# Patient Record
Sex: MALE | Race: WHITE | NOT HISPANIC OR LATINO | Employment: OTHER | URBAN - METROPOLITAN AREA
[De-identification: names, ages, dates, MRNs, and addresses within clinical notes are randomized per-mention and may not be internally consistent; named-entity substitution may affect disease eponyms.]

---

## 2017-01-28 DIAGNOSIS — N18.30 BENIGN HYPERTENSION WITH CHRONIC KIDNEY DISEASE, STAGE III (H): Primary | ICD-10-CM

## 2017-01-28 DIAGNOSIS — I12.9 BENIGN HYPERTENSION WITH CHRONIC KIDNEY DISEASE, STAGE III (H): Primary | ICD-10-CM

## 2017-01-28 NOTE — TELEPHONE ENCOUNTER
lisinopril-hydrochlorothiazide (PRINZIDE,ZESTORETIC) 10-12.5 MG per tablet      Last Written Prescription Date: 08/24/16  Last Fill Quantity: 90 tab, # refills: 0  Last Office Visit with [facility], [facility] or [facility] prescribing provider: 08/07/15       POTASSIUM   Date Value Ref Range Status   01/16/2015 4.7 3.4 - 5.3 mmol/L Final     CREATININE   Date Value Ref Range Status   01/16/2015 1.64* 0.66 - 1.25 mg/dL Final     BP Readings from Last 3 Encounters:   08/07/15 102/60   08/01/15 140/70   04/20/15 128/62

## 2017-01-30 NOTE — TELEPHONE ENCOUNTER
Routing refill request to provider for review/approval because:  Labs out of range:  Potassium and creatine    Gabriel Carroll MD at 8/24/2016  3:43 PM      Status: Signed         Expand All Collapse All    call- f/u needed before more refills given

## 2017-01-31 NOTE — TELEPHONE ENCOUNTER
Call the pt - must have appt scheduled to get med then 1 mo will be given until appt  Send back to me once appt is scheduled and pt is informed

## 2017-02-02 NOTE — TELEPHONE ENCOUNTER
Left message for patient informing him to call back to schedule appt . Will postpone message.Lexi Knox RN

## 2017-02-08 ENCOUNTER — TRANSFERRED RECORDS (OUTPATIENT)
Dept: HEALTH INFORMATION MANAGEMENT | Facility: CLINIC | Age: 82
End: 2017-02-08

## 2017-02-08 NOTE — TELEPHONE ENCOUNTER
[facility] called to check status of refill request.  Advised pharmacist that pt needs appt scheduled first, then will refill x1 month.  Pharmacist will pass message on to pt.  Hold message and watch for pt to make appt.  TISH Platt R.N.

## 2017-02-09 RX ORDER — LISINOPRIL/HYDROCHLOROTHIAZIDE 10-12.5 MG
1 TABLET ORAL DAILY
Qty: 30 TABLET | Refills: 0 | Status: SHIPPED | OUTPATIENT
Start: 2017-02-09 | End: 2018-04-26

## 2017-02-22 DIAGNOSIS — E11.9 TYPE 2 DIABETES, HBA1C GOAL < 8% (H): ICD-10-CM

## 2017-02-22 LAB
ALT SERPL W P-5'-P-CCNC: 24 U/L (ref 0–70)
ANION GAP SERPL CALCULATED.3IONS-SCNC: 7 MMOL/L (ref 3–14)
BUN SERPL-MCNC: 23 MG/DL (ref 7–30)
CALCIUM SERPL-MCNC: 8.2 MG/DL (ref 8.5–10.1)
CHLORIDE SERPL-SCNC: 109 MMOL/L (ref 94–109)
CHOLEST SERPL-MCNC: 116 MG/DL
CO2 SERPL-SCNC: 25 MMOL/L (ref 20–32)
CREAT SERPL-MCNC: 1.53 MG/DL (ref 0.66–1.25)
CREAT UR-MCNC: 197 MG/DL
GFR SERPL CREATININE-BSD FRML MDRD: 43 ML/MIN/1.7M2
GLUCOSE SERPL-MCNC: 107 MG/DL (ref 70–99)
HBA1C MFR BLD: 5.9 % (ref 4.3–6)
HDLC SERPL-MCNC: 41 MG/DL
LDLC SERPL CALC-MCNC: 64 MG/DL
MICROALBUMIN UR-MCNC: 170 MG/L
MICROALBUMIN/CREAT UR: 86.29 MG/G CR (ref 0–17)
NONHDLC SERPL-MCNC: 75 MG/DL
POTASSIUM SERPL-SCNC: 4 MMOL/L (ref 3.4–5.3)
SODIUM SERPL-SCNC: 141 MMOL/L (ref 133–144)
TRIGL SERPL-MCNC: 56 MG/DL

## 2017-02-22 PROCEDURE — 83036 HEMOGLOBIN GLYCOSYLATED A1C: CPT | Performed by: INTERNAL MEDICINE

## 2017-02-22 PROCEDURE — 82043 UR ALBUMIN QUANTITATIVE: CPT | Performed by: INTERNAL MEDICINE

## 2017-02-22 PROCEDURE — 80048 BASIC METABOLIC PNL TOTAL CA: CPT | Performed by: INTERNAL MEDICINE

## 2017-02-22 PROCEDURE — 84460 ALANINE AMINO (ALT) (SGPT): CPT | Performed by: INTERNAL MEDICINE

## 2017-02-22 PROCEDURE — 80061 LIPID PANEL: CPT | Performed by: INTERNAL MEDICINE

## 2017-02-22 PROCEDURE — 36415 COLL VENOUS BLD VENIPUNCTURE: CPT | Performed by: INTERNAL MEDICINE

## 2018-03-10 ENCOUNTER — RADIANT APPOINTMENT (OUTPATIENT)
Dept: GENERAL RADIOLOGY | Facility: CLINIC | Age: 83
End: 2018-03-10
Attending: FAMILY MEDICINE
Payer: MEDICARE

## 2018-03-10 ENCOUNTER — OFFICE VISIT (OUTPATIENT)
Dept: URGENT CARE | Facility: URGENT CARE | Age: 83
End: 2018-03-10
Payer: MEDICARE

## 2018-03-10 VITALS
DIASTOLIC BLOOD PRESSURE: 90 MMHG | WEIGHT: 164.06 LBS | BODY MASS INDEX: 25.7 KG/M2 | RESPIRATION RATE: 16 BRPM | SYSTOLIC BLOOD PRESSURE: 160 MMHG | HEART RATE: 80 BPM | TEMPERATURE: 97.6 F

## 2018-03-10 DIAGNOSIS — R10.31 ABDOMINAL PAIN, RIGHT LOWER QUADRANT: Primary | ICD-10-CM

## 2018-03-10 DIAGNOSIS — R10.31 ABDOMINAL PAIN, RIGHT LOWER QUADRANT: ICD-10-CM

## 2018-03-10 LAB
ALBUMIN SERPL-MCNC: 4 G/DL (ref 3.4–5)
ALBUMIN UR-MCNC: 30 MG/DL
ALP SERPL-CCNC: 82 U/L (ref 40–150)
ALT SERPL W P-5'-P-CCNC: 14 U/L (ref 0–70)
ANION GAP SERPL CALCULATED.3IONS-SCNC: 6 MMOL/L (ref 3–14)
APPEARANCE UR: CLEAR
AST SERPL W P-5'-P-CCNC: 17 U/L (ref 0–45)
BASOPHILS # BLD AUTO: 0 10E9/L (ref 0–0.2)
BASOPHILS NFR BLD AUTO: 0.3 %
BILIRUB SERPL-MCNC: 0.8 MG/DL (ref 0.2–1.3)
BILIRUB UR QL STRIP: NEGATIVE
BUN SERPL-MCNC: 28 MG/DL (ref 7–30)
CALCIUM SERPL-MCNC: 8.5 MG/DL (ref 8.5–10.1)
CHLORIDE SERPL-SCNC: 105 MMOL/L (ref 94–109)
CO2 SERPL-SCNC: 26 MMOL/L (ref 20–32)
COLOR UR AUTO: YELLOW
CREAT SERPL-MCNC: 1.72 MG/DL (ref 0.66–1.25)
DIFFERENTIAL METHOD BLD: NORMAL
EOSINOPHIL # BLD AUTO: 0.4 10E9/L (ref 0–0.7)
EOSINOPHIL NFR BLD AUTO: 6.2 %
ERYTHROCYTE [DISTWIDTH] IN BLOOD BY AUTOMATED COUNT: 12.5 % (ref 10–15)
GFR SERPL CREATININE-BSD FRML MDRD: 38 ML/MIN/1.7M2
GLUCOSE SERPL-MCNC: 115 MG/DL (ref 70–99)
GLUCOSE UR STRIP-MCNC: NEGATIVE MG/DL
HCT VFR BLD AUTO: 46.1 % (ref 40–53)
HGB BLD-MCNC: 15.3 G/DL (ref 13.3–17.7)
HGB UR QL STRIP: ABNORMAL
KETONES UR STRIP-MCNC: ABNORMAL MG/DL
LEUKOCYTE ESTERASE UR QL STRIP: ABNORMAL
LYMPHOCYTES # BLD AUTO: 1.3 10E9/L (ref 0.8–5.3)
LYMPHOCYTES NFR BLD AUTO: 18.7 %
MCH RBC QN AUTO: 32.7 PG (ref 26.5–33)
MCHC RBC AUTO-ENTMCNC: 33.2 G/DL (ref 31.5–36.5)
MCV RBC AUTO: 99 FL (ref 78–100)
MONOCYTES # BLD AUTO: 0.6 10E9/L (ref 0–1.3)
MONOCYTES NFR BLD AUTO: 8.6 %
NEUTROPHILS # BLD AUTO: 4.6 10E9/L (ref 1.6–8.3)
NEUTROPHILS NFR BLD AUTO: 66.2 %
NITRATE UR QL: NEGATIVE
PH UR STRIP: 5.5 PH (ref 5–7)
PLATELET # BLD AUTO: 189 10E9/L (ref 150–450)
POTASSIUM SERPL-SCNC: 4 MMOL/L (ref 3.4–5.3)
PROT SERPL-MCNC: 7.6 G/DL (ref 6.8–8.8)
RBC # BLD AUTO: 4.68 10E12/L (ref 4.4–5.9)
SODIUM SERPL-SCNC: 137 MMOL/L (ref 133–144)
SOURCE: ABNORMAL
SP GR UR STRIP: 1.02 (ref 1–1.03)
UROBILINOGEN UR STRIP-ACNC: 0.2 EU/DL (ref 0.2–1)
WBC # BLD AUTO: 6.9 10E9/L (ref 4–11)

## 2018-03-10 PROCEDURE — 87186 SC STD MICRODIL/AGAR DIL: CPT | Performed by: FAMILY MEDICINE

## 2018-03-10 PROCEDURE — 81003 URINALYSIS AUTO W/O SCOPE: CPT | Performed by: FAMILY MEDICINE

## 2018-03-10 PROCEDURE — 85025 COMPLETE CBC W/AUTO DIFF WBC: CPT | Performed by: FAMILY MEDICINE

## 2018-03-10 PROCEDURE — 72170 X-RAY EXAM OF PELVIS: CPT | Mod: FY

## 2018-03-10 PROCEDURE — 80053 COMPREHEN METABOLIC PANEL: CPT | Performed by: FAMILY MEDICINE

## 2018-03-10 PROCEDURE — 99214 OFFICE O/P EST MOD 30 MIN: CPT | Performed by: FAMILY MEDICINE

## 2018-03-10 PROCEDURE — 87088 URINE BACTERIA CULTURE: CPT | Performed by: FAMILY MEDICINE

## 2018-03-10 PROCEDURE — 87086 URINE CULTURE/COLONY COUNT: CPT | Performed by: FAMILY MEDICINE

## 2018-03-10 PROCEDURE — 36415 COLL VENOUS BLD VENIPUNCTURE: CPT | Performed by: FAMILY MEDICINE

## 2018-03-10 NOTE — NURSING NOTE
"Chief Complaint   Patient presents with     Abdominal Pain     rt sided abd pain for past 2 days,denies nausea       Initial /90 (Cuff Size: Adult Regular)  Pulse 80  Temp 97.6  F (36.4  C) (Oral)  Resp 16  Wt 164 lb 1 oz (74.4 kg)  BMI 25.7 kg/m2 Estimated body mass index is 25.7 kg/(m^2) as calculated from the following:    Height as of 8/7/15: 5' 7\" (1.702 m).    Weight as of this encounter: 164 lb 1 oz (74.4 kg).  Medication Reconciliation: complete Jovanny DAWKINS    "

## 2018-03-10 NOTE — MR AVS SNAPSHOT
"              After Visit Summary   3/10/2018    Jose Gomez    MRN: [id number]           Patient Information     Date Of Birth          1928        Visit Information        Provider Department      3/10/2018 9:30 AM Christiano Knox MD [facility]        Today's Diagnoses     Abdominal pain, right lower quadrant    -  1       Follow-ups after your visit        Who to contact     If you have questions or need follow up information about today's clinic visit or your schedule please contact [facility] directly at [phone].  Normal or non-critical lab and imaging results will be communicated to you by appAttachhart, letter or phone within 4 business days after the clinic has received the results. If you do not hear from us within 7 days, please contact the clinic through appAttachhart or phone. If you have a critical or abnormal lab result, we will notify you by phone as soon as possible.  Submit refill requests through TrueInsider or call your pharmacy and they will forward the refill request to us. Please allow 3 business days for your refill to be completed.          Additional Information About Your Visit        MyChart Information     TrueInsider lets you send messages to your doctor, view your test results, renew your prescriptions, schedule appointments and more. To sign up, go to [email or web address] . Click on \"Log in\" on the left side of the screen, which will take you to the Welcome page. Then click on \"Sign up Now\" on the right side of the page.     You will be asked to enter the access code listed below, as well as some personal information. Please follow the directions to create your username and password.     Your access code is: [email or web address]  Expires: 2018 11:21 AM     Your access code will  in 90 days. If you need help or a new code, please call your [place] clinic or [phone].        Care EveryWhere ID     This is your Care " EveryWhere ID. This could be used by other organizations to access your [place] medical records  [id number]        Your Vitals Were     Pulse Temperature Respirations BMI (Body Mass Index)          80 97.6  F (36.4  C) (Oral) 16 25.7 kg/m2         Blood Pressure from Last 3 Encounters:   03/10/18 160/90   08/07/15 102/60   08/01/15 140/70    Weight from Last 3 Encounters:   03/10/18 164 lb 1 oz (74.4 kg)   08/07/15 151 lb 9.6 oz (68.8 kg)   08/01/15 160 lb (72.6 kg)              We Performed the Following     CBC with platelets and differential     Comprehensive metabolic panel (BMP + Alb, Alk Phos, ALT, AST, Total. Bili, TP)     UA without Microscopic     Urine Culture Aerobic Bacterial        Primary Care Provider Office Phone # Fax #    Gabriel Carroll [phone]       [address] [place] [address]        Equal Access to Services     ESDRAS LOPEZ : Hadii aad ku hadasho Soomaali, waaxda luqadaha, qaybta kaalmada adeegyada, waxay idiin haypapin becky merrill . So [facility] [phone].    ATENCIÓN: Si habla español, tiene a davis disposición servicios gratuitos de asistencia lingüística. Llame al [phone].    We comply with applicable federal civil rights laws and [place] laws. We do not discriminate on the basis of race, color, national origin, age, disability, sex, sexual orientation, or gender identity.            Thank you!     Thank you for choosing [facility]  for your care. Our goal is always to provide you with excellent care. Hearing back from our patients is one way we can continue to improve our services. Please take a few minutes to complete the written survey that you may receive in the mail after your visit with us. Thank you!             Your Updated Medication List - Protect others around you: Learn how to safely use, store and throw away your medicines at www.disposemymeds.org.          This list is accurate as of 3/10/18 11:21  AM.  Always use your most recent med list.                   Brand Name Dispense Instructions for use Diagnosis    acetaminophen 500 MG tablet    TYLENOL    50 tablet    Take 2 tablets (1,000 mg) by mouth every 6 hours as needed for mild pain    Low back pain       aspirin 81 MG tablet     100    1 TABLET DAILY        B-12 2000 MCG Tabs      Take 1 tablet by mouth daily    Need for prophylactic vaccination against Streptococcus pneumoniae (pneumococcus)       blood glucose monitoring test strip    no brand specified    100 each    by In Vitro route. Test as directed    Type II or unspecified type diabetes mellitus without mention of complication, not stated as uncontrolled       gabapentin 100 MG capsule    NEURONTIN     Take 100 mg by mouth 3 times daily        lisinopril-hydrochlorothiazide 10-12.5 MG per tablet    PRINZIDE/ZESTORETIC    30 tablet    Take 1 tablet by mouth daily    Benign hypertension with chronic kidney disease, stage III       METAMUCIL SMOOTH TEXTURE 63 % Powd   Generic drug:  psyllium     1 Bottle    Take 1 teaspoonful by mouth daily as needed Mix in 8 ounces of water    Left hip pain       simvastatin 20 MG tablet    ZOCOR    30 tablet    Take 1 tablet (20 mg) by mouth At Bedtime    Hyperlipidemia LDL goal <100

## 2018-03-10 NOTE — PROGRESS NOTES
SUBJECTIVE  HPI:  Jose Gomez is a 89 year old male who presents with the CC of abdominal/pelvic pain.    Pain is located in the RLQ/side area, without radiation to the back opr groin.  The pain is characterized as aching, and at worst is a level 3 on a scale of 1-10.  Pain has been present for 1 week(s) and is stable.  EXACERBATING FACTORS: movement/walking  RELIEVING FACTORS: nothing.  ASSOCIATED SX: DENIES nausea, vomiting, diarrhea, dysuria, frequency, hematuria, fever and chills.    Past Medical History:   Diagnosis Date     CKD (chronic kidney disease) stage 3, GFR 30-59 ml/min      Esophageal reflux     very mild     Essential hypertension, benign      Mixed hyperlipidemia      Type 2 diabetes, HbA1c goal < 7% (H)      Unspecified internal derangement of knee     LEFT     Current Outpatient Prescriptions   Medication Sig Dispense Refill     simvastatin (ZOCOR) 20 MG tablet Take 1 tablet (20 mg) by mouth At Bedtime 30 tablet 0     Cyanocobalamin (B-12) 2000 MCG TABS Take 1 tablet by mouth daily       ASPIRIN 81 MG PO TABS 1 TABLET DAILY 100 3     lisinopril-hydrochlorothiazide (PRINZIDE/ZESTORETIC) 10-12.5 MG per tablet Take 1 tablet by mouth daily (Patient not taking: Reported on 3/10/2018) 30 tablet 0     acetaminophen (TYLENOL) 500 MG tablet Take 2 tablets (1,000 mg) by mouth every 6 hours as needed for mild pain (Patient not taking: Reported on 3/10/2018) 50 tablet 0     gabapentin (NEURONTIN) 100 MG capsule Take 100 mg by mouth 3 times daily       psyllium (METAMUCIL SMOOTH TEXTURE) 63 % POWD Take 1 teaspoonful by mouth daily as needed Mix in 8 ounces of water 1 Bottle 11     glucose blood VI test strips strip by In Vitro route. Test as directed 100 each 0     Social History   Substance Use Topics     Smoking status: Former Smoker     Types: Cigars     Quit date: 5/3/1984     Smokeless tobacco: Never Used     Alcohol use Yes      Comment: 1-2x per month       ROS:  CONSTITUTIONAL:NEGATIVE for fever,  chills, change in weight  INTEGUMENTARY/SKIN: NEGATIVE for worrisome rashes, moles or lesions    OBJECTIVE:  /90 (Cuff Size: Adult Regular)  Pulse 80  Temp 97.6  F (36.4  C) (Oral)  Resp 16  Wt 164 lb 1 oz (74.4 kg)  BMI 25.7 kg/m2  GENERAL APPEARANCE: healthy, alert and no distress  EYES: EOMI,  PERRL, conjunctiva clear  HENT: ear canals and TM's normal.  Nose and mouth without ulcers, erythema or lesions  NECK: supple, nontender, no lymphadenopathy  RESP: lungs clear to auscultation - no rales, rhonchi or wheezes  CV: regular rates and rhythm, normal S1 S2, no murmur noted  ABDOMEN:  soft, nontender, no HSM or masses and bowel sounds normal  MS: extremities normal- no gross deformities noted, no erythema, FROM noted in all extremities  NEURO: Normal strength and tone, sensory exam grossly normal,  normal speech and mentation  SKIN: no suspicious lesions or rashes    ASSESSMENT:  1. Abdominal pain, right lower quadrant  ddx includes appy, msk issue, constipation, stone, bursitis, shingles.    - UA without Microscopic  - CBC with platelets and differential  - Comprehensive metabolic panel (BMP + Alb, Alk Phos, ALT, AST, Total. Bili, TP)  - Urine Culture Aerobic Bacterial  - XR Pelvis 1/2 Views; Future     See Orders in Epic  Urine has mild findings but of doubtful significance   Will add cx   Wbc neg   cmp pending.   Xray without specific findings when I review the images personally   Radiology to review xrays and I will communicate new findings   Lets treat as msk issue and see back in one week if persists    hes due for dm labs.

## 2018-03-13 LAB
BACTERIA SPEC CULT: ABNORMAL
BACTERIA SPEC CULT: ABNORMAL
SPECIMEN SOURCE: ABNORMAL

## 2018-03-14 DIAGNOSIS — R82.79 POSITIVE URINE CULTURE: Primary | ICD-10-CM

## 2018-03-14 NOTE — LETTER
[address] [address]  Phone: [phone]      Jose Gomez  [address] [facility] [address]    3/17/2018        Dear Jose    I am writing you concerning your recent lab results obtained at the clinic. Your tests have returned. We have tried contacting you by phone and left messages for you to return call; Please contact [facility]. Thank you!    Sincerely,        [address] [address] [phone] or  [phone]

## 2018-03-14 NOTE — TELEPHONE ENCOUNTER
Please call pt and inform him that he will need a antibiotic for a positive UC.  And the call in RX for him to his preferred Pharmacy.

## 2018-03-20 RX ORDER — AMOXICILLIN 500 MG/1
500 CAPSULE ORAL 3 TIMES DAILY
Qty: 21 CAPSULE | Refills: 0
Start: 2018-03-20 | End: 2018-03-27

## 2018-03-20 NOTE — TELEPHONE ENCOUNTER
Pt called and advised. Pt reports that he has already been taking the amoxicillin for about 3-4 days. Pt instructed to follow up with his PCP after the antibiotic is finished. Pt understood.

## 2018-04-26 ENCOUNTER — APPOINTMENT (OUTPATIENT)
Dept: CT IMAGING | Facility: CLINIC | Age: 83
DRG: 305 | End: 2018-04-26
Attending: EMERGENCY MEDICINE
Payer: MEDICARE

## 2018-04-26 ENCOUNTER — OFFICE VISIT (OUTPATIENT)
Dept: URGENT CARE | Facility: URGENT CARE | Age: 83
End: 2018-04-26
Payer: MEDICARE

## 2018-04-26 ENCOUNTER — APPOINTMENT (OUTPATIENT)
Dept: MRI IMAGING | Facility: CLINIC | Age: 83
DRG: 305 | End: 2018-04-26
Attending: INTERNAL MEDICINE
Payer: MEDICARE

## 2018-04-26 ENCOUNTER — HOSPITAL ENCOUNTER (INPATIENT)
Facility: CLINIC | Age: 83
LOS: 1 days | Discharge: HOME | DRG: 305 | End: 2018-04-27
Attending: EMERGENCY MEDICINE | Admitting: INTERNAL MEDICINE
Payer: MEDICARE

## 2018-04-26 VITALS
TEMPERATURE: 96.5 F | SYSTOLIC BLOOD PRESSURE: 168 MMHG | DIASTOLIC BLOOD PRESSURE: 98 MMHG | RESPIRATION RATE: 16 BRPM | HEART RATE: 68 BPM

## 2018-04-26 DIAGNOSIS — R26.81 GAIT INSTABILITY: ICD-10-CM

## 2018-04-26 DIAGNOSIS — R51.9 ACUTE INTRACTABLE HEADACHE, UNSPECIFIED HEADACHE TYPE: Primary | ICD-10-CM

## 2018-04-26 DIAGNOSIS — I10 HYPERTENSION, UNSPECIFIED TYPE: ICD-10-CM

## 2018-04-26 DIAGNOSIS — E78.5 HYPERLIPIDEMIA LDL GOAL <100: Primary | ICD-10-CM

## 2018-04-26 DIAGNOSIS — R51.9 NONINTRACTABLE HEADACHE, UNSPECIFIED CHRONICITY PATTERN, UNSPECIFIED HEADACHE TYPE: ICD-10-CM

## 2018-04-26 PROBLEM — I16.1 HYPERTENSIVE EMERGENCY: Status: ACTIVE | Noted: 2018-04-26

## 2018-04-26 LAB
ALBUMIN SERPL-MCNC: 3.6 G/DL (ref 3.4–5)
ALBUMIN SERPL-MCNC: 4 G/DL (ref 3.4–5)
ALBUMIN UR-MCNC: NEGATIVE MG/DL
ALP SERPL-CCNC: 74 U/L (ref 40–150)
ALP SERPL-CCNC: 84 U/L (ref 40–150)
ALT SERPL W P-5'-P-CCNC: 15 U/L (ref 0–70)
ALT SERPL W P-5'-P-CCNC: 16 U/L (ref 0–70)
ANION GAP SERPL CALCULATED.3IONS-SCNC: 9 MMOL/L (ref 3–14)
APPEARANCE UR: CLEAR
AST SERPL W P-5'-P-CCNC: 18 U/L (ref 0–45)
AST SERPL W P-5'-P-CCNC: 18 U/L (ref 0–45)
BASOPHILS # BLD AUTO: 0 10E9/L (ref 0–0.2)
BASOPHILS NFR BLD AUTO: 0.1 %
BILIRUB DIRECT SERPL-MCNC: 0.2 MG/DL (ref 0–0.2)
BILIRUB SERPL-MCNC: 0.8 MG/DL (ref 0.2–1.3)
BILIRUB SERPL-MCNC: 1 MG/DL (ref 0.2–1.3)
BILIRUB UR QL STRIP: NEGATIVE
BUN SERPL-MCNC: 24 MG/DL (ref 7–30)
CALCIUM SERPL-MCNC: 8.7 MG/DL (ref 8.5–10.1)
CHLORIDE SERPL-SCNC: 105 MMOL/L (ref 94–109)
CO2 SERPL-SCNC: 21 MMOL/L (ref 20–32)
COLOR UR AUTO: ABNORMAL
CREAT SERPL-MCNC: 1.52 MG/DL (ref 0.66–1.25)
DIFFERENTIAL METHOD BLD: NORMAL
EOSINOPHIL # BLD AUTO: 0.2 10E9/L (ref 0–0.7)
EOSINOPHIL NFR BLD AUTO: 2.9 %
ERYTHROCYTE [DISTWIDTH] IN BLOOD BY AUTOMATED COUNT: 12.4 % (ref 10–15)
ERYTHROCYTE [SEDIMENTATION RATE] IN BLOOD BY WESTERGREN METHOD: 9 MM/H (ref 0–20)
GFR SERPL CREATININE-BSD FRML MDRD: 43 ML/MIN/1.7M2
GLUCOSE BLDC GLUCOMTR-MCNC: 177 MG/DL (ref 70–99)
GLUCOSE SERPL-MCNC: 123 MG/DL (ref 70–99)
GLUCOSE UR STRIP-MCNC: NEGATIVE MG/DL
HBA1C MFR BLD: 6.3 % (ref 0–6.4)
HCT VFR BLD AUTO: 42.5 % (ref 40–53)
HGB BLD-MCNC: 14.9 G/DL (ref 13.3–17.7)
HGB UR QL STRIP: NEGATIVE
IMM GRANULOCYTES # BLD: 0 10E9/L (ref 0–0.4)
IMM GRANULOCYTES NFR BLD: 0.4 %
INR PPP: 1.01 (ref 0.86–1.14)
INTERPRETATION ECG - MUSE: NORMAL
KETONES UR STRIP-MCNC: 10 MG/DL
LEUKOCYTE ESTERASE UR QL STRIP: NEGATIVE
LYMPHOCYTES # BLD AUTO: 1 10E9/L (ref 0.8–5.3)
LYMPHOCYTES NFR BLD AUTO: 12.9 %
MCH RBC QN AUTO: 33 PG (ref 26.5–33)
MCHC RBC AUTO-ENTMCNC: 35.1 G/DL (ref 31.5–36.5)
MCV RBC AUTO: 94 FL (ref 78–100)
MONOCYTES # BLD AUTO: 0.4 10E9/L (ref 0–1.3)
MONOCYTES NFR BLD AUTO: 4.9 %
NEUTROPHILS # BLD AUTO: 5.9 10E9/L (ref 1.6–8.3)
NEUTROPHILS NFR BLD AUTO: 78.8 %
NITRATE UR QL: NEGATIVE
NRBC # BLD AUTO: 0 10*3/UL
NRBC BLD AUTO-RTO: 0 /100
PH UR STRIP: 6.5 PH (ref 5–7)
PLATELET # BLD AUTO: 168 10E9/L (ref 150–450)
POTASSIUM SERPL-SCNC: 4 MMOL/L (ref 3.4–5.3)
PROT SERPL-MCNC: 7 G/DL (ref 6.8–8.8)
PROT SERPL-MCNC: 7.7 G/DL (ref 6.8–8.8)
RBC # BLD AUTO: 4.51 10E12/L (ref 4.4–5.9)
RBC #/AREA URNS AUTO: 3 /HPF (ref 0–2)
SODIUM SERPL-SCNC: 135 MMOL/L (ref 133–144)
SOURCE: ABNORMAL
SP GR UR STRIP: 1.03 (ref 1–1.03)
TROPONIN I SERPL-MCNC: <0.015 UG/L (ref 0–0.04)
UROBILINOGEN UR STRIP-MCNC: NORMAL MG/DL (ref 0–2)
WBC # BLD AUTO: 7.5 10E9/L (ref 4–11)
WBC #/AREA URNS AUTO: 2 /HPF (ref 0–5)

## 2018-04-26 PROCEDURE — 81001 URINALYSIS AUTO W/SCOPE: CPT | Performed by: EMERGENCY MEDICINE

## 2018-04-26 PROCEDURE — 25000125 ZZHC RX 250: Performed by: EMERGENCY MEDICINE

## 2018-04-26 PROCEDURE — 25000132 ZZH RX MED GY IP 250 OP 250 PS 637: Mod: GY | Performed by: EMERGENCY MEDICINE

## 2018-04-26 PROCEDURE — 99285 EMERGENCY DEPT VISIT HI MDM: CPT | Mod: 25

## 2018-04-26 PROCEDURE — 25000128 H RX IP 250 OP 636: Performed by: INTERNAL MEDICINE

## 2018-04-26 PROCEDURE — 99213 OFFICE O/P EST LOW 20 MIN: CPT | Performed by: PHYSICIAN ASSISTANT

## 2018-04-26 PROCEDURE — 99223 1ST HOSP IP/OBS HIGH 75: CPT | Mod: AI | Performed by: INTERNAL MEDICINE

## 2018-04-26 PROCEDURE — 85610 PROTHROMBIN TIME: CPT | Performed by: EMERGENCY MEDICINE

## 2018-04-26 PROCEDURE — 96365 THER/PROPH/DIAG IV INF INIT: CPT

## 2018-04-26 PROCEDURE — 25000131 ZZH RX MED GY IP 250 OP 636 PS 637: Mod: GY | Performed by: INTERNAL MEDICINE

## 2018-04-26 PROCEDURE — 21000000 ZZH R&B IMCU HEART CARE

## 2018-04-26 PROCEDURE — 70553 MRI BRAIN STEM W/O & W/DYE: CPT

## 2018-04-26 PROCEDURE — 36415 COLL VENOUS BLD VENIPUNCTURE: CPT | Performed by: INTERNAL MEDICINE

## 2018-04-26 PROCEDURE — 80076 HEPATIC FUNCTION PANEL: CPT | Performed by: INTERNAL MEDICINE

## 2018-04-26 PROCEDURE — 93005 ELECTROCARDIOGRAM TRACING: CPT

## 2018-04-26 PROCEDURE — 70450 CT HEAD/BRAIN W/O DYE: CPT

## 2018-04-26 PROCEDURE — 84484 ASSAY OF TROPONIN QUANT: CPT | Performed by: INTERNAL MEDICINE

## 2018-04-26 PROCEDURE — 85652 RBC SED RATE AUTOMATED: CPT | Performed by: EMERGENCY MEDICINE

## 2018-04-26 PROCEDURE — A9270 NON-COVERED ITEM OR SERVICE: HCPCS | Mod: GY | Performed by: INTERNAL MEDICINE

## 2018-04-26 PROCEDURE — 70496 CT ANGIOGRAPHY HEAD: CPT

## 2018-04-26 PROCEDURE — 83036 HEMOGLOBIN GLYCOSYLATED A1C: CPT | Performed by: INTERNAL MEDICINE

## 2018-04-26 PROCEDURE — 25000132 ZZH RX MED GY IP 250 OP 250 PS 637: Mod: GY | Performed by: INTERNAL MEDICINE

## 2018-04-26 PROCEDURE — 00000146 ZZHCL STATISTIC GLUCOSE BY METER IP

## 2018-04-26 PROCEDURE — 25000128 H RX IP 250 OP 636: Performed by: EMERGENCY MEDICINE

## 2018-04-26 PROCEDURE — 96375 TX/PRO/DX INJ NEW DRUG ADDON: CPT

## 2018-04-26 PROCEDURE — A9270 NON-COVERED ITEM OR SERVICE: HCPCS | Mod: GY | Performed by: EMERGENCY MEDICINE

## 2018-04-26 PROCEDURE — 85025 COMPLETE CBC W/AUTO DIFF WBC: CPT | Performed by: EMERGENCY MEDICINE

## 2018-04-26 PROCEDURE — 80053 COMPREHEN METABOLIC PANEL: CPT | Performed by: EMERGENCY MEDICINE

## 2018-04-26 RX ORDER — AMLODIPINE BESYLATE 5 MG/1
5 TABLET ORAL DAILY
Status: DISCONTINUED | OUTPATIENT
Start: 2018-04-26 | End: 2018-04-27 | Stop reason: HOSPADM

## 2018-04-26 RX ORDER — LISINOPRIL/HYDROCHLOROTHIAZIDE 10-12.5 MG
1 TABLET ORAL ONCE
Status: COMPLETED | OUTPATIENT
Start: 2018-04-26 | End: 2018-04-26

## 2018-04-26 RX ORDER — PROCHLORPERAZINE 25 MG
12.5 SUPPOSITORY, RECTAL RECTAL EVERY 12 HOURS PRN
Status: DISCONTINUED | OUTPATIENT
Start: 2018-04-26 | End: 2018-04-27 | Stop reason: HOSPADM

## 2018-04-26 RX ORDER — ACETAMINOPHEN 325 MG/1
650 TABLET ORAL EVERY 4 HOURS PRN
Status: DISCONTINUED | OUTPATIENT
Start: 2018-04-26 | End: 2018-04-27 | Stop reason: HOSPADM

## 2018-04-26 RX ORDER — ATORVASTATIN CALCIUM 40 MG/1
40 TABLET, FILM COATED ORAL
Status: DISCONTINUED | OUTPATIENT
Start: 2018-04-26 | End: 2018-04-27 | Stop reason: HOSPADM

## 2018-04-26 RX ORDER — HYDROCODONE BITARTRATE AND ACETAMINOPHEN 5; 325 MG/1; MG/1
1-2 TABLET ORAL EVERY 4 HOURS PRN
Status: DISCONTINUED | OUTPATIENT
Start: 2018-04-26 | End: 2018-04-27 | Stop reason: HOSPADM

## 2018-04-26 RX ORDER — LISINOPRIL 10 MG/1
10 TABLET ORAL DAILY
Status: DISCONTINUED | OUTPATIENT
Start: 2018-04-27 | End: 2018-04-27 | Stop reason: HOSPADM

## 2018-04-26 RX ORDER — HYDROMORPHONE HYDROCHLORIDE 1 MG/ML
.3-.5 INJECTION, SOLUTION INTRAMUSCULAR; INTRAVENOUS; SUBCUTANEOUS
Status: DISCONTINUED | OUTPATIENT
Start: 2018-04-26 | End: 2018-04-27 | Stop reason: HOSPADM

## 2018-04-26 RX ORDER — ONDANSETRON 2 MG/ML
4 INJECTION INTRAMUSCULAR; INTRAVENOUS EVERY 6 HOURS PRN
Status: DISCONTINUED | OUTPATIENT
Start: 2018-04-26 | End: 2018-04-27 | Stop reason: HOSPADM

## 2018-04-26 RX ORDER — SODIUM CHLORIDE 9 MG/ML
INJECTION, SOLUTION INTRAVENOUS CONTINUOUS
Status: DISCONTINUED | OUTPATIENT
Start: 2018-04-26 | End: 2018-04-27

## 2018-04-26 RX ORDER — NITROGLYCERIN 0.4 MG/1
0.4 TABLET SUBLINGUAL EVERY 5 MIN PRN
Status: DISCONTINUED | OUTPATIENT
Start: 2018-04-26 | End: 2018-04-27 | Stop reason: HOSPADM

## 2018-04-26 RX ORDER — POLYETHYLENE GLYCOL 3350 17 G/17G
17 POWDER, FOR SOLUTION ORAL DAILY PRN
Status: DISCONTINUED | OUTPATIENT
Start: 2018-04-26 | End: 2018-04-27 | Stop reason: HOSPADM

## 2018-04-26 RX ORDER — DEXAMETHASONE SODIUM PHOSPHATE 10 MG/ML
10 INJECTION, SOLUTION INTRAMUSCULAR; INTRAVENOUS ONCE
Status: COMPLETED | OUTPATIENT
Start: 2018-04-26 | End: 2018-04-26

## 2018-04-26 RX ORDER — NALOXONE HYDROCHLORIDE 0.4 MG/ML
.1-.4 INJECTION, SOLUTION INTRAMUSCULAR; INTRAVENOUS; SUBCUTANEOUS
Status: DISCONTINUED | OUTPATIENT
Start: 2018-04-26 | End: 2018-04-27 | Stop reason: HOSPADM

## 2018-04-26 RX ORDER — ASPIRIN 81 MG/1
81 TABLET ORAL DAILY
Status: DISCONTINUED | OUTPATIENT
Start: 2018-04-27 | End: 2018-04-27 | Stop reason: HOSPADM

## 2018-04-26 RX ORDER — IOPAMIDOL 755 MG/ML
70 INJECTION, SOLUTION INTRAVASCULAR ONCE
Status: COMPLETED | OUTPATIENT
Start: 2018-04-26 | End: 2018-04-26

## 2018-04-26 RX ORDER — LIDOCAINE 40 MG/G
CREAM TOPICAL
Status: DISCONTINUED | OUTPATIENT
Start: 2018-04-26 | End: 2018-04-27 | Stop reason: HOSPADM

## 2018-04-26 RX ORDER — PROCHLORPERAZINE MALEATE 5 MG
5 TABLET ORAL EVERY 6 HOURS PRN
Status: DISCONTINUED | OUTPATIENT
Start: 2018-04-26 | End: 2018-04-27 | Stop reason: HOSPADM

## 2018-04-26 RX ORDER — ASPIRIN 325 MG
325 TABLET ORAL ONCE
Status: COMPLETED | OUTPATIENT
Start: 2018-04-26 | End: 2018-04-26

## 2018-04-26 RX ORDER — DEXTROSE MONOHYDRATE 25 G/50ML
25-50 INJECTION, SOLUTION INTRAVENOUS
Status: DISCONTINUED | OUTPATIENT
Start: 2018-04-26 | End: 2018-04-27 | Stop reason: HOSPADM

## 2018-04-26 RX ORDER — ONDANSETRON 4 MG/1
4 TABLET, ORALLY DISINTEGRATING ORAL EVERY 6 HOURS PRN
Status: DISCONTINUED | OUTPATIENT
Start: 2018-04-26 | End: 2018-04-27 | Stop reason: HOSPADM

## 2018-04-26 RX ORDER — NICOTINE POLACRILEX 4 MG
15-30 LOZENGE BUCCAL
Status: DISCONTINUED | OUTPATIENT
Start: 2018-04-26 | End: 2018-04-27 | Stop reason: HOSPADM

## 2018-04-26 RX ORDER — DIPHENHYDRAMINE HYDROCHLORIDE 50 MG/ML
25 INJECTION INTRAMUSCULAR; INTRAVENOUS ONCE
Status: COMPLETED | OUTPATIENT
Start: 2018-04-26 | End: 2018-04-26

## 2018-04-26 RX ORDER — BISACODYL 10 MG
10 SUPPOSITORY, RECTAL RECTAL DAILY PRN
Status: DISCONTINUED | OUTPATIENT
Start: 2018-04-26 | End: 2018-04-27 | Stop reason: HOSPADM

## 2018-04-26 RX ADMIN — PROCHLORPERAZINE EDISYLATE 5 MG: 5 INJECTION INTRAMUSCULAR; INTRAVENOUS at 17:01

## 2018-04-26 RX ADMIN — ATORVASTATIN CALCIUM 40 MG: 40 TABLET, FILM COATED ORAL at 22:39

## 2018-04-26 RX ADMIN — NICARDIPINE HYDROCHLORIDE 2.5 MG/HR: 0.2 INJECTION, SOLUTION INTRAVENOUS at 20:09

## 2018-04-26 RX ADMIN — DEXAMETHASONE SODIUM PHOSPHATE 10 MG: 10 INJECTION, SOLUTION INTRAMUSCULAR; INTRAVENOUS at 17:01

## 2018-04-26 RX ADMIN — LISINOPRIL AND HYDROCHLOROTHIAZIDE 1 TABLET: 12.5; 1 TABLET ORAL at 18:42

## 2018-04-26 RX ADMIN — ASPIRIN 325 MG ORAL TABLET 325 MG: 325 PILL ORAL at 19:34

## 2018-04-26 RX ADMIN — IOPAMIDOL 70 ML: 755 INJECTION, SOLUTION INTRAVENOUS at 16:54

## 2018-04-26 RX ADMIN — DIPHENHYDRAMINE HYDROCHLORIDE 25 MG: 50 INJECTION, SOLUTION INTRAMUSCULAR; INTRAVENOUS at 17:01

## 2018-04-26 RX ADMIN — SODIUM CHLORIDE: 9 INJECTION, SOLUTION INTRAVENOUS at 22:37

## 2018-04-26 RX ADMIN — INSULIN ASPART 1 UNITS: 100 INJECTION, SOLUTION INTRAVENOUS; SUBCUTANEOUS at 23:08

## 2018-04-26 RX ADMIN — AMLODIPINE BESYLATE 5 MG: 5 TABLET ORAL at 22:39

## 2018-04-26 RX ADMIN — SODIUM CHLORIDE 90 ML: 9 INJECTION, SOLUTION INTRAVENOUS at 16:55

## 2018-04-26 ASSESSMENT — ENCOUNTER SYMPTOMS
ABDOMINAL PAIN: 0
VOMITING: 0
NUMBNESS: 0
HEADACHES: 1
NAUSEA: 1
SPEECH DIFFICULTY: 0
SHORTNESS OF BREATH: 0
DIZZINESS: 0
NECK PAIN: 1

## 2018-04-26 ASSESSMENT — PAIN DESCRIPTION - DESCRIPTORS: DESCRIPTORS: ACHING

## 2018-04-26 NOTE — PROGRESS NOTES
SUBJECTIVE:   Jose Gomez is a 89 year old male presenting with a chief complaint of a severe headache, onset 2 hours ago.  The headache is sharp, and an 8/10 on a 0-10 pain scale.    He also complains of nausea, no vomiting.  He denies any numbness, tingling or weakness in his extremities.  Denies any difficulty talking or swallowing.  He is otherwise in his usual state of health.  He states that he does not usually have headaches.  This headache is one of the worse that he has ever had.  He also complains of some posterior neck pain.  Denies any fevers or URI symptoms.      His friend drove him.       Past Medical History:   Diagnosis Date     CKD (chronic kidney disease) stage 3, GFR 30-59 ml/min      Esophageal reflux     very mild     Essential hypertension, benign      Mixed hyperlipidemia      Type 2 diabetes, HbA1c goal < 7% (H)      Unspecified internal derangement of knee     LEFT     Patient Active Problem List   Diagnosis     Internal derangement of knee     Esophageal reflux     Essential hypertension, benign     Pernicious anemia     HYPERLIPIDEMIA LDL GOAL <100     CKD (chronic kidney disease) stage 3, GFR 30-59 ml/min     Other specified idiopathic peripheral neuropathy     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)     Social History   Substance Use Topics     Smoking status: Former Smoker     Types: Cigars     Quit date: 5/3/1984     Smokeless tobacco: Never Used     Alcohol use Yes      Comment: 1-2x per month       ROS:  CONSTITUTIONAL:NEGATIVE for fever, chills, change in weight  INTEGUMENTARY/SKIN: NEGATIVE for worrisome rashes, moles or lesions  EYES: NEGATIVE for vision changes or irritation  ENT/MOUTH: NEGATIVE for ear, mouth and throat problems  RESP:NEGATIVE for significant cough or SOB  CV: NEGATIVE for chest pain, palpitations or peripheral edema  GI: as per HPI  MUSCULOSKELETAL: NEGATIVE for significant arthralgias or myalgia  NEURO: as per HPI  Review of systems  negative except as stated above.    OBJECTIVE  :BP (!) 168/98  Pulse 68  Temp 96.5  F (35.8  C) (Tympanic)  Resp 16  GENERAL APPEARANCE: alert and mild distress secondary to pain, sitting in wheelchair  SKIN: no suspicious lesions or rashes    (R51) Acute intractable headache, unspecified headache type  (primary encounter diagnosis)  Comment:   Plan: TO [facility] now for further evaluation.  Patient prefers his friend to drive him.  [facility] informed.

## 2018-04-26 NOTE — ED PROVIDER NOTES
History     Chief Complaint:  Headache     The history is provided by the patient and a friend.      Jose Gomez is a 89 year old male with history of DM2, HTN, HLD, and CKD who presents with a headache. The patient felt an ache in his neck at around 1100 this morning while playing cards with a friend. The neck pain slowly worsened and he developed a headache. The headache became progressively worse and was unaffected when he took an extra strength Tylenol at 1330. At 1500 the pain became even worse and his friend noticed that the patient had an abnormally unsteady gait, prompting him to bring the patient to an Urgent Care. Providers there referred him to the ED after obtaining a history. Here the patient endorses some mild nausea with his pain but otherwise has no other medical concerns. He denies any speech difficulty, vision problems, numbness, dizziness, chest pain, shortness of breath, abdominal pain, or vomiting. Of note, the patient does report that he does not often get headaches. He has no history of stroke.    Allergies:  No known drug allergies     Medications:    Aspirin  Neurontin  Prinzide  Zocor    Past Medical History:    CKD (chronic kidney disease) stage 3, GFR 30-59 ml/min   Esophageal reflux   Essential hypertension, benign   Mixed hyperlipidemia   Type 2 diabetes, HbA1c goal < 7% (H)   Unspecified internal derangement of knee     Past Surgical History:    Colonoscopy  Phacoemulsification clear cornea with intraocular lens implant, bilateral    Family History:    Heart disease  Cancer  CVD    Social History:  Presents with friend   Tobacco use: Former smoker. QD 5/3/1984  Alcohol use: 1-2 standard drinks per month  PCP: Gabriel Carroll    Marital Status:  Single      Review of Systems   Eyes: Negative for visual disturbance.   Respiratory: Negative for shortness of breath.    Cardiovascular: Negative for chest pain.   Gastrointestinal: Positive for nausea. Negative for abdominal pain and  "vomiting.   Musculoskeletal: Positive for gait problem and neck pain.   Neurological: Positive for headaches. Negative for dizziness, speech difficulty and numbness.   All other systems reviewed and are negative.    Physical Exam     Patient Vitals for the past 24 hrs:   BP Temp Temp src Pulse Heart Rate Resp SpO2 Height Weight   04/26/18 2020 150/89 - - - 79 11 - - -   04/26/18 2010 150/90 - - - 80 20 - - -   04/26/18 2009 - - - - 80 13 - - -   04/26/18 2008 165/87 - - - 79 23 - - -   04/26/18 2001 - - - - 93 22 92 % - -   04/26/18 1954 (!) 174/104 - - 83 83 20 98 % - -   04/26/18 1915 (!) 185/108 - - - 80 10 - - -   04/26/18 1900 (!) 162/91 - - - 85 11 - - -   04/26/18 1845 (!) 193/131 - - - 75 18 97 % - -   04/26/18 1830 (!) 200/115 - - - 84 18 - - -   04/26/18 1815 171/84 - - - 65 (!) 6 97 % - -   04/26/18 1800 166/77 - - - 68 - 95 % - -   04/26/18 1745 (!) 179/95 - - - 67 - 96 % - -   04/26/18 1730 (!) 171/97 - - - 68 14 94 % - -   04/26/18 1715 (!) 191/105 - - - 73 16 94 % - -   04/26/18 1700 (!) 199/102 - - - 77 9 97 % - -   04/26/18 1634 (!) 218/109 - - - - - - - -   04/26/18 1609 (!) 229/110 - - - - - - - -   04/26/18 1608 - 97  F (36.1  C) Temporal - 79 16 97 % 1.753 m (5' 9\") 74.8 kg (165 lb)      Physical Exam  Constitutional: Elderly white male sitting, appears uncomfortable.  HENT: No signs of trauma.   Eyes: EOM are normal. Pupils are equal, round, and reactive to light.   Neck: Normal range of motion. No JVD present. No cervical adenopathy. No carotid bruit.  Cardiovascular: Regular rhythm.  Exam reveals no gallop and no friction rub.    No murmur heard.  Pulmonary/Chest: Bilateral breath sounds normal. No wheezes, rhonchi or rales.  Abdominal: Soft. No tenderness. No rebound or guarding.   Musculoskeletal: No edema. No tenderness.   Lymphadenopathy: No lymphadenopathy.   Neurological: Alert and oriented to person, place, and time. Normal strength. Coordination normal. Fluent speech, no facial " asymmetry. Finger-nose-finger with slight past pointing. Sensation intact to light touch. Gait not initially tested.  Skin: Skin is warm and dry. No rash noted. No erythema.      Emergency Department Course   ECG (17:06:01):  Rate 82 bpm. KY interval 140. QRS duration 86. QT/QTc 416/486. P-R-T axes 48 34 -8. Normal sinus rhythm. Possible inferior infarct, age undetermined. Interpreted by Matt Rey MD.     Imaging:  Radiographic findings were communicated with the patient who voiced understanding of the findings.  Head CT w/o Contrast  IMPRESSION: Chronic changes. No evidence for intracranial hemorrhage or any acute process.    LILIANA COYLE MD    Head/neck angiogram CT w & w/o Contrast  IMPRESSION:  1. Mild to moderate atherosclerotic disease involving the carotid bifurcations, brachiocephalic vessels, carotid siphon regions without significant stenosis.  2. No evidence for significant stenosis, thromboembolism, dissection, or aneurysm.    LILIANA COYLE MD    Imaging independently reviewed and agree with radiologist interpretation.      Laboratory:  CBC: WBC 7.5 HGB 14.9    CMP: Creatinine 1.52 Glucose 123  GFR Estimate 43   INR: 1.01  Erythrocyte sedimentation rate: 9    UA: 2 wbc, 3 rbc     Interventions:  1701: Compazine 5 mg IV  1701: Benadryl 25 mg IV  1701: Decadron 10 mg IV  1842: Prinzide 10/12.5 1 tablet PO  1934: Aspirin 325 mg PO  2041: Cardene 40 mg IV    Emergency Department Course:  Past medical records, nursing notes, and vitals reviewed.  1630: I performed an exam of the patient and obtained history, as documented above.    1809: Findings and plan explained to the Patient who consents to admission.   1919: I discussed the patient with Dr. Marie, on-call for Neurology.    1924: Discussed the patient with Dr. Sanchez, who will admit the patient to a [facility] bed for further monitoring, evaluation, and treatment.      I personally reviewed the laboratory results with the patient and  answered all related questions prior to admit.  Impression & Plan    Medical Decision Making:  Jose Gomez is a 89 year old male who was playing cards with his friends when he started to notice pain in the back of his neck and the pain progressed and got worse over the course of several hours where he was nauseated and was having some gait problems. He was taken to an urgent care by a friend and brought here.  On arrival here when I entered the room he is hypertensive but he is neurologically non focal, awake, and appropriate. I did not ambulate him initially. Patient was started on medication for headache as well as underwent CT scan of the head and neck to rule out both bleed, dissection, or stroke. No acute findings were noted and when the patient came back he was feeling better and his blood pressure was down. However, when we tried to ambulate him he could not ambulate safely. This is new for him. Patient was given his normal blood pressure medicine he had been taking at home but had been off for a while and his blood pressure was under control. However, when the hospitalist came down to see him it started to rise and so we started him on Nicardipine drip. Patient also received an aspirin and I discussed patient with Neurology, Dr. Matthew Marie. At this point, the patient's symptoms show no sign of acute head bleed or stroke; however, small lacunar stroke  is possible. This could be a hypertensive crisis as well.     Critical Care Time: was 35 minutes excluding procedures.    Diagnosis:    ICD-10-CM    1. Nonintractable headache, unspecified chronicity pattern, unspecified headache type R51 UA with Microscopic   2. Hypertension, unspecified type I10    3. Gait instability R26.81        Disposition:  Admitted to Dr. Sanchez for further evaluation and treatment.      Chris Hdz  4/26/2018    EMERGENCY DEPARTMENT  I, Chris Hdz, am serving as a scribe at 4:30 PM on 4/26/2018 to document services personally  performed by Matt Rey MD based on my observations and the provider's statements to me.       Matt Rey MD  04/26/18 [id number]

## 2018-04-26 NOTE — IP AVS SNAPSHOT
[facility]    [address]    [place] [address]    Phone:  [phone]                                       After Visit Summary   4/26/2018    Jose Gomez    MRN: [id number]           After Visit Summary Signature Page     I have received my discharge instructions, and my questions have been answered. I have discussed any challenges I see with this plan with the nurse or doctor.    ..........................................................................................................................................  Patient/Patient Representative Signature      ..........................................................................................................................................  Patient Representative Print Name and Relationship to Patient    ..................................................               ................................................  Date                                            Time    ..........................................................................................................................................  Reviewed by Signature/Title    ...................................................              ..............................................  Date                                                            Time

## 2018-04-26 NOTE — NURSING NOTE
"Chief Complaint   Patient presents with     Urgent Care     Pt c/o neck pain and headache that started two hours ago.        Initial BP (!) 168/98  Pulse 68  Temp 96.5  F (35.8  C) (Tympanic)  Resp 16 Estimated body mass index is 25.7 kg/(m^2) as calculated from the following:    Height as of 8/7/15: 5' 7\" (1.702 m).    Weight as of 3/10/18: 164 lb 1 oz (74.4 kg).  Medication Reconciliation: unable or not appropriate to perform    Dominick Yanes CMA  "

## 2018-04-26 NOTE — MR AVS SNAPSHOT
"              After Visit Summary   2018    Jose Gomez    MRN: [id number]           Patient Information     Date Of Birth          1928        Visit Information        Provider Department      2018 3:15 PM Patricia Prieto PA-C [facility]        Today's Diagnoses     Acute intractable headache, unspecified headache type    -  1       Follow-ups after your visit        Who to contact     If you have questions or need follow up information about today's clinic visit or your schedule please contact [facility] directly at [phone].  Normal or non-critical lab and imaging results will be communicated to you by [employer], letter or phone within 4 business days after the clinic has received the results. If you do not hear from us within 7 days, please contact the clinic through [employer] or phone. If you have a critical or abnormal lab result, we will notify you by phone as soon as possible.  Submit refill requests through [employer] or call your pharmacy and they will forward the refill request to us. Please allow 3 business days for your refill to be completed.          Additional Information About Your Visit        MyChart Information     [employer] lets you send messages to your doctor, view your test results, renew your prescriptions, schedule appointments and more. To sign up, go to [email or web address] . Click on \"Log in\" on the left side of the screen, which will take you to the Welcome page. Then click on \"Sign up Now\" on the right side of the page.     You will be asked to enter the access code listed below, as well as some personal information. Please follow the directions to create your username and password.     Your access code is: [email or web address]  Expires: 2018 12:21 PM     Your access code will  in 90 days. If you need help or a new code, please call your [place] clinic or [phone].        Care EveryWhere ID     " This is your Care EveryWhere ID. This could be used by other organizations to access your [facility] medical records  [id number]        Your Vitals Were     Pulse Temperature Respirations             68 96.5  F (35.8  C) (Tympanic) 16          Blood Pressure from Last 3 Encounters:   04/26/18 (!) 168/98   03/10/18 160/90   08/07/15 102/60    Weight from Last 3 Encounters:   03/10/18 164 lb 1 oz (74.4 kg)   08/07/15 151 lb 9.6 oz (68.8 kg)   08/01/15 160 lb (72.6 kg)              Today, you had the following     No orders found for display       Primary Care Provider Office Phone # Fax #    Gabriel Carroll [phone]       [address] [place] [id number]        Equal Access to Services     [facility] : Hadii elza olpes hadasho Soomaali, waaxda luqadaha, qaybta kaalmada adeegyada, julianna sousa haysal merrill . So [facility] [phone].    ATENCIÓN: Si habla español, tiene a davis disposición servicios gratuitos de asistencia lingüística. Llame al [phone].    We comply with applicable federal civil rights laws and [place] laws. We do not discriminate on the basis of race, color, national origin, age, disability, sex, sexual orientation, or gender identity.            Thank you!     Thank you for choosing [facility]  for your care. Our goal is always to provide you with excellent care. Hearing back from our patients is one way we can continue to improve our services. Please take a few minutes to complete the written survey that you may receive in the mail after your visit with us. Thank you!             Your Updated Medication List - Protect others around you: Learn how to safely use, store and throw away your medicines at www.disposemymeds.org.          This list is accurate as of 4/26/18  3:49 PM.  Always use your most recent med list.                   Brand Name Dispense Instructions for use Diagnosis    acetaminophen 500 MG tablet    TYLENOL     50 tablet    Take 2 tablets (1,000 mg) by mouth every 6 hours as needed for mild pain    Low back pain       aspirin 81 MG tablet     100    1 TABLET DAILY        B-12 2000 MCG Tabs      Take 1 tablet by mouth daily    Need for prophylactic vaccination against Streptococcus pneumoniae (pneumococcus)       blood glucose monitoring test strip    no brand specified    100 each    by In Vitro route. Test as directed    Type II or unspecified type diabetes mellitus without mention of complication, not stated as uncontrolled       gabapentin 100 MG capsule    NEURONTIN     Take 100 mg by mouth 3 times daily        lisinopril-hydrochlorothiazide 10-12.5 MG per tablet    PRINZIDE/ZESTORETIC    30 tablet    Take 1 tablet by mouth daily    Benign hypertension with chronic kidney disease, stage III       METAMUCIL SMOOTH TEXTURE 63 % Powd   Generic drug:  psyllium     1 Bottle    Take 1 teaspoonful by mouth daily as needed Mix in 8 ounces of water    Left hip pain       simvastatin 20 MG tablet    ZOCOR    30 tablet    Take 1 tablet (20 mg) by mouth At Bedtime    Hyperlipidemia LDL goal <100

## 2018-04-26 NOTE — IP AVS SNAPSHOT
MRN:[id number]                      After Visit Summary   4/26/2018    Jose Gomez    MRN: [id number]           Thank you!     Thank you for choosing [facility] for your care. Our goal is always to provide you with excellent care. Hearing back from our patients is one way we can continue to improve our services. Please take a few minutes to complete the written survey that you may receive in the mail after you visit with us. Thank you!        Patient Information     Date Of Birth          1/21/1928        Designated Caregiver       Most Recent Value    Caregiver    Will someone help with your care after discharge? yes    Name of designated caregiver son/daughter    Phone number of caregiver [phone]    Caregiver address [address] [place]      About your hospital stay     You were admitted on:  April 26, 2018 [facility]    You were discharged on:  April 27, 2018        Reason for your hospital stay       Hypertensive emergency                  Who to Call     For medical emergencies, please call 911.  For non-urgent questions about your medical care, please call your primary care provider or clinic, [phone]          Attending Provider     Provider Specialty    Matt Rey MD Emergency Medicine    [facility]       Primary Care Provider Office Phone # Fax #    Gabriel Carroll [phone]       When to contact your care team       Call your primary doctor or return to ER if you have any of the following: temperature greater than 100.5 or less than 96, chest pain, shortness of breath, severe headache, dizziness, loss of consciousness.                  After Care Instructions     Activity       Your activity upon discharge: activity as tolerated            Diet       Follow this diet upon discharge: Orders Placed This Encounter   2gm sodium, low cholesterol, low fat  diet;   Moderate Consistent CHO Diet                  Follow-up Appointments     Follow-up and recommended labs and tests        Follow up with primary care provider, Gabriel Carroll, within 7 days for hospital follow- up.  Monitor blood pressures- adjust the antihypertensive meds as needed. The following labs/tests are recommended: BMP.                  Your next 10 appointments already scheduled     Apr 28, 2018  6:00 AM CDT   Inpatient Meal Follow Up Therapy with JACQUELIN Enciso   [facility] ([facility])    [address], [address]  [facility] [id number] [phone]            May 02, 2018 11:20 AM CDT   [facility] Visit with Gabriel Carroll MD   [facility]    [address] [address] [phone]           Bring a current list of meds and any records pertaining to this visit. For Physicals, please bring immunization records and any forms needing to be filled out. Please arrive 10 minutes early to complete paperwork.              Additional Services     Med Therapy Manage Referral       Your provider has referred you to: **[facility] Management Scheduling (numerous locations) [phone]   [email or web address]  [facility]: [facility] [phone]   [email or web address]    Reason for Referral: Non-compliance with medications.    The [facility] Management department will contact you to schedule an appointment.  You may also schedule the appointment by calling [phone].  For [facility] [place] patients, please call [phone] to confirm/schedule your appointment on the next business day.    This service is designed to help you get the most from your medications.  A specially trained Pharmacist will work closely with you and your  "providers to solve any questions, concerns, issues or problems related to your medications.    Please bring all of your prescription and non-prescription medications (such as vitamins, over-the-counter medications, and herbals) or a detailed medication list to your appointment.    If you have a glucose meter or other home monitoring information, please also bring this to your appointment (i.e. blood glucose log, blood pressure log, pain log, etc.).                  Pending Results     Date and Time Order Name Status Description    2018 2121 EKG 12-lead, tracing only Preliminary             Statement of Approval     Ordered          18 1516  I have reviewed and agree with all the recommendations and orders detailed in this document.  EFFECTIVE NOW     Approved and electronically signed by:  Marya Hassan MD             Admission Information     Date & Time Provider Department Dept. Phone    2018 Zulema Sanchez MD [facility] Coronary Care Unit [phone]      Your Vitals Were     Blood Pressure Pulse Temperature Respirations Height Weight    146/70 83 97.7  F (36.5  C) (Oral) 16 1.753 m (5' 9\") 69.9 kg (154 lb)    Pulse Oximetry BMI (Body Mass Index)                97% 22.74 kg/m2          MyChart Information     [facility] lets you send messages to your doctor, view your test results, renew your prescriptions, schedule appointments and more. To sign up, go to [email or web address] . Click on \"Log in\" on the left side of the screen, which will take you to the Welcome page. Then click on \"Sign up Now\" on the right side of the page.     You will be asked to enter the access code listed below, as well as some personal information. Please follow the directions to create your username and password.     Your access code is: [email or web address]  Expires: 2018 12:21 PM     Your access code will  in 90 days. If you need help or a new code, please call your [facility] clinic or [phone].   "      Care EveryWhere ID     This is your Care EveryWhere ID. This could be used by other organizations to access your [facility] medical records  [id number]        Equal Access to Services     ESDRAS LOPEZ : Angela Atkinson, warulatimi maidavidha, kimberlyjeff kakat jorgensen, julianna angelain hayaamiranda arteagaalfonzo webb garfield cannon. So [facility] [phone].    ATENCIÓN: Si habla español, tiene a davis disposición servicios gratuitos de asistencia lingüística. Llame al [phone].    We comply with applicable federal civil rights laws and [place] laws. We do not discriminate on the basis of race, color, national origin, age, disability, sex, sexual orientation, or gender identity.               Review of your medicines      START taking        Dose / Directions    amLODIPine 5 MG tablet   Commonly known as:  NORVASC   Used for:  Hypertension, unspecified type        Dose:  5 mg   Take 1 tablet (5 mg) by mouth daily   Quantity:  30 tablet   Refills:  0       lisinopril 10 MG tablet   Commonly known as:  PRINIVIL/ZESTRIL   Used for:  Hypertension, unspecified type        Dose:  10 mg   Take 1 tablet (10 mg) by mouth daily   Quantity:  30 tablet   Refills:  0       simvastatin 20 MG tablet   Commonly known as:  ZOCOR   Used for:  Hyperlipidemia LDL goal <100        Dose:  20 mg   Take 1 tablet (20 mg) by mouth At Bedtime   Quantity:  30 tablet   Refills:  0         CONTINUE these medicines which have NOT CHANGED        Dose / Directions    aspirin 81 MG tablet        1 TABLET DAILY   Quantity:  100   Refills:  3       B-12 2000 MCG Tabs   Used for:  Need for prophylactic vaccination against Streptococcus pneumoniae (pneumococcus)        Dose:  1 tablet   Take 1 tablet by mouth daily   Refills:  0         STOP taking     blood glucose monitoring test strip   Commonly known as:  no brand specified                Where to get your medicines      These medications were sent to [facility] [place], [place] - [address]   [address] [place] [address]     Phone:  [phone]     amLODIPine 5 MG tablet    lisinopril 10 MG tablet    simvastatin 20 MG tablet                Protect others around you: Learn how to safely use, store and throw away your medicines at www.disposemymeds.org.             Medication List: This is a list of all your medications and when to take them. Check marks below indicate your daily home schedule. Keep this list as a reference.      Medications           Morning Afternoon Evening Bedtime As Needed    amLODIPine 5 MG tablet   Commonly known as:  NORVASC   Take 1 tablet (5 mg) by mouth daily   Last time this was given:  5 mg on 4/27/2018 11:37 AM   Next Dose Due:  Tomorrow morning 04/28                                   aspirin 81 MG tablet   1 TABLET DAILY   Last time this was given:  325 mg on 4/26/2018  7:34 PM   Next Dose Due:  This evening 04/27                                   B-12 2000 MCG Tabs   Take 1 tablet by mouth daily   Next Dose Due:  Today 04/27                                   lisinopril 10 MG tablet   Commonly known as:  PRINIVIL/ZESTRIL   Take 1 tablet (10 mg) by mouth daily   Last time this was given:  10 mg on 4/27/2018  9:30 AM   Next Dose Due:  Tomorrow morning 04/28                                   simvastatin 20 MG tablet   Commonly known as:  ZOCOR   Take 1 tablet (20 mg) by mouth At Bedtime   Next Dose Due:  Tonight 04/27                                             More Information        Amlodipine Besylate Oral tablet  What is this medicine?  AMLODIPINE (am YU di peen) is a calcium-channel blocker. It affects the amount of calcium found in your heart and muscle cells. This relaxes your blood vessels, which can reduce the amount of work the heart has to do. This medicine is used to lower high blood pressure. It is also used to prevent chest pain.  This medicine may be used for other purposes; ask your health care provider or pharmacist if you have  questions.  What should I tell my health care provider before I take this medicine?  They need to know if you have any of these conditions:    heart problems like heart failure or aortic stenosis    liver disease    an unusual or allergic reaction to amlodipine, other medicines, foods, dyes, or preservatives    pregnant or trying to get pregnant    breast-feeding  How should I use this medicine?  Take this medicine by mouth with a glass of water. Follow the directions on the prescription label. Take your medicine at regular intervals. Do not take more medicine than directed.  Talk to your pediatrician regarding the use of this medicine in children. Special care may be needed. This medicine has been used in children as young as 6.  Persons over 65 years old may have a stronger reaction to this medicine and need smaller doses.  Overdosage: If you think you have taken too much of this medicine contact a poison control center or emergency room at once.  NOTE: This medicine is only for you. Do not share this medicine with others.  What if I miss a dose?  If you miss a dose, take it as soon as you can. If it is almost time for your next dose, take only that dose. Do not take double or extra doses.  What may interact with this medicine?    herbal or dietary supplements    local or general anesthetics    medicines for high blood pressure    medicines for prostate problems    rifampin  This list may not describe all possible interactions. Give your health care provider a list of all the medicines, herbs, non-prescription drugs, or dietary supplements you use. Also tell them if you smoke, drink alcohol, or use illegal drugs. Some items may interact with your medicine.  What should I watch for while using this medicine?  Visit your doctor or health care professional for regular check ups. Check your blood pressure and pulse rate regularly. Ask your health care professional what your blood pressure and pulse rate should be, and  when you should contact him or her.  This medicine may make you feel confused, dizzy or lightheaded. Do not drive, use machinery, or do anything that needs mental alertness until you know how this medicine affects you. To reduce the risk of dizzy or fainting spells, do not sit or stand up quickly, especially if you are an older patient. Avoid alcoholic drinks; they can make you more dizzy.  Do not suddenly stop taking amlodipine. Ask your doctor or health care professional how you can gradually reduce the dose.  What side effects may I notice from receiving this medicine?  Side effects that you should report to your doctor or health care professional as soon as possible:    allergic reactions like skin rash, itching or hives, swelling of the face, lips, or tongue    breathing problems    changes in vision or hearing    chest pain    fast, irregular heartbeat    swelling of legs or ankles  Side effects that usually do not require medical attention (report to your doctor or health care professional if they continue or are bothersome):    dry mouth    facial flushing    nausea, vomiting    stomach gas, pain    tired, weak    trouble sleeping  This list may not describe all possible side effects. Call your doctor for medical advice about side effects. You may report side effects to FDA at 0-545-FDA-2568.  Where should I keep my medicine?  Keep out of the reach of children.  Store at room temperature between 59 and 86 degrees F (15 and 30 degrees C). Protect from light. Keep container tightly closed. Throw away any unused medicine after the expiration date.  NOTE:This sheet is a summary. It may not cover all possible information. If you have questions about this medicine, talk to your doctor, pharmacist, or health care provider. Copyright  2016 Gold Standard                Lisinopril Oral tablet  What is this medicine?  LISINOPRIL (lyse IN oh pril) is an ACE inhibitor. This medicine is used to treat high blood pressure  and heart failure. It is also used to protect the heart immediately after a heart attack.  This medicine may be used for other purposes; ask your health care provider or pharmacist if you have questions.  What should I tell my health care provider before I take this medicine?  They need to know if you have any of these conditions:    diabetes    heart or blood vessel disease    immune system disease like lupus or scleroderma    kidney disease    low blood pressure    previous swelling of the tongue, face, or lips with difficulty breathing, difficulty swallowing, hoarseness, or tightening of the throat    an unusual or allergic reaction to lisinopril, other ACE inhibitors, insect venom, foods, dyes, or preservatives    pregnant or trying to get pregnant    breast-feeding  How should I use this medicine?  Take this medicine by mouth with a glass of water. Follow the directions on your prescription label. You may take this medicine with or without food. Take your medicine at regular intervals. Do not stop taking this medicine except on the advice of your doctor or health care professional.  Talk to your pediatrician regarding the use of this medicine in children. Special care may be needed. While this drug may be prescribed for children as young as 6 years of age for selected conditions, precautions do apply.  Overdosage: If you think you have taken too much of this medicine contact a poison control center or emergency room at once.  NOTE: This medicine is only for you. Do not share this medicine with others.  What if I miss a dose?  If you miss a dose, take it as soon as you can. If it is almost time for your next dose, take only that dose. Do not take double or extra doses.  What may interact with this medicine?    diuretics    lithium    NSAIDs, medicines for pain and inflammation, like ibuprofen or naproxen    over-the-counter herbal supplements like hawthorn    potassium salts or potassium supplements    salt  substitutes  This list may not describe all possible interactions. Give your health care provider a list of all the medicines, herbs, non-prescription drugs, or dietary supplements you use. Also tell them if you smoke, drink alcohol, or use illegal drugs. Some items may interact with your medicine.  What should I watch for while using this medicine?  Visit your doctor or health care professional for regular check ups. Check your blood pressure as directed. Ask your doctor what your blood pressure should be, and when you should contact him or her. Call your doctor or health care professional if you notice an irregular or fast heart beat.  Women should inform their doctor if they wish to become pregnant or think they might be pregnant. There is a potential for serious side effects to an unborn child. Talk to your health care professional or pharmacist for more information.  Check with your doctor or health care professional if you get an attack of severe diarrhea, nausea and vomiting, or if you sweat a lot. The loss of too much body fluid can make it dangerous for you to take this medicine.  You may get drowsy or dizzy. Do not drive, use machinery, or do anything that needs mental alertness until you know how this drug affects you. Do not stand or sit up quickly, especially if you are an older patient. This reduces the risk of dizzy or fainting spells. Alcohol can make you more drowsy and dizzy. Avoid alcoholic drinks.  Avoid salt substitutes unless you are told otherwise by your doctor or health care professional.  Do not treat yourself for coughs, colds, or pain while you are taking this medicine without asking your doctor or health care professional for advice. Some ingredients may increase your blood pressure.  What side effects may I notice from receiving this medicine?  Side effects that you should report to your doctor or health care professional as soon as possible:    abdominal pain with or without nausea or  vomiting    allergic reactions like skin rash or hives, swelling of the hands, feet, face, lips, throat, or tongue    dark urine    difficulty breathing    dizzy, lightheaded or fainting spell    fever or sore throat    irregular heart beat, chest pain    pain or difficulty passing urine    redness, blistering, peeling or loosening of the skin, including inside the mouth    unusually weak    yellowing of the eyes or skin  Side effects that usually do not require medical attention (report to your doctor or health care professional if they continue or are bothersome):    change in taste    cough    decreased sexual function or desire    headache    sun sensitivity    tiredness  This list may not describe all possible side effects. Call your doctor for medical advice about side effects. You may report side effects to FDA at 3-422-FDA-9462.  Where should I keep my medicine?  Keep out of the reach of children.  Store at room temperature between 15 and 30 degrees C (59 and 86 degrees F). Protect from moisture. Keep container tightly closed. Throw away any unused medicine after the expiration date.  NOTE:This sheet is a summary. It may not cover all possible information. If you have questions about this medicine, talk to your doctor, pharmacist, or health care provider. Copyright  2016 Gold Standard                Simvastatin Oral tablet  What is this medicine?  SIMVASTATIN (SIM va stat in) is known as a HMG-CoA reductase inhibitor or 'statin'. It lowers the level of cholesterol and triglycerides in the blood. This drug may also reduce the risk of heart attack, stroke, or other health problems in patients with risk factors for heart or blood vessel disease. Diet and lifestyle changes are often used with this drug.  This medicine may be used for other purposes; ask your health care provider or pharmacist if you have questions.  What should I tell my health care provider before I take this medicine?  They need to know if you  have any of these conditions:    frequently drink alcoholic beverages    kidney disease    liver disease    muscle aches or weakness    other medical condition    an unusual or allergic reaction to simvastatin, other medicines, foods, dyes, or preservatives    pregnant or trying to get pregnant    breast-feeding  How should I use this medicine?  Take this medicine by mouth with a glass of water. Follow the directions on the prescription label. You can take this medicine with or without food. Take your doses at regular intervals. Do not take your medicine more often than directed.  Talk to your pediatrician regarding the use of this medicine in children. Special care may be needed.  Overdosage: If you think you have taken too much of this medicine contact a poison control center or emergency room at once.  NOTE: This medicine is only for you. Do not share this medicine with others.  What if I miss a dose?  If you miss a dose, take it as soon as you can. If it is almost time for your next dose, take only that dose. Do not take double or extra doses.  What may interact with this medicine?  Do not take this medicine with any of the following medications:    boceprevir    cyclosporine    danazol    gemfibrozil    medicines for fungal infections like itraconazole, ketoconazole, posaconazole    medicines for HIV infection    mifepristone, RU-486    nefazodone    red yeast rice    some antibiotics like clarithromycin, erythromycin, telithromycin    telaprevir  This medicine may also interact with the following medications:    alcohol    amiodarone    amlodipine    colchicine    digoxin    diltiazem    fluconazole    grapefruit juice    niacin    ranolazine    verapamil    voriconazole    warfarin  This list may not describe all possible interactions. Give your health care provider a list of all the medicines, herbs, non-prescription drugs, or dietary supplements you use. Also tell them if you smoke, drink alcohol, or use  illegal drugs. Some items may interact with your medicine.  What should I watch for while using this medicine?  Visit your doctor or health care professional for regular check-ups. You may need regular tests to make sure your liver is working properly.  Tell your doctor or health care professional right away if you get any unexplained muscle pain, tenderness, or weakness, especially if you also have a fever and tiredness. Your doctor or health care professional may tell you to stop taking this medicine if you develop muscle problems. If your muscle problems do not go away after stopping this medicine, contact your health care professional.  This medicine may affect blood sugar levels. If you have diabetes, check with your doctor or health care professional before you change your diet or the dose of your diabetic medicine.  This drug is only part of a total heart-health program. Your doctor or a dietician can suggest a low-cholesterol and low-fat diet to help. Avoid alcohol and smoking, and keep a proper exercise schedule.  Do not use this drug if you are pregnant or breast-feeding. Serious side effects to an unborn child or to an infant are possible. Talk to your doctor or pharmacist for more information.  If you are going to have surgery tell your health care professional that you are taking this drug.  Some drugs may increase the risk of side effects from simvastatin. If you are given certain antibiotics or antifungals, your doctor or health care professional may stop simvastatin for a short time. Check with your doctor or pharmacist for advice.  What side effects may I notice from receiving this medicine?  Side effects that you should report to your doctor or health care professional as soon as possible:    allergic reactions like skin rash, itching or hives, swelling of the face, lips, or tongue    confusion    dark urine    fever    joint pain    loss of memory    muscle cramps, pain    redness, blistering,  peeling or loosening of the skin, including inside the mouth    trouble passing urine or change in the amount of urine    unusually weak or tired    yellowing of the eyes or skin  Side effects that usually do not require medical attention (report to your doctor or health care professional if they continue or are bothersome):    constipation    heartburn    stomach gas, pain, upset  This list may not describe all possible side effects. Call your doctor for medical advice about side effects. You may report side effects to FDA at 3-175-FOM-0097.  Where should I keep my medicine?  Keep out of the reach of children.  Store at room temperature below 30 degrees C (86 degrees F). Keep container tightly closed. Throw away any unused medicine after the expiration date.  NOTE:This sheet is a summary. It may not cover all possible information. If you have questions about this medicine, talk to your doctor, pharmacist, or health care provider. Copyright  2016 Gold Standard

## 2018-04-26 NOTE — ED NOTES
Pt stood w/ assist of two in attempt to road test. Pt very unsteady on feet and nearly fell while trying to walk. Pt assisted to use urinal and then back to bed. MD notified

## 2018-04-27 ENCOUNTER — APPOINTMENT (OUTPATIENT)
Dept: PHYSICAL THERAPY | Facility: CLINIC | Age: 83
DRG: 305 | End: 2018-04-27
Attending: INTERNAL MEDICINE
Payer: MEDICARE

## 2018-04-27 ENCOUNTER — APPOINTMENT (OUTPATIENT)
Dept: CARDIOLOGY | Facility: CLINIC | Age: 83
DRG: 305 | End: 2018-04-27
Attending: INTERNAL MEDICINE
Payer: MEDICARE

## 2018-04-27 ENCOUNTER — APPOINTMENT (OUTPATIENT)
Dept: SPEECH THERAPY | Facility: CLINIC | Age: 83
DRG: 305 | End: 2018-04-27
Attending: INTERNAL MEDICINE
Payer: MEDICARE

## 2018-04-27 VITALS
OXYGEN SATURATION: 97 % | TEMPERATURE: 97.7 F | HEIGHT: 69 IN | SYSTOLIC BLOOD PRESSURE: 125 MMHG | RESPIRATION RATE: 16 BRPM | HEART RATE: 83 BPM | WEIGHT: 154 LBS | BODY MASS INDEX: 22.81 KG/M2 | DIASTOLIC BLOOD PRESSURE: 91 MMHG

## 2018-04-27 LAB
ANION GAP SERPL CALCULATED.3IONS-SCNC: 10 MMOL/L (ref 3–14)
BUN SERPL-MCNC: 29 MG/DL (ref 7–30)
CALCIUM SERPL-MCNC: 8.5 MG/DL (ref 8.5–10.1)
CHLORIDE SERPL-SCNC: 105 MMOL/L (ref 94–109)
CHOLEST SERPL-MCNC: 207 MG/DL
CO2 SERPL-SCNC: 21 MMOL/L (ref 20–32)
CREAT SERPL-MCNC: 1.55 MG/DL (ref 0.66–1.25)
ERYTHROCYTE [DISTWIDTH] IN BLOOD BY AUTOMATED COUNT: 12.4 % (ref 10–15)
GFR SERPL CREATININE-BSD FRML MDRD: 42 ML/MIN/1.7M2
GLUCOSE BLDC GLUCOMTR-MCNC: 126 MG/DL (ref 70–99)
GLUCOSE BLDC GLUCOMTR-MCNC: 144 MG/DL (ref 70–99)
GLUCOSE BLDC GLUCOMTR-MCNC: 194 MG/DL (ref 70–99)
GLUCOSE SERPL-MCNC: 145 MG/DL (ref 70–99)
HCT VFR BLD AUTO: 39.5 % (ref 40–53)
HDLC SERPL-MCNC: 62 MG/DL
HGB BLD-MCNC: 13.7 G/DL (ref 13.3–17.7)
LDLC SERPL CALC-MCNC: 136 MG/DL
MCH RBC QN AUTO: 32.5 PG (ref 26.5–33)
MCHC RBC AUTO-ENTMCNC: 34.7 G/DL (ref 31.5–36.5)
MCV RBC AUTO: 94 FL (ref 78–100)
NONHDLC SERPL-MCNC: 145 MG/DL
PLATELET # BLD AUTO: 181 10E9/L (ref 150–450)
POTASSIUM SERPL-SCNC: 4.3 MMOL/L (ref 3.4–5.3)
RBC # BLD AUTO: 4.22 10E12/L (ref 4.4–5.9)
SODIUM SERPL-SCNC: 136 MMOL/L (ref 133–144)
TRIGL SERPL-MCNC: 45 MG/DL
TROPONIN I SERPL-MCNC: <0.015 UG/L (ref 0–0.04)
WBC # BLD AUTO: 5.5 10E9/L (ref 4–11)

## 2018-04-27 PROCEDURE — 93306 TTE W/DOPPLER COMPLETE: CPT | Mod: 26 | Performed by: INTERNAL MEDICINE

## 2018-04-27 PROCEDURE — 85027 COMPLETE CBC AUTOMATED: CPT | Performed by: INTERNAL MEDICINE

## 2018-04-27 PROCEDURE — 80061 LIPID PANEL: CPT | Performed by: INTERNAL MEDICINE

## 2018-04-27 PROCEDURE — A9270 NON-COVERED ITEM OR SERVICE: HCPCS | Mod: GY | Performed by: INTERNAL MEDICINE

## 2018-04-27 PROCEDURE — 93010 ELECTROCARDIOGRAM REPORT: CPT | Performed by: INTERNAL MEDICINE

## 2018-04-27 PROCEDURE — 93306 TTE W/DOPPLER COMPLETE: CPT

## 2018-04-27 PROCEDURE — 97116 GAIT TRAINING THERAPY: CPT | Mod: GP | Performed by: PHYSICAL THERAPIST

## 2018-04-27 PROCEDURE — 92526 ORAL FUNCTION THERAPY: CPT | Mod: GN | Performed by: SPEECH-LANGUAGE PATHOLOGIST

## 2018-04-27 PROCEDURE — 99238 HOSP IP/OBS DSCHRG MGMT 30/<: CPT | Performed by: INTERNAL MEDICINE

## 2018-04-27 PROCEDURE — 36415 COLL VENOUS BLD VENIPUNCTURE: CPT | Performed by: INTERNAL MEDICINE

## 2018-04-27 PROCEDURE — 97161 PT EVAL LOW COMPLEX 20 MIN: CPT | Mod: GP | Performed by: PHYSICAL THERAPIST

## 2018-04-27 PROCEDURE — 92610 EVALUATE SWALLOWING FUNCTION: CPT | Mod: GN | Performed by: SPEECH-LANGUAGE PATHOLOGIST

## 2018-04-27 PROCEDURE — 25000132 ZZH RX MED GY IP 250 OP 250 PS 637: Mod: GY | Performed by: INTERNAL MEDICINE

## 2018-04-27 PROCEDURE — 00000146 ZZHCL STATISTIC GLUCOSE BY METER IP

## 2018-04-27 PROCEDURE — A9585 GADOBUTROL INJECTION: HCPCS | Performed by: INTERNAL MEDICINE

## 2018-04-27 PROCEDURE — 84484 ASSAY OF TROPONIN QUANT: CPT | Performed by: INTERNAL MEDICINE

## 2018-04-27 PROCEDURE — 80048 BASIC METABOLIC PNL TOTAL CA: CPT | Performed by: INTERNAL MEDICINE

## 2018-04-27 PROCEDURE — G0463 HOSPITAL OUTPT CLINIC VISIT: HCPCS

## 2018-04-27 PROCEDURE — 40000225 ZZH STATISTIC SLP WARD VISIT: Performed by: SPEECH-LANGUAGE PATHOLOGIST

## 2018-04-27 PROCEDURE — 93005 ELECTROCARDIOGRAM TRACING: CPT

## 2018-04-27 PROCEDURE — 25000128 H RX IP 250 OP 636: Performed by: INTERNAL MEDICINE

## 2018-04-27 PROCEDURE — 40000193 ZZH STATISTIC PT WARD VISIT: Performed by: PHYSICAL THERAPIST

## 2018-04-27 RX ORDER — DEXTROSE MONOHYDRATE 25 G/50ML
25-50 INJECTION, SOLUTION INTRAVENOUS
Status: DISCONTINUED | OUTPATIENT
Start: 2018-04-27 | End: 2018-04-27 | Stop reason: HOSPADM

## 2018-04-27 RX ORDER — NICOTINE POLACRILEX 4 MG
15-30 LOZENGE BUCCAL
Status: DISCONTINUED | OUTPATIENT
Start: 2018-04-27 | End: 2018-04-27 | Stop reason: HOSPADM

## 2018-04-27 RX ORDER — GADOBUTROL 604.72 MG/ML
7.5 INJECTION INTRAVENOUS ONCE
Status: COMPLETED | OUTPATIENT
Start: 2018-04-27 | End: 2018-04-27

## 2018-04-27 RX ORDER — SIMVASTATIN 20 MG
20 TABLET ORAL AT BEDTIME
Qty: 30 TABLET | Refills: 0 | Status: SHIPPED | OUTPATIENT
Start: 2018-04-27 | End: 2018-05-02

## 2018-04-27 RX ORDER — LISINOPRIL 5 MG/1
5 TABLET ORAL DAILY
Qty: 30 TABLET | Refills: 0 | Status: SHIPPED | OUTPATIENT
Start: 2018-04-27 | End: 2018-04-27

## 2018-04-27 RX ORDER — AMLODIPINE BESYLATE 5 MG/1
5 TABLET ORAL DAILY
Qty: 30 TABLET | Refills: 0 | Status: SHIPPED | OUTPATIENT
Start: 2018-04-27 | End: 2018-05-02

## 2018-04-27 RX ORDER — LISINOPRIL 10 MG/1
10 TABLET ORAL DAILY
Qty: 30 TABLET | Refills: 0 | Status: SHIPPED | OUTPATIENT
Start: 2018-04-27 | End: 2018-05-02

## 2018-04-27 RX ADMIN — AMLODIPINE BESYLATE 5 MG: 5 TABLET ORAL at 11:37

## 2018-04-27 RX ADMIN — INSULIN ASPART 1 UNITS: 100 INJECTION, SOLUTION INTRAVENOUS; SUBCUTANEOUS at 02:18

## 2018-04-27 RX ADMIN — GADOBUTROL 7.5 ML: 604.72 INJECTION INTRAVENOUS at 01:06

## 2018-04-27 RX ADMIN — ASPIRIN 81 MG: 81 TABLET, COATED ORAL at 09:30

## 2018-04-27 RX ADMIN — ACETAMINOPHEN 650 MG: 325 TABLET ORAL at 17:17

## 2018-04-27 RX ADMIN — LISINOPRIL 10 MG: 10 TABLET ORAL at 09:30

## 2018-04-27 ASSESSMENT — PAIN DESCRIPTION - DESCRIPTORS: DESCRIPTORS: ACHING

## 2018-04-27 NOTE — PROGRESS NOTES
CLINICAL SWALLOW EVALUATION       04/27/18 0900   General Information   Onset Date 04/26/18   Start of Care Date 04/27/18   Referring Physician Dr. AKIL Sanchez   Patient Profile Review/OT: Additional Occupational Profile Info See Profile for full history and prior level of function   Patient/Family Goals Statement Patient would like to continue oral diet.   Swallowing Evaluation Bedside swallow evaluation   Behaviorial Observations Alert   Mode of current nutrition Oral diet   Type of oral diet Regular;Thin liquid   Respiratory Status Room air   Comments Patient admitted 4/26/18 with hypertensive emergency and headache with neck pain concerning for CVA.  MRI read to be without acute infarct or bleed.  Patient's PMH is significant for DM2 and HTN.   Clinical Swallow Evaluation   Oral Musculature generally intact   Structural Abnormalities none present   Dentition present and adequate   Mucosal Quality good   Mandibular Strength and Mobility intact   Oral Labial Strength and Mobility WFL   Lingual Strength and Mobility WFL   Velar Elevation intact   Buccal Strength and Mobility intact   Laryngeal Function Cough;Throat clear;Swallow;Voicing initiated;Dry swallow palpated   Oral Musculature Comments WFL   Additional Documentation Yes   Clinical Swallow Eval: Thin Liquid Texture Trial   Mode of Presentation, Thin Liquids cup;self-fed   Volume of Liquid or Food Presented 6 oz   Oral Phase of Swallow WFL   Pharyngeal Phase of Swallow intact   Diagnostic Statement Wet vocal quality for 1/15 trials of water by cup today. Cleared independently with throat clear.    Clinical Swallow Eval: Puree Solid Texture Trial   Mode of Presentation, Puree spoon;self-fed   Volume of Puree Presented 2 tsp   Oral Phase, Puree WFL   Pharyngeal Phase, Puree intact   Diagnostic Statement WFL   Clinical Swallow Eval: Solid Food Texture Trial   Mode of Presentation, Solid self-fed   Volume of Solid Food Presented 2 crackers   Oral Phase, Solid WFL    Pharyngeal Phase, Solid impaired;coughing/choking   Diagnostic Statement Patient exhibited cough x1 with subsequent sneeze and throat clear while talking with dry oral residue present.  No other overt Sx of aspiration with subsequent solid trials.     Swallow Eval: Clinical Impressions   Skilled Criteria for Therapy Intervention Skilled criteria met.  Treatment indicated.   Functional Assessment Scale (FAS) 6   Treatment Diagnosis Minimal oropharyngeal dysphagia   Diet texture recommendations Regular diet;Thin liquids   Recommended Feeding/Eating Techniques small sips/bites;maintain upright posture during/after eating for 30 mins;alternate between small bites and sips of food/liquid   Therapy Frequency other (see comments)   Predicted Duration of Therapy Intervention (days/wks) 1-2x follow up   Anticipated Discharge Disposition home   Risks and Benefits of Treatment have been explained. Yes   Patient, family and/or staff in agreement with Plan of Care Yes   Clinical Impression Comments Patient presents with minimal oropharyngeal dysphagia characterized by oral residue with dry solid leading to suspected base of tongue or valleculae retention and cough during talking x1 at this time.  Patient exhibiting cough and sneeze response to suspected particulate residue.  Patient reports similar events at home only when eating white rice.  Patient otherwise tolerated thin liquids, puree, and solid trials without overt Sx of aspiration with minimal cueing required not to talk during mastication.  Will follow 1-2x during meal to ensure independent use of generalized safe swallowing strategies and diet tolerance.    Total Evaluation Time   Total Evaluation Time (Minutes) 30

## 2018-04-27 NOTE — PLAN OF CARE
Problem: Hypertensive Disease/Crisis (Arterial) (Adult)  Goal: Signs and Symptoms of Listed Potential Problems Will be Absent, Minimized or Managed (Hypertensive Disease/Crisis)  Signs and symptoms of listed potential problems will be absent, minimized or managed by discharge/transition of care (reference Hypertensive Disease/Crisis (Arterial) (Adult) CPG).   Outcome: Improving  A&O- forgetful/ Neuros intact-word finding.   VSS- BP improved.  Tele NSR.  MOd cho diet. Up with Ax1- worked with ot. Denies pain. Plan dc today- meds here from [facility]- Dr. allison called at 1500 and is going to put in order.  Waiting to ask patient if he has a ride after he finishes his nap.

## 2018-04-27 NOTE — H&P
Admitted:     04/26/2018      PRIMARY CARE PHYSICIAN:  Dr. Gabriel Carroll.      CHIEF COMPLAINT:  Headache and hypertensive emergency.      HISTORY OF PRESENT ILLNESS:  Mr. Jose Gomez is a 89-year-old  male with history of hypertension, hyperlipidemia, type 2 diabetes mellitus and chronic kidney disease who presented to the emergency room with complaints of neck pain and headache.  The patient was playing cards this a.m. at around 11, suddenly noticed pain in his neck and slowly developed a headache, and the headache became progressively worse despite him taking 1 Extra-Strength Tylenol at 1:30 p.m.  So, he felt nauseated because of the pain; so his friend brought him into urgent care.  An urgent care provider has referred him to the emergency room after getting the history.  He felt a little lightheaded but otherwise denies any chest pain or shortness of breath.  No focal weakness, no tingling or numbness, no abdominal pain, nausea, vomiting per se currently.  He was given dexamethasone 10 mg IV, Benadryl 25 mg IV to help with the headache.  In the emergency room, he is hypertensive with a blood pressure in the 229/110 range.  So, there was concern for a stroke with the headache and hypertension.  So, a CT scan of the head was obtained without contrast which was negative which showed there is mild to moderate cerebral atrophy and patchy white matter changes seen in both hemispheres without mass effect; otherwise, no evidence for intracranial hemorrhage or any other acute process.  So, a CT angiogram of the head and neck was done, and it showed mild to moderate atherosclerotic disease involving the carotid bifurcations without significant stenosis.  So, it is a normal CT angiogram.  So, he was given regular aspirin, and they tried to get him up to walk to send him home, but patient became very unsteady and almost fell, so they caught him and a request for hospitalization was made.  To help with his blood  pressure, he was given lisinopril 10 mg in combination with the hydrochlorothiazide 12.5 mg.  With that, his blood pressure improved to 170s at one point for a short period of time, and then it started to trend up again up to 190s to 200s over 100s.  So, a low-dose nicardipine drip was started.  His headache mostly resolved, and he only has like mild posterior head pain.  He rated it as 2/10 currently when I had seen him.  As per the history, the patient has been very noncompliant with all of his medications.  Has not filled his medication prescriptions from the pharmacy since 2016.      PAST MEDICAL HISTORY:  Significant for hypertension, diabetes mellitus type 2, hyperlipidemia, peripheral neuropathy.      ALLERGIES:  NO KNOWN DRUG ALLERGIES.      SOCIAL HISTORY:  Not a smoker.  He drinks 1-2 beers twice a week.  He lives in a house in [place] with his wife.  He is .  He is pretty active and walks around the house.  Due to peripheral neuropathy, he does not exercise much.      FAMILY HISTORY:  Mother had enlarged heart.      REVIEW OF SYSTEMS:  Ten-point review of systems was done and is negative except as in HPI.      PHYSICAL EXAMINATION:   VITAL SIGNS:  His temperature is 97 degrees Fahrenheit, afebrile, pulse rate is ranging from 60s-80s.  Blood pressure initially high at 229/110.  After the lisinopril/hydrochlorothiazide and nicardipine drip, it came down to 150/90.  Respiratory rate is 11-20.  He is satting 92% on room air.   GENERAL:  He is alert, oriented, pleasant male sitting comfortably in bed, not in any acute distress.   HEAD:  Atraumatic, normocephalic.   NECK:  Supple, no JVD.     PUPILS:  Equal, round and reactive to light.  Extraocular eye movements are intact.  Cranial nerves II-XII intact.  Muscle power is 5/5 in bilateral upper and lower extremities.  Finger-nose testing is normal.   HEART:  S1, S2 heard; no murmurs, rubs or gallops.   LUNGS:  Clear to auscultation.  No rales,  rhonchi or wheezing.   ABDOMEN:  Soft, nontender, nondistended, no hepatosplenomegaly.     NEUROLOGIC:  Cranial nerves II through XII are intact.  No focal weakness.  Fluent speech.  Finger-nose testing is normal.   SKIN:  Warm and dry.   PSYCHIATRIC:  Mood and affect are normal.      LABORATORY AND RADIOLOGICAL INVESTIGATIONS:  A 12-lead EKG is reviewed and showed normal sinus rhythm with inferior infarct, age undetermined, with no acute findings.  CBC showed WBC count of 7.5, hemoglobin 14.9, platelet count at 168.  Glucose at 123.  Sodium 135, potassium 4, chloride 105, bicarbonate 21, BUN 24, creatinine 1.52, which is his baseline.  All his LFTs are normal.  INR is at 1.01.  CT head without contrast and CT angiogram of head and neck is negative as dictated above.      ASSESSMENT AND PLAN:  A 89-year-old male with history of hypertension, hyperlipidemia, chronic kidney disease, type 2 diabetes mellitus and diabetic neuropathy and history of pernicious anemia, gastroesophageal reflux disease, presenting with headache and neck pain and hypertensive emergency.   1.  Headache and neck pain.  Could be secondary to hypertensive emergency versus stroke is in the differential as well.  CT head and CT angiogram negative in the emergency room.  He will be hospitalized and will continue baby aspirin and will request an MRI brain to rule out a stroke.  Neuro checks q.4 hours ordered, and we will try to control his blood pressure to less than 160.   2.  Hypertensive emergency.  He was given lisinopril low dose and hydrochlorothiazide, and the patient's blood pressure started to trend up again.  So, we will continue the nicardipine drip and will plan on starting him on Norvasc 5 mg p.o. daily, and if his blood pressure becomes controlled at less than 160, then we will plan on discontinuing the nicardipine drip.  An MRI brain is ordered as a stat as that will help us on deciding the goal range of the blood pressure.  A fasting  LPA and a hemoglobin A1c will be checked on him.   3.  Diabetes mellitus type 2.  He will be placed on a sliding scale insulin with Accu-Cheks q.4 hours while he is n.p.o. until speech path evaluations will be obtained.  Stroke protocol is initiated to do an echo.  PT, OT, speech path evaluations ordered.  He will be monitored on telemetry.  Deep venous thrombosis prophylaxis with PCDs.   # Pain Assessment:  Current Pain Score 2018   Patient currently in pain? yes   Pain score (0-10) 3   Pain location Head   Pain descriptors Aching   - Neo is experiencing pain due to head ache . Pain management was discussed and the plan was created in a collaborative fashion.  Neo's response to the current recommendations: engaged  - Opioid regimen: oxycodone and dilaudid IV   - Response to opioid medications: Reduction of symptoms pain   - Bowel regimen: not needed               CODE STATUS:  Discussed with the patient and he wants to be a full code.      He will be admitted as inpatient as I am anticipating more than 2-night stay.         YOMAIRA BLANKENSHIP MD             D: 2018   T: 2018   MT: TI      Name:     NEO VILLAVICENCIO   MRN:      [id number]        Account:      [id number]   :      1928        Admitted:     2018                   Document: [id number]

## 2018-04-27 NOTE — PROGRESS NOTES
Discharge education completed with patient and his son and daughter. All questions addressed, pt and family verbalized understanding of all instruction. Pt received written copy of instruction. VSS, pt c/o minimal neck pain improved after tylenol. Received meds from discharge pharmacy. Son to provide transport to home

## 2018-04-27 NOTE — PROGRESS NOTES
WOC consult for coccyx:     Pt hx:  A 89-year-old male with history of hypertension, hyperlipidemia, chronic kidney disease, type 2 diabetes mellitus and diabetic neuropathy and history of pernicious anemia, gastroesophageal reflux disease, presenting with headache and neck pain and hypertensive emergency.     D:  Pt able to stand with minimal assist today for assessment.  Pt wearing briefs which were dry, but per report has had some incontinence recently.  The sides of upper gluteal cleft have reddened roughed tissue and are slow to nora, but the tissue appears basically intact.  Coccyx/sacrum intact.  Pt denies any discomfort.      I:  Skin assessed; discussed POC and pressure injury prevention with pt, nursing.     A:  No active pressure injuries noted.  Reddened tissue to gluteal cleft appears more moisture/friction related.  Pt able to reposition himself independently.     P:  Plan for perineal skin care:  BID and prn:  Cleanse with mild cleanser, ie. Leander perineal lotion.  If skin to gluteal cleft appears irritated or pt incontinent, apply a thin glaze of Critic-aid barrier paste to area.  Leave open to air; no dressings.  Ensure pt repositioning every 1-2 hrs in bed, side to side, and every 1 hr in chair.      WOC will sign off; please reconsult if further issues.

## 2018-04-27 NOTE — PLAN OF CARE
Problem: Patient Care Overview  Goal: Plan of Care/Patient Progress Review  Discharge Planner SLP   Patient plan for discharge: Not stated  Current status: Clinical swallow evaluation and treatment completed.  Patient presents with minimal oropharyngeal dysphagia.  No reported dysphagia sx hx or pneumonia hx.  Patient exhibited cough and sneeze after talking with dry, crumbly solids in mouth.  Recommend continue regular diet with thin liquids when alert and upright in chair, alternating liquids and solids. Trained safe swallowing strategies.   Barriers to return to prior living situation: No swallowing barriers to return home.  Recommendations for discharge: Return to previous environment per swallowing needs.   Rationale for recommendations: Patient likely to meet SLP goals during inpatient hospitalization.        Entered by: Jennifer Golden 04/27/2018 9:20 AM

## 2018-04-27 NOTE — PLAN OF CARE
Problem: Patient Care Overview  Goal: Plan of Care/Patient Progress Review  PT: Orders received, chart reviewed, eval and treat initiated. PT was admitted with neck pain/headache with decreased stability. Hx of DM2, HTN, and peripheral neuropathy with 2 falls in past 6 months reported with no injuries. Pt lives in split level home with 6 steps up or down (railings available) with 2 adult kids. Was IND with all mobility and was driving before this incident. Pt states his children would be available to assist him at home as needed.    Discharge Planner PT   Patient plan for discharge: Home  Current status: Pt completes bed mobility IND. Sit<>stand SBA with no AD. Amb 70' with no AD and SBA-CGA, 1 minor LOB noted when turning into room, no LOB with several other turns.   Barriers to return to prior living situation: Safety on stairs  Recommendations for discharge: Home with supervision of children  Rationale for recommendations: Pt was previously IND with all mobility. Currently moves very well although noted minor dynamic balance deficits and decreased endurance currently. Hx of 2 falls in past 6 mo. Pt would be safe to return to home with supervision of children on stairs or with longer distances of amb. Recommend pacing activity until endurance increases.        Entered by: Melody Crandall 04/27/2018 12:02 PM

## 2018-04-27 NOTE — PROGRESS NOTES
04/27/18 1104   Quick Adds   Type of Visit Initial PT Evaluation   Living Environment   Lives With child(katarzyna), adult   Living Arrangements house   Home Accessibility stairs to enter home;stairs within home   Number of Stairs to Enter Home 2   Number of Stairs Within Home 6   Stair Railings at Home (Stairs present inside steps)   Transportation Available car;family or friend will provide   Living Environment Comment Pt lives in split level home with 6 steps up or down inside the home. Lives with adult daughter/son it sounds like. Was previously completing his own driving, states kids can assist him as needed at home,   Self-Care   Usual Activity Tolerance good   Current Activity Tolerance moderate   Equipment Currently Used at Home none   Functional Level Prior   Ambulation 0-->independent   Transferring 0-->independent   Toileting 0-->independent   Bathing 0-->independent   Dressing 0-->independent   Eating 0-->independent   Communication 0-->understands/communicates without difficulty   Swallowing 0-->swallows foods/liquids without difficulty   Cognition 0 - no cognition issues reported   Fall history within last six months yes   Number of times patient has fallen within last six months 2   Which of the above functional risks had a recent onset or change? ambulation;transferring   Prior Functional Level Comment Pt states he has been IND with all mobility. Does note 2 falls in home related to increased peripheral neuropathy, no injuries with this.    General Information   Onset of Illness/Injury or Date of Surgery - Date 04/26/18   Referring Physician Zulema Sanchez MD   Patient/Family Goals Statement Return to home with adult children   Pertinent History of Current Problem (include personal factors and/or comorbidities that impact the POC) Pt admitted with headache/neck pain with unsteadiness. PMH of DM2, HTN, peripheral neuropathy.   Precautions/Limitations no known precautions/limitations   Weight-Bearing Status  - LUE full weight-bearing   Weight-Bearing Status - RUE full weight-bearing   Weight-Bearing Status - LLE full weight-bearing   Weight-Bearing Status - RLE full weight-bearing   Cognitive Status Examination   Orientation orientation to person, place and time   Level of Consciousness alert   Follows Commands and Answers Questions 100% of the time   Personal Safety and Judgment intact   Memory intact   Integumentary/Edema   Integumentary/Edema no deficits were identifed   Posture    Posture Protracted shoulders;Forward head position   Range of Motion (ROM)   ROM Comment WFL   Strength   Strength Comments Grossly 4/5 strength   Bed Mobility   Bed Mobility Comments Pt completes supine>sit IND   Transfer Skills   Transfer Comments Pt completes sit<>stand SBA with no AD.   Gait   Gait Comments Pt amb 70' with CGA and no AD. Good reciprocal gait pattern, 1 minor LOB noted with turning into room but pt able to self-correct,   Balance   Balance Comments Pt demonstrates good static and moderate dynamic standing balance.   Sensory Examination   Sensory Perception Comments Pt reporting peripheral neuropathy in feet.   General Therapy Interventions   Planned Therapy Interventions balance training;gait training;ROM;strengthening;stretching;transfer training;home program guidelines;progressive activity/exercise   Clinical Impression   Criteria for Skilled Therapeutic Intervention yes, treatment indicated   PT Diagnosis Weakness and mild general de-conditioning   Influenced by the following impairments Mild general weakness/dynamic balance deficits/decreased endurance   Functional limitations due to impairments Decreased amb distance, mild falls risk with stairs/dynamic activities   Clinical Presentation Stable/Uncomplicated   Clinical Presentation Rationale Pt is medically stable and was previously IND with all mobility   Clinical Decision Making (Complexity) Low complexity   Therapy Frequency` daily   Predicted Duration of  "Therapy Intervention (days/wks) 5 days   Anticipated Discharge Disposition Home with Assist   Risk & Benefits of therapy have been explained Yes   Patient, Family & other staff in agreement with plan of care Yes   [facility] AM-PAC  \"6 Clicks\" V.2 Basic Mobility Inpatient Short Form   1. Turning from your back to your side while in a flat bed without using bedrails? 4 - None   2. Moving from lying on your back to sitting on the side of a flat bed without using bedrails? 4 - None   3. Moving to and from a bed to a chair (including a wheelchair)? 3 - A Little   4. Standing up from a chair using your arms (e.g., wheelchair, or bedside chair)? 4 - None   5. To walk in hospital room? 3 - A Little   6. Climbing 3-5 steps with a railing? 3 - A Little   Basic Mobility Raw Score (Score out of 24.Lower scores equate to lower levels of function) 21   Total Evaluation Time   Total Evaluation Time (Minutes) 8     "

## 2018-04-27 NOTE — PLAN OF CARE
Problem: Patient Care Overview  Goal: Plan of Care/Patient Progress Review  Outcome: Improving  A&O. Neuros intact.   Gait:  Stood up with ax2 and transferred to chair- not needing much help- only took a couple steps. VSS- took lisinopril- home meds being verified.  Tele NSr.  Speech saw- mod cho diet ordered.  Denies headache.   Plan:  Pt/ot to see, lives with family, plan to figure out med compliance at home.  Possible dc to home today.

## 2018-04-27 NOTE — PHARMACY-ADMISSION MEDICATION HISTORY
Admission medication history interview status for the 4/26/2018  admission is complete. See EPIC admission navigator for prior to admission medications     Medication history source reliability:Poor    Actions taken by pharmacist (provider contacted, etc):discuseed non compliance with Dr. BLANKENSHIP        Additional medication history information not noted on PTA med list :Non compliant with rx meds.  Last fills 2016    Medication reconciliation/reorder completed by provider prior to medication history? No    Time spent in this activity: 25min, called [facility] / [facility] and talked to pharmacist     Prior to Admission medications    Medication Sig Last Dose Taking? Auth Provider   Cyanocobalamin (B-12) 2000 MCG TABS Take 1 tablet by mouth daily Past Week at Unknown time Yes Gabriel Carroll MD   ASPIRIN 81 MG PO TABS 1 TABLET DAILY More than a month at Unknown time  Gabriel Carroll MD

## 2018-04-27 NOTE — PLAN OF CARE
Problem: Hypertensive Disease/Crisis (Arterial) (Adult)  Goal: Signs and Symptoms of Listed Potential Problems Will be Absent, Minimized or Managed (Hypertensive Disease/Crisis)  Signs and symptoms of listed potential problems will be absent, minimized or managed by discharge/transition of care (reference Hypertensive Disease/Crisis (Arterial) (Adult) CPG).  Outcome: Improving  Pt A+O, SR, RA, neuros intact. Slightly [place]. Continues to report mild headache but declines PRN medication. BPs stabilized with nicardipine-drip stopped at 2300 per MD verbal phone order. Urinal use and occasional urinary incontinence. Xsmall soft incontinent BM. Bedrest on shift d/t unsteady gait per ED report. NPO per orders until swallow study performed. Q4H blood glucose monitoring, sliding scale novolog administered x2. Unblanchable redness on coccyx/gluteal cleft-WOC consult ordered. Prelim MRI results unremarkable. Trops, UA, and head CT negative. NS running at 75mL/hr. Consults for neurology, PT, OT, and speech pathology. Continue to monitor.

## 2018-04-27 NOTE — DISCHARGE SUMMARY
[facility]    Discharge Summary  Hospitalist    Date of Admission:  4/26/2018  Date of Discharge:  4/27/2018  Discharging Provider: Marya Hassan  Date of Service (when I saw the patient): 04/27/18    Discharge Diagnoses   Hypertensive emergency  Dyslipidemia  DM type 2- diet controlled  CKD stage 3  Noncompliance    History of Present Illness   Mr. Jose Gomez is a 89-year-old  male with history of hypertension, hyperlipidemia, type 2 diabetes mellitus and chronic kidney disease who presented to the emergency room with complaints of neck pain and headache; for a detailed HPI- please refer to H&P done by Dr Zulema Sanchez on 04/26/2018; the patient was on Lisinopril/HCTZ 10/12.5 and Zocor 20 mg qdaily but apparently he ran out of medications (his pharmacy confirmed that he did not fill his meds since Dec 2016).     Hospital Course   On arrival- his BP was extremely elevated 229/110; there was initially concern for stroke so he underwent imaging of the head with CT with and w/o contrast which were negative for any acute pathology and CTA of neck and head which showed it showed mild to moderate atherosclerotic disease involving the carotid bifurcations without significant stenosis; MRI of the brain was also negative;there were no focal neurological deficits; in ER -he was given 1 dose Prinzide and started on Nicardipine drip and admitted to CICU for close monitoring; serial troponins remained negative; blood work showed cr of 1.52--1.55 which seems to be his baseline    He was started on oral Norvasc and Lisinopril with marked improvement of his BPs next day 123/52. His headache/neck pain resolved; he had an echocardiogram which showed EF 60-65% with grade 1  diastolic function; bubble study was negative. He was seen by PT/OT who felt that patient is safe to be d/c back home with supervision of children. There was no need for Neurology consultation as it seems that his symptoms  represented hypertensive emergency, not CVA. He will be discharged on Norvasc 5 mg po daily, Lisinopril 20 mg po daily, Zocor 20 mg po daily (lipid profile with , total cholesterol 207) and he will continue with ASA; I strongly advised him to be complaint with his medications.    Regarding his DM type 2- he was not taking any meds but DM seems to be diet-controlled, HbA1c was 6.3.    He will follow up with PMD as scheduled; he may need further adjustment of his BP meds.      # Discharge Pain Plan:  - Patient currently has NO PAIN and is not being prescribed pain medications on discharge.    Marya Hassan MD    Significant Results and Procedures   See below    Echocardiogram 04/27/2018  1. Normal left ventricular size and systolic function. LVEF 60-65%. Grade 1  diastolic function.  2. No regional wall motion abnormalities.  3. Normal right ventricular size and systolic function.  4. Aortic valve is trileaflet with an sclerotic, without hemodynamically  significant stenosis. Trace mitral and tricuspid regurgitation.  5. Bubble study (saline contrast injection) was performed that was negative  for flow across the inter-atrial septum.      Pending Results   None  Unresulted Labs Ordered in the Past 30 Days of this Admission     No orders found for last 61 day(s).          Code Status   Full Code       Primary Care Physician   Gabriel Carroll    Physical Exam   Temp: 98.3  F (36.8  C) Temp src: Oral BP: 146/70 Pulse: 83 Heart Rate: 74 Resp: 16 SpO2: 97 % O2 Device: None (Room air)    Vitals:    04/26/18 1608 04/27/18 0645   Weight: 74.8 kg (165 lb) 69.9 kg (154 lb)     Vital Signs with Ranges  Temp:  [96.5  F (35.8  C)-98.3  F (36.8  C)] 98.3  F (36.8  C)  Pulse:  [68-83] 83  Heart Rate:  [65-93] 74  Resp:  [6-23] 16  BP: (116-229)/() 146/70  SpO2:  [92 %-98 %] 97 %  I/O last 3 completed shifts:  In: 972 [P.O.:460; I.V.:512]  Out: 675 [Urine:675]    GENERAL:  He is alert, oriented, pleasant male,  not in any acute distress.   HEAD:  Atraumatic, normocephalic.   NECK:  Supple, no JVD.     PUPILS:  Equal, round and reactive to light.  Extraocular eye movements are intact.   HEART:  S1, S2 heard; no murmurs, rubs or gallops.   LUNGS:  Clear to auscultation.  No rales, rhonchi or wheezing.   ABDOMEN:  Soft, nontender, nondistended, no hepatosplenomegaly.     NEUROLOGIC:  Cranial nerves II through XII are intact.  No focal weakness.  Fluent speech.  Finger-nose testing is normal.   SKIN:  Warm and dry.   PSYCHIATRIC:  Mood and affect are normal.     Discharge Disposition   Discharged to home  Condition at discharge: Good    Consultations This Hospital Stay   NEUROLOGY IP CONSULT  PHYSICAL THERAPY ADULT IP CONSULT  OCCUPATIONAL THERAPY ADULT IP CONSULT  SPEECH LANGUAGE PATH ADULT IP CONSULT  SWALLOW EVAL SPEECH PATH AT BEDSIDE IP CONSULT  SMOKING CESSATION PROGRAM IP CONSULT  WOUND OSTOMY CONTINENCE NURSE  IP CONSULT    Time Spent on this Encounter   I, Marya Hassan, personally saw the patient today and spent less than or equal to 30 minutes discharging this patient.    Discharge Orders     Med Therapy Manage Referral     Reason for your hospital stay   Hypertensive emergency     Follow-up and recommended labs and tests    Follow up with primary care provider, Gabriel Carroll, within 7 days for hospital follow- up.  Monitor blood pressures- adjust the antihypertensive meds as needed. The following labs/tests are recommended: BMP.     Activity   Your activity upon discharge: activity as tolerated     When to contact your care team   Call your primary doctor or return to ER if you have any of the following: temperature greater than 100.5 or less than 96, chest pain, shortness of breath, severe headache, dizziness, loss of consciousness.     Full Code     Diet   Follow this diet upon discharge: Orders Placed This Encounter  2gm sodium, low cholesterol, low fat diet;   Moderate Consistent CHO Diet        Discharge Medications   Current Discharge Medication List      START taking these medications    Details   amLODIPine (NORVASC) 5 MG tablet Take 1 tablet (5 mg) by mouth daily  Qty: 30 tablet, Refills: 0    Comments: Future refills by PCP Dr. Gabriel Carroll with phone number [phone].  Associated Diagnoses: Hypertension, unspecified type      lisinopril (PRINIVIL/ZESTRIL) 10 MG tablet Take 1 tablet (10 mg) by mouth daily  Qty: 30 tablet, Refills: 0    Comments: Future refills by PCP Dr. Gabriel Carroll with phone number [phone].  Associated Diagnoses: Hypertension, unspecified type      simvastatin (ZOCOR) 20 MG tablet Take 1 tablet (20 mg) by mouth At Bedtime  Qty: 30 tablet, Refills: 0    Comments: Future refills by PCP Dr. Gabriel Carroll with phone number [phone].  Associated Diagnoses: Hyperlipidemia LDL goal <100         CONTINUE these medications which have NOT CHANGED    Details   Cyanocobalamin (B-12) 2000 MCG TABS Take 1 tablet by mouth daily    Associated Diagnoses: Need for prophylactic vaccination against Streptococcus pneumoniae (pneumococcus)      ASPIRIN 81 MG PO TABS 1 TABLET DAILY  Qty: 100, Refills: 3         STOP taking these medications       glucose blood VI test strips strip Comments:   Reason for Stopping:             Allergies   Allergies   Allergen Reactions     No Known Drug Allergies      Data   Most Recent 3 CBC's:  Recent Labs   Lab Test  04/27/18   0516  04/26/18   1638  03/10/18   1008   WBC  5.5  7.5  6.9   HGB  13.7  14.9  15.3   MCV  94  94  99   PLT  181  168  189      Most Recent 3 BMP's:  Recent Labs   Lab Test  04/27/18   0516  04/26/18   1638  03/10/18   1008   NA  136  135  137   POTASSIUM  4.3  4.0  4.0   CHLORIDE  105  105  105   CO2  21  21  26   BUN  29  24  28   CR  1.55*  1.52*  1.72*   ANIONGAP  10  9  6   CAMILA  8.5  8.7  8.5   GLC  145*  123*  115*     Most Recent 2 LFT's:  Recent Labs   Lab Test  04/26/18   2140  04/26/18   1638   AST  18   18   ALT  15  16   ALKPHOS  74  84   BILITOTAL  0.8  1.0     Most Recent INR's and Anticoagulation Dosing History:  Anticoagulation Dose History     Recent Dosing and Labs Latest Ref Rng & Units 4/26/2018    INR 0.86 - 1.14 1.01        Most Recent 3 Troponin's:  Recent Labs   Lab Test  04/27/18   0516  04/26/18   2140   TROPI  <0.015  <0.015     Most Recent Cholesterol Panel:  Recent Labs   Lab Test  04/27/18   0516   CHOL  207*   LDL  136*   HDL  62   TRIG  45     Most Recent 6 Bacteria Isolates From Any Culture (See EPIC Reports for Culture Details):  Recent Labs   Lab Test  03/10/18   1040  07/10/14   1136   CULT  >100,000 colonies/mL  Enterococcus faecalis  *  <10,000 colonies/mL  urogenital roger  Susceptibility testing not routinely done    <10,000 colonies/mL Mixed gram positive roger  Multiple species present, probable perineal contamination.  Susceptibility testing not routinely done       Most Recent TSH, T4 and A1c Labs:  Recent Labs   Lab Test  04/26/18   2140   A1C  6.3     Results for orders placed or performed during the hospital encounter of 04/26/18   Head CT w/o contrast    Narrative    CT SCAN OF THE HEAD WITHOUT CONTRAST   4/26/2018 5:02 PM     HISTORY: Sudden head/neck pain.    TECHNIQUE: Axial images of the head and coronal reformations without  IV contrast material. Radiation dose for this scan was reduced using  automated exposure control, adjustment of the mA and/or kV according  to patient size, or iterative reconstruction technique.    COMPARISON: None.    FINDINGS: There is some mild to moderate cerebral atrophy. Patchy  white matter changes are seen in both hemispheres without mass effect.  There is no evidence for intracranial hemorrhage, mass effect, acute  infarct, or skull fracture.      Impression    IMPRESSION: Chronic changes. No evidence for intracranial hemorrhage  or any acute process.    LILIANA COYLE MD   Head/neck angiogram CT  w & w/o contrast    Narrative    CTA ANGIOGRAM  HEAD AND NECK  4/26/2018  5:03 PM     HISTORY: Sudden head/neck pain.    TECHNIQUE: Axial images were obtained through the head and neck  without and with intravenous contrast. 70 mL of Isovue-370 was given.  Multiplanar reconstructions were performed. 3-D reconstructions off a  remote workstation for CT angiography were also acquired. Carotid  stenoses were evaluated by comparing the caliber of the proximal  internal carotid artery to the caliber of the distal internal carotid  artery. Radiation dose for this scan was reduced using automated  exposure control, adjustment of the mA and/or kV according to patient  size, or iterative reconstruction technique.    FINDINGS:    Brachiocephalic vessels: There is some moderate calcific plaque in the  thoracic arch and some mild to moderate calcified and noncalcified  plaque in the proximal brachiocephalic vessels but there is no  stenosis.    Right carotid system: Mild calcified and noncalcified plaque is  present causing some 40% stenosis of the proximal internal carotid  artery. There is no evidence for dissection.    Left carotid system: Mild calcific plaque. No evidence for stenosis or  dissection.    Right vertebral artery: Normal.    Left vertebral artery: Congenitally small vessel. No evidence for  stenosis or dissection.    [place]: Minimal calcific plaque is seen in the carotid  siphon regions without stenosis. The basilar artery is patent. The  proximal anterior, middle, and posterior cerebral arteries are patent.  Incidental note is made of primary origin right posterior cerebral  artery off the right internal carotid artery. This is an anatomic  variant.      Impression    IMPRESSION:  1. Mild to moderate atherosclerotic disease involving the carotid  bifurcations, brachiocephalic vessels, carotid siphon regions without  significant stenosis.  2. No evidence for significant stenosis, thromboembolism, dissection,  or aneurysm.    LILIANA COYLE MD   MR  Brain w/o & w Contrast    Narrative    MRI BRAIN WITHOUT AND WITH CONTRAST  4/27/2018 1:24 AM    HISTORY:  unsteady gait and head ache r/o stroke;      TECHNIQUE:  Multiplanar, multisequence MRI of the brain without and  with 7.5mL Gadavist    COMPARISON: None.    FINDINGS: There is significant motion artifact on these images There  is generalized atrophy of the brain.  White matter changes are present  in the cerebral hemispheres that are consistent with small vessel  ischemic disease in this age patient. There is no evidence of  hemorrhage, mass, acute infarct, or anomaly.  There are no gadolinium  enhancing lesions.   The facial structures appear normal. The arteries  at the base of the brain and the dural venous sinuses appear patent.       Impression    IMPRESSION:  No acute pathology. No bleed, mass, or infarcts.    I agree with the preliminary report that was communicated to the  referring physician.    RAYMUNDO PANDA MD

## 2018-04-27 NOTE — ED NOTES
"[facility]  ED Nurse Handoff Report    ED Chief complaint: Headache (onset of upper neck pain/head pain while playing cards.  No neuro defecits.  +nausea, denies dizziness.  Seen by PCP and sent here. )      ED Diagnosis:   Final diagnoses:   None       Code Status: Full Code    Allergies:   Allergies   Allergen Reactions     No Known Drug Allergies        Activity level - Baseline/Home:  Independent    Activity Level - Current:   Stand with Assist of 2     Needed?: No    Isolation: No  Infection: Not Applicable    Bariatric?: No    Vital Signs:   Vitals:    04/26/18 1815 04/26/18 1830 04/26/18 1845 04/26/18 1900   BP: 171/84 (!) 200/115 (!) 193/131 (!) 162/91   Resp: (!) 6 18 18 11   Temp:       TempSrc:       SpO2: 97%  97%    Weight:       Height:           Cardiac Rhythm: ,    SR    Pain level: 0-10 Pain Scale: 7    Is this patient confused?: No     Patient Report: Initial Complaint: headache  Focused Assessment: Pt was playing cards with friend and noted that he had a neck ache. Pt then began to have a headache and friend reported pt was having trouble walking and pt kept going to the side when walking. Neuro assessment in the ED negative except for headache and nausea. Pt received headache cocktail which improved headache and nausea. Pt alert and oriented. Pt states the reason he was having trouble walking is d/t his neuropathy. Friend reports it was much worse today. Pt w/ hx of diabetes and HTN. Pt reports taking his BP medication \"off and on\" and has not been taking it lately. Attempted to roadtested pt and pt very unsteady on feet and needed two to help stand upright. Pt uses call light. Pt given 325 mg ASA and started on nicardipine gtt in ED.   Tests Performed: CT head and neck  Abnormal Results:   Treatments provided: BP medication and medications for HA.     Family Comments: friend at bedside. Pt reports living with son and daughter     OBS brochure/video discussed/provided " to patient: N/A    ED Medications:   Medications   prochlorperazine (COMPAZINE) injection 5 mg (5 mg Intravenous Given 4/26/18 1701)   diphenhydrAMINE (BENADRYL) injection 25 mg (25 mg Intravenous Given 4/26/18 1701)   dexamethasone (DECADRON) injection 10 mg (10 mg Intravenous Given 4/26/18 1701)   Saline Flush - CT (90 mLs Intravenous Given 4/26/18 1655)   iopamidol (ISOVUE-370) solution 70 mL (70 mLs Intravenous Given 4/26/18 1654)   lisinopril-hydrochlorothiazide (PRINZIDE/ZESTORETIC) 10-12.5 MG per tablet 1 tablet (1 tablet Oral Given 4/26/18 1842)       Drips infusing?:  No    For the majority of the shift this patient was Green.   Interventions performed were N/A.    Severe Sepsis OR Septic Shock Diagnosis Present: No      ED NURSE PHONE NUMBER: [phone]

## 2018-04-28 LAB — INTERPRETATION ECG - MUSE: NORMAL

## 2018-04-28 NOTE — PLAN OF CARE
Problem: Patient Care Overview  Goal: Plan of Care/Patient Progress Review  Speech Language Therapy Discharge Summary    Reason for therapy discharge:    Discharged to home.    Progress towards therapy goal(s). See goals on Care Plan in [facility] electronic health record for goal details.  Goals met    Therapy recommendation(s):    No further therapy is recommended.

## 2018-04-28 NOTE — PLAN OF CARE
Problem: Patient Care Overview  Goal: Plan of Care/Patient Progress Review    Physical Therapy Discharge Summary    Reason for therapy discharge:    Discharged to home.    Progress towards therapy goal(s). See goals on Care Plan in [facility] electronic health record for goal details.  Goals not met.  Barriers to achieving goals:   discharge on same date as initial evaluation.    Therapy recommendation(s):    Recommend supervision with mobility in home, secondary to pt's frequent falls.

## 2018-05-01 ENCOUNTER — ALLIED HEALTH/NURSE VISIT (OUTPATIENT)
Dept: PHARMACY | Facility: CLINIC | Age: 83
End: 2018-05-01
Attending: INTERNAL MEDICINE
Payer: COMMERCIAL

## 2018-05-01 DIAGNOSIS — E78.5 HYPERLIPIDEMIA LDL GOAL <100: ICD-10-CM

## 2018-05-01 DIAGNOSIS — D51.0 PERNICIOUS ANEMIA: ICD-10-CM

## 2018-05-01 DIAGNOSIS — I16.1 HYPERTENSIVE EMERGENCY: Primary | ICD-10-CM

## 2018-05-01 PROCEDURE — 99605 MTMS BY PHARM NP 15 MIN: CPT | Performed by: PHARMACIST

## 2018-05-01 NOTE — PROGRESS NOTES
SUBJECTIVE/OBJECTIVE:                Jose Gomez is a 89 year old male called for a transitions of care visit.  He was discharged from [facility] on 4/27 for hypertensive emergency, dyslipidemia, DM type 2 - diet controlled, and CKD stage 3 as well as non-compliance.     Chief Complaint: None. No questions, says he is taking all his medications as directed. The first two times I called him, he was unavailable, as he was socializing. When I called back per his request, he okayed just going through his medications to make sure they matched up.     Allergies/ADRs: Reviewed in Epic  Tobacco: No tobacco use   Alcohol: Yes- not quantified    Medication Adherence/Access:  Chart notes indicate patient was off medications from December 2016 until hospitalization.  The patient fills medications at [place]: YES - given 30 day fills after hospitalization.     Hypertensive Emergency: Current therapy includes amlodipine 5 mg daily and lisinopril 10 mg daily. He has not checked his BP since leaving the hospital, but he reports he has an appointment tomorrow for it to be checked. Denies headache. Said he is feeling fine - no reported side effects.    Pernicious Anemia: Current therapy includes B-12  2000 mcg daily.   Hemoglobin   Date Value Ref Range Status   04/27/2018 13.7 13.3 - 17.7 g/dL Final     Hyperlipidemia: Current therapy includes simvastatin 20 mg once daily.  Pt reports no significant myalgias or other side effects. He is also on a low-dose aspirin daily, no reported bruising/bleeding.     Current labs include:  BP Readings from Last 3 Encounters:   04/27/18 (!) 125/91   04/26/18 (!) 168/98   03/10/18 160/90     Lab Results   Component Value Date    A1C 6.3 04/26/2018   .  Lab Results   Component Value Date    CHOL 207 04/27/2018     Lab Results   Component Value Date    TRIG 45 04/27/2018     Lab Results   Component Value Date    HDL 62 04/27/2018     Lab Results   Component Value Date     04/27/2018     Liver  Function Studies -   Recent Labs   Lab Test  04/26/18   2140   PROTTOTAL  7.0   ALBUMIN  3.6   BILITOTAL  0.8   ALKPHOS  74   AST  18   ALT  15     Last Basic Metabolic Panel:  Lab Results   Component Value Date     04/27/2018      Lab Results   Component Value Date    POTASSIUM 4.3 04/27/2018     Lab Results   Component Value Date    CHLORIDE 105 04/27/2018     Lab Results   Component Value Date    BUN 29 04/27/2018     Lab Results   Component Value Date    CR 1.55 04/27/2018     ASSESSMENT:                 Current medications were reviewed today.      Medication Adherence: fair, no issues identified. We did discuss that he only got 30 day supplies of his medications from the hospital, so he will need refills of these.    Hypertensive Emergency: Improved. Pt would benefit from re-check of blood pressure now that he is home. BP was at goal of <130/80 mmHg at time of discharge. Appears to be tolerating regimen well.    Pernicious Anemia: Stable.    Hyperlipidemia: Stable. Pt is tolerating statin therapy.    PLAN:                  1. Pt to continue current medications and follow-up as planned.   2. Pt to discuss refills of medications at appointment tomorrow    I spent 10 minutes (total of calls) with this patient today. I offer these suggestions for consideration by the PCP. A copy of the visit note was provided to the patient's primary care provider.    Will follow up in one month for check-in.    The patient was mailed a summary of these recommendations as an after visit summary.    Tamiko Beltran PharmD   [facility] Pharmacist   Phone: [phone]

## 2018-05-01 NOTE — PATIENT INSTRUCTIONS
Recommendations from today's MTM visit:                                                    MTM (medication therapy management) is a service provided by a clinical pharmacist designed to help you get the most of out of your medicines.   Today we reviewed what your medicines are for, how to know if they are working, that your medicines are safe and how to make your medicine regimen as easy as possible.     1. Continue current medications and follow-up with your primary care doctor as planned.    2. It looks like you received a 30 day supply of medications after your hospital visit. You can request these medications be refilled at your appointment tomorrow. Please reach out if you have difficulty filling your medications.    Next MTM visit: 1 month check-in    To schedule another MTM appointment, please call the clinic directly or you may call the MTM scheduling line at [phone] or toll-free at [phone].     My Clinical Pharmacist's contact information:                                                      It was a pleasure speaking with you.  Please feel free to contact me with any questions or concerns you have.      Tamiko Beltran PharmD   MTM Pharmacist   Phone: [phone]    You may receive a survey about the MTM services you received.  I would appreciate your feedback to help me serve you better in the future. Please fill it out and return it when you can. Your comments will be anonymous.

## 2018-05-01 NOTE — MR AVS SNAPSHOT
After Visit Summary   5/1/2018    Jose Gomez    MRN: [id number]           Patient Information     Date Of Birth          1/21/1928        Visit Information        Provider Department      5/1/2018 2:00 PM Tamiko Beltran, [facility] Rheumatology MTM        Today's Diagnoses     Hypertensive emergency    -  1    Hyperlipidemia LDL goal <100        Pernicious anemia          Care Instructions    Recommendations from today's MTM visit:                                                    MTM (medication therapy management) is a service provided by a clinical pharmacist designed to help you get the most of out of your medicines.   Today we reviewed what your medicines are for, how to know if they are working, that your medicines are safe and how to make your medicine regimen as easy as possible.     1. Continue current medications and follow-up with your primary care doctor as planned.    2. It looks like you received a 30 day supply of medications after your hospital visit. You can request these medications be refilled at your appointment tomorrow. Please reach out if you have difficulty filling your medications.    Next MTM visit: 1 month check-in    To schedule another MTM appointment, please call the clinic directly or you may call the MTM scheduling line at [phone] or toll-free at [phone].     My Clinical Pharmacist's contact information:                                                      It was a pleasure speaking with you.  Please feel free to contact me with any questions or concerns you have.      Tamiko Beltran PharmD   MTM Pharmacist   Phone: [phone]    You may receive a survey about the MTM services you received.  I would appreciate your feedback to help me serve you better in the future. Please fill it out and return it when you can. Your comments will be anonymous.                Follow-ups after your visit        Your next 10 appointments already  "scheduled     May 02, 2018 11:20 AM CDT   Office Visit with Gabriel Carroll MD   [facility] ([facility]    [address] [address] [phone]           Bring a current list of meds and any records pertaining to this visit. For Physicals, please bring immunization records and any forms needing to be filled out. Please arrive 10 minutes early to complete paperwork.              Who to contact     If you have questions or need follow up information about today's clinic visit or your schedule please contact [facility] RHEUMATOLOGY [facility] directly at [phone].  Normal or non-critical lab and imaging results will be communicated to you by MyChart, letter or phone within 4 business days after the clinic has received the results. If you do not hear from us within 7 days, please contact the clinic through [employer] or phone. If you have a critical or abnormal lab result, we will notify you by phone as soon as possible.  Submit refill requests through [employer] or call your pharmacy and they will forward the refill request to us. Please allow 3 business days for your refill to be completed.          Additional Information About Your Visit        MyChart Information     [employer] lets you send messages to your doctor, view your test results, renew your prescriptions, schedule appointments and more. To sign up, go to [email or web address] . Click on \"Log in\" on the left side of the screen, which will take you to the Welcome page. Then click on \"Sign up Now\" on the right side of the page.     You will be asked to enter the access code listed below, as well as some personal information. Please follow the directions to create your username and password.     Your access code is: [email or web address]  Expires: 2018 12:21 PM     Your access code will  in 90 days. If you need help or a new code, please call your [place] clinic or " [phone].        Care EveryWhere ID     This is your Care EveryWhere ID. This could be used by other organizations to access your [facility] medical records  [id number]         Blood Pressure from Last 3 Encounters:   04/27/18 (!) 125/91   04/26/18 (!) 168/98   03/10/18 160/90    Weight from Last 3 Encounters:   04/27/18 154 lb (69.9 kg)   03/10/18 164 lb 1 oz (74.4 kg)   08/07/15 151 lb 9.6 oz (68.8 kg)              Today, you had the following     No orders found for display       Primary Care Provider Office Phone # Fax #    Gabriel Carroll [phone]       [address] [place] [phone]        Equal Access to Services     ESDRAS LOPEZ : Hadii aad ku hadasho Solorena, waaxda luqadaha, qaybta kaalmada adealfonzoyatimi, julianna merrill . So [facility] [phone].    ATENCIÓN: Si habla español, tiene a davis disposición servicios gratuitos de asistencia lingüística. Edison al [phone].    We comply with applicable federal civil rights laws and [place] laws. We do not discriminate on the basis of race, color, national origin, age, disability, sex, sexual orientation, or gender identity.            Thank you!     Thank you for choosing [facility] RHEUMATOLOGY [facility]  for your care. Our goal is always to provide you with excellent care. Hearing back from our patients is one way we can continue to improve our services. Please take a few minutes to complete the written survey that you may receive in the mail after your visit with us. Thank you!             Your Updated Medication List - Protect others around you: Learn how to safely use, store and throw away your medicines at www.disposemymeds.org.          This list is accurate as of 5/1/18  2:40 PM.  Always use your most recent med list.                   Brand Name Dispense Instructions for use Diagnosis    amLODIPine 5 MG tablet    NORVASC    30 tablet    Take 1 tablet (5 mg) by mouth daily    Hypertension,  unspecified type       aspirin 81 MG tablet     100    1 TABLET DAILY        B-12 2000 MCG Tabs      Take 1 tablet by mouth daily    Need for prophylactic vaccination against Streptococcus pneumoniae (pneumococcus)       lisinopril 10 MG tablet    PRINIVIL/ZESTRIL    30 tablet    Take 1 tablet (10 mg) by mouth daily    Hypertension, unspecified type       simvastatin 20 MG tablet    ZOCOR    30 tablet    Take 1 tablet (20 mg) by mouth At Bedtime    Hyperlipidemia LDL goal <100

## 2018-05-02 ENCOUNTER — OFFICE VISIT (OUTPATIENT)
Dept: INTERNAL MEDICINE | Facility: CLINIC | Age: 83
End: 2018-05-02
Payer: MEDICARE

## 2018-05-02 VITALS
DIASTOLIC BLOOD PRESSURE: 60 MMHG | WEIGHT: 159.8 LBS | SYSTOLIC BLOOD PRESSURE: 130 MMHG | HEART RATE: 71 BPM | BODY MASS INDEX: 25.08 KG/M2 | HEIGHT: 67 IN | RESPIRATION RATE: 20 BRPM | TEMPERATURE: 98 F | OXYGEN SATURATION: 98 %

## 2018-05-02 DIAGNOSIS — I10 HYPERTENSION, UNSPECIFIED TYPE: ICD-10-CM

## 2018-05-02 DIAGNOSIS — I10 ESSENTIAL HYPERTENSION, BENIGN: Primary | ICD-10-CM

## 2018-05-02 DIAGNOSIS — E78.5 HYPERLIPIDEMIA LDL GOAL <100: ICD-10-CM

## 2018-05-02 DIAGNOSIS — N18.30 CKD (CHRONIC KIDNEY DISEASE) STAGE 3, GFR 30-59 ML/MIN (H): ICD-10-CM

## 2018-05-02 DIAGNOSIS — E11.42 TYPE 2 DIABETES MELLITUS WITH DIABETIC POLYNEUROPATHY, WITHOUT LONG-TERM CURRENT USE OF INSULIN (H): ICD-10-CM

## 2018-05-02 LAB
ANION GAP SERPL CALCULATED.3IONS-SCNC: 8 MMOL/L (ref 3–14)
BUN SERPL-MCNC: 30 MG/DL (ref 7–30)
CALCIUM SERPL-MCNC: 8.5 MG/DL (ref 8.5–10.1)
CHLORIDE SERPL-SCNC: 108 MMOL/L (ref 94–109)
CO2 SERPL-SCNC: 25 MMOL/L (ref 20–32)
CREAT SERPL-MCNC: 1.58 MG/DL (ref 0.66–1.25)
GFR SERPL CREATININE-BSD FRML MDRD: 41 ML/MIN/1.7M2
GLUCOSE SERPL-MCNC: 99 MG/DL (ref 70–99)
POTASSIUM SERPL-SCNC: 3.9 MMOL/L (ref 3.4–5.3)
SODIUM SERPL-SCNC: 141 MMOL/L (ref 133–144)

## 2018-05-02 PROCEDURE — 36415 COLL VENOUS BLD VENIPUNCTURE: CPT | Performed by: INTERNAL MEDICINE

## 2018-05-02 PROCEDURE — 80048 BASIC METABOLIC PNL TOTAL CA: CPT | Performed by: INTERNAL MEDICINE

## 2018-05-02 PROCEDURE — 99214 OFFICE O/P EST MOD 30 MIN: CPT | Performed by: INTERNAL MEDICINE

## 2018-05-02 RX ORDER — AMLODIPINE BESYLATE 5 MG/1
5 TABLET ORAL DAILY
Qty: 90 TABLET | Refills: 0 | Status: SHIPPED | OUTPATIENT
Start: 2018-05-02 | End: 2018-08-15

## 2018-05-02 RX ORDER — LISINOPRIL 10 MG/1
10 TABLET ORAL DAILY
Qty: 90 TABLET | Refills: 0 | Status: ON HOLD | OUTPATIENT
Start: 2018-05-02 | End: 2018-06-04

## 2018-05-02 RX ORDER — SIMVASTATIN 20 MG
20 TABLET ORAL AT BEDTIME
Qty: 90 TABLET | Refills: 0 | Status: SHIPPED | OUTPATIENT
Start: 2018-05-02 | End: 2018-08-15

## 2018-05-02 ASSESSMENT — PAIN SCALES - GENERAL: PAINLEVEL: NO PAIN (0)

## 2018-05-02 NOTE — PROGRESS NOTES
SUBJECTIVE:   Jose Gomez is a 89 year old male who presents to clinic today for the following health issues:    Patient was last seen by myself in 2015.  At that time he was being treated for hypertension and diet-controlled type 2 diabetes.  He was always reluctant to take medications in the follow-up and it seems that over the last several years he stopped getting his meds refilled specifically the antihypertensives and cholesterol medication.  Stop coming in for visits and has not been seen since.  He currently lives with his children and his daughter states she was unaware that he had followed up nor was she aware that he was not taking any medications even though it had been well over several years of doing this.      Hospital Follow-up Visit:    Hospital/Nursing Home/IP Rehab Facility: [facility]  Date of Admission: 4-26-18  Date of Discharge: 4-27-18  Reason(s) for Admission: Hypertensive emergency  Dyslipidemia  DM type 2- diet controlled  CKD stage 3  Noncompliance            Problems taking medications regularly:  None       Medication changes since discharge: None       Problems adhering to non-medication therapy:  None    Summary of hospitalization:  [facility] discharge summary reviewed  Diagnostic Tests/Treatments reviewed.  Follow up needed: none  Other Healthcare Providers Involved in Patient s Care:         None  Update since discharge: improved.     Post Discharge Medication Reconciliation: discharge medications reconciled, continue medications without change.  Plan of care communicated with patient and family     Coding guidelines for this visit:  Type of Medical   Decision Making Face-to-Face Visit       within 7 Days of discharge Face-to-Face Visit        within 14 days of discharge   Moderate Complexity [id number]   High Complexity 33054 [id number]            Mr. Jose Gomez is a 89-year-old  male with history of hypertension, hyperlipidemia, type 2 diabetes  mellitus and chronic kidney disease who presented to the emergency room with complaints of neck pain and headache; for a detailed HPI- please refer to H&P done by Dr Zulema Sanchez on 04/26/2018; the patient was on Lisinopril/HCTZ 10/12.5 and Zocor 20 mg qdaily but apparently he ran out of medications (his pharmacy confirmed that he did not fill his meds since Dec 2016).         Hospital Course      On arrival- his BP was extremely elevated 229/110; there was initially concern for stroke so he underwent imaging of the head with CT with and w/o contrast which were negative for any acute pathology and CTA of neck and head which showed it showed mild to moderate atherosclerotic disease involving the carotid bifurcations without significant stenosis; MRI of the brain was also negative;there were no focal neurological deficits; in ER -he was given 1 dose Prinzide and started on Nicardipine drip and admitted to CICU for close monitoring; serial troponins remained negative; blood work showed cr of 1.52--1.55 which seems to be his baseline     He was started on oral Norvasc and Lisinopril with marked improvement of his BPs next day 123/52. His headache/neck pain resolved; he had an echocardiogram which showed EF 60-65% with grade 1  diastolic function; bubble study was negative. He was seen by PT/OT who felt that patient is safe to be d/c back home with supervision of children. There was no need for Neurology consultation as it seems that his symptoms represented hypertensive emergency, not CVA. He will be discharged on Norvasc 5 mg po daily, Lisinopril 20 mg po daily, Zocor 20 mg po daily (lipid profile with , total cholesterol 207) and he will continue with ASA; I strongly advised him to be complaint with his medications.     Regarding his DM type 2- he was not taking any meds but DM seems to be diet-controlled, HbA1c was 6.3.     He will follow up with PMD as scheduled; he may need further adjustment of his BP  "meds.     Since discharge she has been at home without any issues according to him.      His diet consists of a lot of prepared foods.  Either he or his children really know how to cook.  They state that bilateral things are to prepare her eat out a lot.  The no idea of how much sodium they consume.    Problem list and histories reviewed & adjusted, as indicated.  Additional history: as documented    Labs reviewed in EPIC    Reviewed and updated as needed this visit by clinical staff       Reviewed and updated as needed this visit by Provider         ROS:  Constitutional, HEENT, cardiovascular, pulmonary, gi and gu systems are negative, except as otherwise noted.    OBJECTIVE:                                                    /60  Pulse 71  Temp 98  F (36.7  C) (Oral)  Resp 20  Ht 5' 7\" (1.702 m)  Wt 159 lb 12.8 oz (72.5 kg)  SpO2 98%  BMI 25.03 kg/m2  Body mass index is 25.03 kg/(m^2).  GENERAL APPEARANCE: alert and no distress  HENT: nose and mouth without ulcers or lesions  NECK: no adenopathy, no asymmetry, masses, or scars and thyroid normal to palpation  RESP: lungs clear to auscultation - no rales, rhonchi or wheezes  CV: regular rates and rhythm, normal S1 S2, no S3 or S4 and no murmur, click or rub  MS: extremities normal- no gross deformities noted  SKIN: no suspicious lesions or rashes    Diagnostic test results:  Results for orders placed or performed in visit on 05/02/18   Basic metabolic panel   Result Value Ref Range    Sodium 141 133 - 144 mmol/L    Potassium 3.9 3.4 - 5.3 mmol/L    Chloride 108 94 - 109 mmol/L    Carbon Dioxide 25 20 - 32 mmol/L    Anion Gap 8 3 - 14 mmol/L    Glucose 99 70 - 99 mg/dL    Urea Nitrogen 30 7 - 30 mg/dL    Creatinine 1.58 (H) 0.66 - 1.25 mg/dL    GFR Estimate 41 (L) >60 mL/min/1.7m2    GFR Estimate If Black 50 (L) >60 mL/min/1.7m2    Calcium 8.5 8.5 - 10.1 mg/dL        ASSESSMENT/PLAN:                                                    1. Essential " hypertension, benign  With medication his numbers seem quite good again continue the medication  Spell large portion of the visit talking about nutrition and need to watch his sodium intake.  Family and patient are open to seeing a dietitian to learn more at this will be helpful given their food choices  - Basic metabolic panel  - DIABETES EDUCATOR REFERRAL    2. CKD (chronic kidney disease) stage 3, GFR 30-59 ml/min  stable  - Basic metabolic panel  - DIABETES EDUCATOR REFERRAL    3. Type 2 diabetes mellitus with diabetic polyneuropathy, without long-term current use of insulin (H)  Diet controlled-I do not foresee any need to use medications for his diabetes given the overall stability over the last 5+ years  - DIABETES EDUCATOR REFERRAL    Recommend follow-up in the next 4-8 weeks     Gabriel Carroll MD  [facility]

## 2018-05-02 NOTE — MR AVS SNAPSHOT
After Visit Summary   5/2/2018    Jose Gomez    MRN: [id number]           Patient Information     Date Of Birth          1/21/1928        Visit Information        Provider Department      5/2/2018 11:20 AM Gabriel Carroll MD [facility]        Today's Diagnoses     Essential hypertension, benign    -  1    CKD (chronic kidney disease) stage 3, GFR 30-59 ml/min        Type 2 diabetes mellitus with diabetic polyneuropathy, without long-term current use of insulin (H)          Care Instructions      Reading Food Labels  Look for the Nutrition Facts label on packaged foods. Reading labels is a big step toward eating healthier. The tips below help you know what to look for.    1. Serving size. Read this closely because the package, jar, or can may contain more than 1 serving. This is how to measure 1 serving of the food in the package. If you eat more than 1 serving, you get more of everything on the label -- including fat, cholesterol, and calories.  2. Total fat. This tells you how many grams (g) of fat are in 1 serving. Fat is high in calories. A healthy goal is to have less than 25% of your daily calories come from fat.  3. Saturated fat. This tells you how much saturated fat is in 1 serving. Saturated fat raises your cholesterol the most. Look for foods that have little or no saturated fat.  4. Trans fat. This tells you how much trans fat is in 1 serving. Even a small amount of trans fat can harm your health. Choose foods that have no trans fat.  5. Cholesterol. This tells you how much cholesterol is in 1 serving. For many years, it was recommended to eat less than 300 milligrams (mg) of cholesterol a day. New guidelines have removed this limitation. That's because cholesterol has been shown to not raise blood cholesterol levels as much as once thought. But many foods high in cholesterol are also high in saturated fat. So it is recommended to limit saturated fat in your  diet.  6. Calories from fat. This number tells you how many calories from fat are in 1 serving (there are 9 calories per gram of fat). Look for foods with few calories from fat.  7. % Daily value. The higher the number, the more 1 serving has of that nutrient. Look for foods that have low numbers for total fat, saturated fat, cholesterol, and sodium. Foods that are higher in fiber, vitamins, and minerals (iron and calcium) are good choices.   8. Sodium. This tells you how much salt is in 1 serving. Choose foods with low numbers for sodium.  9. Dietary fiber. This number tells you how much fiber is in 1 serving. Foods that are high in fiber can help you feel full. They can also be good for your heart and digestion. The recommended daily amount of fiber is 25 grams for women and 38 grams for men. After age 50, your daily fiber needs drop to 21 grams for women and 30 grams for men.  Date Last Reviewed: 6/1/2017 2000-2017 [employer]. [address] [place] [address]. All rights reserved. This information is not intended as a substitute for professional medical care. Always follow your healthcare professional's instructions.        Eating Heart-Healthy Food: Using the DASH Plan    Eating for your heart doesn t have to be hard or boring. You just need to know how to make healthier choices. The DASH eating plan has been developed to help you do just that. DASH stands for Dietary Approaches to Stop Hypertension. It is a plan that has been proven to be healthier for your heart and to lower your risk for high blood pressure. It can also help lower your risk for cancer, heart disease, osteoporosis, and diabetes.  Choosing from each food group  Choose foods from each of the food groups below each day. Try to get the recommended number of servings for each food group. The serving numbers are based on a diet of 2,000 calories a day. Talk to your doctor if you re unsure about your calorie needs. Along  with getting the correct servings, the DASH plan also recommends a sodium intake less than 2,300 mg per day.        Grains  Servings: 6 to 8 a day  A serving is:    1 slice bread    1 ounce dry cereal    Half a cup cooked rice, pasta or cereal  Best choices: Whole grains and any grains high in fiber. Vegetables  Servings: 4 to 5 a day  A serving is:    1 cup raw leafy vegetable    Half a cup cut-up raw or cooked vegetable    Half a cup vegetable juice  Best choices: Fresh or frozen vegetables prepared without added salt or fat.   Fruits  Servings: 4 to 5 a day  A serving is:    1 medium fruit    One-quarter cup dried fruit    Half a cup fresh, frozen, or canned fruit    Half a cup of 100% fruit juices  Best choices: A variety of fresh fruits of different colors. Whole fruits are a better choice than fruit juices. Low-fat or fat-free dairy  Servings: 2 to 3 a day  A serving is:    1 cup milk    1 cup yogurt    One and a half ounces cheese  Best choices: Skim or 1% milk, low-fat or fat-free yogurt or buttermilk, and low-fat cheeses.         Lean meats, poultry, fish  Servings: 6 or fewer a day  A serving is:    1 ounce cooked meats, poultry, or fish    1 egg  Best choices: Lean poultry and fish. Trim away visible fat. Broil, grill, roast, or boil instead of frying. Remove skin from poultry before eating. Limit how much red meat you eat.  Nuts, seeds, beans  Servings: 4 to 5 a week  A serving is:    One-third cup nuts (one and a half ounces)    2 tablespoons nut butter or seeds    Half a cup cooked dry beans or legumes  Best choices: Dry roasted nuts with no salt added, lentils, kidney beans, garbanzo beans, and whole hung beans.   Fats and oils  Servings: 2 to 3 a day  A serving is:    1 teaspoon vegetable oil    1 teaspoon soft margarine    1 tablespoon mayonnaise    2 tablespoons salad dressing  Best choices: Nut and vegetable oils (nontropical vegetable oils), such as olive and canola oil. Sweets  Servings: 5 a  week or fewer  A serving is:    1 tablespoon sugar, maple syrup, or honey    1 tablespoon jam or jelly    1 half-ounce jelly beans (about 15)    1 cup lemonade  Best choices: Dried fruit can be a satisfying sweet. Choose low-fat sweets. And watch your serving sizes!      For more on the DASH eating plan, visit:  www.nhlbi.nih.gov/health/health-topics/topics/dash   Date Last Reviewed: 6/1/2016 2000-2017 [employer]. [address] [place] [address]. All rights reserved. This information is not intended as a substitute for professional medical care. Always follow your healthcare professional's instructions.        Low-Salt Diet  This diet removes foods that are high in salt. It also limits the amount of salt you use when cooking. It is most often used for people with high blood pressure, edema (fluid retention), and kidney, liver, or heart disease.  Table salt contains the mineral sodium. Your body needs sodium to work normally. But too much sodium can make your health problems worse. Your healthcare provider is recommending a low-salt (also called low-sodium) diet for you. Your total daily allowance of salt is 1,500 to 2,300 milligrams (mg). It is less than 1 teaspoon of table salt. This means you can have only about 500 to 700 mg of sodium at each meal. People with certain health problems should limit salt intake to the lower end of the recommended range.    When you cook, don t add much salt. If you can cook without using salt, even better. Don t add salt to your food at the table.  When shopping, read food labels. Salt is often called sodium on the label. Choose foods that are salt-free, low salt, or very low salt. Note that foods with reduced salt may not lower your salt intake enough.    Beans, potatoes, and pasta  Ok: Dry beans, split peas, lentils, potatoes, rice, macaroni, pasta, spaghetti without added salt  Avoid: Potato chips, tortilla chips, and similar products  Breads and  cereals  Ok: Low-sodium breads, rolls, cereals, and cakes; low-salt crackers, matzo crackers  Avoid: Salted crackers, pretzels, popcorn, Bhutanese toast, pancakes, muffins  Dairy  Ok: Milk, chocolate milk, hot chocolate mix, low-salt cheeses, and yogurt  Avoid: Processed cheese and cheese spreads; Roquefort, Camembert, and cottage cheese; buttermilk, instant breakfast drink  Desserts  Ok: Ice cream, frozen yogurt, juice bars, gelatin, cookies and pies, sugar, honey, jelly, hard candy  Avoid: Most pies, cakes and cookies prepared or processed with salt; instant pudding  Drinks  Ok: Tea, coffee, fizzy (carbonated) drinks, juices  Avoid: Flavored coffees, electrolyte replacement drinks, sports drinks  Meats  Ok: All fresh meat, fish, poultry, low-salt tuna, eggs, egg substitute  Avoid: Smoked, pickled, brine-cured, or salted meats and fish. This includes villanueva, chipped beef, corned beef, hot dogs, deli meats, ham, kosher meats, salt pork, sausage, canned tuna, salted codfish, smoked salmon, herring, sardines, or anchovies.  Seasonings and spices  Ok: Most seasonings are okay. Good substitutes for salt include: fresh herb blends, hot sauce, lemon, garlic, adkins, vinegar, dry mustard, parsley, cilantro, horseradish, tomato paste, regular margarine, mayonnaise, unsalted butter, cream cheese, vegetable oil, cream, low-salt salad dressing and gravy.  Avoid: Regular ketchup, relishes, pickles, soy sauce, teriyaki sauce, Worcestershire sauce, BBQ sauce, tartar sauce, meat tenderizer, chili sauce, regular gravy, regular salad dressing, salted butter  Soups  Ok: Low-salt soups and broths made with allowed foods  Avoid: Bouillon cubes, soups with smoked or salted meats, regular soup and broth  Vegetables  Ok: Most vegetables are okay; also low-salt tomato and vegetable juices  Avoid: Sauerkraut and other brine-soaked vegetables; pickles and other pickled vegetables; tomato juice, olives  Date Last Reviewed: 8/1/2016 2000-2017  [employer]. [address] [place] [address]. All rights reserved. This information is not intended as a substitute for professional medical care. Always follow your healthcare professional's instructions.                Follow-ups after your visit        Additional Services     DIABETES EDUCATOR REFERRAL       DIABETES SELF MANAGEMENT TRAINING (DSMT)      Your provider has referred you to Diabetes Education: FMG: Diabetes Education - All [facility]3] [phone]   [email or web address]     If an urgent visit is needed or A1C is above 12, Care Team to call the Diabetes  Education Team at [phone] or send an In Basket message to the Diabetes Education Pool (P DIAB ED-PATIENT CARE).    A  will call you to make your appointment. If it has been more than 3 business days since your referral was placed, please call the above phone number to schedule.    Type of training and number of hours: Previous Diagnosis: Follow-up DSMT - 2 hours.    Diabetes Type: Type 2 - Diet Control   Medicare covers: 10 hours of initial DSMT in 12 month period from the time of first visit, plus 2 hours of follow-up DSMT annually, and additional hours as requested for insulin training.         Diabetes Co-Morbidities: hypertension               A1C Goal:  <8.0       A1C is: Lab Results       Component                Value               Date                       A1C                      6.3                 04/26/2018              MEDICAL NUTRITION THERAPY (MNT) for Diabetes    Medical Nutrition Therapy with a Registered Dietitian can be provided in coordination with Diabetes Self-Management Training to assist in achieving optimal diabetes management.    MNT Type and Hours: Previous diagnosis: Annual follow-up MNT - 2 hours                       Medicare will cover: 3 hours initial MNT in 12 month period after first visit, plus 2 hours of follow-up MNT  "annually        Diabetes Education Topics: Knowledge: Healthy Eating - low Na as well as diabetic    Special Educational Needs Requiring Individual DSMT: None      Please be aware that coverage of these services is subject to the terms and limitations of your health insurance plan.  Call member services at your health plan to determine Diabetes Self-Management Training (Codes  and ) and Medical Nutrition Therapy (Codes 26307 and 33238) benefits and ask which blood glucose monitor brands are covered by your plan.  Please bring the following with you to your appointment:    (1)  List of current medications   (2)  List of Blood Glucose Monitor brands that are covered by your insurance plan  (3)  Blood Glucose Monitor and log book  (4)   Food records for the 3 days prior to your visit    The Certified Diabetes Educator may make diabetes medication adjustments per the CDE Protocol and Collaborative Practice Agreement.                  Who to contact     If you have questions or need follow up information about today's clinic visit or your schedule please contact [facility] directly at [phone].  Normal or non-critical lab and imaging results will be communicated to you by [employer], letter or phone within 4 business days after the clinic has received the results. If you do not hear from us within 7 days, please contact the clinic through [employer] or phone. If you have a critical or abnormal lab result, we will notify you by phone as soon as possible.  Submit refill requests through [employer] or call your pharmacy and they will forward the refill request to us. Please allow 3 business days for your refill to be completed.          Additional Information About Your Visit        [employer] Information     [employer] lets you send messages to your doctor, view your test results, renew your prescriptions, schedule appointments and more. To sign up, go to [email or web address] . Click on \"Log in\" " "on the left side of the screen, which will take you to the Welcome page. Then click on \"Sign up Now\" on the right side of the page.     You will be asked to enter the access code listed below, as well as some personal information. Please follow the directions to create your username and password.     Your access code is: [email or web address]  Expires: 2018 12:21 PM     Your access code will  in 90 days. If you need help or a new code, please call your [place] clinic or [phone].        Care EveryWhere ID     This is your Care EveryWhere ID. This could be used by other organizations to access your [place] medical records  [id number]        Your Vitals Were     Pulse Temperature Respirations Height Pulse Oximetry BMI (Body Mass Index)    71 98  F (36.7  C) (Oral) 20 5' 7\" (1.702 m) 98% 25.03 kg/m2       Blood Pressure from Last 3 Encounters:   18 130/60   18 (!) 125/91   18 (!) 168/98    Weight from Last 3 Encounters:   18 159 lb 12.8 oz (72.5 kg)   18 154 lb (69.9 kg)   03/10/18 164 lb 1 oz (74.4 kg)              We Performed the Following     Basic metabolic panel     DIABETES EDUCATOR REFERRAL        Primary Care Provider Office Phone # Fax #    Gabriel Carroll [phone]       [address] [place] [id number]        Equal Access to Services     [facility] : Hadii aad ku hadasho Solorena, waaxda luqadaha, qaybta kaalmada adeegyatimi, julianna cannon. So [facility] [phone].    ATENCIÓN: Si habla español, tiene a davis disposición servicios gratuitos de asistencia lingüística. Llcourtney al [phone].    We comply with applicable federal civil rights laws and [place] laws. We do not discriminate on the basis of race, color, national origin, age, disability, sex, sexual orientation, or gender identity.            Thank you!     Thank you for choosing [facility]  for your care. Our goal is always to " provide you with excellent care. Hearing back from our patients is one way we can continue to improve our services. Please take a few minutes to complete the written survey that you may receive in the mail after your visit with us. Thank you!             Your Updated Medication List - Protect others around you: Learn how to safely use, store and throw away your medicines at www.disposemymeds.org.          This list is accurate as of 5/2/18 12:14 PM.  Always use your most recent med list.                   Brand Name Dispense Instructions for use Diagnosis    amLODIPine 5 MG tablet    NORVASC    30 tablet    Take 1 tablet (5 mg) by mouth daily    Hypertension, unspecified type       aspirin 81 MG tablet     100    1 TABLET DAILY        B-12 2000 MCG Tabs      Take 1 tablet by mouth daily    Need for prophylactic vaccination against Streptococcus pneumoniae (pneumococcus)       lisinopril 10 MG tablet    PRINIVIL/ZESTRIL    30 tablet    Take 1 tablet (10 mg) by mouth daily    Hypertension, unspecified type       simvastatin 20 MG tablet    ZOCOR    30 tablet    Take 1 tablet (20 mg) by mouth At Bedtime    Hyperlipidemia LDL goal <100

## 2018-05-02 NOTE — PATIENT INSTRUCTIONS
Reading Food Labels  Look for the Nutrition Facts label on packaged foods. Reading labels is a big step toward eating healthier. The tips below help you know what to look for.    1. Serving size. Read this closely because the package, jar, or can may contain more than 1 serving. This is how to measure 1 serving of the food in the package. If you eat more than 1 serving, you get more of everything on the label   including fat, cholesterol, and calories.  2. Total fat. This tells you how many grams (g) of fat are in 1 serving. Fat is high in calories. A healthy goal is to have less than 25% of your daily calories come from fat.  3. Saturated fat. This tells you how much saturated fat is in 1 serving. Saturated fat raises your cholesterol the most. Look for foods that have little or no saturated fat.  4. Trans fat. This tells you how much trans fat is in 1 serving. Even a small amount of trans fat can harm your health. Choose foods that have no trans fat.  5. Cholesterol. This tells you how much cholesterol is in 1 serving. For many years, it was recommended to eat less than 300 milligrams (mg) of cholesterol a day. New guidelines have removed this limitation. That's because cholesterol has been shown to not raise blood cholesterol levels as much as once thought. But many foods high in cholesterol are also high in saturated fat. So it is recommended to limit saturated fat in your diet.  6. Calories from fat. This number tells you how many calories from fat are in 1 serving (there are 9 calories per gram of fat). Look for foods with few calories from fat.  7. % Daily value. The higher the number, the more 1 serving has of that nutrient. Look for foods that have low numbers for total fat, saturated fat, cholesterol, and sodium. Foods that are higher in fiber, vitamins, and minerals (iron and calcium) are good choices.   8. Sodium. This tells you how much salt is in 1 serving. Choose foods with low numbers for sodium.   9. Dietary fiber. This number tells you how much fiber is in 1 serving. Foods that are high in fiber can help you feel full. They can also be good for your heart and digestion. The recommended daily amount of fiber is 25 grams for women and 38 grams for men. After age 50, your daily fiber needs drop to 21 grams for women and 30 grams for men.  Date Last Reviewed: 6/1/2017 2000-2017 [employer]. [address] [place] [address], [place] [address]. All rights reserved. This information is not intended as a substitute for professional medical care. Always follow your healthcare professional's instructions.        Eating Heart-Healthy Food: Using the DASH Plan    Eating for your heart doesn t have to be hard or boring. You just need to know how to make healthier choices. The DASH eating plan has been developed to help you do just that. DASH stands for Dietary Approaches to Stop Hypertension. It is a plan that has been proven to be healthier for your heart and to lower your risk for high blood pressure. It can also help lower your risk for cancer, heart disease, osteoporosis, and diabetes.  Choosing from each food group  Choose foods from each of the food groups below each day. Try to get the recommended number of servings for each food group. The serving numbers are based on a diet of 2,000 calories a day. Talk to your doctor if you re unsure about your calorie needs. Along with getting the correct servings, the DASH plan also recommends a sodium intake less than 2,300 mg per day.        Grains  Servings: 6 to 8 a day  A serving is:    1 slice bread    1 ounce dry cereal    Half a cup cooked rice, pasta or cereal  Best choices: Whole grains and any grains high in fiber. Vegetables  Servings: 4 to 5 a day  A serving is:    1 cup raw leafy vegetable    Half a cup cut-up raw or cooked vegetable    Half a cup vegetable juice  Best choices: Fresh or frozen vegetables prepared without added salt or fat.   Fruits   Servings: 4 to 5 a day  A serving is:    1 medium fruit    One-quarter cup dried fruit    Half a cup fresh, frozen, or canned fruit    Half a cup of 100% fruit juices  Best choices: A variety of fresh fruits of different colors. Whole fruits are a better choice than fruit juices. Low-fat or fat-free dairy  Servings: 2 to 3 a day  A serving is:    1 cup milk    1 cup yogurt    One and a half ounces cheese  Best choices: Skim or 1% milk, low-fat or fat-free yogurt or buttermilk, and low-fat cheeses.         Lean meats, poultry, fish  Servings: 6 or fewer a day  A serving is:    1 ounce cooked meats, poultry, or fish    1 egg  Best choices: Lean poultry and fish. Trim away visible fat. Broil, grill, roast, or boil instead of frying. Remove skin from poultry before eating. Limit how much red meat you eat.  Nuts, seeds, beans  Servings: 4 to 5 a week  A serving is:    One-third cup nuts (one and a half ounces)    2 tablespoons nut butter or seeds    Half a cup cooked dry beans or legumes  Best choices: Dry roasted nuts with no salt added, lentils, kidney beans, garbanzo beans, and whole hung beans.   Fats and oils  Servings: 2 to 3 a day  A serving is:    1 teaspoon vegetable oil    1 teaspoon soft margarine    1 tablespoon mayonnaise    2 tablespoons salad dressing  Best choices: Nut and vegetable oils (nontropical vegetable oils), such as olive and canola oil. Sweets  Servings: 5 a week or fewer  A serving is:    1 tablespoon sugar, maple syrup, or honey    1 tablespoon jam or jelly    1 half-ounce jelly beans (about 15)    1 cup lemonade  Best choices: Dried fruit can be a satisfying sweet. Choose low-fat sweets. And watch your serving sizes!      For more on the DASH eating plan, visit:  www.nhlbi.nih.gov/health/health-topics/topics/dash   Date Last Reviewed: 6/1/2016 2000-2017 [employer]. [address] [place] [address]. All rights reserved. This information is not intended as a  substitute for professional medical care. Always follow your healthcare professional's instructions.        Low-Salt Diet  This diet removes foods that are high in salt. It also limits the amount of salt you use when cooking. It is most often used for people with high blood pressure, edema (fluid retention), and kidney, liver, or heart disease.  Table salt contains the mineral sodium. Your body needs sodium to work normally. But too much sodium can make your health problems worse. Your healthcare provider is recommending a low-salt (also called low-sodium) diet for you. Your total daily allowance of salt is 1,500 to 2,300 milligrams (mg). It is less than 1 teaspoon of table salt. This means you can have only about 500 to 700 mg of sodium at each meal. People with certain health problems should limit salt intake to the lower end of the recommended range.    When you cook, don t add much salt. If you can cook without using salt, even better. Don t add salt to your food at the table.  When shopping, read food labels. Salt is often called sodium on the label. Choose foods that are salt-free, low salt, or very low salt. Note that foods with reduced salt may not lower your salt intake enough.    Beans, potatoes, and pasta  Ok: Dry beans, split peas, lentils, potatoes, rice, macaroni, pasta, spaghetti without added salt  Avoid: Potato chips, tortilla chips, and similar products  Breads and cereals  Ok: Low-sodium breads, rolls, cereals, and cakes; low-salt crackers, matzo crackers  Avoid: Salted crackers, pretzels, popcorn, Polish toast, pancakes, muffins  Dairy  Ok: Milk, chocolate milk, hot chocolate mix, low-salt cheeses, and yogurt  Avoid: Processed cheese and cheese spreads; Roquefort, Camembert, and cottage cheese; buttermilk, instant breakfast drink  Desserts  Ok: Ice cream, frozen yogurt, juice bars, gelatin, cookies and pies, sugar, honey, jelly, hard candy  Avoid: Most pies, cakes and cookies prepared or  processed with salt; instant pudding  Drinks  Ok: Tea, coffee, fizzy (carbonated) drinks, juices  Avoid: Flavored coffees, electrolyte replacement drinks, sports drinks  Meats  Ok: All fresh meat, fish, poultry, low-salt tuna, eggs, egg substitute  Avoid: Smoked, pickled, brine-cured, or salted meats and fish. This includes villanueva, chipped beef, corned beef, hot dogs, deli meats, ham, kosher meats, salt pork, sausage, canned tuna, salted codfish, smoked salmon, herring, sardines, or anchovies.  Seasonings and spices  Ok: Most seasonings are okay. Good substitutes for salt include: fresh herb blends, hot sauce, lemon, garlic, adkins, vinegar, dry mustard, parsley, cilantro, horseradish, tomato paste, regular margarine, mayonnaise, unsalted butter, cream cheese, vegetable oil, cream, low-salt salad dressing and gravy.  Avoid: Regular ketchup, relishes, pickles, soy sauce, teriyaki sauce, Worcestershire sauce, BBQ sauce, tartar sauce, meat tenderizer, chili sauce, regular gravy, regular salad dressing, salted butter  Soups  Ok: Low-salt soups and broths made with allowed foods  Avoid: Bouillon cubes, soups with smoked or salted meats, regular soup and broth  Vegetables  Ok: Most vegetables are okay; also low-salt tomato and vegetable juices  Avoid: Sauerkraut and other brine-soaked vegetables; pickles and other pickled vegetables; tomato juice, olives  Date Last Reviewed: 8/1/2016 2000-2017 [employer]. [address] [place] [address], [place] [address]. All rights reserved. This information is not intended as a substitute for professional medical care. Always follow your healthcare professional's instructions.

## 2018-05-26 ENCOUNTER — APPOINTMENT (OUTPATIENT)
Dept: GENERAL RADIOLOGY | Facility: CLINIC | Age: 83
DRG: 963 | End: 2018-05-26
Attending: EMERGENCY MEDICINE
Payer: COMMERCIAL

## 2018-05-26 ENCOUNTER — APPOINTMENT (OUTPATIENT)
Dept: CT IMAGING | Facility: CLINIC | Age: 83
DRG: 963 | End: 2018-05-26
Attending: EMERGENCY MEDICINE
Payer: COMMERCIAL

## 2018-05-26 ENCOUNTER — APPOINTMENT (OUTPATIENT)
Dept: CT IMAGING | Facility: CLINIC | Age: 83
DRG: 963 | End: 2018-05-26
Attending: PHYSICIAN ASSISTANT
Payer: COMMERCIAL

## 2018-05-26 ENCOUNTER — HOSPITAL ENCOUNTER (INPATIENT)
Facility: CLINIC | Age: 83
LOS: 9 days | Discharge: NURSING, REHAB OR LONG-TERM CARE | DRG: 963 | End: 2018-06-04
Attending: EMERGENCY MEDICINE | Admitting: SURGERY
Payer: COMMERCIAL

## 2018-05-26 DIAGNOSIS — I62.00 SUBDURAL HEMORRHAGE (H): ICD-10-CM

## 2018-05-26 DIAGNOSIS — I10 HYPERTENSION, UNSPECIFIED TYPE: ICD-10-CM

## 2018-05-26 DIAGNOSIS — S32.9XXA CLOSED NONDISPLACED FRACTURE OF PELVIS, UNSPECIFIED PART OF PELVIS, INITIAL ENCOUNTER (H): ICD-10-CM

## 2018-05-26 DIAGNOSIS — I48.0 PAROXYSMAL ATRIAL FIBRILLATION (H): Primary | ICD-10-CM
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
## 2018-05-26 DIAGNOSIS — S22.42XA CLOSED FRACTURE OF MULTIPLE RIBS OF LEFT SIDE, INITIAL ENCOUNTER: ICD-10-CM

## 2018-05-26 DIAGNOSIS — Z29.9 PREVENTIVE MEASURE: ICD-10-CM

## 2018-05-26 LAB
ALBUMIN SERPL-MCNC: 3.6 G/DL (ref 3.4–5)
ALP SERPL-CCNC: 86 U/L (ref 40–150)
ALT SERPL W P-5'-P-CCNC: 26 U/L (ref 0–70)
ANION GAP SERPL CALCULATED.3IONS-SCNC: 9 MMOL/L (ref 3–14)
APTT PPP: 29 SEC (ref 22–37)
AST SERPL W P-5'-P-CCNC: 27 U/L (ref 0–45)
BASOPHILS # BLD AUTO: 0 10E9/L (ref 0–0.2)
BASOPHILS NFR BLD AUTO: 0.2 %
BILIRUB SERPL-MCNC: 0.7 MG/DL (ref 0.2–1.3)
BUN SERPL-MCNC: 29 MG/DL (ref 7–30)
CALCIUM SERPL-MCNC: 8.5 MG/DL (ref 8.5–10.1)
CHLORIDE SERPL-SCNC: 107 MMOL/L (ref 94–109)
CO2 SERPL-SCNC: 20 MMOL/L (ref 20–32)
CREAT SERPL-MCNC: 1.7 MG/DL (ref 0.66–1.25)
DIFFERENTIAL METHOD BLD: ABNORMAL
EOSINOPHIL # BLD AUTO: 0.5 10E9/L (ref 0–0.7)
EOSINOPHIL NFR BLD AUTO: 5.7 %
ERYTHROCYTE [DISTWIDTH] IN BLOOD BY AUTOMATED COUNT: 12.3 % (ref 10–15)
ETHANOL SERPL-MCNC: <0.01 G/DL
GFR SERPL CREATININE-BSD FRML MDRD: 38 ML/MIN/1.7M2
GLUCOSE BLDC GLUCOMTR-MCNC: 155 MG/DL (ref 70–99)
GLUCOSE BLDC GLUCOMTR-MCNC: 161 MG/DL (ref 70–99)
GLUCOSE SERPL-MCNC: 159 MG/DL (ref 70–99)
HCT VFR BLD AUTO: 39.6 % (ref 40–53)
HGB BLD-MCNC: 13.7 G/DL (ref 13.3–17.7)
IMM GRANULOCYTES # BLD: 0.1 10E9/L (ref 0–0.4)
IMM GRANULOCYTES NFR BLD: 1 %
INR PPP: 1.07 (ref 0.86–1.14)
INTERPRETATION ECG - MUSE: NORMAL
LACTATE BLD-SCNC: 2 MMOL/L (ref 0.4–1.9)
LYMPHOCYTES # BLD AUTO: 1.6 10E9/L (ref 0.8–5.3)
LYMPHOCYTES NFR BLD AUTO: 16.7 %
MAGNESIUM SERPL-MCNC: 1.9 MG/DL (ref 1.6–2.3)
MCH RBC QN AUTO: 32.5 PG (ref 26.5–33)
MCHC RBC AUTO-ENTMCNC: 34.6 G/DL (ref 31.5–36.5)
MCV RBC AUTO: 94 FL (ref 78–100)
MONOCYTES # BLD AUTO: 0.4 10E9/L (ref 0–1.3)
MONOCYTES NFR BLD AUTO: 4 %
NEUTROPHILS # BLD AUTO: 6.7 10E9/L (ref 1.6–8.3)
NEUTROPHILS NFR BLD AUTO: 72.4 %
NRBC # BLD AUTO: 0 10*3/UL
NRBC BLD AUTO-RTO: 0 /100
PLATELET # BLD AUTO: 155 10E9/L (ref 150–450)
POTASSIUM SERPL-SCNC: 4.7 MMOL/L (ref 3.4–5.3)
PROT SERPL-MCNC: 6.9 G/DL (ref 6.8–8.8)
RADIOLOGIST FLAGS: ABNORMAL
RADIOLOGIST FLAGS: ABNORMAL
RBC # BLD AUTO: 4.21 10E12/L (ref 4.4–5.9)
SODIUM SERPL-SCNC: 136 MMOL/L (ref 133–144)
TROPONIN I SERPL-MCNC: <0.015 UG/L (ref 0–0.04)
TROPONIN I SERPL-MCNC: <0.015 UG/L (ref 0–0.04)
WBC # BLD AUTO: 9.3 10E9/L (ref 4–11)

## 2018-05-26 PROCEDURE — 84484 ASSAY OF TROPONIN QUANT: CPT | Performed by: EMERGENCY MEDICINE

## 2018-05-26 PROCEDURE — 25000132 ZZH RX MED GY IP 250 OP 250 PS 637: Mod: GY | Performed by: SURGERY

## 2018-05-26 PROCEDURE — 70450 CT HEAD/BRAIN W/O DYE: CPT

## 2018-05-26 PROCEDURE — A9270 NON-COVERED ITEM OR SERVICE: HCPCS | Mod: GY | Performed by: INTERNAL MEDICINE

## 2018-05-26 PROCEDURE — 96365 THER/PROPH/DIAG IV INF INIT: CPT

## 2018-05-26 PROCEDURE — 99207 ZZC CONSULT E&M CHANGED TO INITIAL LEVEL: CPT | Performed by: HOSPITALIST

## 2018-05-26 PROCEDURE — 83605 ASSAY OF LACTIC ACID: CPT | Performed by: NURSE PRACTITIONER

## 2018-05-26 PROCEDURE — 99285 EMERGENCY DEPT VISIT HI MDM: CPT | Mod: 25

## 2018-05-26 PROCEDURE — 25000128 H RX IP 250 OP 636: Performed by: NURSE PRACTITIONER

## 2018-05-26 PROCEDURE — 99232 SBSQ HOSP IP/OBS MODERATE 35: CPT | Performed by: PHYSICIAN ASSISTANT

## 2018-05-26 PROCEDURE — 25000132 ZZH RX MED GY IP 250 OP 250 PS 637: Mod: GY | Performed by: EMERGENCY MEDICINE

## 2018-05-26 PROCEDURE — A9270 NON-COVERED ITEM OR SERVICE: HCPCS | Mod: GY | Performed by: EMERGENCY MEDICINE

## 2018-05-26 PROCEDURE — 85025 COMPLETE CBC W/AUTO DIFF WBC: CPT | Performed by: EMERGENCY MEDICINE

## 2018-05-26 PROCEDURE — 72125 CT NECK SPINE W/O DYE: CPT

## 2018-05-26 PROCEDURE — 25000128 H RX IP 250 OP 636: Performed by: EMERGENCY MEDICINE

## 2018-05-26 PROCEDURE — 80320 DRUG SCREEN QUANTALCOHOLS: CPT | Performed by: EMERGENCY MEDICINE

## 2018-05-26 PROCEDURE — 36415 COLL VENOUS BLD VENIPUNCTURE: CPT | Performed by: EMERGENCY MEDICINE

## 2018-05-26 PROCEDURE — 99207 ZZC APP CREDIT; MD BILLING SHARED VISIT: CPT | Performed by: PHYSICIAN ASSISTANT

## 2018-05-26 PROCEDURE — 96376 TX/PRO/DX INJ SAME DRUG ADON: CPT

## 2018-05-26 PROCEDURE — 25000125 ZZHC RX 250: Performed by: PHYSICIAN ASSISTANT

## 2018-05-26 PROCEDURE — 74177 CT ABD & PELVIS W/CONTRAST: CPT

## 2018-05-26 PROCEDURE — 25000128 H RX IP 250 OP 636: Performed by: SURGERY

## 2018-05-26 PROCEDURE — 70450 CT HEAD/BRAIN W/O DYE: CPT | Mod: 76

## 2018-05-26 PROCEDURE — 83735 ASSAY OF MAGNESIUM: CPT | Performed by: EMERGENCY MEDICINE

## 2018-05-26 PROCEDURE — 96374 THER/PROPH/DIAG INJ IV PUSH: CPT

## 2018-05-26 PROCEDURE — 25000125 ZZHC RX 250: Performed by: EMERGENCY MEDICINE

## 2018-05-26 PROCEDURE — 73502 X-RAY EXAM HIP UNI 2-3 VIEWS: CPT

## 2018-05-26 PROCEDURE — A9270 NON-COVERED ITEM OR SERVICE: HCPCS | Mod: GY | Performed by: SURGERY

## 2018-05-26 PROCEDURE — 96375 TX/PRO/DX INJ NEW DRUG ADDON: CPT

## 2018-05-26 PROCEDURE — 25000128 H RX IP 250 OP 636

## 2018-05-26 PROCEDURE — 70450 CT HEAD/BRAIN W/O DYE: CPT | Mod: 77

## 2018-05-26 PROCEDURE — 25000131 ZZH RX MED GY IP 250 OP 636 PS 637: Performed by: PHYSICIAN ASSISTANT

## 2018-05-26 PROCEDURE — 85730 THROMBOPLASTIN TIME PARTIAL: CPT | Performed by: EMERGENCY MEDICINE

## 2018-05-26 PROCEDURE — 25000132 ZZH RX MED GY IP 250 OP 250 PS 637: Performed by: INTERNAL MEDICINE

## 2018-05-26 PROCEDURE — 00000146 ZZHCL STATISTIC GLUCOSE BY METER IP

## 2018-05-26 PROCEDURE — 85610 PROTHROMBIN TIME: CPT | Performed by: EMERGENCY MEDICINE

## 2018-05-26 PROCEDURE — 80053 COMPREHEN METABOLIC PANEL: CPT | Performed by: EMERGENCY MEDICINE

## 2018-05-26 PROCEDURE — 20000003 ZZH R&B ICU

## 2018-05-26 PROCEDURE — 84484 ASSAY OF TROPONIN QUANT: CPT | Performed by: NURSE PRACTITIONER

## 2018-05-26 PROCEDURE — 99223 1ST HOSP IP/OBS HIGH 75: CPT | Performed by: HOSPITALIST

## 2018-05-26 RX ORDER — LISINOPRIL 10 MG/1
10 TABLET ORAL DAILY
Status: DISCONTINUED | OUTPATIENT
Start: 2018-05-27 | End: 2018-05-27

## 2018-05-26 RX ORDER — SODIUM CHLORIDE 9 MG/ML
INJECTION, SOLUTION INTRAVENOUS CONTINUOUS
Status: DISCONTINUED | OUTPATIENT
Start: 2018-05-26 | End: 2018-06-02

## 2018-05-26 RX ORDER — LIDOCAINE 40 MG/G
CREAM TOPICAL
Status: DISCONTINUED | OUTPATIENT
Start: 2018-05-26 | End: 2018-06-04 | Stop reason: HOSPADM

## 2018-05-26 RX ORDER — LABETALOL HYDROCHLORIDE 5 MG/ML
10-20 INJECTION, SOLUTION INTRAVENOUS EVERY 10 MIN PRN
Status: DISCONTINUED | OUTPATIENT
Start: 2018-05-26 | End: 2018-06-04 | Stop reason: HOSPADM

## 2018-05-26 RX ORDER — DEXTROSE MONOHYDRATE, SODIUM CHLORIDE, AND POTASSIUM CHLORIDE 50; 1.49; 4.5 G/1000ML; G/1000ML; G/1000ML
INJECTION, SOLUTION INTRAVENOUS CONTINUOUS
Status: DISCONTINUED | OUTPATIENT
Start: 2018-05-26 | End: 2018-05-26

## 2018-05-26 RX ORDER — ACETAMINOPHEN 650 MG/1
650 SUPPOSITORY RECTAL ONCE
Status: COMPLETED | OUTPATIENT
Start: 2018-05-26 | End: 2018-05-26

## 2018-05-26 RX ORDER — SODIUM CHLORIDE, SODIUM LACTATE, POTASSIUM CHLORIDE, CALCIUM CHLORIDE 600; 310; 30; 20 MG/100ML; MG/100ML; MG/100ML; MG/100ML
1000 INJECTION, SOLUTION INTRAVENOUS CONTINUOUS
Status: DISCONTINUED | OUTPATIENT
Start: 2018-05-26 | End: 2018-05-26

## 2018-05-26 RX ORDER — ONDANSETRON 2 MG/ML
4 INJECTION INTRAMUSCULAR; INTRAVENOUS EVERY 6 HOURS PRN
Status: DISCONTINUED | OUTPATIENT
Start: 2018-05-26 | End: 2018-06-04 | Stop reason: HOSPADM

## 2018-05-26 RX ORDER — POTASSIUM CHLORIDE 1.5 G/1.58G
20-40 POWDER, FOR SOLUTION ORAL
Status: DISCONTINUED | OUTPATIENT
Start: 2018-05-26 | End: 2018-05-28

## 2018-05-26 RX ORDER — ONDANSETRON 4 MG/1
4 TABLET, ORALLY DISINTEGRATING ORAL EVERY 6 HOURS PRN
Status: DISCONTINUED | OUTPATIENT
Start: 2018-05-26 | End: 2018-06-04 | Stop reason: HOSPADM

## 2018-05-26 RX ORDER — IOPAMIDOL 755 MG/ML
85 INJECTION, SOLUTION INTRAVASCULAR ONCE
Status: COMPLETED | OUTPATIENT
Start: 2018-05-26 | End: 2018-05-26

## 2018-05-26 RX ORDER — POTASSIUM CHLORIDE 29.8 MG/ML
20 INJECTION INTRAVENOUS
Status: DISCONTINUED | OUTPATIENT
Start: 2018-05-26 | End: 2018-05-28

## 2018-05-26 RX ORDER — AMLODIPINE BESYLATE 5 MG/1
5 TABLET ORAL DAILY
Status: DISCONTINUED | OUTPATIENT
Start: 2018-05-27 | End: 2018-06-04 | Stop reason: HOSPADM

## 2018-05-26 RX ORDER — MORPHINE SULFATE 4 MG/ML
INJECTION, SOLUTION INTRAMUSCULAR; INTRAVENOUS
Status: COMPLETED
Start: 2018-05-26 | End: 2018-05-26

## 2018-05-26 RX ORDER — NICOTINE POLACRILEX 4 MG
15-30 LOZENGE BUCCAL
Status: DISCONTINUED | OUTPATIENT
Start: 2018-05-26 | End: 2018-06-04 | Stop reason: HOSPADM

## 2018-05-26 RX ORDER — MORPHINE SULFATE 2 MG/ML
2 INJECTION, SOLUTION INTRAMUSCULAR; INTRAVENOUS ONCE
Status: DISCONTINUED | OUTPATIENT
Start: 2018-05-26 | End: 2018-05-26

## 2018-05-26 RX ORDER — DEXTROSE MONOHYDRATE 25 G/50ML
25-50 INJECTION, SOLUTION INTRAVENOUS
Status: DISCONTINUED | OUTPATIENT
Start: 2018-05-26 | End: 2018-06-04 | Stop reason: HOSPADM

## 2018-05-26 RX ORDER — FAMOTIDINE 20 MG/1
20 TABLET, FILM COATED ORAL EVERY 24 HOURS
Status: DISCONTINUED | OUTPATIENT
Start: 2018-05-26 | End: 2018-06-04 | Stop reason: HOSPADM

## 2018-05-26 RX ORDER — HYDROMORPHONE HYDROCHLORIDE 1 MG/ML
0.2 INJECTION, SOLUTION INTRAMUSCULAR; INTRAVENOUS; SUBCUTANEOUS
Status: DISCONTINUED | OUTPATIENT
Start: 2018-05-26 | End: 2018-06-04 | Stop reason: HOSPADM

## 2018-05-26 RX ORDER — METOCLOPRAMIDE HYDROCHLORIDE 5 MG/ML
5 INJECTION INTRAMUSCULAR; INTRAVENOUS EVERY 6 HOURS PRN
Status: DISCONTINUED | OUTPATIENT
Start: 2018-05-26 | End: 2018-05-26

## 2018-05-26 RX ORDER — OXYCODONE HYDROCHLORIDE 5 MG/1
5 TABLET ORAL
Status: DISCONTINUED | OUTPATIENT
Start: 2018-05-26 | End: 2018-06-04 | Stop reason: HOSPADM

## 2018-05-26 RX ORDER — ONDANSETRON 2 MG/ML
INJECTION INTRAMUSCULAR; INTRAVENOUS
Status: COMPLETED
Start: 2018-05-26 | End: 2018-05-26

## 2018-05-26 RX ORDER — METOPROLOL TARTRATE 1 MG/ML
5 INJECTION, SOLUTION INTRAVENOUS EVERY 6 HOURS
Status: DISCONTINUED | OUTPATIENT
Start: 2018-05-26 | End: 2018-05-27

## 2018-05-26 RX ORDER — METOCLOPRAMIDE 5 MG/1
5 TABLET ORAL EVERY 6 HOURS PRN
Status: DISCONTINUED | OUTPATIENT
Start: 2018-05-26 | End: 2018-05-26

## 2018-05-26 RX ORDER — AMOXICILLIN 250 MG
1-2 CAPSULE ORAL 2 TIMES DAILY
Status: DISCONTINUED | OUTPATIENT
Start: 2018-05-26 | End: 2018-06-04 | Stop reason: HOSPADM

## 2018-05-26 RX ORDER — PROCHLORPERAZINE 25 MG
12.5 SUPPOSITORY, RECTAL RECTAL EVERY 12 HOURS PRN
Status: DISCONTINUED | OUTPATIENT
Start: 2018-05-26 | End: 2018-06-04 | Stop reason: HOSPADM

## 2018-05-26 RX ORDER — DIAZEPAM 2 MG
2 TABLET ORAL EVERY 8 HOURS PRN
Status: CANCELLED | OUTPATIENT
Start: 2018-05-26

## 2018-05-26 RX ORDER — PROCHLORPERAZINE MALEATE 5 MG
5 TABLET ORAL EVERY 6 HOURS PRN
Status: DISCONTINUED | OUTPATIENT
Start: 2018-05-26 | End: 2018-06-04 | Stop reason: HOSPADM

## 2018-05-26 RX ORDER — NALOXONE HYDROCHLORIDE 0.4 MG/ML
.1-.4 INJECTION, SOLUTION INTRAMUSCULAR; INTRAVENOUS; SUBCUTANEOUS
Status: DISCONTINUED | OUTPATIENT
Start: 2018-05-26 | End: 2018-06-04 | Stop reason: HOSPADM

## 2018-05-26 RX ORDER — MAGNESIUM SULFATE HEPTAHYDRATE 40 MG/ML
4 INJECTION, SOLUTION INTRAVENOUS EVERY 4 HOURS PRN
Status: DISCONTINUED | OUTPATIENT
Start: 2018-05-26 | End: 2018-05-28

## 2018-05-26 RX ORDER — POTASSIUM CL/LIDO/0.9 % NACL 10MEQ/0.1L
10 INTRAVENOUS SOLUTION, PIGGYBACK (ML) INTRAVENOUS
Status: DISCONTINUED | OUTPATIENT
Start: 2018-05-26 | End: 2018-05-28

## 2018-05-26 RX ORDER — POTASSIUM CHLORIDE 1500 MG/1
20-40 TABLET, EXTENDED RELEASE ORAL
Status: DISCONTINUED | OUTPATIENT
Start: 2018-05-26 | End: 2018-05-28

## 2018-05-26 RX ORDER — FAMOTIDINE 20 MG/1
20 TABLET, FILM COATED ORAL 2 TIMES DAILY
Status: DISCONTINUED | OUTPATIENT
Start: 2018-05-26 | End: 2018-05-26

## 2018-05-26 RX ORDER — HYDROMORPHONE HYDROCHLORIDE 1 MG/ML
0.25 INJECTION, SOLUTION INTRAMUSCULAR; INTRAVENOUS; SUBCUTANEOUS
Status: DISCONTINUED | OUTPATIENT
Start: 2018-05-26 | End: 2018-05-26

## 2018-05-26 RX ORDER — SIMVASTATIN 20 MG
20 TABLET ORAL AT BEDTIME
Status: DISCONTINUED | OUTPATIENT
Start: 2018-05-26 | End: 2018-06-04 | Stop reason: HOSPADM

## 2018-05-26 RX ORDER — HYDRALAZINE HYDROCHLORIDE 20 MG/ML
10-20 INJECTION INTRAMUSCULAR; INTRAVENOUS
Status: DISCONTINUED | OUTPATIENT
Start: 2018-05-26 | End: 2018-06-04 | Stop reason: HOSPADM

## 2018-05-26 RX ORDER — ONDANSETRON 2 MG/ML
4 INJECTION INTRAMUSCULAR; INTRAVENOUS ONCE
Status: COMPLETED | OUTPATIENT
Start: 2018-05-26 | End: 2018-05-26

## 2018-05-26 RX ORDER — POTASSIUM CHLORIDE 7.45 MG/ML
10 INJECTION INTRAVENOUS
Status: DISCONTINUED | OUTPATIENT
Start: 2018-05-26 | End: 2018-05-28

## 2018-05-26 RX ADMIN — HYDROMORPHONE HYDROCHLORIDE 0.25 MG: 1 INJECTION, SOLUTION INTRAMUSCULAR; INTRAVENOUS; SUBCUTANEOUS at 10:43

## 2018-05-26 RX ADMIN — SODIUM CHLORIDE: 9 INJECTION, SOLUTION INTRAVENOUS at 18:21

## 2018-05-26 RX ADMIN — HYDROMORPHONE HYDROCHLORIDE 0.25 MG: 1 INJECTION, SOLUTION INTRAMUSCULAR; INTRAVENOUS; SUBCUTANEOUS at 11:29

## 2018-05-26 RX ADMIN — SODIUM CHLORIDE, POTASSIUM CHLORIDE, SODIUM LACTATE AND CALCIUM CHLORIDE 1000 ML: 600; 310; 30; 20 INJECTION, SOLUTION INTRAVENOUS at 09:34

## 2018-05-26 RX ADMIN — SODIUM CHLORIDE 66 ML: 9 INJECTION, SOLUTION INTRAVENOUS at 09:48

## 2018-05-26 RX ADMIN — ACETAMINOPHEN 650 MG: 650 SUPPOSITORY RECTAL at 14:29

## 2018-05-26 RX ADMIN — IOPAMIDOL 85 ML: 755 INJECTION, SOLUTION INTRAVENOUS at 09:48

## 2018-05-26 RX ADMIN — OXYCODONE HYDROCHLORIDE 5 MG: 5 TABLET ORAL at 20:38

## 2018-05-26 RX ADMIN — SODIUM CHLORIDE, POTASSIUM CHLORIDE, SODIUM LACTATE AND CALCIUM CHLORIDE 1000 ML: 600; 310; 30; 20 INJECTION, SOLUTION INTRAVENOUS at 11:30

## 2018-05-26 RX ADMIN — MAGNESIUM SULFATE HEPTAHYDRATE 1 G: 500 INJECTION, SOLUTION INTRAMUSCULAR; INTRAVENOUS at 18:20

## 2018-05-26 RX ADMIN — ONDANSETRON 4 MG: 2 INJECTION INTRAMUSCULAR; INTRAVENOUS at 11:52

## 2018-05-26 RX ADMIN — INSULIN ASPART 1 UNITS: 100 INJECTION, SOLUTION INTRAVENOUS; SUBCUTANEOUS at 21:41

## 2018-05-26 RX ADMIN — HYDROMORPHONE HYDROCHLORIDE 0.2 MG: 1 INJECTION, SOLUTION INTRAMUSCULAR; INTRAVENOUS; SUBCUTANEOUS at 20:38

## 2018-05-26 RX ADMIN — HYDROMORPHONE HYDROCHLORIDE 0.25 MG: 1 INJECTION, SOLUTION INTRAMUSCULAR; INTRAVENOUS; SUBCUTANEOUS at 12:20

## 2018-05-26 RX ADMIN — MORPHINE SULFATE 2 MG: 4 INJECTION INTRAVENOUS at 15:17

## 2018-05-26 RX ADMIN — METOPROLOL TARTRATE 5 MG: 5 INJECTION, SOLUTION INTRAVENOUS at 19:55

## 2018-05-26 RX ADMIN — ACETAMINOPHEN 650 MG: 160 SUSPENSION ORAL at 19:50

## 2018-05-26 RX ADMIN — SENNOSIDES AND DOCUSATE SODIUM 2 TABLET: 8.6; 5 TABLET ORAL at 19:52

## 2018-05-26 ASSESSMENT — ENCOUNTER SYMPTOMS
NECK PAIN: 1
BACK PAIN: 1
MYALGIAS: 1

## 2018-05-26 ASSESSMENT — VISUAL ACUITY
OU: NOT TESTABLE
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
## 2018-05-26 NOTE — IP AVS SNAPSHOT
"    [facility]: [phone]                                              INTERAGENCY TRANSFER FORM - LAB / IMAGING / EKG / EMG RESULTS   2018                    Hospital Admission Date: 2018  NEO VILLAVICENCIO   : 1928  Sex: Male        Attending Provider: George Ogden MD     Allergies:  No Known Drug Allergies    Infection:  None   Service:  HOSPITALIST    Ht:  1.702 m (5' 7\")   Wt:  78.9 kg (173 lb 15.1 oz)   Admission Wt:  77.1 kg (170 lb)    BMI:  27.24 kg/m 2   BSA:  1.93 m 2            Patient PCP Information     Provider PCP Type    Gabriel Carroll MD General         Lab Results - 3 Days      Glucose by meter [[id number]]  Resulted: 18 1343, Result status: Final result    Ordering provider: Shukri Herrera MD  18 1226 Resulting lab: POINT OF CARE TEST, GLUCOSE    Specimen Information    Type Source Collected On     18 1226          Components       Value Reference Range Flag Lab   Glucose 92 70 - 99 mg/dL  170            Creatinine [[id number]] (Abnormal)  Resulted: 18 0811, Result status: Final result    Ordering provider: Annmarie Santos MD  18 0000 Resulting lab: [facility]    Specimen Information    Type Source Collected On   Blood  18 0635          Components       Value Reference Range Flag Lab   Creatinine 1.63 0.66 - 1.25 mg/dL H FrStHsLb   GFR Estimate 40 >60 mL/min/1.7m2 L FrStHsLb   Comment:  Non  GFR Calc   GFR Estimate If Black 48 >60 mL/min/1.7m2 L FrStHsLb   Comment:   GFR Calc            Potassium [[id number]]  Resulted: 18 0811, Result status: Final result    Ordering provider: Annmarie Santos MD  18 0000 Resulting lab: [facility]    Specimen Information    Type Source Collected On   Blood  18 0635          Components       Value Reference Range Flag Lab   Potassium 4.4 3.4 - 5.3 mmol/L  FrStHsLb            Magnesium " [[id number]]  Resulted: 06/04/18 0811, Result status: Final result    Ordering provider: Annmarie Santos MD  06/04/18 0635 Resulting lab: [facility]    Specimen Information    Type Source Collected On     06/04/18 0635          Components       Value Reference Range Flag Lab   Magnesium 2.3 1.6 - 2.3 mg/dL  FrStHsLb            Glucose by meter [[id number]]  Resulted: 06/04/18 0802, Result status: Final result    Ordering provider: Shukri Herrera MD  06/04/18 0732 Resulting lab: POINT OF CARE TEST, GLUCOSE    Specimen Information    Type Source Collected On     06/04/18 0732          Components       Value Reference Range Flag Lab   Glucose 74 70 - 99 mg/dL  170            Glucose by meter [[id number]] (Abnormal)  Resulted: 06/04/18 0242, Result status: Final result    Ordering provider: Shukri Herrera MD  06/04/18 0226 Resulting lab: POINT OF CARE TEST, GLUCOSE    Specimen Information    Type Source Collected On     06/04/18 0226          Components       Value Reference Range Flag Lab   Glucose 106 70 - 99 mg/dL H 170            Magnesium [[id number]]  Resulted: 06/04/18 0114, Result status: Final result    Ordering provider: Marika Kline DO  06/03/18 2353 Resulting lab: [facility]    Specimen Information    Type Source Collected On   Blood  06/04/18 0035          Components       Value Reference Range Flag Lab   Magnesium 1.8 1.6 - 2.3 mg/dL  FrStHsLb            Glucose by meter [[id number]] (Abnormal)  Resulted: 06/03/18 2134, Result status: Final result    Ordering provider: Shukri Herrera MD  06/03/18 2118 Resulting lab: POINT OF CARE TEST, GLUCOSE    Specimen Information    Type Source Collected On     06/03/18 2118          Components       Value Reference Range Flag Lab   Glucose 109 70 - 99 mg/dL H 170            Glucose by meter [[id number]] (Abnormal)  Resulted: 06/03/18 1922, Result status: Final result    Ordering provider: Shukri Herrera MD  06/03/18 4780  Resulting lab: POINT OF CARE TEST, GLUCOSE    Specimen Information    Type Source Collected On     06/03/18 1749          Components       Value Reference Range Flag Lab   Glucose 112 70 - 99 mg/dL H 170            Glucose by meter [[id number]]  Resulted: 06/03/18 1304, Result status: Final result    Ordering provider: Shukri Herrera MD  06/03/18 1207 Resulting lab: POINT OF CARE TEST, GLUCOSE    Specimen Information    Type Source Collected On     06/03/18 1207          Components       Value Reference Range Flag Lab   Glucose 94 70 - 99 mg/dL  170            Glucose by meter [[id number]]  Resulted: 06/03/18 0827, Result status: Final result    Ordering provider: Shukri Herrera MD  06/03/18 0806 Resulting lab: POINT OF CARE TEST, GLUCOSE    Specimen Information    Type Source Collected On     06/03/18 0806          Components       Value Reference Range Flag Lab   Glucose 78 70 - 99 mg/dL  170            Glucose by meter [[id number]]  Resulted: 06/03/18 0239, Result status: Final result    Ordering provider: Shukri Herrera MD  06/03/18 0226 Resulting lab: POINT OF CARE TEST, GLUCOSE    Specimen Information    Type Source Collected On     06/03/18 0226          Components       Value Reference Range Flag Lab   Glucose 82 70 - 99 mg/dL  170            Glucose by meter [[id number]] (Abnormal)  Resulted: 06/02/18 2159, Result status: Final result    Ordering provider: Shukri Herrera MD  06/02/18 2144 Resulting lab: POINT OF CARE TEST, GLUCOSE    Specimen Information    Type Source Collected On     06/02/18 2144          Components       Value Reference Range Flag Lab   Glucose 107 70 - 99 mg/dL H 170            Glucose by meter [[id number]] (Abnormal)  Resulted: 06/02/18 1718, Result status: Final result    Ordering provider: Shukri Herrera MD  06/02/18 1706 Resulting lab: POINT OF CARE TEST, GLUCOSE    Specimen Information    Type Source Collected On     06/02/18 1706          Components       Value Reference Range  Flag Lab   Glucose 124 70 - 99 mg/dL H 170            Glucose by meter [[id number]] (Abnormal)  Resulted: 06/02/18 1303, Result status: Final result    Ordering provider: Shukri Herrera MD  06/02/18 1251 Resulting lab: POINT OF CARE TEST, GLUCOSE    Specimen Information    Type Source Collected On     06/02/18 1251          Components       Value Reference Range Flag Lab   Glucose 145 70 - 99 mg/dL H 170            Basic metabolic panel [[id number]] (Abnormal)  Resulted: 06/02/18 0804, Result status: Final result    Ordering provider: Mulu Moran MD  06/02/18 0000 Resulting lab: [facility]    Specimen Information    Type Source Collected On   Blood  06/02/18 0708          Components       Value Reference Range Flag Lab   Sodium 136 133 - 144 mmol/L  FrStHsLb   Potassium 4.3 3.4 - 5.3 mmol/L  FrStHsLb   Chloride 107 94 - 109 mmol/L  FrStHsLb   Carbon Dioxide 21 20 - 32 mmol/L  FrStHsLb   Anion Gap 8 3 - 14 mmol/L  FrStHsLb   Glucose 91 70 - 99 mg/dL  FrStHsLb   Urea Nitrogen 20 7 - 30 mg/dL  FrStHsLb   Creatinine 1.42 0.66 - 1.25 mg/dL H FrStHsLb   GFR Estimate 47 >60 mL/min/1.7m2 L FrStHsLb   Comment:  Non  GFR Calc   GFR Estimate If Black 57 >60 mL/min/1.7m2 L FrStHsLb   Comment:  African American GFR Calc   Calcium 7.9 8.5 - 10.1 mg/dL L FrStHsLb            Magnesium [[id number]]  Resulted: 06/02/18 0804, Result status: Final result    Ordering provider: Mulu Moran MD  06/02/18 0000 Resulting lab: [facility]    Specimen Information    Type Source Collected On   Blood  06/02/18 0708          Components       Value Reference Range Flag Lab   Magnesium 1.9 1.6 - 2.3 mg/dL  FrStHsLb            CBC with platelets [[id number]] (Abnormal)  Resulted: 06/02/18 0748, Result status: Final result    Ordering provider: Mulu Moran MD  06/02/18 0000 Resulting lab: [facility]    Specimen Information    Type Source Collected On   Blood  06/02/18 0742           Components       Value Reference Range Flag Lab   WBC 5.8 4.0 - 11.0 10e9/L  FrStHsLb   RBC Count 2.48 4.4 - 5.9 10e12/L L FrStHsLb   Hemoglobin 8.1 13.3 - 17.7 g/dL L FrStHsLb   Hematocrit 23.4 40.0 - 53.0 % L FrStHsLb   MCV 94 78 - 100 fl  FrStHsLb   MCH 32.7 26.5 - 33.0 pg  FrStHsLb   MCHC 34.6 31.5 - 36.5 g/dL  FrStHsLb   RDW 12.8 10.0 - 15.0 %  FrStHsLb   Platelet Count 201 150 - 450 10e9/L  FrStHsLb            Glucose by meter [[id number]]  Resulted: 06/02/18 0744, Result status: Final result    Ordering provider: Shukri Herrera MD  06/02/18 0730 Resulting lab: POINT OF CARE TEST, GLUCOSE    Specimen Information    Type Source Collected On     06/02/18 0730          Components       Value Reference Range Flag Lab   Glucose 91 70 - 99 mg/dL  170            Glucose by meter [[id number]]  Resulted: 06/02/18 0309, Result status: Final result    Ordering provider: Shukri Herrera MD  06/02/18 0256 Resulting lab: POINT OF CARE TEST, GLUCOSE    Specimen Information    Type Source Collected On     06/02/18 0256          Components       Value Reference Range Flag Lab   Glucose 98 70 - 99 mg/dL  170            Glucose by meter [[id number]] (Abnormal)  Resulted: 06/01/18 2237, Result status: Final result    Ordering provider: Shukri Herrera MD  06/01/18 2223 Resulting lab: POINT OF CARE TEST, GLUCOSE    Specimen Information    Type Source Collected On     06/01/18 2223          Components       Value Reference Range Flag Lab   Glucose 122 70 - 99 mg/dL H 170            Glucose by meter [[id number]] (Abnormal)  Resulted: 06/01/18 1858, Result status: Final result    Ordering provider: Shukri Herrera MD  06/01/18 1846 Resulting lab: POINT OF CARE TEST, GLUCOSE    Specimen Information    Type Source Collected On     06/01/18 1846          Components       Value Reference Range Flag Lab   Glucose 153 70 - 99 mg/dL H 170            Glucose by meter [[id number]] (Abnormal)  Resulted: 06/01/18 1233, Result status: Final  result    Ordering provider: Shukri Herrera MD  06/01/18 1215 Resulting lab: POINT OF CARE TEST, GLUCOSE    Specimen Information    Type Source Collected On     06/01/18 1215          Components       Value Reference Range Flag Lab   Glucose 102 70 - 99 mg/dL H 170            Basic metabolic panel [[id number]] (Abnormal)  Resulted: 06/01/18 0857, Result status: Final result    Ordering provider: Mulu Moran MD  06/01/18 0001 Resulting lab: [facility]    Specimen Information    Type Source Collected On   Blood  06/01/18 0810          Components       Value Reference Range Flag Lab   Sodium 134 133 - 144 mmol/L  FrStHsLb   Potassium 4.2 3.4 - 5.3 mmol/L  FrStHsLb   Chloride 106 94 - 109 mmol/L  FrStHsLb   Carbon Dioxide 18 20 - 32 mmol/L L FrStHsLb   Anion Gap 10 3 - 14 mmol/L  FrStHsLb   Glucose 81 70 - 99 mg/dL  FrStHsLb   Urea Nitrogen 20 7 - 30 mg/dL  FrStHsLb   Creatinine 1.41 0.66 - 1.25 mg/dL H FrStHsLb   GFR Estimate 47 >60 mL/min/1.7m2 L FrStHsLb   Comment:  Non  GFR Calc   GFR Estimate If Black 57 >60 mL/min/1.7m2 L FrStHsLb   Comment:  African American GFR Calc   Calcium 7.8 8.5 - 10.1 mg/dL L FrStHsLb            Magnesium [[id number]]  Resulted: 06/01/18 0857, Result status: Final result    Ordering provider: Mulu Moran MD  06/01/18 0001 Resulting lab: [facility]    Specimen Information    Type Source Collected On   Blood  06/01/18 0810          Components       Value Reference Range Flag Lab   Magnesium 2.0 1.6 - 2.3 mg/dL  FrStHsLb            CBC with platelets [[id number]] (Abnormal)  Resulted: 06/01/18 0838, Result status: Final result    Ordering provider: Mulu Moran MD  06/01/18 0001 Resulting lab: [facility]    Specimen Information    Type Source Collected On   Blood  06/01/18 0810          Components       Value Reference Range Flag Lab   WBC 6.5 4.0 - 11.0 10e9/L  FrStHsLb   RBC Count 2.49 4.4 - 5.9 10e12/L L FrStHsLb  "  Hemoglobin 8.1 13.3 - 17.7 g/dL L FrStHsLb   Hematocrit 23.4 40.0 - 53.0 % L FrStHsLb   MCV 94 78 - 100 fl  FrStHsLb   MCH 32.5 26.5 - 33.0 pg  FrStHsLb   MCHC 34.6 31.5 - 36.5 g/dL  FrStHsLb   RDW 12.6 10.0 - 15.0 %  FrStHsLb   Platelet Count 188 150 - 450 10e9/L  FrStHsLb            Glucose by meter [[id number]]  Resulted: 06/01/18 0752, Result status: Final result    Ordering provider: Shukri Herrera MD  06/01/18 0737 Resulting lab: POINT OF CARE TEST, GLUCOSE    Specimen Information    Type Source Collected On     06/01/18 0737          Components       Value Reference Range Flag Lab   Glucose 98 70 - 99 mg/dL  170            Testing Performed By     Lab - Abbreviation Name Director Address Valid Date Range    14 - FrStHsLb [facility] Unknown [address] [place] [address] 05/08/15 1057 - Present    170 - Unknown POINT OF CARE TEST, GLUCOSE Unknown Unknown 10/31/11 1114 - Present            Unresulted Labs (24h ago through future)    Start       Ordered    06/05/18 0600  Basic metabolic panel  AM DRAW,   Routine      06/04/18 1044    Unscheduled  Sodium  CONDITIONAL (SPECIFY),   Routine     Comments:  Q6H (IF  on 3% NaCl infusion, 3% sodium chloride/sodium acetate, or 25% Albumin more than 2 consecutive doses)    05/26/18 1702    Unscheduled  Potassium  CONDITIONAL (SPECIFY),   Routine     Comments:  Q6H (IF on 3% NaCl infusion or  3% sodium chloride/sodium acetate)    05/26/18 1702    Unscheduled  Magnesium  (Magnesium Replacement -  Adult - \"Standard\" - Replacement for all levels less than 1.6 mg/dL )  CONDITIONAL (SPECIFY),   Routine     Comments:  Obtain Magnesium Level for these conditions:  *IF no magnesium result within 24 hrs before initiation of order set, draw magnesium level with next lab collect.    *2 HOURS AFTER last magnesium replacement dose when magnesium replacement given for level less than 1.6   *Next morning after magnesium dose.     Repeat Magnesium Replacement if " "necessary.    05/26/18 1804    Unscheduled  Potassium  (Potassium Replacement - \"Standard\" - For K levels less than 3.4 mmol/L - UU,UR,UA,RH,SH,PH,WY )  CONDITIONAL (SPECIFY),   Routine     Comments:  Obtain Potassium Level for these conditions:  *IF no potassium result within 24 hours before initiation of order set, draw potassium level with next lab collect.    *2 HOURS AFTER last IV potassium replacement dose and 4 hours after an oral replacement dose.  *Next morning after potassium dose.     Repeat Potassium Replacement if necessary.    05/26/18 1804    Unscheduled  Potassium  (Potassium Replacement - \"High\" - Replacement for all levels less than 4.1 mmol/L - UU,UR,UA,RH,SH,PH,WY )  CONDITIONAL (SPECIFY),   Routine     Comments:  Obtain Potassium Level for these conditions:  *IF no potassium result within 24 hrs before initiation of order set, draw potassium level with next lab collect.    *2 HOURS AFTER last IV potassium replacement dose and 4 hours after an oral replacement dose when potassium replacement given for level less than 3.4.  *Next morning after potassium dose.     Repeat Potassium Replacement if necessary.    05/28/18 1215    Unscheduled  Magnesium  (Magnesium Replacement - Adult - \"High\" - Replacement for all levels less than or equal to 2 mg/dL)  CONDITIONAL (SPECIFY),   Routine     Comments:  Obtain Magnesium Level for these conditions:  *IF no magnesium result within 24 hrs before initiation of order set, draw magnesium level with next lab collect.    *2 HOURS AFTER last magnesium replacement dose when magnesium replacement given for level less than 1.6  *Next morning after magnesium dose.     Repeat Magnesium Replacement if necessary.    05/28/18 1215         ECG/EMG Results      ECHO LIMITED WITH OPTISON [[id number]]  Resulted: 05/27/18 0746, Result status: Edited Result - FINAL    Ordering provider: Celia Lomas APRN CNP  05/26/18 8759 Resulted by: Edin Medeiros MD    Performed: " 18 0800 - 18 0801 Resulting lab: RADIOLOGY RESULTS    Narrative:       [id number]  [facility]  [id number]  [id number]^LANCE^KEYSHA^BIBI           [facility]  Echocardiography Laboratory  [address] [place] [address]        Name: NEO VILLAVICENCIO  MRN: [id number]  : 1928  Study Date: 2018 07:46 AM  Age: 89 yrs  Gender: Male  Patient Location: [facility]  Reason For Study: Rhythm Disorder  Ordering Physician: KEYSHA LUCAS  Performed By: Oxana Kerns     BSA: 1.9 m2  Height: 67 in  Weight: 170 lb  HR: 70  BP: 122/70 mmHg  _____________________________________________________________________________  __        Procedure  Limited Portable Echo Adult.  _____________________________________________________________________________  __        Interpretation Summary     The visual ejection fraction is estimated at 55-60%.  The right ventricle is normal in size and function.  Sinus rhythm was noted.  The study was technically difficult. Compared to prior study, there is no  significant change.  _____________________________________________________________________________  __        Left Ventricle  The left ventricle is normal in size. The visual ejection fraction is  estimated at 55-60%. Left ventricular diastolic function is indeterminate. No  regional wall motion abnormalities noted.     Right Ventricle  The right ventricle is normal in size and function.     Atria  The left atrium is not well visualized. Right atrium not well visualized.  There is no atrial shunt seen.     Mitral Valve  The mitral valve leaflets are mildly thickened. There is trace mitral  regurgitation.        Tricuspid Valve  There is trace tricuspid regurgitation. The right ventricular systolic  pressure is approximated at 25.8 mmHg plus the right atrial pressure.     Aortic Valve  No hemodynamically significant valvular aortic stenosis.     Pulmonic Valve  The pulmonic valve is not well visualized.     Vessels  The  aortic root is not well visualized.     Pericardium  There is no pericardial effusion.        Rhythm  Sinus rhythm was noted.  _____________________________________________________________________________  __           Doppler Measurements & Calculations  Ao V2 max: 136.4 cm/sec  Ao max P.0 mmHg  TR max domenico: 253.8 cm/sec  TR max P.8 mmHg           _____________________________________________________________________________  __           Report approved by: Edin Morejon 2018 12:36 PM       1    Type Source Collected On     18 0746          View Image (below)        Echocardiogram Complete [[id number]]  Resulted: 18 08, Result status: In process    Ordering provider: Celia Lomas APRN CNP  18 1740 Performed: 18 0800 - 18 08    Resulting lab: [facility]                   Encounter-Level Documents:     There are no encounter-level documents.      Order-Level Documents:     There are no order-level documents.
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
## 2018-05-26 NOTE — ED NOTES
Large area of swelling and bruising noted to left forehead. No visible bruising to left ribs at this time.

## 2018-05-26 NOTE — PHARMACY-ADMISSION MEDICATION HISTORY
Admission medication history interview status for the 5/26/2018  admission is complete. See EPIC admission navigator for prior to admission medications     Medication history source reliability:Good    Actions taken by pharmacist (provider contacted, etc):None     Additional medication history information not noted on PTA med list :None    Medication reconciliation/reorder completed by provider prior to medication history? No    Time spent in this activity:  5 minutes    Prior to Admission medications    Medication Sig Last Dose Taking? Auth Provider   amLODIPine (NORVASC) 5 MG tablet Take 1 tablet (5 mg) by mouth daily 5/26/2018 at AM Yes Gabriel Carroll MD   ASPIRIN PO Take 81 mg by mouth daily 5/25/2018 at afternoon Yes Unknown, Entered By History   Cyanocobalamin (B-12) 2000 MCG TABS Take 1 tablet by mouth daily 5/26/2018 at AM Yes Gabriel Carroll MD   lisinopril (PRINIVIL/ZESTRIL) 10 MG tablet Take 1 tablet (10 mg) by mouth daily 5/26/2018 at AM Yes Gabriel Carroll MD   simvastatin (ZOCOR) 20 MG tablet Take 1 tablet (20 mg) by mouth At Bedtime 5/25/2018 at HS Yes Gabriel Carroll MD
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
## 2018-05-26 NOTE — IP AVS SNAPSHOT
` `     [facility]: [phone]            Medication Administration Report for Jose Gomez as of 06/04/18 1602   Legend:    Given Hold Not Given Due Canceled Entry Other Actions    Time Time (Time) Time  Time-Action       Inactive    Active    Linked        Medications 05/29/18 05/30/18 05/31/18 06/01/18 06/02/18 06/03/18 06/04/18    acetaminophen (TYLENOL) tablet 650 mg  Dose: 650 mg  Freq: 4 TIMES DAILY Route: PO  Start: 05/29/18 0900   Admin Instructions: Maximum acetaminophen dose from all sources = 75 mg/kg/day not to exceed 4 grams/day.    Admin. Amount: 2 tablet (2 × 325 mg tablet)  Last Admin: 06/04/18 1525  Dispense Loc: [facility]     0955 (650 mg)-Given       1405 (650 mg)-Given       1850 (650 mg)-Given       2105 (650 mg)-Given        0800 (650 mg)-Given       1351 (650 mg)-Given       1834 (650 mg)-Given       2100 (650 mg)-Given        0858 (650 mg)-Given       1439 (650 mg)-Given       1815 (650 mg)-Given       2106 (650 mg)-Given        0848 (650 mg)-Given       1252 (650 mg)-Given       1854 (650 mg)-Given       2139 (650 mg)-Given        0915 (650 mg)-Given       1401 (650 mg)-Given       1802 (650 mg)-Given       2223 (650 mg)-Given        0908 (650 mg)-Given       (1510)-Not Given       1709 (650 mg)-Given       2143 (650 mg)-Given        0859 (650 mg)-Given       1525 (650 mg)-Given       [ ] 1800       [ ] 2200           acetaminophen (TYLENOL) tablet 650 mg  Dose: 650 mg  Freq: EVERY 4 HOURS PRN Route: PO  PRN Reason: mild pain  Start: 05/28/18 0453   Admin Instructions: Maximum acetaminophen dose from all sources = 75 mg/kg/day not to exceed 4 grams/day.    Admin. Amount: 2 tablet (2 × 325 mg tablet)  Dispense Loc: [facility]              Or  acetaminophen (TYLENOL) Suppository 650 mg  Dose: 650 mg  Freq: EVERY 4 HOURS PRN Route: RE  PRN Reason: mild pain  Start: 05/28/18 0453   Admin Instructions: Maximum acetaminophen dose from all sources = 75 mg/kg/day  not to exceed 4 grams/day.    Admin. Amount: 1 suppository (1 × 650 mg suppository)  Dispense Loc:  [facility]               amiodarone (PACERONE/CODARONE) tablet 200 mg  Dose: 200 mg  Freq: DAILY Route: PO  Start: 06/03/18 0900   Admin Instructions: Avoid grapefruit juice during oral amiodarone treatment.    Admin. Amount: 1 tablet (1 × 200 mg tablet)  Last Admin: 06/04/18 0859  Dispense Loc:  [facility]          0908 (200 mg)-Given        0859 (200 mg)-Given           amLODIPine (NORVASC) tablet 5 mg  Dose: 5 mg  Freq: DAILY Route: PO  Start: 05/27/18 0900   Admin Instructions: Hold for SBP <100    Admin. Amount: 1 tablet (1 × 5 mg tablet)  Last Admin: 06/04/18 0859  Dispense Loc:  [facility]     0837 (5 mg)-Given        0800 (5 mg)-Given        0858 (5 mg)-Given        0914 (5 mg)-Given        0915 (5 mg)-Given        0908 (5 mg)-Given        0859 (5 mg)-Given           bisacodyl (DULCOLAX) Suppository 10 mg  Dose: 10 mg  Freq: DAILY Route: RE  Start: 05/30/18 0900   Admin Instructions: Hold if Pt has had BM in past 36 hours    Admin. Amount: 1 suppository (1 × 10 mg suppository)  Last Admin: 06/02/18 1042  Dispense Loc:  [facility]      (0900)-Not Given        (0859)-Not Given        (1854)-Not Given        1042 (10 mg)-Given        (1119)-Not Given [C]        (0900)-Not Given [C]           famotidine (PEPCID) tablet 20 mg  Dose: 20 mg  Freq: EVERY 24 HOURS Route: PO  Start: 05/26/18 2100   Admin Instructions: Dose adjusted per renal dosing policy.  Estimated CrCl = 30-50 mL/min.    Admin. Amount: 1 tablet (1 × 20 mg tablet)  Last Admin: 06/03/18 2143  Dispense Loc:  [facility]     2030 (20 mg)-Given        2054 (20 mg)-Given        2106 (20 mg)-Given        2139 (20 mg)-Given        2224 (20 mg)-Given        2143 (20 mg)-Given        [ ] 2100           glucose gel 15-30 g  Dose: 15-30 g  Freq: EVERY 15 MIN PRN Route: PO  PRN Reason: low blood sugar  Start: 05/26/18 1839   Admin Instructions: Give 15 g for BG 51 to 69  mg/dL IF patient is conscious and able to swallow. Give 30 g for BG less than or equal to 50 mg/dL IF patient is conscious and able to swallow. Do NOT give glucose gel via enteral tube.  IF patient has enteral tube: give apple juice 120 mL (4 oz or 15 g of CHO) via enteral tube for BG 51 to 69 mg/dL.  Give apple juice 240 mL (8 oz or 30 g of CHO) via enteral tube for BG less than or equal to 50 mg/dL.    ~Oral gel is preferable for conscious and able to swallow patient.   ~IF gel unavailable or patient refuses may provide apple juice 120 mL (4 oz or 15 g of CHO). Document juice on I and O flowsheet.    Admin. Amount: 15-30 g  Dispense Loc: [facility]  Volume: 93.75 mL              Or  dextrose 50 % injection 25-50 mL  Dose: 25-50 mL  Freq: EVERY 15 MIN PRN Route: IV  PRN Reason: low blood sugar  Start: 05/26/18 1839   Admin Instructions: Use if have IV access, BG less than 70 mg/dL and meet dose criteria below:  Dose if conscious and alert (or disorientated) and NPO = 25 mL  Dose if unconscious / not alert = 50 mL  Vesicant. For ordered doses up to 25 g, give IV Push undiluted. Give each 5g over 1 minute.    Admin. Amount: 25-50 mL  Dispense Loc: [facility]  Infused Over: 1-5 Minutes  Volume: 50 mL              Or  glucagon injection 1 mg  Dose: 1 mg  Freq: EVERY 15 MIN PRN Route: SC  PRN Reason: low blood sugar  PRN Comment: May repeat x 1 only  Start: 05/26/18 1839   Admin Instructions: May give SQ or IM. ONLY use glucagon IF patient has NO IV access AND is UNABLE to swallow AND blood glucose is LESS than or EQUAL to 50 mg/dL.  If ordered IV, give IV Push over 1 minute. Reconstitute with 1mL sterile water.    Admin. Amount: 1 mg  Dispense Loc: [facility]               hydrALAZINE (APRESOLINE) injection 10-20 mg  Dose: 10-20 mg  Freq: EVERY 1 HOUR PRN Route: IV  PRN Reason: high blood pressure  PRN Comment: For a blood pressure level above the parameters defined in the blood pressure order by the provider.  Start:  05/26/18 1702   Admin Instructions: USE if HR less than 60 bpm upon antihypertensive initiation or HR falls to less than 60 bpm during labetalol.    Give 10 mg, wait 30 minutes. If not effective then repeat 10 mg. Wait 1 hour. If not effective then give 20 mg.  May add prn enalapril (if ordered) if Systolic Blood Pressure parameter is not met after reaching hydrAZALINE 20 mg. Notify provider within 1 hour if Blood Pressure parameters are not met.  For ordered doses up to 40 mg, give IV Push undiluted over 1 minute.    Admin. Amount: 10-20 mg = 0.5-1 mL Conc: 20 mg/mL  Dispense Loc:  [facility]  Infused Over: 1 Minutes  Volume: 1 mL               HYDROmorphone (PF) (DILAUDID) injection 0.2 mg  Dose: 0.2 mg  Freq: EVERY 2 HOURS PRN Route: IV  PRN Reason: other  PRN Comment: pain control or improvement in physical function. Hold dose for analgesic side effects.  Start: 05/26/18 1606   Admin Instructions: Notify provider to assess for uncontrolled pain or analgesic side effects. Hold while on PCA or with regular IV opioid dosing  For ordered doses up to 4 mg give IV Push undiluted. Administer each 2mg over 2-5 minutes.    Admin. Amount: 0.2 mg  Last Admin: 06/02/18 1402  Dispense Loc:  [facility]     1932 (0.2 mg)-Given           1004 (0.2 mg)-Given       1402 (0.2 mg)-Given             insulin aspart (NovoLOG) inj (RAPID ACTING)  Dose: 1-3 Units  Freq: AT BEDTIME Route: SC  Start: 05/27/18 2200   Admin Instructions: LOW INSULIN RESISTANCE DOSING    Do Not give Bedtime Correction Insulin if BG less than 200.   For  - 299 give 1 unit.   For  - 399 give 2 units   For BG greater than or equal 400 give 3 units.   Notify provider if glucose greater than or equal to 350 mg/dL after administration of correction dose.  If given at mealtime, must be administered 5 min before meal or immediately after.    Admin. Amount: 1-3 Units  Dispense Loc: Contact Rx for dose  Volume: 3 mL     (2113)-Not Given        (2107)-Not  Given        (2246)-Not Given        (2224)-Not Given [C]        (2216)-Not Given [C]        (2144)-Not Given        [ ] 2200           insulin aspart (NovoLOG) inj (RAPID ACTING)  Dose: 1-3 Units  Freq: 3 TIMES DAILY BEFORE MEALS Route: SC  Start: 05/27/18 1345   Admin Instructions: Correction Scale - LOW INSULIN RESISTANCE DOSING     Do Not give Correction Insulin if Pre-Meal BG less than 140.   For Pre-Meal  - 239 give 1 unit.   For Pre-Meal  - 339 give 2 units.   For Pre-Meal BG greater than or equal to 340 give 3 units.   To be given with prandial insulin, and based on pre-meal blood glucose.   Notify provider if glucose greater than or equal to 350 mg/dL after administration of correction dose.  If given at mealtime, must be administered 5 min before meal or immediately after.    Admin. Amount: 1-3 Units  Last Admin: 06/02/18 1401  Dispense Loc: Contact Rx for dose  Volume: 3 mL     (0957)-Not Given       (1353)-Not Given       (1836)-Not Given        (0806)-Not Given       (1300)-Not Given       (1832)-Not Given        (0845)-Not Given [C]       (1200)-Not Given       (1742)-Not Given [C]        (0804)-Not Given       (1307)-Not Given       1858 (1 Units)-Given        (0812)-Not Given       1401 (1 Units)-Given       (1721)-Not Given        (0907)-Not Given       (1322)-Not Given [C]       (1752)-Not Given [C]        (0856)-Not Given       (1323)-Not Given       [ ] 1700           labetalol (NORMODYNE/TRANDATE) injection 10-20 mg  Dose: 10-20 mg  Freq: EVERY 10 MIN PRN Route: IV  PRN Reason: high blood pressure  PRN Comment: For a blood pressure level above the parameters defined in the blood pressure order by the provider.  Start: 05/26/18 1701   Admin Instructions: Use if HR 60 bpm or greater upon antihypertensive initiation.  Hold if HR less than 60 bpm.    Increase or repeat the dose if Blood Pressure goal not met. Give 10 mg, wait 10 minutes.  If not effective then give 10 mg. May add prn  "hydrALAZINE (if ordered) if Systolic Blood Pressure parameter is not met after reaching labetalol 20 mg.  If hydrALAZINE not ordered can use enalapril (if ordered) (MAX daily dose: 300 mg /24 hrs)   Notify provider within 1 hour if Blood Pressure parameters are not met.  For ordered doses up to 80 mg, give IV Push undiluted. Give each 20 mg over 2 minutes.    Admin. Amount: 10-20 mg = 2-4 mL Conc: 5 mg/mL  Dispense Loc:  [facility]  Infused Over: 2-8 Minutes  Volume: 4 mL               Lidocaine (LIDOCARE) 4 % Patch 1-3 patch  Dose: 1-3 patch  Freq: EVERY 24 HOURS 0800 Route: TD  Start: 18 0845   Admin Instructions: Apply patch(s) to back-ribs-pelvis- or anywhere else hurting. To prevent lidocaine toxicity, patient should be patch free for 12 hrs daily. Patches may be cut to smaller size prior to removing release liner.  NEVER APPLY HEAT OVER PATCH which increases absorption and risk of local anesthetic toxicity. Do not apply over area where liposomal bupivacaine was injected for 96 hours post injection.    Admin. Amount: 1-3 patch  Last Admin: 18 0858  Dispense Loc:  [facility] 73  Infused Over: 12 Hours     1136 (2 patch)-Given [C]        0802 (2 patch)-Given [C]        0906 (2 patch)-Given        0915 (1 patch)-Given        0915 (1 patch)-Given        1133 (1 patch)-Given        0858 (1 patch)-Given           lidocaine (LMX4) cream  Freq: EVERY 1 HOUR PRN Route: Top  PRN Reason: mild pain  PRN Comment: with VAD insertion or accessing implanted port,  Start: 18 1606   Admin Instructions: Do NOT give if patient has a history of allergy to any local anesthetic or any \"eileen\" product.   Apply 30 min prior to VAD insertion or port access.  MAX Dose:  2.5 gm (  of 5 gm tube)    Dispense Loc: Contact Rx for dose  Administrations Remainin               lidocaine 1 % 1 mL  Dose: 1 mL  Freq: EVERY 1 HOUR PRN Route: OTHER  PRN Comment: mild pain with VAD insertion or accessing implanted port,  Start: 18 " "1606   Admin Instructions: Do NOT give if patient has a history of allergy to any local anesthetic or any \"eileen\" product. MAX dose 1 mL subcutaneous OR intradermal in divided doses.    Admin. Amount: 1 mL  Dispense Loc:  [facility] 73  Administrations Remainin  Volume: 20 mL               lidocaine patch in PLACE  Freq: EVERY 8 HOURS Route: TD  Start: 18 0845   Admin Instructions: Chart every shift, confirming that patch is still in place on patient (no barcode scan needed). See patch order for dose information.  NEVER APPLY HEAT OVER PATCH which will increase absorption and may lead to risk of local anesthetic toxicity. Do not apply over area where liposomal bupivacaine injected for 96 hours.    Last Admin: 18 1525  Dispense Loc:  Main Pharmacy     1138 ( )-Given       1615 ( )-Patch in Place               1200 ( )-Patch in Place               0303 ( )-Negative       1200 ( )-Patch in Place       2122 ( )-Negative        0354 ( )-Negative       1307 ( )-Patch in Place       1552 ( )-Patch in Place       (2305)-Not Given [C]        0916 ( )-Negative       1534 ( )-Patch in Place       (2357)-Not Given [C]        1134 ( )-Patch in Place       1551 ( )-Patch in Place        0138 ( )-Patch in Place       0900 ( )-Patch in Place       1525 ( )-Patch in Place           lidocaine patch REMOVAL  Freq: EVERY 24 HOURS  Route: TD  Start: 18   Admin Instructions: Patient should have a 12 hour patch free interval    Last Admin: 18  Dispense Loc:  Main Pharmacy      ( )-Patch Removed         ( )-Patch Removed         ( )-Patch Removed         ( )-Patch Removed         ( )-Patch Removed         ( )-Given        [ ]            lisinopril (PRINIVIL/Zestril) tablet 5 mg  Dose: 5 mg  Freq: DAILY Route: PO  Start: 18 1000   Admin Instructions: Hold for SBP < 100    Admin. Amount: 2 tablet (2 × 2.5 mg tablet)  Last Admin: 18 0859  Dispense Loc:  [facility] 73  "         1134 (5 mg)-Given        0859 (5 mg)-Given           magnesium sulfate 2 g in water intermittent infusion  Dose: 2 g  Freq: DAILY PRN Route: IV  PRN Reason: magnesium supplementation  Start: 05/29/18 0948   Admin Instructions: For Serum Mg++ 1.6 - 2 mg/dL  Give 2 g and recheck magnesium level next AM.    Admin. Amount: 2 g = 50 mL Conc: 0.04 g/mL  Last Admin: 06/04/18 0214  Dispense Loc: Contact Rx for dose  Volume: 50 mL      1002 (2 g)-New Bag            0214 (2 g)-New Bag           magnesium sulfate 4 g in 100 mL sterile water (premade)  Dose: 4 g  Freq: EVERY 4 HOURS PRN Route: IV  PRN Reason: magnesium supplementation  Start: 05/28/18 1214   Admin Instructions: For serum Mg++ less than 1.6 mg/dL  Give 4 g and recheck magnesium level 2 hours after dose, and next AM.    Admin. Amount: 4 g = 100 mL Conc: 4 g/100 mL  Dispense Loc: Contact Rx for dose  Infused Over: 120 Minutes  Volume: 100 mL               menthol (ICY HOT) 5 % patch 1 patch  Dose: 1 patch  Freq: EVERY 24 HOURS Route: Top  Start: 05/29/18 0845   Admin Instructions: Apply to Skin. -- Place when lidoderm is off--Remove when lidoderm is placed  Remove 'old patch' and chart on Medication Patch Removal order when new patch is applied. Avoid placing heating pad over the patch.    Admin. Amount: 1 patch  Last Admin: 06/03/18 2148  Dispense Loc:  Main Pharmacy     2055 (1 patch)-Given        (0807)-Not Given        (0916)-Not Given        (1030)-Not Given       2227 (1 patch)-Given        2225 (1 patch)-Given        2148 (1 patch)-Given        [ ] 2200          And  menthol (ICY HOT) Patch in Place  Freq: EVERY 8 HOURS Route: TD  Start: 05/29/18 0845   Admin Instructions: Chart every shift, confirming that patch is still in place on patient (no barcode scan needed). See patch order for dose information.    Last Admin: 06/04/18 0358  Dispense Loc:  Main Pharmacy     (1002)-Not Given       (1616)-Not Given [C]        0028 ( )-Patch in Place        0807 (3 each)-Patch Removed              2357 ( )-Negative        0916 ( )-Negative       1653 ( )-Negative       2347 ( )-Negative        (1031)-Not Given [C]       (1552)-Not Given        0044 ( )-Patch in Place [C]               0204 ( )-Patch in Place       1837 ( )-Patch in Place       2142 ( )-Given        0358 ( )-Patch in Place       1145 ( )-Patch Removed       [ ] 1900          And  menthol (ICY HOT) patch REMOVAL  Freq: EVERY 8 HOURS PRN Route: TD  PRN Comment: for patch removal  Start: 05/29/18 0838   Admin Instructions: Remove patch when new patch is applied or patch is discontinued.    Dispense Loc:  Main Pharmacy               methocarbamol (ROBAXIN) tablet 500 mg  Dose: 500 mg  Freq: 3 TIMES DAILY Route: PO  Start: 05/29/18 0900   Admin. Amount: 1 tablet (1 × 500 mg tablet)  Last Admin: 06/04/18 1525  Dispense Loc:  [facility] 73     0955 (500 mg)-Given       1726 (500 mg)-Given       2105 (500 mg)-Given        0800 (500 mg)-Given       1652 (500 mg)-Given       2100 (500 mg)-Given        0859 (500 mg)-Given       1655 (500 mg)-Given       2106 (500 mg)-Given        0914 (500 mg)-Given       1552 (500 mg)-Given       2139 (500 mg)-Given        0915 (500 mg)-Given       1802 (500 mg)-Given       2223 (500 mg)-Given        0908 (500 mg)-Given       1611 (500 mg)-Given       2143 (500 mg)-Given        0859 (500 mg)-Given       1525 (500 mg)-Given       [ ] 2200           metoprolol (LOPRESSOR) injection 2.5 mg  Dose: 2.5 mg  Freq: EVERY 4 HOURS PRN Route: IV  PRN Reason: other  PRN Comment: HR > 120, hold for SBP < 90  Start: 05/27/18 1533   Admin Instructions: For ordered doses up to 15 mg, give IV Push undiluted over 5-10 minutes.    Admin. Amount: 2.5 mg = 2.5 mL Conc: 1 mg/mL  Dispense Loc:  ADS 73  Volume: 2.5 mL               naloxone (NARCAN) injection 0.1-0.4 mg  Dose: 0.1-0.4 mg  Freq: EVERY 2 MIN PRN Route: IV  PRN Reason: opioid reversal  Start: 05/26/18 1606   Admin Instructions: For  respiratory rate LESS than or EQUAL to 8.  Partial reversal dose:  0.1 mg titrated q 2 minutes for Analgesia Side Effects Monitoring Sedation Level of 3 (frequently drowsy, arousable, drifts to sleep during conversation).Full reversal dose:  0.4 mg bolus for Analgesia Side Effects Monitoring Sedation Level of 4 (somnolent, minimal or no response to stimulation).  For ordered doses up to 2mg give IVP. Give each 0.4mg over 15 seconds in emergency situations. For non-emergent situations further dilute in 9mL of NS to facilitate titration of response.    Admin. Amount: 0.1-0.4 mg = 0.25-1 mL Conc: 0.4 mg/mL  Dispense Loc: [facility]  Volume: 1 mL               ondansetron (ZOFRAN-ODT) ODT tab 4 mg  Dose: 4 mg  Freq: EVERY 6 HOURS PRN Route: PO  PRN Reasons: nausea,vomiting  Start: 05/26/18 1606   Admin Instructions: This is Step 1 of nausea and vomiting management.  If nausea not resolved in 15 minutes, go to Step 2 prochlorperazine (COMPAZINE). Do not push through foil backing. Peel back foil and gently remove. Place on tongue immediately. Administration with liquid unnecessary  With dry hands, peel back foil backing and gently remove tablet; do not push oral disintegrating tablet through foil backing; administer immediately on tongue and oral disintegrating tablet dissolves in seconds; then swallow with saliva; liquid not required.    Admin. Amount: 1 tablet (1 × 4 mg tablet)  Dispense Loc: [facility]              Or  ondansetron (ZOFRAN) injection 4 mg  Dose: 4 mg  Freq: EVERY 6 HOURS PRN Route: IV  PRN Reasons: nausea,vomiting  Start: 05/26/18 1606   Admin Instructions: This is Step 1 of nausea and vomiting management.  If nausea not resolved in 15 minutes, go to Step 2 prochlorperazine (COMPAZINE).  Irritant. For ordered doses up to 4 mg, give IV Push undiluted over 2-5 minutes.    Admin. Amount: 4 mg = 2 mL Conc: 4 mg/2 mL  Dispense Loc: [facility]  Infused Over: 2-5 Minutes  Volume: 2 mL               oxyCODONE IR  (ROXICODONE) tablet 5 mg  Dose: 5 mg  Freq: EVERY 3 HOURS PRN Route: PO  PRN Reason: moderate to severe pain  Start: 05/26/18 1606   Admin Instructions: Hold while on PCA or with regular IV opioid dosing.  Please give 30 minutes prior to PT.    Admin. Amount: 1 tablet (1 × 5 mg tablet)  Last Admin: 06/04/18 1525  Dispense Loc:  ADS 73     0515 (5 mg)-Given       0837 (5 mg)-Given       1404 (5 mg)-Given       1726 (5 mg)-Given       2106 (5 mg)-Given        0800 (5 mg)-Given       1125 (5 mg)-Given       1652 (5 mg)-Given       2053 (5 mg)-Given        0858 (5 mg)-Given       1447 (5 mg)-Given       1920 (5 mg)-Given        0410 (5 mg)-Given       0848 (5 mg)-Given       1253 (5 mg)-Given       1552 (5 mg)-Given       1855 (5 mg)-Given       2303 (5 mg)-Given        0740 (5 mg)-Given [C]       1152 (5 mg)-Given       1448 (5 mg)-Given       1801 (5 mg)-Given       2224 (5 mg)-Given        0203 (5 mg)-Given       0624 (5 mg)-Given       1134 (5 mg)-Given        0042 (5 mg)-Given       0349 (5 mg)-Given       0859 (5 mg)-Given       1525 (5 mg)-Given           polyethylene glycol (MIRALAX/GLYCOLAX) Packet 17 g  Dose: 17 g  Freq: 2 TIMES DAILY Route: PO  Start: 05/29/18 0900   Admin Instructions: 1 Packet = 17 grams. Mixed prescribed dose in 8 ounces of water. Follow with 8 oz. of water.    Admin. Amount: 17 g  Last Admin: 06/04/18 0858  Dispense Loc:  ADS 73     0955 (17 g)-Given       2104 (17 g)-Given        0801 (17 g)-Given       2054 (17 g)-Given        0859 (17 g)-Given       2106 (17 g)-Given        0915 (17 g)-Given       2138 (17 g)-Given        0915 (17 g)-Given       2223 (17 g)-Given        0908 (17 g)-Given       (2137)-Not Given [C]        0858 (17 g)-Given       [ ] 2100           potassium chloride (KLOR-CON) Packet 20-40 mEq  Dose: 20-40 mEq  Freq: EVERY 2 HOURS PRN Route: ORAL OR FEED  PRN Reason: potassium supplementation  Start: 05/28/18 1214   Admin Instructions: Use if unable to tolerate  tablets.    If Serum K+ 3.4-4.0, dose = 20 mEq x1. Recheck K+ level the next AM.  If Serum K+ 3.0-3.3, dose = 60 mEq po total dose (40 mEq x 1 followed in 2 hours by 20 mEq X1). Recheck K+ level 4 hours after dose and the next AM.  If Serum K+ 2.5-2.9, dose = 80 mEq po total dose (40 mEq Q2H x2). Recheck K+ level 4 hours after dose and the next AM.  If Serum K+ less than 2.5, See IV order.  Dissolve packet contents in 4-8 ounces of cold water or juice.    Admin. Amount: 20-40 mEq  Dispense Loc: [facility]               potassium chloride 10 mEq in 100 mL intermittent infusion with 10 mg lidocaine  Dose: 10 mEq  Freq: EVERY 1 HOUR PRN Route: IV  PRN Reason: potassium supplementation  Start: 05/28/18 1214   Admin Instructions: Infuse via PERIPHERAL LINE. Use potassium with lidocaine for pain with peripheral administration.  If Serum K+ 3.4-4.0, dose = 10 mEq/hr x2 doses. Recheck K+ level the next AM.  If Serum K+ 3.0-3.3, dose = 10 mEq/hr x4 doses (40 mEq IV total dose). Recheck K+ level 2 hours after dose and the next AM.  If Serum K+ less than 3.0, dose = 10 mEq/hr x6 doses (60 mEq IV total dose). Recheck K+ level 2 hours after dose and the next AM.    Admin. Amount: 10 mEq = 100 mL Conc: 10 mEq/100 mL  Dispense Loc: Contact Rx for dose  Infused Over: 1 Hours  Volume: 100 mL               potassium chloride 10 mEq in 100 mL sterile water intermittent infusion (premix)  Dose: 10 mEq  Freq: EVERY 1 HOUR PRN Route: IV  PRN Reason: potassium supplementation  Start: 05/28/18 1214   Admin Instructions: Infuse via PERIPHERAL LINE or CENTRAL LINE. Use for central line replacement if patient weight less than 65 kg, if patient is on TPN with high potassium content or if unit does not stock 20 mEq bags.  If Serum K+ 3.4-4.0, dose = 10 mEq/hr x2 doses. Recheck K+ level the next AM.  If Serum K+ 3.0-3.3, dose = 10 mEq/hr x4 doses (40 mEq IV total dose). Recheck K+ level 2 hours after dose and the next AM.  If Serum K+ less than  3.0, dose = 10 mEq/hr x6 doses (60 mEq IV total dose). Recheck K+ level 2 hours after dose and the next AM.    Admin. Amount: 10 mEq = 100 mL Conc: 10 mEq/100 mL  Dispense Loc: Contact Rx for dose  Infused Over: 60 Minutes  Volume: 100 mL               potassium chloride 20 mEq in 50 mL intermittent infusion  Dose: 20 mEq  Freq: EVERY 1 HOUR PRN Route: IV  PRN Reason: potassium supplementation  Start: 05/28/18 1214   Admin Instructions: Infuse via CENTRAL LINE Only.  May need EKG if less than 65 kg or on TPN - Max rate is 0.3 mEq/kg/hr for patients not on EKG monitoring.    If Serum K+ 3.4-4.0, dose = 20 mEq/hr x1 doses. Recheck K+ level the next AM.  If Serum K+ 3.0-3.3, dose = 20 mEq/hr x2 doses (40 mEq IV total dose).  Recheck K+ level 2 hours after dose and the next AM.  If Serum K+ less than 3.0, dose = 20 mEq/hr x3 doses (60 mEq IV total dose). Recheck K+ level 2 hours after dose and the next AM.    Admin. Amount: 20 mEq = 50 mL Conc: 20 mEq/50 mL  Dispense Loc: Contact Rx for dose  Volume: 50 mL               potassium chloride SA (K-DUR/KLOR-CON M) CR tablet 20-40 mEq  Dose: 20-40 mEq  Freq: EVERY 2 HOURS PRN Route: PO  PRN Reason: potassium supplementation  Start: 05/28/18 1214   Admin Instructions: Use if able to take PO.   If Serum K+ 3.4-4.0, dose = 20 mEq x1. Recheck K+ level the next AM.  If Serum K+ 3.0-3.3, dose = 60 mEq po total dose (40 mEq x1 followed in 2 hours by 20 mEq x1). Recheck K+ level 4 hours after dose and the next AM.  If Serum K+ 2.5-2.9, dose = 80 mEq po total dose (40 mEq Q2H x2). Recheck K+ level 4 hours after dose and the next AM.  If Serum K+ less than 2.5, See IV order.  DO NOT CRUSH    Admin. Amount: 1-2 tablet (1-2 × 20 mEq tablet)  Dispense Loc:  [facility] 73               prochlorperazine (COMPAZINE) injection 5 mg  Dose: 5 mg  Freq: EVERY 6 HOURS PRN Route: IV  PRN Reasons: nausea,vomiting  Start: 05/26/18 1606   Admin Instructions: This is Step 2 of nausea and vomiting  management. Give if nausea not resolved 15 minutes after giving ondansetron (ZOFRAN). If nausea not resolved in 15 minutes, go to Step 3 metoclopramide (REGLAN), if ordered.  For ordered doses up to 10 mg, give IV Push undiluted. Each 5mg over 1 minute.    Admin. Amount: 5 mg = 1 mL Conc: 5 mg/mL  Dispense Loc: [facility] 73  Infused Over: 1-2 Minutes  Volume: 1 mL              Or  prochlorperazine (COMPAZINE) tablet 5 mg  Dose: 5 mg  Freq: EVERY 6 HOURS PRN Route: PO  PRN Reason: vomiting  Start: 05/26/18 1606   Admin Instructions: This is Step 2 of nausea and vomiting management. Give if nausea not resolved 15 minutes after giving ondansetron (ZOFRAN). If nausea not resolved in 15 minutes, go to Step 3 metoclopramide (REGLAN), if ordered.    Admin. Amount: 1 tablet (1 × 5 mg tablet)  Dispense Loc: [facility] 73              Or  prochlorperazine (COMPAZINE) Suppository 12.5 mg  Dose: 12.5 mg  Freq: EVERY 12 HOURS PRN Route: RE  PRN Reasons: nausea,vomiting  Start: 05/26/18 1606   Admin Instructions: This is Step 2 of nausea and vomiting management. Give if nausea not resolved 15 minutes after giving ondansetron (ZOFRAN). If nausea not resolved in 15 minutes, go to Step 3 metoclopramide (REGLAN), if ordered.    Admin. Amount: 0.5 suppository (0.5 × 25 mg suppository)  Dispense Loc:  Main Pharmacy               senna-docusate (SENOKOT-S;PERICOLACE) 8.6-50 MG per tablet 1-2 tablet  Dose: 1-2 tablet  Freq: 2 TIMES DAILY Route: PO  Start: 05/26/18 2100   Admin Instructions: Start with 1 tablet po BID. If no bowel movement in 24 hours, increase to 2 tablets po BID. Hold for loose stools.    Admin. Amount: 1-2 tablet  Last Admin: 06/03/18 0908  Dispense Loc: [facility] 73     0839 (1 tablet)-Given       2104 (1 tablet)-Given        1125 (2 tablet)-Given       2055 (2 tablet)-Given        0858 (1 tablet)-Given       2106 (2 tablet)-Given        0914 (2 tablet)-Given       2139 (2 tablet)-Given        0914 (2 tablet)-Given       2223  (1 tablet)-Given        0908 (1 tablet)-Given       (2136)-Not Given [C]        (0900)-Not Given       [ ] 2100           simvastatin (ZOCOR) tablet 20 mg  Dose: 20 mg  Freq: AT BEDTIME Route: PO  Start: 05/26/18 2200   Admin. Amount: 1 tablet (1 × 20 mg tablet)  Last Admin: 06/03/18 2143  Dispense Loc:  [facility] 73     2108 (20 mg)-Given        2100 (20 mg)-Given        2106 (20 mg)-Given        2139 (20 mg)-Given        2224 (20 mg)-Given        2143 (20 mg)-Given        [ ] 2200           sodium chloride (PF) 0.9% PF flush 3 mL  Dose: 3 mL  Freq: EVERY 8 HOURS Route: IK  Start: 05/26/18 1615   Admin Instructions: And Q1H PRN, to lock peripheral IV dormant line.    Admin. Amount: 3 mL  Last Admin: 06/04/18 1525  Dispense Loc: [facility] Stock  Volume: 3 mL     (0029)-Not Given       (0909)-Not Given       (1617)-Not Given               (0820)-Not Given       (1832)-Not Given       (2357)-Not Given        (0916)-Not Given       (1653)-Not Given       (2339)-Not Given        (1032)-Not Given       1552 (3 mL)-Given       (2150)-Not Given        (0506)-Not Given       1402 (3 mL)-Given       2224 (3 mL)-Given        0624 (3 mL)-Given       1510 (3 mL)-Given       2149 (3 mL)-Given        0858 (3 mL)-Given       1525 (3 mL)-Given       [ ] 2200           sodium chloride (PF) 0.9% PF flush 3 mL  Dose: 3 mL  Freq: EVERY 1 HOUR PRN Route: IK  PRN Reasons: line flush,post meds or blood draw  Start: 05/26/18 1606   Admin Instructions: for peripheral IV flush post IV meds    Admin. Amount: 3 mL  Dispense Loc: [facility] Stock  Volume: 3 mL              Completed Medications  Medications 05/29/18 05/30/18 05/31/18 06/01/18 06/02/18 06/03/18 06/04/18         Dose: 400 mg  Freq: DAILY Route: PO  Start: 05/30/18 0900   End: 06/02/18 0914   Admin Instructions: Avoid grapefruit juice during oral amiodarone treatment.    Admin. Amount: 2 tablet (2 × 200 mg tablet)  Last Admin: 06/02/18 0914  Dispense Loc:  [facility] 73  Administrations  Remainin      0800 (400 mg)-Given        0858 (400 mg)-Given        0914 (400 mg)-Given        0914 (400 mg)-Given            Discontinued Medications  Medications 18         Rate: 50 mL/hr   Freq: CONTINUOUS Route: IV  Start: 18   End: 18 130   Last Admin: 18 1257  Dispense Loc: [facility] Floor Stock  Volume: 1,000 mL     0000 ( )-Rate/Dose Verify        0745 ( )-New Bag       2234 ( )-New Bag         1257 ( )-New Bag        1309-Med Discontinued
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
## 2018-05-26 NOTE — PROGRESS NOTES
Called to assist my hospitalist JENNIFFER colleague w/ VT in a newly admitted pt    On evaluation pt was in narrow complex  Tachyarrhythmia 149 bpm, resolved spontaneously.  BP at that time was 97/66.    CNS:  Oriented to person only disoriented to time, L head hematoma, +follows commands wiggle toes & show 3 fingers bilat, doesn't answer most questions correctly, using numbers to answer, PERRL 3mm brisk bilat  CV: NSR S1S2, paroxysms of AF, generally normotensive, 1 episode of hypotension w/ sleeping,   Lung: CTAB upper lobe  Abd:  Nabs soft/NT/ND  Ext: w/o edema    Plan  Narrow complex  Tachyarrhythmia likely AF w/RVR and VT both self limited paroxysms, significant decr in -->97 w/ AF w/ RVR  -added on mag --> 1.9 will give 1g mag sulfate  -D/w hospitalist attending Dr. Yu, will obtain stat TTE  -Planning to transfer to ICU for increasing size of SDH, new onset atrial & tachyarrhythmias for closer neuro/cardiac monitoring  -recheck mag in am  -electrolyte protocol started  -recheck troponin    SDH bilat  -change IVF to NS  -increase frequency of neuro checks to q1h  -check gluc    Remainder as per JENNIFFER Camacho.    Celia Lomas CNP  Hospitalist house officer

## 2018-05-27 ENCOUNTER — APPOINTMENT (OUTPATIENT)
Dept: GENERAL RADIOLOGY | Facility: CLINIC | Age: 83
DRG: 963 | End: 2018-05-27
Attending: SURGERY
Payer: COMMERCIAL

## 2018-05-27 ENCOUNTER — APPOINTMENT (OUTPATIENT)
Dept: SPEECH THERAPY | Facility: CLINIC | Age: 83
DRG: 963 | End: 2018-05-27
Attending: PHYSICIAN ASSISTANT
Payer: COMMERCIAL

## 2018-05-27 ENCOUNTER — APPOINTMENT (OUTPATIENT)
Dept: CARDIOLOGY | Facility: CLINIC | Age: 83
DRG: 963 | End: 2018-05-27
Attending: NURSE PRACTITIONER
Payer: COMMERCIAL

## 2018-05-27 ENCOUNTER — APPOINTMENT (OUTPATIENT)
Dept: CT IMAGING | Facility: CLINIC | Age: 83
DRG: 963 | End: 2018-05-27
Attending: PHYSICIAN ASSISTANT
Payer: COMMERCIAL

## 2018-05-27 LAB
ABO + RH BLD: NORMAL
ABO + RH BLD: NORMAL
ANION GAP SERPL CALCULATED.3IONS-SCNC: 10 MMOL/L (ref 3–14)
BLD GP AB SCN SERPL QL: NORMAL
BLD PROD TYP BPU: NORMAL
BLOOD BANK CMNT PATIENT-IMP: NORMAL
BUN SERPL-MCNC: 33 MG/DL (ref 7–30)
CALCIUM SERPL-MCNC: 7.6 MG/DL (ref 8.5–10.1)
CHLORIDE SERPL-SCNC: 108 MMOL/L (ref 94–109)
CO2 SERPL-SCNC: 20 MMOL/L (ref 20–32)
CREAT SERPL-MCNC: 1.89 MG/DL (ref 0.66–1.25)
ERYTHROCYTE [DISTWIDTH] IN BLOOD BY AUTOMATED COUNT: 12.4 % (ref 10–15)
GFR SERPL CREATININE-BSD FRML MDRD: 34 ML/MIN/1.7M2
GLUCOSE BLDC GLUCOMTR-MCNC: 115 MG/DL (ref 70–99)
GLUCOSE BLDC GLUCOMTR-MCNC: 117 MG/DL (ref 70–99)
GLUCOSE BLDC GLUCOMTR-MCNC: 122 MG/DL (ref 70–99)
GLUCOSE BLDC GLUCOMTR-MCNC: 129 MG/DL (ref 70–99)
GLUCOSE BLDC GLUCOMTR-MCNC: 129 MG/DL (ref 70–99)
GLUCOSE BLDC GLUCOMTR-MCNC: 133 MG/DL (ref 70–99)
GLUCOSE SERPL-MCNC: 145 MG/DL (ref 70–99)
HCT VFR BLD AUTO: 27.5 % (ref 40–53)
HGB BLD-MCNC: 9 G/DL (ref 13.3–17.7)
HGB BLD-MCNC: 9.1 G/DL (ref 13.3–17.7)
HGB BLD-MCNC: 9.4 G/DL (ref 13.3–17.7)
HGB BLD-MCNC: 9.5 G/DL (ref 13.3–17.7)
MAGNESIUM SERPL-MCNC: 1.9 MG/DL (ref 1.6–2.3)
MCH RBC QN AUTO: 32.4 PG (ref 26.5–33)
MCHC RBC AUTO-ENTMCNC: 34.5 G/DL (ref 31.5–36.5)
MCV RBC AUTO: 94 FL (ref 78–100)
NUM BPU REQUESTED: 2
PHOSPHATE SERPL-MCNC: 3.2 MG/DL (ref 2.5–4.5)
PLATELET # BLD AUTO: 119 10E9/L (ref 150–450)
POTASSIUM SERPL-SCNC: 4.6 MMOL/L (ref 3.4–5.3)
RBC # BLD AUTO: 2.93 10E12/L (ref 4.4–5.9)
SODIUM SERPL-SCNC: 138 MMOL/L (ref 133–144)
SPECIMEN EXP DATE BLD: NORMAL
TROPONIN I SERPL-MCNC: 0.02 UG/L (ref 0–0.04)
TROPONIN I SERPL-MCNC: 0.03 UG/L (ref 0–0.04)
WBC # BLD AUTO: 6.5 10E9/L (ref 4–11)

## 2018-05-27 PROCEDURE — 86850 RBC ANTIBODY SCREEN: CPT | Performed by: SURGERY

## 2018-05-27 PROCEDURE — 70450 CT HEAD/BRAIN W/O DYE: CPT

## 2018-05-27 PROCEDURE — 83735 ASSAY OF MAGNESIUM: CPT | Performed by: SURGERY

## 2018-05-27 PROCEDURE — 92610 EVALUATE SWALLOWING FUNCTION: CPT | Mod: GN | Performed by: SPEECH-LANGUAGE PATHOLOGIST

## 2018-05-27 PROCEDURE — 85018 HEMOGLOBIN: CPT | Performed by: SURGERY

## 2018-05-27 PROCEDURE — 86923 COMPATIBILITY TEST ELECTRIC: CPT | Performed by: SURGERY

## 2018-05-27 PROCEDURE — 25000132 ZZH RX MED GY IP 250 OP 250 PS 637: Performed by: HOSPITALIST

## 2018-05-27 PROCEDURE — 93321 DOPPLER ECHO F-UP/LMTD STD: CPT | Mod: 26 | Performed by: INTERNAL MEDICINE

## 2018-05-27 PROCEDURE — 25000132 ZZH RX MED GY IP 250 OP 250 PS 637: Performed by: INTERNAL MEDICINE

## 2018-05-27 PROCEDURE — 25000128 H RX IP 250 OP 636: Performed by: INTERNAL MEDICINE

## 2018-05-27 PROCEDURE — 36415 COLL VENOUS BLD VENIPUNCTURE: CPT | Performed by: SURGERY

## 2018-05-27 PROCEDURE — 25500064 ZZH RX 255 OP 636: Performed by: HOSPITALIST

## 2018-05-27 PROCEDURE — 85027 COMPLETE CBC AUTOMATED: CPT | Performed by: SURGERY

## 2018-05-27 PROCEDURE — 93325 DOPPLER ECHO COLOR FLOW MAPG: CPT | Mod: 26 | Performed by: INTERNAL MEDICINE

## 2018-05-27 PROCEDURE — 93010 ELECTROCARDIOGRAM REPORT: CPT | Performed by: INTERNAL MEDICINE

## 2018-05-27 PROCEDURE — 25000128 H RX IP 250 OP 636: Performed by: SURGERY

## 2018-05-27 PROCEDURE — 84100 ASSAY OF PHOSPHORUS: CPT | Performed by: SURGERY

## 2018-05-27 PROCEDURE — 40000225 ZZH STATISTIC SLP WARD VISIT: Performed by: SPEECH-LANGUAGE PATHOLOGIST

## 2018-05-27 PROCEDURE — 86900 BLOOD TYPING SEROLOGIC ABO: CPT | Performed by: SURGERY

## 2018-05-27 PROCEDURE — 25000128 H RX IP 250 OP 636: Performed by: NURSE PRACTITIONER

## 2018-05-27 PROCEDURE — 99233 SBSQ HOSP IP/OBS HIGH 50: CPT | Performed by: INTERNAL MEDICINE

## 2018-05-27 PROCEDURE — 80048 BASIC METABOLIC PNL TOTAL CA: CPT | Performed by: SURGERY

## 2018-05-27 PROCEDURE — 93308 TTE F-UP OR LMTD: CPT | Mod: 26 | Performed by: INTERNAL MEDICINE

## 2018-05-27 PROCEDURE — 93005 ELECTROCARDIOGRAM TRACING: CPT

## 2018-05-27 PROCEDURE — 99207 ZZC CDG-MDM COMPONENT: MEETS MODERATE - UP CODED: CPT | Performed by: INTERNAL MEDICINE

## 2018-05-27 PROCEDURE — A9270 NON-COVERED ITEM OR SERVICE: HCPCS | Mod: GY | Performed by: INTERNAL MEDICINE

## 2018-05-27 PROCEDURE — 99221 1ST HOSP IP/OBS SF/LOW 40: CPT | Performed by: PHYSICIAN ASSISTANT

## 2018-05-27 PROCEDURE — 93321 DOPPLER ECHO F-UP/LMTD STD: CPT

## 2018-05-27 PROCEDURE — 92526 ORAL FUNCTION THERAPY: CPT | Mod: GN | Performed by: SPEECH-LANGUAGE PATHOLOGIST

## 2018-05-27 PROCEDURE — 71045 X-RAY EXAM CHEST 1 VIEW: CPT

## 2018-05-27 PROCEDURE — 20000003 ZZH R&B ICU

## 2018-05-27 PROCEDURE — 25000132 ZZH RX MED GY IP 250 OP 250 PS 637: Mod: GY | Performed by: SURGERY

## 2018-05-27 PROCEDURE — 84484 ASSAY OF TROPONIN QUANT: CPT | Performed by: SURGERY

## 2018-05-27 PROCEDURE — 00000146 ZZHCL STATISTIC GLUCOSE BY METER IP

## 2018-05-27 PROCEDURE — A9270 NON-COVERED ITEM OR SERVICE: HCPCS | Mod: GY | Performed by: HOSPITALIST

## 2018-05-27 PROCEDURE — 86901 BLOOD TYPING SEROLOGIC RH(D): CPT | Performed by: SURGERY

## 2018-05-27 PROCEDURE — A9270 NON-COVERED ITEM OR SERVICE: HCPCS | Mod: GY | Performed by: SURGERY

## 2018-05-27 RX ORDER — CEFAZOLIN SODIUM 2 G/100ML
2 INJECTION, SOLUTION INTRAVENOUS EVERY 8 HOURS
Status: DISCONTINUED | OUTPATIENT
Start: 2018-05-27 | End: 2018-05-27

## 2018-05-27 RX ORDER — CEFAZOLIN SODIUM 1 G/3ML
1 INJECTION, POWDER, FOR SOLUTION INTRAMUSCULAR; INTRAVENOUS EVERY 12 HOURS
Status: COMPLETED | OUTPATIENT
Start: 2018-05-27 | End: 2018-05-30

## 2018-05-27 RX ORDER — METOPROLOL TARTRATE 1 MG/ML
2.5 INJECTION, SOLUTION INTRAVENOUS EVERY 4 HOURS PRN
Status: DISCONTINUED | OUTPATIENT
Start: 2018-05-27 | End: 2018-06-04 | Stop reason: HOSPADM

## 2018-05-27 RX ADMIN — HYDROMORPHONE HYDROCHLORIDE 0.2 MG: 1 INJECTION, SOLUTION INTRAMUSCULAR; INTRAVENOUS; SUBCUTANEOUS at 00:41

## 2018-05-27 RX ADMIN — CEFAZOLIN SODIUM 1 G: 1 INJECTION, POWDER, FOR SOLUTION INTRAMUSCULAR; INTRAVENOUS at 21:22

## 2018-05-27 RX ADMIN — SIMVASTATIN 20 MG: 20 TABLET, FILM COATED ORAL at 21:22

## 2018-05-27 RX ADMIN — ACETAMINOPHEN 650 MG: 160 SUSPENSION ORAL at 21:22

## 2018-05-27 RX ADMIN — OXYCODONE HYDROCHLORIDE 5 MG: 5 TABLET ORAL at 17:44

## 2018-05-27 RX ADMIN — ACETAMINOPHEN 650 MG: 160 SUSPENSION ORAL at 04:01

## 2018-05-27 RX ADMIN — SENNOSIDES AND DOCUSATE SODIUM 1 TABLET: 8.6; 5 TABLET ORAL at 21:22

## 2018-05-27 RX ADMIN — SODIUM CHLORIDE 500 ML: 9 INJECTION, SOLUTION INTRAVENOUS at 06:56

## 2018-05-27 RX ADMIN — SODIUM CHLORIDE: 9 INJECTION, SOLUTION INTRAVENOUS at 17:21

## 2018-05-27 RX ADMIN — HUMAN ALBUMIN MICROSPHERES AND PERFLUTREN 3 ML: 10; .22 INJECTION, SOLUTION INTRAVENOUS at 08:15

## 2018-05-27 RX ADMIN — SIMVASTATIN 20 MG: 20 TABLET, FILM COATED ORAL at 00:41

## 2018-05-27 RX ADMIN — OXYCODONE HYDROCHLORIDE 5 MG: 5 TABLET ORAL at 00:41

## 2018-05-27 RX ADMIN — ACETAMINOPHEN 650 MG: 160 SUSPENSION ORAL at 09:13

## 2018-05-27 RX ADMIN — SODIUM CHLORIDE 500 ML: 9 INJECTION, SOLUTION INTRAVENOUS at 05:15

## 2018-05-27 RX ADMIN — ACETAMINOPHEN 650 MG: 160 SUSPENSION ORAL at 13:44

## 2018-05-27 RX ADMIN — FAMOTIDINE 20 MG: 20 TABLET ORAL at 21:22

## 2018-05-27 RX ADMIN — SODIUM CHLORIDE 500 ML: 9 INJECTION, SOLUTION INTRAVENOUS at 03:00

## 2018-05-27 ASSESSMENT — ACTIVITIES OF DAILY LIVING (ADL)
AMBULATION: 0-->INDEPENDENT
RETIRED_EATING: 0-->INDEPENDENT
TRANSFERRING: 0-->INDEPENDENT
COGNITION: 1 - ATTENTION OR MEMORY DEFICITS
BATHING: 0-->INDEPENDENT
FALL_HISTORY_WITHIN_LAST_SIX_MONTHS: NO
SWALLOWING: 0-->SWALLOWS FOODS/LIQUIDS WITHOUT DIFFICULTY
RETIRED_COMMUNICATION: 0-->UNDERSTANDS/COMMUNICATES WITHOUT DIFFICULTY
TOILETING: 0-->INDEPENDENT
DRESS: 0-->INDEPENDENT

## 2018-05-27 ASSESSMENT — VISUAL ACUITY
OU: NOT TESTABLE

## 2018-05-27 NOTE — PROGRESS NOTES
Was directed by on call hospitalist to notify on call surgeon regarding patient's decreased blood pressure. Orders for 500 cc bolus over 2 hours. On call surgeon paged. Awaiting callback. Will continue to monitor.

## 2018-05-27 NOTE — PROGRESS NOTES
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 but alert and oriented this morning. Repeat HCT stable.   - no further imaging needed, unless patient experiences neuro changes  - q2h neuro checks  - avoid sedating medications    CV: Hypotension overnight, responsive to fluids. Episode of Afib RVR in the ED, now in sinus rhythm.  - IV metoprolol, hold for SBP <90  - continuous telemetry  - maintain MAPs >60  - trops elevated (0.025 max) now trending down, suspect demand ischemia  - ECHO ordered for this morning    Resp: Maintained on nasal canula. Multiple L sided rib fractures. Multiple small pulmonary nodules --> will need to follow up with PCP.   - aggressive pulmonary toilet, respiratory therapy to see  - encourage IS    Renal: Creatinine elevated this am (1.89), govea in place with adequate UOP. SHERWIN could be related to contrast load vs hypotension.   - continue close monitor I&O  - trend creatinine    GI: Abdomen soft, non tender  - ok for diet  - stool softeners    Inf: no acute concerns  - trend fever and EBC curve    Ppx:  - SCD, hold on chemoprophylaxis until cleared by NSG.       Maegan Bedoya MD  General Surgery Resident- PGY5  [facility]  [phone]

## 2018-05-27 NOTE — PROGRESS NOTES
Patient is intermittently disoriented to time and place. Oriented to self.  Patient is on room air, clear lung sounds throughout.  Normal sinus rhythm 60-80's.  Hospitalist and surgeon paged overnight for low blood pressure. 1500 total IV 0.9% NS bolus given.  Urine output 30/HR with govea in place.  Maintenance fluid at 50ml/HR  Patient complains of pain on right side of chest but turns independently and moves all extremities with no problems. Pulses equal in all extremities.  Report given to oncoming nurse. Will continue with plan of care.
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
## 2018-05-28 ENCOUNTER — APPOINTMENT (OUTPATIENT)
Dept: OCCUPATIONAL THERAPY | Facility: CLINIC | Age: 83
DRG: 963 | End: 2018-05-28
Attending: INTERNAL MEDICINE
Payer: COMMERCIAL

## 2018-05-28 LAB
ANION GAP SERPL CALCULATED.3IONS-SCNC: 11 MMOL/L (ref 3–14)
BUN SERPL-MCNC: 32 MG/DL (ref 7–30)
CALCIUM SERPL-MCNC: 7.7 MG/DL (ref 8.5–10.1)
CHLORIDE SERPL-SCNC: 109 MMOL/L (ref 94–109)
CO2 SERPL-SCNC: 18 MMOL/L (ref 20–32)
CREAT SERPL-MCNC: 1.67 MG/DL (ref 0.66–1.25)
ERYTHROCYTE [DISTWIDTH] IN BLOOD BY AUTOMATED COUNT: 12.5 % (ref 10–15)
GFR SERPL CREATININE-BSD FRML MDRD: 39 ML/MIN/1.7M2
GLUCOSE BLDC GLUCOMTR-MCNC: 111 MG/DL (ref 70–99)
GLUCOSE BLDC GLUCOMTR-MCNC: 141 MG/DL (ref 70–99)
GLUCOSE BLDC GLUCOMTR-MCNC: 142 MG/DL (ref 70–99)
GLUCOSE BLDC GLUCOMTR-MCNC: 154 MG/DL (ref 70–99)
GLUCOSE SERPL-MCNC: 105 MG/DL (ref 70–99)
HCT VFR BLD AUTO: 25.9 % (ref 40–53)
HGB BLD-MCNC: 9.1 G/DL (ref 13.3–17.7)
MAGNESIUM SERPL-MCNC: 1.8 MG/DL (ref 1.6–2.3)
MCH RBC QN AUTO: 33.2 PG (ref 26.5–33)
MCHC RBC AUTO-ENTMCNC: 35.1 G/DL (ref 31.5–36.5)
MCV RBC AUTO: 95 FL (ref 78–100)
PHOSPHATE SERPL-MCNC: 2.1 MG/DL (ref 2.5–4.5)
PLATELET # BLD AUTO: 124 10E9/L (ref 150–450)
POTASSIUM SERPL-SCNC: 4.1 MMOL/L (ref 3.4–5.3)
RBC # BLD AUTO: 2.74 10E12/L (ref 4.4–5.9)
SODIUM SERPL-SCNC: 138 MMOL/L (ref 133–144)
WBC # BLD AUTO: 8.9 10E9/L (ref 4–11)

## 2018-05-28 PROCEDURE — 25000128 H RX IP 250 OP 636: Performed by: SURGERY

## 2018-05-28 PROCEDURE — 93010 ELECTROCARDIOGRAM REPORT: CPT | Performed by: INTERNAL MEDICINE

## 2018-05-28 PROCEDURE — 83735 ASSAY OF MAGNESIUM: CPT | Performed by: SURGERY

## 2018-05-28 PROCEDURE — A9270 NON-COVERED ITEM OR SERVICE: HCPCS | Mod: GY | Performed by: HOSPITALIST

## 2018-05-28 PROCEDURE — 99231 SBSQ HOSP IP/OBS SF/LOW 25: CPT | Performed by: SURGERY

## 2018-05-28 PROCEDURE — 97166 OT EVAL MOD COMPLEX 45 MIN: CPT | Mod: GO

## 2018-05-28 PROCEDURE — A9270 NON-COVERED ITEM OR SERVICE: HCPCS | Mod: GY | Performed by: SURGERY

## 2018-05-28 PROCEDURE — 93005 ELECTROCARDIOGRAM TRACING: CPT

## 2018-05-28 PROCEDURE — 40000916 ZZH STATISTIC SITTER, NIGHT HOURS

## 2018-05-28 PROCEDURE — 84100 ASSAY OF PHOSPHORUS: CPT | Performed by: SURGERY

## 2018-05-28 PROCEDURE — 97530 THERAPEUTIC ACTIVITIES: CPT | Mod: GO

## 2018-05-28 PROCEDURE — 25000128 H RX IP 250 OP 636: Performed by: INTERNAL MEDICINE

## 2018-05-28 PROCEDURE — 99222 1ST HOSP IP/OBS MODERATE 55: CPT | Performed by: INTERNAL MEDICINE

## 2018-05-28 PROCEDURE — 25000128 H RX IP 250 OP 636

## 2018-05-28 PROCEDURE — 25000132 ZZH RX MED GY IP 250 OP 250 PS 637: Mod: GY | Performed by: HOSPITALIST

## 2018-05-28 PROCEDURE — 40000133 ZZH STATISTIC OT WARD VISIT

## 2018-05-28 PROCEDURE — 85027 COMPLETE CBC AUTOMATED: CPT | Performed by: SURGERY

## 2018-05-28 PROCEDURE — 99233 SBSQ HOSP IP/OBS HIGH 50: CPT | Performed by: INTERNAL MEDICINE

## 2018-05-28 PROCEDURE — 00000146 ZZHCL STATISTIC GLUCOSE BY METER IP

## 2018-05-28 PROCEDURE — 36415 COLL VENOUS BLD VENIPUNCTURE: CPT | Performed by: SURGERY

## 2018-05-28 PROCEDURE — 25000125 ZZHC RX 250

## 2018-05-28 PROCEDURE — 25000132 ZZH RX MED GY IP 250 OP 250 PS 637: Mod: GY | Performed by: SURGERY

## 2018-05-28 PROCEDURE — 25000125 ZZHC RX 250: Performed by: INTERNAL MEDICINE

## 2018-05-28 PROCEDURE — 20000003 ZZH R&B ICU

## 2018-05-28 PROCEDURE — 40000915 ZZH STATISTIC SITTER, EVENING HOURS

## 2018-05-28 PROCEDURE — 25000132 ZZH RX MED GY IP 250 OP 250 PS 637: Mod: GY | Performed by: INTERNAL MEDICINE

## 2018-05-28 PROCEDURE — 80048 BASIC METABOLIC PNL TOTAL CA: CPT | Performed by: SURGERY

## 2018-05-28 PROCEDURE — A9270 NON-COVERED ITEM OR SERVICE: HCPCS | Mod: GY | Performed by: INTERNAL MEDICINE

## 2018-05-28 RX ORDER — POTASSIUM CHLORIDE 1.5 G/1.58G
20-40 POWDER, FOR SOLUTION ORAL
Status: DISCONTINUED | OUTPATIENT
Start: 2018-05-28 | End: 2018-06-04 | Stop reason: HOSPADM

## 2018-05-28 RX ORDER — MAGNESIUM SULFATE HEPTAHYDRATE 40 MG/ML
4 INJECTION, SOLUTION INTRAVENOUS EVERY 4 HOURS PRN
Status: DISCONTINUED | OUTPATIENT
Start: 2018-05-28 | End: 2018-06-04 | Stop reason: HOSPADM

## 2018-05-28 RX ORDER — ACETAMINOPHEN 650 MG/1
650 SUPPOSITORY RECTAL EVERY 4 HOURS PRN
Status: DISCONTINUED | OUTPATIENT
Start: 2018-05-28 | End: 2018-06-04 | Stop reason: HOSPADM

## 2018-05-28 RX ORDER — ACETAMINOPHEN 325 MG/1
650 TABLET ORAL EVERY 4 HOURS PRN
Status: DISCONTINUED | OUTPATIENT
Start: 2018-05-28 | End: 2018-06-04 | Stop reason: HOSPADM

## 2018-05-28 RX ORDER — POTASSIUM CHLORIDE 7.45 MG/ML
10 INJECTION INTRAVENOUS
Status: DISCONTINUED | OUTPATIENT
Start: 2018-05-28 | End: 2018-06-04 | Stop reason: HOSPADM

## 2018-05-28 RX ORDER — POTASSIUM CHLORIDE 29.8 MG/ML
20 INJECTION INTRAVENOUS
Status: DISCONTINUED | OUTPATIENT
Start: 2018-05-28 | End: 2018-06-04 | Stop reason: HOSPADM

## 2018-05-28 RX ORDER — DIGOXIN 0.25 MG/ML
125 INJECTION INTRAMUSCULAR; INTRAVENOUS ONCE
Status: DISCONTINUED | OUTPATIENT
Start: 2018-05-28 | End: 2018-05-29

## 2018-05-28 RX ORDER — POTASSIUM CL/LIDO/0.9 % NACL 10MEQ/0.1L
10 INTRAVENOUS SOLUTION, PIGGYBACK (ML) INTRAVENOUS
Status: DISCONTINUED | OUTPATIENT
Start: 2018-05-28 | End: 2018-06-04 | Stop reason: HOSPADM

## 2018-05-28 RX ORDER — METOPROLOL TARTRATE 1 MG/ML
INJECTION, SOLUTION INTRAVENOUS
Status: COMPLETED
Start: 2018-05-28 | End: 2018-05-28

## 2018-05-28 RX ORDER — METOPROLOL TARTRATE 1 MG/ML
5 INJECTION, SOLUTION INTRAVENOUS ONCE
Status: COMPLETED | OUTPATIENT
Start: 2018-05-28 | End: 2018-05-28

## 2018-05-28 RX ORDER — POTASSIUM CHLORIDE 1500 MG/1
20-40 TABLET, EXTENDED RELEASE ORAL
Status: DISCONTINUED | OUTPATIENT
Start: 2018-05-28 | End: 2018-06-04 | Stop reason: HOSPADM

## 2018-05-28 RX ADMIN — OXYCODONE HYDROCHLORIDE 5 MG: 5 TABLET ORAL at 21:42

## 2018-05-28 RX ADMIN — CEFAZOLIN SODIUM 1 G: 1 INJECTION, POWDER, FOR SOLUTION INTRAMUSCULAR; INTRAVENOUS at 09:36

## 2018-05-28 RX ADMIN — SENNOSIDES AND DOCUSATE SODIUM 1 TABLET: 8.6; 5 TABLET ORAL at 10:32

## 2018-05-28 RX ADMIN — SENNOSIDES AND DOCUSATE SODIUM 1 TABLET: 8.6; 5 TABLET ORAL at 21:39

## 2018-05-28 RX ADMIN — Medication 2 G: at 12:40

## 2018-05-28 RX ADMIN — SODIUM CHLORIDE 500 ML: 9 INJECTION, SOLUTION INTRAVENOUS at 06:30

## 2018-05-28 RX ADMIN — SODIUM CHLORIDE, POTASSIUM CHLORIDE, SODIUM LACTATE AND CALCIUM CHLORIDE 500 ML: 600; 310; 30; 20 INJECTION, SOLUTION INTRAVENOUS at 10:15

## 2018-05-28 RX ADMIN — CEFAZOLIN SODIUM 1 G: 1 INJECTION, POWDER, FOR SOLUTION INTRAMUSCULAR; INTRAVENOUS at 21:39

## 2018-05-28 RX ADMIN — HYDROMORPHONE HYDROCHLORIDE 0.2 MG: 1 INJECTION, SOLUTION INTRAMUSCULAR; INTRAVENOUS; SUBCUTANEOUS at 13:06

## 2018-05-28 RX ADMIN — OXYCODONE HYDROCHLORIDE 5 MG: 5 TABLET ORAL at 10:32

## 2018-05-28 RX ADMIN — METOPROLOL TARTRATE 5 MG: 5 INJECTION, SOLUTION INTRAVENOUS at 04:59

## 2018-05-28 RX ADMIN — OXYCODONE HYDROCHLORIDE 5 MG: 5 TABLET ORAL at 05:11

## 2018-05-28 RX ADMIN — FAMOTIDINE 20 MG: 20 TABLET ORAL at 21:39

## 2018-05-28 RX ADMIN — SIMVASTATIN 20 MG: 20 TABLET, FILM COATED ORAL at 21:42

## 2018-05-28 RX ADMIN — LIDOCAINE HYDROCHLORIDE 10 ML: 20 JELLY TOPICAL at 05:07

## 2018-05-28 RX ADMIN — AMIODARONE HYDROCHLORIDE 150 MG: 1.5 INJECTION, SOLUTION INTRAVENOUS at 05:28

## 2018-05-28 RX ADMIN — METOPROLOL TARTRATE 5 MG: 1 INJECTION, SOLUTION INTRAVENOUS at 04:59

## 2018-05-28 RX ADMIN — AMIODARONE HYDROCHLORIDE 1 MG/MIN: 50 INJECTION, SOLUTION INTRAVENOUS at 05:41

## 2018-05-28 RX ADMIN — AMIODARONE HYDROCHLORIDE 1 MG/MIN: 50 INJECTION, SOLUTION INTRAVENOUS at 09:58

## 2018-05-28 ASSESSMENT — ACTIVITIES OF DAILY LIVING (ADL): PREVIOUS_RESPONSIBILITIES: DRIVING

## 2018-05-28 NOTE — PROGRESS NOTES
Patient pulled govea catheter out with balloon inflated. Moderate amount of blood loss. Patient does not complain of pain.   Dr. Javier zamarripa. Orders to bladder scan in 2-3 hours. Plan to possibly reinsert govea at a later time if patient does not spontaneously void.

## 2018-05-28 NOTE — PLAN OF CARE
Problem: Patient Care Overview  Goal: Plan of Care/Patient Progress Review  PT: Cancel PT evaluation today, per discussion with IDT pt with limited tolerance for mobility and significant pain. OT will follow at this time for functional transfers and ADLs as able and PT to initiate when pt tolerating progressive mobility and gait training.

## 2018-05-28 NOTE — PROGRESS NOTES
GS note    Patient removed own govea catheter   Some bleeding, then oozing and clots reported  Discussed with urology on call and recommend  At this time to give iv abx and   Await clinical progression - ability to void or not; before determining the need for placing a new govea catheter
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
## 2018-05-28 NOTE — PROGRESS NOTES
Patient is disoriented to time and place but oriented to self. Patient has mild confusion and requires a sitter at bedside for reorientation as he pulled multiple IV's and his govea out with the balloon inflated. Patient bled throughout the night from his meatus and was unable to void. Surgeon on call paged and urology paged when bladder scan showed >400 ml.   Intensivist at bedside around 0530 when patient's HR elevated to 170 A fib with RVR. Intensivist orders to place coudet catheter. (see previous note).    Patient currently in A fib with rates . Blood pressure low and 500ml bolus of 0.9NS running to maintain MAP >65.   Patient able to tolerate PO meds without issues.    Hypoactive bowel sounds.   Patient complains of pain in his back and penis.  Report given to oncoming nurse.   Will continue with plan of care.

## 2018-05-28 NOTE — PROGRESS NOTES
General Surgeon on call notified of patient's A fib with RVR. Intensivist notified and at bedside. Coudet catheter placed, 5mg metoprolol given, and amiodarone bolus with standard drip initiated.     Will continue to monitor.

## 2018-05-28 NOTE — PROGRESS NOTES
Full consult dictated.  In brief, I was called due to Afib with RVR.  Pt is back in NSR.    Plan:  - Complete Amio load.  - Change Amio to PO tomorrow: Amiodarone 400 mg PO BID for a day, then decrease to 400 mg PO daily for 4 days, and then decrease to 200 mg PO daily.    Ankush Oviedo MD
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
## 2018-05-28 NOTE — PROGRESS NOTES
Pt developed Afib with RVR, rates 140-160.    EKG confirmed Afib with RVR. AM labs pending. Metoprolol 5mg IV x1, some improvement in rate but minimal change, started amiodarone load, mildly hypotensive so will give 500mL IVF.    Pt had pulled govea earlier in the night, blood from meatus and unable to void. Placed [place] catheter at bedside with return of pale urine, small amount of blood.
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
## 2018-05-29 ENCOUNTER — APPOINTMENT (OUTPATIENT)
Dept: SPEECH THERAPY | Facility: CLINIC | Age: 83
DRG: 963 | End: 2018-05-29
Payer: COMMERCIAL

## 2018-05-29 ENCOUNTER — APPOINTMENT (OUTPATIENT)
Dept: CT IMAGING | Facility: CLINIC | Age: 83
DRG: 963 | End: 2018-05-29
Attending: NURSE PRACTITIONER
Payer: COMMERCIAL

## 2018-05-29 LAB
ANION GAP SERPL CALCULATED.3IONS-SCNC: 8 MMOL/L (ref 3–14)
BUN SERPL-MCNC: 28 MG/DL (ref 7–30)
CALCIUM SERPL-MCNC: 7.3 MG/DL (ref 8.5–10.1)
CHLORIDE SERPL-SCNC: 109 MMOL/L (ref 94–109)
CO2 SERPL-SCNC: 19 MMOL/L (ref 20–32)
CREAT SERPL-MCNC: 1.7 MG/DL (ref 0.66–1.25)
ERYTHROCYTE [DISTWIDTH] IN BLOOD BY AUTOMATED COUNT: 12.4 % (ref 10–15)
GFR SERPL CREATININE-BSD FRML MDRD: 38 ML/MIN/1.7M2
GLUCOSE BLDC GLUCOMTR-MCNC: 117 MG/DL (ref 70–99)
GLUCOSE BLDC GLUCOMTR-MCNC: 132 MG/DL (ref 70–99)
GLUCOSE BLDC GLUCOMTR-MCNC: 132 MG/DL (ref 70–99)
GLUCOSE BLDC GLUCOMTR-MCNC: 174 MG/DL (ref 70–99)
GLUCOSE SERPL-MCNC: 112 MG/DL (ref 70–99)
HCT VFR BLD AUTO: 23.2 % (ref 40–53)
HGB BLD-MCNC: 8 G/DL (ref 13.3–17.7)
MAGNESIUM SERPL-MCNC: 2.1 MG/DL (ref 1.6–2.3)
MCH RBC QN AUTO: 32.7 PG (ref 26.5–33)
MCHC RBC AUTO-ENTMCNC: 34.5 G/DL (ref 31.5–36.5)
MCV RBC AUTO: 95 FL (ref 78–100)
PLATELET # BLD AUTO: 103 10E9/L (ref 150–450)
POTASSIUM SERPL-SCNC: 4 MMOL/L (ref 3.4–5.3)
RBC # BLD AUTO: 2.45 10E12/L (ref 4.4–5.9)
SODIUM SERPL-SCNC: 136 MMOL/L (ref 133–144)
WBC # BLD AUTO: 8.6 10E9/L (ref 4–11)

## 2018-05-29 PROCEDURE — A9270 NON-COVERED ITEM OR SERVICE: HCPCS | Mod: GY | Performed by: SURGERY

## 2018-05-29 PROCEDURE — 25000132 ZZH RX MED GY IP 250 OP 250 PS 637: Performed by: INTERNAL MEDICINE

## 2018-05-29 PROCEDURE — A9270 NON-COVERED ITEM OR SERVICE: HCPCS | Mod: GY | Performed by: NURSE PRACTITIONER

## 2018-05-29 PROCEDURE — A9270 NON-COVERED ITEM OR SERVICE: HCPCS | Mod: GY | Performed by: HOSPITALIST

## 2018-05-29 PROCEDURE — 72128 CT CHEST SPINE W/O DYE: CPT

## 2018-05-29 PROCEDURE — 20000003 ZZH R&B ICU

## 2018-05-29 PROCEDURE — 85027 COMPLETE CBC AUTOMATED: CPT | Performed by: INTERNAL MEDICINE

## 2018-05-29 PROCEDURE — 25000132 ZZH RX MED GY IP 250 OP 250 PS 637: Mod: GY | Performed by: PHYSICIAN ASSISTANT

## 2018-05-29 PROCEDURE — 00000146 ZZHCL STATISTIC GLUCOSE BY METER IP

## 2018-05-29 PROCEDURE — 99232 SBSQ HOSP IP/OBS MODERATE 35: CPT | Performed by: INTERNAL MEDICINE

## 2018-05-29 PROCEDURE — 25000132 ZZH RX MED GY IP 250 OP 250 PS 637: Performed by: HOSPITALIST

## 2018-05-29 PROCEDURE — A9270 NON-COVERED ITEM OR SERVICE: HCPCS | Mod: GY | Performed by: PHYSICIAN ASSISTANT

## 2018-05-29 PROCEDURE — A9270 NON-COVERED ITEM OR SERVICE: HCPCS | Mod: GY | Performed by: INTERNAL MEDICINE

## 2018-05-29 PROCEDURE — 83735 ASSAY OF MAGNESIUM: CPT | Performed by: INTERNAL MEDICINE

## 2018-05-29 PROCEDURE — 40000275 ZZH STATISTIC RCP TIME EA 10 MIN

## 2018-05-29 PROCEDURE — 40000225 ZZH STATISTIC SLP WARD VISIT: Performed by: SPEECH-LANGUAGE PATHOLOGIST

## 2018-05-29 PROCEDURE — 94799 UNLISTED PULMONARY SVC/PX: CPT

## 2018-05-29 PROCEDURE — 25000128 H RX IP 250 OP 636: Performed by: SURGERY

## 2018-05-29 PROCEDURE — 99233 SBSQ HOSP IP/OBS HIGH 50: CPT | Performed by: NURSE PRACTITIONER

## 2018-05-29 PROCEDURE — 25000132 ZZH RX MED GY IP 250 OP 250 PS 637: Mod: GY | Performed by: SURGERY

## 2018-05-29 PROCEDURE — 94150 VITAL CAPACITY TEST: CPT

## 2018-05-29 PROCEDURE — 99222 1ST HOSP IP/OBS MODERATE 55: CPT | Performed by: INTERNAL MEDICINE

## 2018-05-29 PROCEDURE — 36415 COLL VENOUS BLD VENIPUNCTURE: CPT | Performed by: INTERNAL MEDICINE

## 2018-05-29 PROCEDURE — 99233 SBSQ HOSP IP/OBS HIGH 50: CPT | Performed by: INTERNAL MEDICINE

## 2018-05-29 PROCEDURE — 80048 BASIC METABOLIC PNL TOTAL CA: CPT | Performed by: INTERNAL MEDICINE

## 2018-05-29 PROCEDURE — 25000132 ZZH RX MED GY IP 250 OP 250 PS 637: Performed by: NURSE PRACTITIONER

## 2018-05-29 PROCEDURE — 92526 ORAL FUNCTION THERAPY: CPT | Mod: GN | Performed by: SPEECH-LANGUAGE PATHOLOGIST

## 2018-05-29 RX ORDER — BISACODYL 10 MG
10 SUPPOSITORY, RECTAL RECTAL DAILY
Status: DISCONTINUED | OUTPATIENT
Start: 2018-05-30 | End: 2018-06-04 | Stop reason: HOSPADM

## 2018-05-29 RX ORDER — AMIODARONE HYDROCHLORIDE 200 MG/1
400 TABLET ORAL 2 TIMES DAILY
Status: COMPLETED | OUTPATIENT
Start: 2018-05-29 | End: 2018-05-29

## 2018-05-29 RX ORDER — METHOCARBAMOL 500 MG/1
500 TABLET, FILM COATED ORAL 3 TIMES DAILY
Status: DISCONTINUED | OUTPATIENT
Start: 2018-05-29 | End: 2018-06-04 | Stop reason: HOSPADM

## 2018-05-29 RX ORDER — MAGNESIUM SULFATE HEPTAHYDRATE 40 MG/ML
2 INJECTION, SOLUTION INTRAVENOUS DAILY PRN
Status: DISCONTINUED | OUTPATIENT
Start: 2018-05-29 | End: 2018-06-04 | Stop reason: HOSPADM

## 2018-05-29 RX ORDER — LIDOCAINE 4 G/G
1-3 PATCH TOPICAL
Status: DISCONTINUED | OUTPATIENT
Start: 2018-05-29 | End: 2018-06-04 | Stop reason: HOSPADM

## 2018-05-29 RX ORDER — ACETAMINOPHEN 325 MG/1
650 TABLET ORAL 4 TIMES DAILY
Status: DISCONTINUED | OUTPATIENT
Start: 2018-05-29 | End: 2018-06-04 | Stop reason: HOSPADM

## 2018-05-29 RX ORDER — AMIODARONE HYDROCHLORIDE 200 MG/1
200 TABLET ORAL DAILY
Status: DISCONTINUED | OUTPATIENT
Start: 2018-06-03 | End: 2018-06-04 | Stop reason: HOSPADM

## 2018-05-29 RX ORDER — POLYETHYLENE GLYCOL 3350 17 G/17G
17 POWDER, FOR SOLUTION ORAL 2 TIMES DAILY
Status: DISCONTINUED | OUTPATIENT
Start: 2018-05-29 | End: 2018-06-04 | Stop reason: HOSPADM

## 2018-05-29 RX ORDER — AMIODARONE HYDROCHLORIDE 200 MG/1
400 TABLET ORAL DAILY
Status: COMPLETED | OUTPATIENT
Start: 2018-05-30 | End: 2018-06-02

## 2018-05-29 RX ADMIN — ACETAMINOPHEN 650 MG: 325 TABLET ORAL at 09:55

## 2018-05-29 RX ADMIN — POLYETHYLENE GLYCOL 3350 17 G: 17 POWDER, FOR SOLUTION ORAL at 21:04

## 2018-05-29 RX ADMIN — LIDOCAINE 2 PATCH: 560 PATCH PERCUTANEOUS; TOPICAL; TRANSDERMAL at 11:36

## 2018-05-29 RX ADMIN — FAMOTIDINE 20 MG: 20 TABLET ORAL at 20:30

## 2018-05-29 RX ADMIN — SIMVASTATIN 20 MG: 20 TABLET, FILM COATED ORAL at 21:08

## 2018-05-29 RX ADMIN — SENNOSIDES AND DOCUSATE SODIUM 1 TABLET: 8.6; 5 TABLET ORAL at 21:04

## 2018-05-29 RX ADMIN — AMIODARONE HYDROCHLORIDE 400 MG: 200 TABLET ORAL at 20:30

## 2018-05-29 RX ADMIN — SENNOSIDES AND DOCUSATE SODIUM 1 TABLET: 8.6; 5 TABLET ORAL at 08:39

## 2018-05-29 RX ADMIN — OXYCODONE HYDROCHLORIDE 5 MG: 5 TABLET ORAL at 08:37

## 2018-05-29 RX ADMIN — OXYCODONE HYDROCHLORIDE 5 MG: 5 TABLET ORAL at 14:04

## 2018-05-29 RX ADMIN — OXYCODONE HYDROCHLORIDE 5 MG: 5 TABLET ORAL at 17:26

## 2018-05-29 RX ADMIN — ACETAMINOPHEN 650 MG: 325 TABLET ORAL at 21:05

## 2018-05-29 RX ADMIN — OXYCODONE HYDROCHLORIDE 5 MG: 5 TABLET ORAL at 05:15

## 2018-05-29 RX ADMIN — METHOCARBAMOL 500 MG: 500 TABLET ORAL at 17:26

## 2018-05-29 RX ADMIN — METHOCARBAMOL 500 MG: 500 TABLET ORAL at 09:55

## 2018-05-29 RX ADMIN — AMIODARONE HYDROCHLORIDE 400 MG: 200 TABLET ORAL at 09:55

## 2018-05-29 RX ADMIN — METHOCARBAMOL 500 MG: 500 TABLET ORAL at 21:05

## 2018-05-29 RX ADMIN — CEFAZOLIN SODIUM 1 G: 1 INJECTION, POWDER, FOR SOLUTION INTRAMUSCULAR; INTRAVENOUS at 08:37

## 2018-05-29 RX ADMIN — CEFAZOLIN SODIUM 1 G: 1 INJECTION, POWDER, FOR SOLUTION INTRAMUSCULAR; INTRAVENOUS at 20:27

## 2018-05-29 RX ADMIN — OXYCODONE HYDROCHLORIDE 5 MG: 5 TABLET ORAL at 21:06

## 2018-05-29 RX ADMIN — ACETAMINOPHEN 650 MG: 325 TABLET ORAL at 18:50

## 2018-05-29 RX ADMIN — AMLODIPINE BESYLATE 5 MG: 5 TABLET ORAL at 08:37

## 2018-05-29 RX ADMIN — ACETAMINOPHEN 650 MG: 325 TABLET ORAL at 14:05

## 2018-05-29 RX ADMIN — POLYETHYLENE GLYCOL 3350 17 G: 17 POWDER, FOR SOLUTION ORAL at 09:55

## 2018-05-29 RX ADMIN — HYDROMORPHONE HYDROCHLORIDE 0.2 MG: 1 INJECTION, SOLUTION INTRAMUSCULAR; INTRAVENOUS; SUBCUTANEOUS at 19:32

## 2018-05-29 RX ADMIN — MENTHOL 1 PATCH: 205.5 PATCH TOPICAL at 20:55

## 2018-05-29 ASSESSMENT — VISUAL ACUITY
OU: NOT TESTABLE

## 2018-05-29 NOTE — PROGRESS NOTES
"NIF/ VC assessment\"    NIF = -20   patient had difficulty keeping a tight seal around mouthpiece.    VC = 1.4L   Patient had difficulty with technique.    IS/ Acapella done     Discussed results with Glory Bergeron NP      "
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
## 2018-05-29 NOTE — PLAN OF CARE
Problem: Patient Care Overview  Goal: Plan of Care/Patient Progress Review    Discharge Planner SLP   Patient plan for discharge: Did not state  Current status: Swallow Tx was provided this pm. Diet textures are now regular per MD order.  Patient tolerated regular solids and thin liquids without overt signs of aspiration given min cues to use strategies.  No reflux symptoms are present.  Partial dentures are not present.  Recommend reminders to use precautions with continued soft food selections from a regular diet and thin liquids.  Precautions include: sit at 90 degrees, small bites/sips, slow rate of intake, alternate solids and liquids, up for 30 min after intake, one pill at a time.  Swallow Tx goal has been met.  No further swallow Tx is indicated at this time.  Decreased recall noted in conversation.  Note TCU recommended.  Plan to defer cognitive-linguistic eval and Tx to completion at TCU if deficits remain.   Barriers to return to prior living situation: Decreased recall, weakness, pain  Recommendations for discharge: TCU; SLP cognitive-linguistic eval and Tx  Rationale for recommendations: SLP cognitive-linguistic eval and Tx to maximize function for daily needs after discharge       Entered by: Heather Cochran 05/29/2018 3:04 PM     Speech Language Therapy Discharge Summary    Reason for therapy discharge:    All goals and outcomes met, no further needs identified. For swallowing    Progress towards therapy goal(s). See goals on Care Plan in Epic electronic health record for goal details.  Goals met for swallowing    Therapy recommendation(s):    Continued therapy is recommended.  Rationale/Recommendations:  See note above for SLP cognitive-linguistic services after discharge to TCU.

## 2018-05-29 NOTE — PROGRESS NOTES
Patient alert and disoriented to place. Afebrile. Pupils Equal and reactive.   Patient complains of pain in his back.  HR NSR 70's-80's. Amiodarone drip at 0.5  2L NC with clear lung sounds.  Active bowel sounds. Diabetic diet.  Barry in place. Urine output adequate.   Right IV DC'd due to infiltration of amiodarone. Pharmacy called. Site marked and hot pack applied. Amiodarone placed in left lower arm IV.  Report given to oncoming nurse. Will continue to monitor.

## 2018-05-29 NOTE — PLAN OF CARE
Problem: Patient Care Overview  Goal: Plan of Care/Patient Progress Review  Outcome: Improving  Pt A/O x 4. Pain improving with PO meds, lido patches in place x 2. Scrotal edema and bruising. Barry in place. Hypoactive BS, bowel meds received. Poor appetite, fluids encouraged. Palliative consult today. PT/OT following. Family at the bedside, updated. Will continue to monitor.
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
## 2018-05-29 NOTE — PROGRESS NOTES
Trauma Surgery Note    Pt stable in ICU. No new surgical concerns. Pt overall stable. Appreciate hospitalist management.     Armida Fraire MD  [facility]  [phone]
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
## 2018-05-29 NOTE — PLAN OF CARE
Problem: Patient Care Overview  Goal: Plan of Care/Patient Progress Review  Outcome: Improving  Pt in room air, shallow breathing noted after dilauded and placed on NC 2LPM for sats in high 80s. Fair U/O concentrated through govea, no blood in urine, afebrile, remains on amiodorone, LF bolus given, digitalis was held as pt. Self converted from HR in 110s from afib to sinus rythym, fairly alert and disoriented X1 at times, answers most questions and converses well with some overall confusion noted, pt. Does keep asking when he'll be leaving and does not understand situation and is mildly forgetful, pt's son and daughter visited, were updated and were supportive.
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
## 2018-05-30 ENCOUNTER — APPOINTMENT (OUTPATIENT)
Dept: OCCUPATIONAL THERAPY | Facility: CLINIC | Age: 83
DRG: 963 | End: 2018-05-30
Payer: COMMERCIAL

## 2018-05-30 ENCOUNTER — APPOINTMENT (OUTPATIENT)
Dept: GENERAL RADIOLOGY | Facility: CLINIC | Age: 83
DRG: 963 | End: 2018-05-30
Attending: NURSE PRACTITIONER
Payer: COMMERCIAL

## 2018-05-30 ENCOUNTER — APPOINTMENT (OUTPATIENT)
Dept: PHYSICAL THERAPY | Facility: CLINIC | Age: 83
DRG: 963 | End: 2018-05-30
Attending: INTERNAL MEDICINE
Payer: COMMERCIAL

## 2018-05-30 LAB
ABO + RH BLD: NORMAL
ABO + RH BLD: NORMAL
ANION GAP SERPL CALCULATED.3IONS-SCNC: 9 MMOL/L (ref 3–14)
BLD GP AB SCN SERPL QL: NORMAL
BLOOD BANK CMNT PATIENT-IMP: NORMAL
BUN SERPL-MCNC: 27 MG/DL (ref 7–30)
CALCIUM SERPL-MCNC: 7.4 MG/DL (ref 8.5–10.1)
CHLORIDE SERPL-SCNC: 108 MMOL/L (ref 94–109)
CO2 SERPL-SCNC: 18 MMOL/L (ref 20–32)
CREAT SERPL-MCNC: 1.57 MG/DL (ref 0.66–1.25)
ERYTHROCYTE [DISTWIDTH] IN BLOOD BY AUTOMATED COUNT: 12.6 % (ref 10–15)
GFR SERPL CREATININE-BSD FRML MDRD: 42 ML/MIN/1.7M2
GLUCOSE BLDC GLUCOMTR-MCNC: 120 MG/DL (ref 70–99)
GLUCOSE BLDC GLUCOMTR-MCNC: 128 MG/DL (ref 70–99)
GLUCOSE BLDC GLUCOMTR-MCNC: 137 MG/DL (ref 70–99)
GLUCOSE BLDC GLUCOMTR-MCNC: 148 MG/DL (ref 70–99)
GLUCOSE SERPL-MCNC: 100 MG/DL (ref 70–99)
HCT VFR BLD AUTO: 22.6 % (ref 40–53)
HGB BLD-MCNC: 8 G/DL (ref 13.3–17.7)
MAGNESIUM SERPL-MCNC: 1.9 MG/DL (ref 1.6–2.3)
MCH RBC QN AUTO: 33.2 PG (ref 26.5–33)
MCHC RBC AUTO-ENTMCNC: 35.4 G/DL (ref 31.5–36.5)
MCV RBC AUTO: 94 FL (ref 78–100)
PLATELET # BLD AUTO: 125 10E9/L (ref 150–450)
POTASSIUM SERPL-SCNC: 4 MMOL/L (ref 3.4–5.3)
RBC # BLD AUTO: 2.41 10E12/L (ref 4.4–5.9)
SODIUM SERPL-SCNC: 135 MMOL/L (ref 133–144)
SPECIMEN EXP DATE BLD: NORMAL
WBC # BLD AUTO: 8 10E9/L (ref 4–11)

## 2018-05-30 PROCEDURE — 71045 X-RAY EXAM CHEST 1 VIEW: CPT

## 2018-05-30 PROCEDURE — 40000133 ZZH STATISTIC OT WARD VISIT

## 2018-05-30 PROCEDURE — 36415 COLL VENOUS BLD VENIPUNCTURE: CPT | Performed by: INTERNAL MEDICINE

## 2018-05-30 PROCEDURE — 25000128 H RX IP 250 OP 636: Performed by: SURGERY

## 2018-05-30 PROCEDURE — 97530 THERAPEUTIC ACTIVITIES: CPT | Mod: GP | Performed by: PHYSICAL THERAPIST

## 2018-05-30 PROCEDURE — 25000132 ZZH RX MED GY IP 250 OP 250 PS 637: Performed by: NURSE PRACTITIONER

## 2018-05-30 PROCEDURE — 97110 THERAPEUTIC EXERCISES: CPT | Mod: GO

## 2018-05-30 PROCEDURE — A9270 NON-COVERED ITEM OR SERVICE: HCPCS | Mod: GY | Performed by: NURSE PRACTITIONER

## 2018-05-30 PROCEDURE — 83735 ASSAY OF MAGNESIUM: CPT | Performed by: INTERNAL MEDICINE

## 2018-05-30 PROCEDURE — 12000000 ZZH R&B MED SURG/OB

## 2018-05-30 PROCEDURE — A9270 NON-COVERED ITEM OR SERVICE: HCPCS | Mod: GY | Performed by: INTERNAL MEDICINE

## 2018-05-30 PROCEDURE — 25000132 ZZH RX MED GY IP 250 OP 250 PS 637: Mod: GY | Performed by: INTERNAL MEDICINE

## 2018-05-30 PROCEDURE — A9270 NON-COVERED ITEM OR SERVICE: HCPCS | Mod: GY | Performed by: HOSPITALIST

## 2018-05-30 PROCEDURE — A9270 NON-COVERED ITEM OR SERVICE: HCPCS | Mod: GY | Performed by: PHYSICIAN ASSISTANT

## 2018-05-30 PROCEDURE — 25000128 H RX IP 250 OP 636: Performed by: NURSE PRACTITIONER

## 2018-05-30 PROCEDURE — 40000193 ZZH STATISTIC PT WARD VISIT: Performed by: PHYSICAL THERAPIST

## 2018-05-30 PROCEDURE — 85027 COMPLETE CBC AUTOMATED: CPT | Performed by: INTERNAL MEDICINE

## 2018-05-30 PROCEDURE — 00000146 ZZHCL STATISTIC GLUCOSE BY METER IP

## 2018-05-30 PROCEDURE — 86850 RBC ANTIBODY SCREEN: CPT | Performed by: NURSE PRACTITIONER

## 2018-05-30 PROCEDURE — 86900 BLOOD TYPING SEROLOGIC ABO: CPT | Performed by: NURSE PRACTITIONER

## 2018-05-30 PROCEDURE — A9270 NON-COVERED ITEM OR SERVICE: HCPCS | Mod: GY | Performed by: SURGERY

## 2018-05-30 PROCEDURE — 25000132 ZZH RX MED GY IP 250 OP 250 PS 637: Performed by: HOSPITALIST

## 2018-05-30 PROCEDURE — 97116 GAIT TRAINING THERAPY: CPT | Mod: GP | Performed by: PHYSICAL THERAPIST

## 2018-05-30 PROCEDURE — 86901 BLOOD TYPING SEROLOGIC RH(D): CPT | Performed by: NURSE PRACTITIONER

## 2018-05-30 PROCEDURE — 25000132 ZZH RX MED GY IP 250 OP 250 PS 637: Performed by: SURGERY

## 2018-05-30 PROCEDURE — 99233 SBSQ HOSP IP/OBS HIGH 50: CPT | Performed by: INTERNAL MEDICINE

## 2018-05-30 PROCEDURE — 99232 SBSQ HOSP IP/OBS MODERATE 35: CPT | Performed by: INTERNAL MEDICINE

## 2018-05-30 PROCEDURE — 25000128 H RX IP 250 OP 636: Performed by: HOSPITALIST

## 2018-05-30 PROCEDURE — 25000132 ZZH RX MED GY IP 250 OP 250 PS 637: Performed by: INTERNAL MEDICINE

## 2018-05-30 PROCEDURE — 80048 BASIC METABOLIC PNL TOTAL CA: CPT | Performed by: INTERNAL MEDICINE

## 2018-05-30 PROCEDURE — 25000132 ZZH RX MED GY IP 250 OP 250 PS 637: Mod: GY | Performed by: PHYSICIAN ASSISTANT

## 2018-05-30 PROCEDURE — 97161 PT EVAL LOW COMPLEX 20 MIN: CPT | Mod: GP | Performed by: PHYSICAL THERAPIST

## 2018-05-30 RX ADMIN — ACETAMINOPHEN 650 MG: 325 TABLET ORAL at 18:34

## 2018-05-30 RX ADMIN — SODIUM CHLORIDE: 9 INJECTION, SOLUTION INTRAVENOUS at 07:45

## 2018-05-30 RX ADMIN — AMIODARONE HYDROCHLORIDE 400 MG: 200 TABLET ORAL at 08:00

## 2018-05-30 RX ADMIN — AMLODIPINE BESYLATE 5 MG: 5 TABLET ORAL at 08:00

## 2018-05-30 RX ADMIN — OXYCODONE HYDROCHLORIDE 5 MG: 5 TABLET ORAL at 11:25

## 2018-05-30 RX ADMIN — METHOCARBAMOL 500 MG: 500 TABLET ORAL at 16:52

## 2018-05-30 RX ADMIN — FAMOTIDINE 20 MG: 20 TABLET ORAL at 20:54

## 2018-05-30 RX ADMIN — MAGNESIUM SULFATE HEPTAHYDRATE 2 G: 40 INJECTION, SOLUTION INTRAVENOUS at 10:02

## 2018-05-30 RX ADMIN — POLYETHYLENE GLYCOL 3350 17 G: 17 POWDER, FOR SOLUTION ORAL at 20:54

## 2018-05-30 RX ADMIN — SODIUM CHLORIDE: 9 INJECTION, SOLUTION INTRAVENOUS at 22:34

## 2018-05-30 RX ADMIN — ACETAMINOPHEN 650 MG: 325 TABLET ORAL at 08:00

## 2018-05-30 RX ADMIN — SENNOSIDES AND DOCUSATE SODIUM 2 TABLET: 8.6; 5 TABLET ORAL at 20:55

## 2018-05-30 RX ADMIN — METHOCARBAMOL 500 MG: 500 TABLET ORAL at 21:00

## 2018-05-30 RX ADMIN — ACETAMINOPHEN 650 MG: 325 TABLET ORAL at 21:00

## 2018-05-30 RX ADMIN — METHOCARBAMOL 500 MG: 500 TABLET ORAL at 08:00

## 2018-05-30 RX ADMIN — OXYCODONE HYDROCHLORIDE 5 MG: 5 TABLET ORAL at 20:53

## 2018-05-30 RX ADMIN — OXYCODONE HYDROCHLORIDE 5 MG: 5 TABLET ORAL at 08:00

## 2018-05-30 RX ADMIN — LIDOCAINE 2 PATCH: 560 PATCH PERCUTANEOUS; TOPICAL; TRANSDERMAL at 08:02

## 2018-05-30 RX ADMIN — SIMVASTATIN 20 MG: 20 TABLET, FILM COATED ORAL at 21:00

## 2018-05-30 RX ADMIN — ACETAMINOPHEN 650 MG: 325 TABLET ORAL at 13:51

## 2018-05-30 RX ADMIN — SENNOSIDES AND DOCUSATE SODIUM 2 TABLET: 8.6; 5 TABLET ORAL at 11:25

## 2018-05-30 RX ADMIN — OXYCODONE HYDROCHLORIDE 5 MG: 5 TABLET ORAL at 16:52

## 2018-05-30 RX ADMIN — POLYETHYLENE GLYCOL 3350 17 G: 17 POWDER, FOR SOLUTION ORAL at 08:01

## 2018-05-30 RX ADMIN — CEFAZOLIN SODIUM 1 G: 1 INJECTION, POWDER, FOR SOLUTION INTRAMUSCULAR; INTRAVENOUS at 08:01

## 2018-05-30 ASSESSMENT — VISUAL ACUITY
OU: GLASSES;BASELINE
OU: NOT TESTABLE
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
## 2018-05-30 NOTE — PLAN OF CARE
Problem: Patient Care Overview  Goal: Plan of Care/Patient Progress Review  Outcome: Improving  Neuro-  PT up playing cards with friend.  Oriented to name and situation.  Pt having difficulty with time and place.  Responds appropriately.  Pt requiring less pain medication overnight.    CV-  NSR.  BP WNL afebrile   Resp-  Will desaturate without O2.  L/S are Clear.  GI/-  Urine output marginal this am.  Poor appetite.
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
## 2018-05-30 NOTE — PLAN OF CARE
Problem: Patient Care Overview  Goal: Plan of Care/Patient Progress Review  Discharge Planner OT   Patient plan for discharge: did not state  Current status: OT arrived to see pt just following PT session where pt had been up walking, however, pt did not recall having just been out of bed. OT will further assess cognitive function. Pt then participated in ex's in bed.   Barriers to return to prior living situation: current level of assist  Recommendations for discharge: ARU  Rationale for recommendations: Pt will benefit from continued daily therapies to advance safety and indep with ADLs and mobilities.        Entered by: Robin Smith 05/30/2018 5:23 PM

## 2018-05-30 NOTE — CONSULTS
"CLINICAL NUTRITION SERVICES  -  ASSESSMENT NOTE      Recommendations Ordered by Registered Dietitian (RD): Boost Glucose Control BID between meals    Malnutrition:  % Weight Loss:  None noted  % Intake:  </= 50% for >/= 1 month (severe malnutrition)  Subcutaneous Fat Loss:  None observed  Muscle Loss:  Temporal region mild depletion  Fluid Retention:  None noted    Malnutrition Diagnosis: Non-Severe malnutrition  In Context of:  Acute illness or injury        REASON FOR ASSESSMENT  Jose Gomez is a 89 year old male seen by Registered Dietitian for Rounds Referral (poor appetite)      NUTRITION HISTORY  - Information obtained from patient.  He states that he has had a poor appetite for \"a couple of months\" and during this time feels like he has been consuming about 50% of what he would normally eat.  Has been trying to eat more \"bland\" foods typically avoids sweets.  He has not been using oral nutritional supplements.        CURRENT NUTRITION ORDERS  Diet Order:     Moderate Consistent Carbohydrate     Current Intake/Tolerance:  Patient states that he had some toast and peaches this morning for breakfast.  \"I ate more of the peaches than the toast\".  Per flowsheets, took bites of breakfast.  He also noted that he had a few bites of chicken last night but it wasn't the \"bone in\" type that he typically likes.       PHYSICAL FINDINGS  Observed  Muscle Wasting - temporal (mild)  Obtained from Chart/Interdisciplinary Team  None noted    ANTHROPOMETRICS  Height: 5'7\"  Weight: 76.6 kg (169#)(5/30)  Body mass index is 26.45 kg/(m^2)  Weight Status:  Overweight BMI 25-29.9  IBW: 67.3 kg   % IBW: 114%  Weight History: Patient reports that his usual weight is around 170#  Wt Readings from Last 10 Encounters:   05/30/18 76.6 kg (168 lb 14 oz)   05/02/18 72.5 kg (159 lb 12.8 oz)   04/27/18 69.9 kg (154 lb)   03/10/18 74.4 kg (164 lb 1 oz)   08/07/15 68.8 kg (151 lb 9.6 oz)   08/01/15 72.6 kg (160 lb)   04/20/15 71.2 kg (157 " lb)   10/22/14 74.2 kg (163 lb 9.6 oz)   07/15/14 72.1 kg (159 lb)   06/28/14 70.9 kg (156 lb 6.4 oz)   Based on EPIC records, weight appears to be up overall       LABS  Labs reviewed    MEDICATIONS  Medications reviewed      ASSESSED NUTRITION NEEDS PER APPROVED PRACTICE GUIDELINES:    Dosing Weight 76.6 kg  Estimated Energy Needs: 5109-0329 kcals (25-30 Kcal/Kg)  Justification: maintenance  Estimated Protein Needs:  grams protein (1.2-1.5 g pro/Kg)  Justification: hypercatabolism with acute illness  Estimated Fluid Needs: 9901-7182 mL (1 mL/Kcal)  Justification: maintenance    MALNUTRITION:  % Weight Loss:  None noted  % Intake:  </= 50% for >/= 1 month (severe malnutrition)  Subcutaneous Fat Loss:  None observed  Muscle Loss:  Temporal region mild depletion  Fluid Retention:  None noted    Malnutrition Diagnosis: Non-Severe malnutrition  In Context of:  Acute illness or injury    NUTRITION DIAGNOSIS:  Inadequate oral intake related to poor appetite as evidenced by taking bites of food      NUTRITION INTERVENTIONS  Recommendations / Nutrition Prescription  Continue Moderate CHO diet as tolerated  Boost Glucose Control BID between meals     Implementation  Nutrition education: Per Provider order if indicated   Medical Food Supplement:  Ordered as above     Nutrition Goals  Intake will improve to 50% of meals and 50% of supplements     MONITORING AND EVALUATION:  Progress towards goals will be monitored and evaluated per protocol and Practice Guidelines    Vijaya Sherwood RD, LD, CNSC   Clinical Dietitian - [facility]

## 2018-05-31 ENCOUNTER — APPOINTMENT (OUTPATIENT)
Dept: PHYSICAL THERAPY | Facility: CLINIC | Age: 83
DRG: 963 | End: 2018-05-31
Payer: COMMERCIAL

## 2018-05-31 ENCOUNTER — APPOINTMENT (OUTPATIENT)
Dept: OCCUPATIONAL THERAPY | Facility: CLINIC | Age: 83
DRG: 963 | End: 2018-05-31
Payer: COMMERCIAL

## 2018-05-31 LAB
BLD PROD TYP BPU: NORMAL
BLD PROD TYP BPU: NORMAL
BLD UNIT ID BPU: 0
BLD UNIT ID BPU: 0
BLOOD PRODUCT CODE: NORMAL
BLOOD PRODUCT CODE: NORMAL
BPU ID: NORMAL
BPU ID: NORMAL
GLUCOSE BLDC GLUCOMTR-MCNC: 104 MG/DL (ref 70–99)
GLUCOSE BLDC GLUCOMTR-MCNC: 106 MG/DL (ref 70–99)
GLUCOSE BLDC GLUCOMTR-MCNC: 108 MG/DL (ref 70–99)
GLUCOSE BLDC GLUCOMTR-MCNC: 114 MG/DL (ref 70–99)
GLUCOSE BLDC GLUCOMTR-MCNC: 128 MG/DL (ref 70–99)
INTERPRETATION ECG - MUSE: NORMAL
INTERPRETATION ECG - MUSE: NORMAL
TRANSFUSION STATUS PATIENT QL: NORMAL

## 2018-05-31 PROCEDURE — 97116 GAIT TRAINING THERAPY: CPT | Mod: GP

## 2018-05-31 PROCEDURE — A9270 NON-COVERED ITEM OR SERVICE: HCPCS | Mod: GY | Performed by: INTERNAL MEDICINE

## 2018-05-31 PROCEDURE — 00000146 ZZHCL STATISTIC GLUCOSE BY METER IP

## 2018-05-31 PROCEDURE — 97535 SELF CARE MNGMENT TRAINING: CPT | Mod: GO

## 2018-05-31 PROCEDURE — A9270 NON-COVERED ITEM OR SERVICE: HCPCS | Mod: GY | Performed by: PHYSICIAN ASSISTANT

## 2018-05-31 PROCEDURE — 40000193 ZZH STATISTIC PT WARD VISIT

## 2018-05-31 PROCEDURE — A9270 NON-COVERED ITEM OR SERVICE: HCPCS | Mod: GY | Performed by: HOSPITALIST

## 2018-05-31 PROCEDURE — 97530 THERAPEUTIC ACTIVITIES: CPT | Mod: GO

## 2018-05-31 PROCEDURE — 25000132 ZZH RX MED GY IP 250 OP 250 PS 637: Mod: GY | Performed by: INTERNAL MEDICINE

## 2018-05-31 PROCEDURE — 25000132 ZZH RX MED GY IP 250 OP 250 PS 637: Performed by: SURGERY

## 2018-05-31 PROCEDURE — 25000132 ZZH RX MED GY IP 250 OP 250 PS 637: Performed by: NURSE PRACTITIONER

## 2018-05-31 PROCEDURE — 40000133 ZZH STATISTIC OT WARD VISIT

## 2018-05-31 PROCEDURE — A9270 NON-COVERED ITEM OR SERVICE: HCPCS | Mod: GY | Performed by: NURSE PRACTITIONER

## 2018-05-31 PROCEDURE — 25000132 ZZH RX MED GY IP 250 OP 250 PS 637: Performed by: PHYSICIAN ASSISTANT

## 2018-05-31 PROCEDURE — A9270 NON-COVERED ITEM OR SERVICE: HCPCS | Mod: GY | Performed by: SURGERY

## 2018-05-31 PROCEDURE — 12000000 ZZH R&B MED SURG/OB

## 2018-05-31 PROCEDURE — 97530 THERAPEUTIC ACTIVITIES: CPT | Mod: GP

## 2018-05-31 PROCEDURE — 25000132 ZZH RX MED GY IP 250 OP 250 PS 637: Performed by: HOSPITALIST

## 2018-05-31 PROCEDURE — 99233 SBSQ HOSP IP/OBS HIGH 50: CPT | Performed by: INTERNAL MEDICINE

## 2018-05-31 RX ADMIN — POLYETHYLENE GLYCOL 3350 17 G: 17 POWDER, FOR SOLUTION ORAL at 21:06

## 2018-05-31 RX ADMIN — AMIODARONE HYDROCHLORIDE 400 MG: 200 TABLET ORAL at 08:58

## 2018-05-31 RX ADMIN — ACETAMINOPHEN 650 MG: 325 TABLET ORAL at 08:58

## 2018-05-31 RX ADMIN — METHOCARBAMOL 500 MG: 500 TABLET ORAL at 21:06

## 2018-05-31 RX ADMIN — POLYETHYLENE GLYCOL 3350 17 G: 17 POWDER, FOR SOLUTION ORAL at 08:59

## 2018-05-31 RX ADMIN — METHOCARBAMOL 500 MG: 500 TABLET ORAL at 08:59

## 2018-05-31 RX ADMIN — ACETAMINOPHEN 650 MG: 325 TABLET ORAL at 21:06

## 2018-05-31 RX ADMIN — SENNOSIDES AND DOCUSATE SODIUM 2 TABLET: 8.6; 5 TABLET ORAL at 21:06

## 2018-05-31 RX ADMIN — SENNOSIDES AND DOCUSATE SODIUM 1 TABLET: 8.6; 5 TABLET ORAL at 08:58

## 2018-05-31 RX ADMIN — ACETAMINOPHEN 650 MG: 325 TABLET ORAL at 14:39

## 2018-05-31 RX ADMIN — METHOCARBAMOL 500 MG: 500 TABLET ORAL at 16:55

## 2018-05-31 RX ADMIN — OXYCODONE HYDROCHLORIDE 5 MG: 5 TABLET ORAL at 19:20

## 2018-05-31 RX ADMIN — LIDOCAINE 2 PATCH: 560 PATCH PERCUTANEOUS; TOPICAL; TRANSDERMAL at 09:06

## 2018-05-31 RX ADMIN — OXYCODONE HYDROCHLORIDE 5 MG: 5 TABLET ORAL at 08:58

## 2018-05-31 RX ADMIN — SIMVASTATIN 20 MG: 20 TABLET, FILM COATED ORAL at 21:06

## 2018-05-31 RX ADMIN — FAMOTIDINE 20 MG: 20 TABLET ORAL at 21:06

## 2018-05-31 RX ADMIN — OXYCODONE HYDROCHLORIDE 5 MG: 5 TABLET ORAL at 14:47

## 2018-05-31 RX ADMIN — ACETAMINOPHEN 650 MG: 325 TABLET ORAL at 18:15

## 2018-05-31 RX ADMIN — AMLODIPINE BESYLATE 5 MG: 5 TABLET ORAL at 08:58

## 2018-05-31 ASSESSMENT — ACTIVITIES OF DAILY LIVING (ADL)
ADLS_ACUITY_SCORE: 13
ADLS_ACUITY_SCORE: 13
ADLS_ACUITY_SCORE: 14
ADLS_ACUITY_SCORE: 13

## 2018-05-31 ASSESSMENT — VISUAL ACUITY
OU: GLASSES;BASELINE

## 2018-05-31 NOTE — PLAN OF CARE
"Problem: Patient Care Overview  Goal: Plan of Care/Patient Progress Review  Outcome: No Change  A&O x4, VSS on RA. CMS and neuros intact. Weakness in LLE due to pelvic fracture. Pain in L ribs and pelvis moderately managed with scheduled tylenol, robaxin, and PRN oxycodone 5mg. Pt worried about taking narcotics and becoming addicted, stated \"Isn't that habit forming?\", RN educated pt about taking pain medication when needed. 2 Lidocaine patches also in place on L ribcage. Significant penile and scrotal edema, govea in place and draining adequately. Passing gas, but pt unable to recall when he had his last BM. Bowel sounds active. Lung sounds are diminished but clear. Repositioned Q2 hours. HOB maintained 30-60 degrees. Seizure precaution pads in place. BGs were 104, 128, and 106. Pt reluctant to eat and worried about his sugars going to high, RN educated pt on importance of nutrition and eating to help the healing process. Consistent carb diet, poor appetite. Pt to DC to ARU in 1-2 days.       "

## 2018-05-31 NOTE — PLAN OF CARE
Problem: Diabetes Comorbidity  Goal: Diabetes  Patient comorbidity will be monitored for signs and symptoms of hyperglycemia or hypoglycemia. Problems will be absent, minimized or managed by discharge/transition of care.   Outcome: No Change  A&Ox4 with intermittent confusion. Up to the chair with a heavy assist of 2. On-going back, left hip and left rib cage pain, scheduled pain meds given as well as oxycodone prn. Poor appetite for breakfast and lunch, but is eating well for dinner. Vitals have been WNL. Transfer orders in for 73.
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
## 2018-05-31 NOTE — PROGRESS NOTES
CM    I:  SW received update patient is being recommended for ARU.  Per protocol, VENESSA placed ARU referral thru DOD for anticipated d/c 6/1-6/2. VENESSA also spoke w/ARU liaison about this patient who will begin to review.    P:  Continue to assist with discharge planning as needed.    JAMES Tejeda    UPDATE@[id number]:  VENESSA received call from [facility] liaison asking if the auto claim form patient had been given has been completed, as they need this to proceed with authorization.  Patient states he is not aware of this form.  SW placed call to patient's daughter, Armida, left  asking for call back to clarify this as well as to discuss families availability during the day once patient completes ARU.  Awaiting call back from daughter.
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
## 2018-05-31 NOTE — PLAN OF CARE
Problem: Patient Care Overview  Goal: Plan of Care/Patient Progress Review  Discharge Planner OT   Patient plan for discharge: did not state  Current status: Pt now on floor/73, alert and oriented to self, , month, year, place and reason for hospitalization. Follows 1-step commands 100% of the time. However, noted significantly impaired STM/recall. Did not recall having been out of bed and attempting stand as yet (yesterday in ICU), and repeatedly asked therapist what was on food tray not recalling response after even 1-2 minutes. OT to further assess cognition. Pt peformed supine to sit with Mod A of 2, sit to supine Max A of 2. He is able to maintain static sitting balance. Performed sit to stand with Mod A of 2, cited L hip pain at 7/10 with standing but able to shift weight R<>L with Min A of 2 for balance. Attempted steps forward however L knee buckling and pt took 1 small step only before requesting to sit back down. He did then take small side steps to R toward HOB using walker with Min A of 2 and able to perform controlled stand to sit with Min A of 2. Once in bed positioned him in upright sit for noon meal. Pt able to feed himself with set-up, no spilling noted.   Barriers to return to prior living situation: current level of assist  Recommendations for discharge: ARU  Rationale for recommendations: pt will benefit from daily therapies to advance mobility and ADLs       Entered by: Robin Smith 2018 12:54 PM

## 2018-05-31 NOTE — PROGRESS NOTES
Pt is alert and oriented.  Complains of Rt rib pain 7/10.  Pain meds given.  CV-  HR and BP WNL  resp- 2L nc with SpO2 >90%  GI/-  Barry in with good output    Report called to station 73.    Family notified of transfer.
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
## 2018-05-31 NOTE — PLAN OF CARE
Problem: Patient Care Overview  Goal: Plan of Care/Patient Progress Review  Discharge Planner PT   Patient plan for discharge: None stated  Current status: Pt is limited by increased pain with all mobility. Pt is mod assist of 2 for supine to sit, sit to supine. Max assist of 2 for sit to stand. Mod assist of 2 with walker for stepping activities, L knee buckling with weight bearing.  Barriers to return to prior living situation: Current functional status, increased pain, decreased independence with mobility tasks.  Recommendations for discharge: ARU  Rationale for recommendations: Pt was independent prior to admission, would benefit from cont inpt PT to optimize functional recovery and overall independence.       Entered by: Leeann Traore 05/31/2018 3:59 PM

## 2018-06-01 ENCOUNTER — APPOINTMENT (OUTPATIENT)
Dept: PHYSICAL THERAPY | Facility: CLINIC | Age: 83
DRG: 963 | End: 2018-06-01
Payer: COMMERCIAL

## 2018-06-01 LAB
ANION GAP SERPL CALCULATED.3IONS-SCNC: 10 MMOL/L (ref 3–14)
BUN SERPL-MCNC: 20 MG/DL (ref 7–30)
CALCIUM SERPL-MCNC: 7.8 MG/DL (ref 8.5–10.1)
CHLORIDE SERPL-SCNC: 106 MMOL/L (ref 94–109)
CO2 SERPL-SCNC: 18 MMOL/L (ref 20–32)
CREAT SERPL-MCNC: 1.41 MG/DL (ref 0.66–1.25)
ERYTHROCYTE [DISTWIDTH] IN BLOOD BY AUTOMATED COUNT: 12.6 % (ref 10–15)
GFR SERPL CREATININE-BSD FRML MDRD: 47 ML/MIN/1.7M2
GLUCOSE BLDC GLUCOMTR-MCNC: 102 MG/DL (ref 70–99)
GLUCOSE BLDC GLUCOMTR-MCNC: 122 MG/DL (ref 70–99)
GLUCOSE BLDC GLUCOMTR-MCNC: 153 MG/DL (ref 70–99)
GLUCOSE BLDC GLUCOMTR-MCNC: 95 MG/DL (ref 70–99)
GLUCOSE BLDC GLUCOMTR-MCNC: 98 MG/DL (ref 70–99)
GLUCOSE SERPL-MCNC: 81 MG/DL (ref 70–99)
HCT VFR BLD AUTO: 23.4 % (ref 40–53)
HGB BLD-MCNC: 8.1 G/DL (ref 13.3–17.7)
MAGNESIUM SERPL-MCNC: 2 MG/DL (ref 1.6–2.3)
MCH RBC QN AUTO: 32.5 PG (ref 26.5–33)
MCHC RBC AUTO-ENTMCNC: 34.6 G/DL (ref 31.5–36.5)
MCV RBC AUTO: 94 FL (ref 78–100)
PLATELET # BLD AUTO: 188 10E9/L (ref 150–450)
POTASSIUM SERPL-SCNC: 4.2 MMOL/L (ref 3.4–5.3)
RBC # BLD AUTO: 2.49 10E12/L (ref 4.4–5.9)
SODIUM SERPL-SCNC: 134 MMOL/L (ref 133–144)
WBC # BLD AUTO: 6.5 10E9/L (ref 4–11)

## 2018-06-01 PROCEDURE — 25000132 ZZH RX MED GY IP 250 OP 250 PS 637: Performed by: INTERNAL MEDICINE

## 2018-06-01 PROCEDURE — A9270 NON-COVERED ITEM OR SERVICE: HCPCS | Mod: GY | Performed by: INTERNAL MEDICINE

## 2018-06-01 PROCEDURE — 00000146 ZZHCL STATISTIC GLUCOSE BY METER IP

## 2018-06-01 PROCEDURE — 25000132 ZZH RX MED GY IP 250 OP 250 PS 637: Mod: GY | Performed by: HOSPITALIST

## 2018-06-01 PROCEDURE — 25000128 H RX IP 250 OP 636: Performed by: NURSE PRACTITIONER

## 2018-06-01 PROCEDURE — 99233 SBSQ HOSP IP/OBS HIGH 50: CPT | Performed by: INTERNAL MEDICINE

## 2018-06-01 PROCEDURE — 12000000 ZZH R&B MED SURG/OB

## 2018-06-01 PROCEDURE — A9270 NON-COVERED ITEM OR SERVICE: HCPCS | Mod: GY | Performed by: SURGERY

## 2018-06-01 PROCEDURE — 40000193 ZZH STATISTIC PT WARD VISIT: Performed by: PHYSICAL THERAPIST

## 2018-06-01 PROCEDURE — A9270 NON-COVERED ITEM OR SERVICE: HCPCS | Mod: GY | Performed by: NURSE PRACTITIONER

## 2018-06-01 PROCEDURE — A9270 NON-COVERED ITEM OR SERVICE: HCPCS | Mod: GY | Performed by: HOSPITALIST

## 2018-06-01 PROCEDURE — A9270 NON-COVERED ITEM OR SERVICE: HCPCS | Mod: GY | Performed by: PHYSICIAN ASSISTANT

## 2018-06-01 PROCEDURE — 83735 ASSAY OF MAGNESIUM: CPT | Performed by: INTERNAL MEDICINE

## 2018-06-01 PROCEDURE — 80048 BASIC METABOLIC PNL TOTAL CA: CPT | Performed by: INTERNAL MEDICINE

## 2018-06-01 PROCEDURE — 25000132 ZZH RX MED GY IP 250 OP 250 PS 637: Mod: GY | Performed by: PHYSICIAN ASSISTANT

## 2018-06-01 PROCEDURE — 25000132 ZZH RX MED GY IP 250 OP 250 PS 637: Performed by: SURGERY

## 2018-06-01 PROCEDURE — 97530 THERAPEUTIC ACTIVITIES: CPT | Mod: GP | Performed by: PHYSICAL THERAPIST

## 2018-06-01 PROCEDURE — 25000132 ZZH RX MED GY IP 250 OP 250 PS 637: Performed by: NURSE PRACTITIONER

## 2018-06-01 PROCEDURE — 25000132 ZZH RX MED GY IP 250 OP 250 PS 637: Mod: GY | Performed by: INTERNAL MEDICINE

## 2018-06-01 PROCEDURE — 85027 COMPLETE CBC AUTOMATED: CPT | Performed by: INTERNAL MEDICINE

## 2018-06-01 PROCEDURE — 36415 COLL VENOUS BLD VENIPUNCTURE: CPT | Performed by: INTERNAL MEDICINE

## 2018-06-01 PROCEDURE — 99207 ZZC CDG-MDM COMPONENT: MEETS MODERATE - UP CODED: CPT | Performed by: INTERNAL MEDICINE

## 2018-06-01 RX ADMIN — OXYCODONE HYDROCHLORIDE 5 MG: 5 TABLET ORAL at 12:53

## 2018-06-01 RX ADMIN — ACETAMINOPHEN 650 MG: 325 TABLET ORAL at 18:54

## 2018-06-01 RX ADMIN — OXYCODONE HYDROCHLORIDE 5 MG: 5 TABLET ORAL at 04:10

## 2018-06-01 RX ADMIN — METHOCARBAMOL 500 MG: 500 TABLET ORAL at 15:52

## 2018-06-01 RX ADMIN — ACETAMINOPHEN 650 MG: 325 TABLET ORAL at 08:48

## 2018-06-01 RX ADMIN — SODIUM CHLORIDE: 9 INJECTION, SOLUTION INTRAVENOUS at 12:57

## 2018-06-01 RX ADMIN — LIDOCAINE 1 PATCH: 560 PATCH PERCUTANEOUS; TOPICAL; TRANSDERMAL at 09:15

## 2018-06-01 RX ADMIN — POLYETHYLENE GLYCOL 3350 17 G: 17 POWDER, FOR SOLUTION ORAL at 09:15

## 2018-06-01 RX ADMIN — OXYCODONE HYDROCHLORIDE 5 MG: 5 TABLET ORAL at 15:52

## 2018-06-01 RX ADMIN — AMIODARONE HYDROCHLORIDE 400 MG: 200 TABLET ORAL at 09:14

## 2018-06-01 RX ADMIN — OXYCODONE HYDROCHLORIDE 5 MG: 5 TABLET ORAL at 18:55

## 2018-06-01 RX ADMIN — OXYCODONE HYDROCHLORIDE 5 MG: 5 TABLET ORAL at 08:48

## 2018-06-01 RX ADMIN — SENNOSIDES AND DOCUSATE SODIUM 2 TABLET: 8.6; 5 TABLET ORAL at 09:14

## 2018-06-01 RX ADMIN — OXYCODONE HYDROCHLORIDE 5 MG: 5 TABLET ORAL at 23:03

## 2018-06-01 RX ADMIN — METHOCARBAMOL 500 MG: 500 TABLET ORAL at 09:14

## 2018-06-01 RX ADMIN — SENNOSIDES AND DOCUSATE SODIUM 2 TABLET: 8.6; 5 TABLET ORAL at 21:39

## 2018-06-01 RX ADMIN — ACETAMINOPHEN 650 MG: 325 TABLET ORAL at 21:39

## 2018-06-01 RX ADMIN — AMLODIPINE BESYLATE 5 MG: 5 TABLET ORAL at 09:14

## 2018-06-01 RX ADMIN — MENTHOL 1 PATCH: 205.5 PATCH TOPICAL at 22:27

## 2018-06-01 RX ADMIN — ACETAMINOPHEN 650 MG: 325 TABLET ORAL at 12:52

## 2018-06-01 RX ADMIN — FAMOTIDINE 20 MG: 20 TABLET ORAL at 21:39

## 2018-06-01 RX ADMIN — SIMVASTATIN 20 MG: 20 TABLET, FILM COATED ORAL at 21:39

## 2018-06-01 RX ADMIN — POLYETHYLENE GLYCOL 3350 17 G: 17 POWDER, FOR SOLUTION ORAL at 21:38

## 2018-06-01 RX ADMIN — METHOCARBAMOL 500 MG: 500 TABLET ORAL at 21:39

## 2018-06-01 ASSESSMENT — VISUAL ACUITY
OU: GLASSES;BASELINE

## 2018-06-01 ASSESSMENT — ACTIVITIES OF DAILY LIVING (ADL)
ADLS_ACUITY_SCORE: 14
ADLS_ACUITY_SCORE: 14
ADLS_ACUITY_SCORE: 16
ADLS_ACUITY_SCORE: 14
ADLS_ACUITY_SCORE: 14
ADLS_ACUITY_SCORE: 16
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
## 2018-06-01 NOTE — PROGRESS NOTES
SPIRITUAL HEALTH SERVICES Progress Note  [facility]    Visited pt per length of stay to introduce SH services. Pt's daughter was visiting and was about to leave. Pt declined at this time. SH has no plans to follow but is available upon request or need.      Carole Vincent  Chaplain Resident  Pager: [phone]  Office: [phone]
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
## 2018-06-01 NOTE — PLAN OF CARE
Problem: Patient Care Overview  Goal: Plan of Care/Patient Progress Review  OT: Attempted to see pt for OT treatment, pt declined at this time reporting high back pain levels, pt asked therapist to return later. Nursing notified.
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
## 2018-06-01 NOTE — PLAN OF CARE
Problem: Patient Care Overview  Goal: Plan of Care/Patient Progress Review  Outcome: Improving  Pt is A&Ox4, forgetful. VVS, has BP parameters 160 & below, O2 1L NC. Tele=NSR. Pain controlled with oxycodone & tylenol. Up x2 with lift, has LLE weakness, neuros intact. Has not had BM, pt refused 0900 suppository, wants to wait after having senna & miralax. Barry continued per urologist PA, edema & bruising in scrotum. HOB up greater than 30 degrees. Possible D/C to ARC pending available bed.

## 2018-06-01 NOTE — PLAN OF CARE
Problem: Patient Care Overview  Goal: Plan of Care/Patient Progress Review  Discharge Planner PT   Patient plan for discharge: Pt agreeable to ARC.   Current status: Pt in supine upon arrival of therapist with report of pain: 6/10. Pt performed supine-sit with Ruben of 2. Pt tolerated standing and ambulating a couple steps forward/backwards with FWW and Ruben of 2, noted short shuffling gait pattern. Pt continues to have difficulty weight shifting onto L LE.   Barriers to return to prior living situation: Level of assist, Pain, Decreased activity tolerance  Recommendations for discharge: ARC  Rationale for recommendations: Pt was independent prior to admission and would benefit from intensive therapy in order to return to prior level of function.        Entered by: Breanne London 06/01/2018 2:33 PM
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
## 2018-06-02 ENCOUNTER — APPOINTMENT (OUTPATIENT)
Dept: OCCUPATIONAL THERAPY | Facility: CLINIC | Age: 83
DRG: 963 | End: 2018-06-02
Payer: COMMERCIAL

## 2018-06-02 ENCOUNTER — APPOINTMENT (OUTPATIENT)
Dept: PHYSICAL THERAPY | Facility: CLINIC | Age: 83
DRG: 963 | End: 2018-06-02
Payer: COMMERCIAL

## 2018-06-02 LAB
ANION GAP SERPL CALCULATED.3IONS-SCNC: 8 MMOL/L (ref 3–14)
BUN SERPL-MCNC: 20 MG/DL (ref 7–30)
CALCIUM SERPL-MCNC: 7.9 MG/DL (ref 8.5–10.1)
CHLORIDE SERPL-SCNC: 107 MMOL/L (ref 94–109)
CO2 SERPL-SCNC: 21 MMOL/L (ref 20–32)
CREAT SERPL-MCNC: 1.42 MG/DL (ref 0.66–1.25)
ERYTHROCYTE [DISTWIDTH] IN BLOOD BY AUTOMATED COUNT: 12.8 % (ref 10–15)
GFR SERPL CREATININE-BSD FRML MDRD: 47 ML/MIN/1.7M2
GLUCOSE BLDC GLUCOMTR-MCNC: 107 MG/DL (ref 70–99)
GLUCOSE BLDC GLUCOMTR-MCNC: 124 MG/DL (ref 70–99)
GLUCOSE BLDC GLUCOMTR-MCNC: 145 MG/DL (ref 70–99)
GLUCOSE BLDC GLUCOMTR-MCNC: 91 MG/DL (ref 70–99)
GLUCOSE BLDC GLUCOMTR-MCNC: 98 MG/DL (ref 70–99)
GLUCOSE SERPL-MCNC: 91 MG/DL (ref 70–99)
HCT VFR BLD AUTO: 23.4 % (ref 40–53)
HGB BLD-MCNC: 8.1 G/DL (ref 13.3–17.7)
MAGNESIUM SERPL-MCNC: 1.9 MG/DL (ref 1.6–2.3)
MCH RBC QN AUTO: 32.7 PG (ref 26.5–33)
MCHC RBC AUTO-ENTMCNC: 34.6 G/DL (ref 31.5–36.5)
MCV RBC AUTO: 94 FL (ref 78–100)
PLATELET # BLD AUTO: 201 10E9/L (ref 150–450)
POTASSIUM SERPL-SCNC: 4.3 MMOL/L (ref 3.4–5.3)
RBC # BLD AUTO: 2.48 10E12/L (ref 4.4–5.9)
SODIUM SERPL-SCNC: 136 MMOL/L (ref 133–144)
WBC # BLD AUTO: 5.8 10E9/L (ref 4–11)

## 2018-06-02 PROCEDURE — 97110 THERAPEUTIC EXERCISES: CPT | Mod: GO | Performed by: OCCUPATIONAL THERAPIST

## 2018-06-02 PROCEDURE — 12000000 ZZH R&B MED SURG/OB

## 2018-06-02 PROCEDURE — 25000132 ZZH RX MED GY IP 250 OP 250 PS 637: Performed by: NURSE PRACTITIONER

## 2018-06-02 PROCEDURE — 25000132 ZZH RX MED GY IP 250 OP 250 PS 637: Performed by: PHYSICIAN ASSISTANT

## 2018-06-02 PROCEDURE — 25000132 ZZH RX MED GY IP 250 OP 250 PS 637: Mod: GY | Performed by: HOSPITALIST

## 2018-06-02 PROCEDURE — A9270 NON-COVERED ITEM OR SERVICE: HCPCS | Mod: GY | Performed by: INTERNAL MEDICINE

## 2018-06-02 PROCEDURE — 25000132 ZZH RX MED GY IP 250 OP 250 PS 637: Performed by: SURGERY

## 2018-06-02 PROCEDURE — A9270 NON-COVERED ITEM OR SERVICE: HCPCS | Mod: GY | Performed by: NURSE PRACTITIONER

## 2018-06-02 PROCEDURE — 36415 COLL VENOUS BLD VENIPUNCTURE: CPT | Performed by: INTERNAL MEDICINE

## 2018-06-02 PROCEDURE — 40000193 ZZH STATISTIC PT WARD VISIT: Performed by: PHYSICAL THERAPY ASSISTANT

## 2018-06-02 PROCEDURE — 40000133 ZZH STATISTIC OT WARD VISIT: Performed by: OCCUPATIONAL THERAPIST

## 2018-06-02 PROCEDURE — 25000128 H RX IP 250 OP 636: Performed by: SURGERY

## 2018-06-02 PROCEDURE — 99232 SBSQ HOSP IP/OBS MODERATE 35: CPT | Performed by: HOSPITALIST

## 2018-06-02 PROCEDURE — A9270 NON-COVERED ITEM OR SERVICE: HCPCS | Mod: GY | Performed by: HOSPITALIST

## 2018-06-02 PROCEDURE — 00000146 ZZHCL STATISTIC GLUCOSE BY METER IP

## 2018-06-02 PROCEDURE — 25000132 ZZH RX MED GY IP 250 OP 250 PS 637: Mod: GY | Performed by: INTERNAL MEDICINE

## 2018-06-02 PROCEDURE — 85027 COMPLETE CBC AUTOMATED: CPT | Performed by: INTERNAL MEDICINE

## 2018-06-02 PROCEDURE — 80048 BASIC METABOLIC PNL TOTAL CA: CPT | Performed by: INTERNAL MEDICINE

## 2018-06-02 PROCEDURE — 97530 THERAPEUTIC ACTIVITIES: CPT | Mod: GP | Performed by: PHYSICAL THERAPY ASSISTANT

## 2018-06-02 PROCEDURE — A9270 NON-COVERED ITEM OR SERVICE: HCPCS | Mod: GY | Performed by: SURGERY

## 2018-06-02 PROCEDURE — A9270 NON-COVERED ITEM OR SERVICE: HCPCS | Mod: GY | Performed by: PHYSICIAN ASSISTANT

## 2018-06-02 PROCEDURE — 83735 ASSAY OF MAGNESIUM: CPT | Performed by: INTERNAL MEDICINE

## 2018-06-02 RX ADMIN — METHOCARBAMOL 500 MG: 500 TABLET ORAL at 18:02

## 2018-06-02 RX ADMIN — HYDROMORPHONE HYDROCHLORIDE 0.2 MG: 1 INJECTION, SOLUTION INTRAMUSCULAR; INTRAVENOUS; SUBCUTANEOUS at 14:02

## 2018-06-02 RX ADMIN — OXYCODONE HYDROCHLORIDE 5 MG: 5 TABLET ORAL at 18:01

## 2018-06-02 RX ADMIN — AMIODARONE HYDROCHLORIDE 400 MG: 200 TABLET ORAL at 09:14

## 2018-06-02 RX ADMIN — SIMVASTATIN 20 MG: 20 TABLET, FILM COATED ORAL at 22:24

## 2018-06-02 RX ADMIN — HYDROMORPHONE HYDROCHLORIDE 0.2 MG: 1 INJECTION, SOLUTION INTRAMUSCULAR; INTRAVENOUS; SUBCUTANEOUS at 10:04

## 2018-06-02 RX ADMIN — METHOCARBAMOL 500 MG: 500 TABLET ORAL at 09:15

## 2018-06-02 RX ADMIN — MENTHOL 1 PATCH: 205.5 PATCH TOPICAL at 22:25

## 2018-06-02 RX ADMIN — POLYETHYLENE GLYCOL 3350 17 G: 17 POWDER, FOR SOLUTION ORAL at 22:23

## 2018-06-02 RX ADMIN — OXYCODONE HYDROCHLORIDE 5 MG: 5 TABLET ORAL at 11:52

## 2018-06-02 RX ADMIN — BISACODYL 10 MG: 10 SUPPOSITORY RECTAL at 10:42

## 2018-06-02 RX ADMIN — FAMOTIDINE 20 MG: 20 TABLET ORAL at 22:24

## 2018-06-02 RX ADMIN — OXYCODONE HYDROCHLORIDE 5 MG: 5 TABLET ORAL at 22:24

## 2018-06-02 RX ADMIN — AMLODIPINE BESYLATE 5 MG: 5 TABLET ORAL at 09:15

## 2018-06-02 RX ADMIN — SENNOSIDES AND DOCUSATE SODIUM 1 TABLET: 8.6; 5 TABLET ORAL at 22:23

## 2018-06-02 RX ADMIN — OXYCODONE HYDROCHLORIDE 5 MG: 5 TABLET ORAL at 07:40

## 2018-06-02 RX ADMIN — LIDOCAINE 1 PATCH: 560 PATCH PERCUTANEOUS; TOPICAL; TRANSDERMAL at 09:15

## 2018-06-02 RX ADMIN — OXYCODONE HYDROCHLORIDE 5 MG: 5 TABLET ORAL at 14:48

## 2018-06-02 RX ADMIN — POLYETHYLENE GLYCOL 3350 17 G: 17 POWDER, FOR SOLUTION ORAL at 09:15

## 2018-06-02 RX ADMIN — ACETAMINOPHEN 650 MG: 325 TABLET ORAL at 18:02

## 2018-06-02 RX ADMIN — SENNOSIDES AND DOCUSATE SODIUM 2 TABLET: 8.6; 5 TABLET ORAL at 09:14

## 2018-06-02 RX ADMIN — ACETAMINOPHEN 650 MG: 325 TABLET ORAL at 09:15

## 2018-06-02 RX ADMIN — ACETAMINOPHEN 650 MG: 325 TABLET ORAL at 22:23

## 2018-06-02 RX ADMIN — METHOCARBAMOL 500 MG: 500 TABLET ORAL at 22:23

## 2018-06-02 RX ADMIN — ACETAMINOPHEN 650 MG: 325 TABLET ORAL at 14:01

## 2018-06-02 ASSESSMENT — VISUAL ACUITY
OU: GLASSES;BASELINE

## 2018-06-02 ASSESSMENT — ACTIVITIES OF DAILY LIVING (ADL)
ADLS_ACUITY_SCORE: 16
ADLS_ACUITY_SCORE: 13
ADLS_ACUITY_SCORE: 14
ADLS_ACUITY_SCORE: 13
ADLS_ACUITY_SCORE: 13
ADLS_ACUITY_SCORE: 14
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
## 2018-06-02 NOTE — PLAN OF CARE
Problem: Stroke (Hemorrhagic) (Adult)  Goal: Signs and Symptoms of Listed Potential Problems Will be Absent, Minimized or Managed (Stroke)  Signs and symptoms of listed potential problems will be absent, minimized or managed by discharge/transition of care (reference Stroke (Hemorrhagic) (Adult) CPG).   VSS.  A/O, forgetful.  Scattered bruises, one large one on left forehead area.  Cont to have pain with movement.  Pt does not ask for pain meds but grimaces and grunts and groans when moving.  Swallows without difficulty.  Possible d/c to TCU today if bed available.
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
## 2018-06-02 NOTE — PLAN OF CARE
"Problem: Patient Care Overview  Goal: Plan of Care/Patient Progress Review  Discharge Planner OT   Patient plan for discharge: did not state  Current status: Pt reports high pain levels at this time, nursing reports he is \"catching up,\" discussed plan for OOB activity with pain control prior to PT session this afternoon. Pt completed in bed UE exercises, pt completed with VC and rests between activities.   Barriers to return to prior living situation: current level of assist  Recommendations for discharge: TCU  Rationale for recommendations: pt will benefit from daily therapies to advance mobility and ADLs       Entered by: Carole Salazar 06/02/2018 9:46 AM           "

## 2018-06-02 NOTE — PLAN OF CARE
Problem: Patient Care Overview  Goal: Plan of Care/Patient Progress Review  Discharge Planner PT   Patient plan for discharge: Pt agreeable to AR[facility].   Current status: Pt motivated to participate in PT despite increased pain.  Pt performed supine to sit transfer with mod assist of 1 and increased time.  Sat on EOB to rest a few minutes with SBA.  Sit to/from stand transfer with mod assist of 1.  Pt able to take a few small steps using wheeled walker from bed to chair and min assist.  Pt fatigues quickly and increased shakiness noted in LEs toward end of transfer.  Pt stayed in chair at end of session with nurse aware and chair alarm on.    Barriers to return to prior living situation: Level of assist, Pain, Decreased activity tolerance  Recommendations for discharge: ARC per plan established by the PT.   Rationale for recommendations: Pt was independent prior to admission and would benefit from intensive therapy in order to return to prior level of function.        Entered by: Garima Mccray 06/02/2018 4:18 PM

## 2018-06-02 NOTE — PROGRESS NOTES
"CM    I: VENESSA placed call to [facility], following up on referral that was sent 6/1. VENESSA spoke w/Sandra who stated they cannot accept patient until Monday at the earliest, as their business office would first have to call [employer] to review the open claim.  VENESSA explained patient has Medicare should the auto claim not end up paying.  Sandra then stated they have been directed to not take patient's with this type of scenario, as Medicare will not pay until facilities have ruled out any auto claim.  VENESSA updated staff and attempted to reach daughter to discuss the importance of her calling the Medical  first thing Monday morning to answer the questions they have.      P:  Continue to assist as needed.      JAMES Tejeda    UPDATE@6208:  VENESSA spoke with daughter, Armida, updating her with the contact information for [facility] adjustor (Rashawn Bkaer 2  ext [phone]), explaining to daughter the importance of calling Ms Baker right away Monday morning to answer her questions, which will allow adjustor to confirm the claim that has been opened. (Claim # [id number]).  Daughter seemed to understand the importance of this, knowing [facility] (or any TCU) will not accept patient until their business offices are able to confirm with [employer].  Daughter confirmed she will call Ms Baker by 8am Monday.     UPDATE@4010:  [facility], called to state they would also have to have their business office follow up on patient's \"commercial pending\" status to rule out and/or accept patient under Medicare.    "

## 2018-06-02 NOTE — PROGRESS NOTES
UROLOGY  RESTING COMFORTABLY, GRIFFIN DRAINING CLEAR.MILD PENILE SCROTAL SWELLING/ECCYMOSIS.  SUGGEST- CONTINUE WITH GRIFFIN , ELEVATE SCROTUM

## 2018-06-03 ENCOUNTER — APPOINTMENT (OUTPATIENT)
Dept: PHYSICAL THERAPY | Facility: CLINIC | Age: 83
DRG: 963 | End: 2018-06-03
Payer: COMMERCIAL

## 2018-06-03 ENCOUNTER — APPOINTMENT (OUTPATIENT)
Dept: OCCUPATIONAL THERAPY | Facility: CLINIC | Age: 83
DRG: 963 | End: 2018-06-03
Payer: COMMERCIAL

## 2018-06-03 LAB
GLUCOSE BLDC GLUCOMTR-MCNC: 109 MG/DL (ref 70–99)
GLUCOSE BLDC GLUCOMTR-MCNC: 112 MG/DL (ref 70–99)
GLUCOSE BLDC GLUCOMTR-MCNC: 78 MG/DL (ref 70–99)
GLUCOSE BLDC GLUCOMTR-MCNC: 82 MG/DL (ref 70–99)
GLUCOSE BLDC GLUCOMTR-MCNC: 94 MG/DL (ref 70–99)

## 2018-06-03 PROCEDURE — 97116 GAIT TRAINING THERAPY: CPT | Mod: GP | Performed by: PHYSICAL THERAPIST

## 2018-06-03 PROCEDURE — 25000132 ZZH RX MED GY IP 250 OP 250 PS 637: Mod: GY | Performed by: INTERNAL MEDICINE

## 2018-06-03 PROCEDURE — A9270 NON-COVERED ITEM OR SERVICE: HCPCS | Mod: GY | Performed by: PHYSICIAN ASSISTANT

## 2018-06-03 PROCEDURE — A9270 NON-COVERED ITEM OR SERVICE: HCPCS | Mod: GY | Performed by: HOSPITALIST

## 2018-06-03 PROCEDURE — A9270 NON-COVERED ITEM OR SERVICE: HCPCS | Mod: GY | Performed by: INTERNAL MEDICINE

## 2018-06-03 PROCEDURE — 00000146 ZZHCL STATISTIC GLUCOSE BY METER IP

## 2018-06-03 PROCEDURE — A9270 NON-COVERED ITEM OR SERVICE: HCPCS | Mod: GY | Performed by: SURGERY

## 2018-06-03 PROCEDURE — 40000133 ZZH STATISTIC OT WARD VISIT

## 2018-06-03 PROCEDURE — 25000132 ZZH RX MED GY IP 250 OP 250 PS 637: Performed by: SURGERY

## 2018-06-03 PROCEDURE — 25000132 ZZH RX MED GY IP 250 OP 250 PS 637: Performed by: HOSPITALIST

## 2018-06-03 PROCEDURE — 25000132 ZZH RX MED GY IP 250 OP 250 PS 637: Performed by: PHYSICIAN ASSISTANT

## 2018-06-03 PROCEDURE — 97535 SELF CARE MNGMENT TRAINING: CPT | Mod: GO

## 2018-06-03 PROCEDURE — 99232 SBSQ HOSP IP/OBS MODERATE 35: CPT | Performed by: HOSPITALIST

## 2018-06-03 PROCEDURE — A9270 NON-COVERED ITEM OR SERVICE: HCPCS | Mod: GY | Performed by: NURSE PRACTITIONER

## 2018-06-03 PROCEDURE — 25000132 ZZH RX MED GY IP 250 OP 250 PS 637: Performed by: NURSE PRACTITIONER

## 2018-06-03 PROCEDURE — 40000193 ZZH STATISTIC PT WARD VISIT: Performed by: PHYSICAL THERAPIST

## 2018-06-03 PROCEDURE — 97530 THERAPEUTIC ACTIVITIES: CPT | Mod: GP | Performed by: PHYSICAL THERAPIST

## 2018-06-03 PROCEDURE — 25000132 ZZH RX MED GY IP 250 OP 250 PS 637: Performed by: INTERNAL MEDICINE

## 2018-06-03 PROCEDURE — 12000000 ZZH R&B MED SURG/OB

## 2018-06-03 RX ORDER — LISINOPRIL 2.5 MG/1
5 TABLET ORAL DAILY
Status: DISCONTINUED | OUTPATIENT
Start: 2018-06-03 | End: 2018-06-04 | Stop reason: HOSPADM

## 2018-06-03 RX ADMIN — METHOCARBAMOL 500 MG: 500 TABLET ORAL at 21:43

## 2018-06-03 RX ADMIN — POLYETHYLENE GLYCOL 3350 17 G: 17 POWDER, FOR SOLUTION ORAL at 09:08

## 2018-06-03 RX ADMIN — METHOCARBAMOL 500 MG: 500 TABLET ORAL at 16:11

## 2018-06-03 RX ADMIN — MENTHOL 1 PATCH: 205.5 PATCH TOPICAL at 21:48

## 2018-06-03 RX ADMIN — ACETAMINOPHEN 650 MG: 325 TABLET ORAL at 21:43

## 2018-06-03 RX ADMIN — OXYCODONE HYDROCHLORIDE 5 MG: 5 TABLET ORAL at 02:03

## 2018-06-03 RX ADMIN — LISINOPRIL 5 MG: 2.5 TABLET ORAL at 11:34

## 2018-06-03 RX ADMIN — FAMOTIDINE 20 MG: 20 TABLET ORAL at 21:43

## 2018-06-03 RX ADMIN — OXYCODONE HYDROCHLORIDE 5 MG: 5 TABLET ORAL at 06:24

## 2018-06-03 RX ADMIN — AMIODARONE HYDROCHLORIDE 200 MG: 200 TABLET ORAL at 09:08

## 2018-06-03 RX ADMIN — LIDOCAINE 1 PATCH: 560 PATCH PERCUTANEOUS; TOPICAL; TRANSDERMAL at 11:33

## 2018-06-03 RX ADMIN — AMLODIPINE BESYLATE 5 MG: 5 TABLET ORAL at 09:08

## 2018-06-03 RX ADMIN — METHOCARBAMOL 500 MG: 500 TABLET ORAL at 09:08

## 2018-06-03 RX ADMIN — OXYCODONE HYDROCHLORIDE 5 MG: 5 TABLET ORAL at 11:34

## 2018-06-03 RX ADMIN — ACETAMINOPHEN 650 MG: 325 TABLET ORAL at 09:08

## 2018-06-03 RX ADMIN — SENNOSIDES AND DOCUSATE SODIUM 1 TABLET: 8.6; 5 TABLET ORAL at 09:08

## 2018-06-03 RX ADMIN — ACETAMINOPHEN 650 MG: 325 TABLET ORAL at 17:09

## 2018-06-03 RX ADMIN — SIMVASTATIN 20 MG: 20 TABLET, FILM COATED ORAL at 21:43

## 2018-06-03 ASSESSMENT — VISUAL ACUITY
OU: GLASSES;BASELINE

## 2018-06-03 ASSESSMENT — ACTIVITIES OF DAILY LIVING (ADL)
ADLS_ACUITY_SCORE: 16

## 2018-06-03 NOTE — PLAN OF CARE
Problem: OT General Care Plan  Goal: OT target date for goal attainment  Discharge Planner OT   Patient plan for discharge: none stated   Current status: Pt sitting up in the chair when therapist arrived. Pt went from sit<>Stand with moderate assist of 2 and extra time. Pt stood with the walker with minimum assist of 2. Pt took 3-4 small steps to the EOB with minimum-moderate assist of 2. Sit EOB<>supine with moderate assist of 2.  Pt limited by pain during session.   Barriers to return to prior living situation: current level of assist; pain; decreased endurance for I/ADLs  Recommendations for discharge: ARU  Rationale for recommendations: pt will benefit from daily therapies to advance mobility and ADLs       Entered by: Jarrell Cuevas 06/03/2018 2:58 PM

## 2018-06-03 NOTE — PLAN OF CARE
"Problem: Patient Care Overview  Goal: Plan of Care/Patient Progress Review  Discharge Planner PT   Patient plan for discharge: None stated.  Current status: Min A sup>sit (moving to the R side). Increased time and cues for hand placement, Min A up to stand from bed height. Mod A for balance and walker navigation, pt reports L hip pain posterior, lateral and anterior hip with movement and in sitting. Agreeable to sit in the chair for \"a bit.\" Discussed with nursing staff likely need 2 to return to bed and to not sit more than 30 min in order to assist with pain control.   Barriers to return to prior living situation: Fall risk, level of assist, pain and impaired mobility  Recommendations for discharge: Acute Rehab  Rationale for recommendations: Pt will benefit from steady intensive rehab to progress functional independence and safety with mobility.       Entered by: Kaci Oswald 06/03/2018 2:12 PM           "
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
## 2018-06-03 NOTE — PLAN OF CARE
Problem: Stroke (Hemorrhagic) (Adult)  Goal: Signs and Symptoms of Listed Potential Problems Will be Absent, Minimized or Managed (Stroke)  Signs and symptoms of listed potential problems will be absent, minimized or managed by discharge/transition of care (reference Stroke (Hemorrhagic) (Adult) CPG).   VSS, A/O, forgetful.  Pain appears less tonight, pt moving better and is able to help more.  Oxycodone given more often to control pain and prepare pt for d/c to TCU or rehab.  Swallows without difficulty.  Loose stools, small amounts overnight.

## 2018-06-04 ENCOUNTER — PATIENT OUTREACH (OUTPATIENT)
Dept: CARE COORDINATION | Facility: CLINIC | Age: 83
End: 2018-06-04

## 2018-06-04 ENCOUNTER — APPOINTMENT (OUTPATIENT)
Dept: OCCUPATIONAL THERAPY | Facility: CLINIC | Age: 83
DRG: 963 | End: 2018-06-04
Payer: COMMERCIAL

## 2018-06-04 ENCOUNTER — APPOINTMENT (OUTPATIENT)
Dept: PHYSICAL THERAPY | Facility: CLINIC | Age: 83
DRG: 963 | End: 2018-06-04
Payer: COMMERCIAL

## 2018-06-04 VITALS
DIASTOLIC BLOOD PRESSURE: 68 MMHG | SYSTOLIC BLOOD PRESSURE: 123 MMHG | TEMPERATURE: 98.3 F | OXYGEN SATURATION: 95 % | HEART RATE: 70 BPM | BODY MASS INDEX: 27.1 KG/M2 | RESPIRATION RATE: 18 BRPM | WEIGHT: 173 LBS

## 2018-06-04 VITALS
DIASTOLIC BLOOD PRESSURE: 73 MMHG | TEMPERATURE: 97.5 F | BODY MASS INDEX: 27.24 KG/M2 | HEART RATE: 79 BPM | WEIGHT: 173.94 LBS | RESPIRATION RATE: 18 BRPM | OXYGEN SATURATION: 95 % | SYSTOLIC BLOOD PRESSURE: 146 MMHG

## 2018-06-04 LAB
CREAT SERPL-MCNC: 1.63 MG/DL (ref 0.66–1.25)
GFR SERPL CREATININE-BSD FRML MDRD: 40 ML/MIN/1.7M2
GLUCOSE BLDC GLUCOMTR-MCNC: 106 MG/DL (ref 70–99)
GLUCOSE BLDC GLUCOMTR-MCNC: 74 MG/DL (ref 70–99)
GLUCOSE BLDC GLUCOMTR-MCNC: 92 MG/DL (ref 70–99)
MAGNESIUM SERPL-MCNC: 1.8 MG/DL (ref 1.6–2.3)
MAGNESIUM SERPL-MCNC: 2.3 MG/DL (ref 1.6–2.3)
POTASSIUM SERPL-SCNC: 4.4 MMOL/L (ref 3.4–5.3)

## 2018-06-04 PROCEDURE — 25000128 H RX IP 250 OP 636: Performed by: HOSPITALIST

## 2018-06-04 PROCEDURE — 00000146 ZZHCL STATISTIC GLUCOSE BY METER IP

## 2018-06-04 PROCEDURE — 36415 COLL VENOUS BLD VENIPUNCTURE: CPT | Performed by: INTERNAL MEDICINE

## 2018-06-04 PROCEDURE — 82565 ASSAY OF CREATININE: CPT | Performed by: HOSPITALIST

## 2018-06-04 PROCEDURE — 25000132 ZZH RX MED GY IP 250 OP 250 PS 637: Performed by: HOSPITALIST

## 2018-06-04 PROCEDURE — 84132 ASSAY OF SERUM POTASSIUM: CPT | Performed by: HOSPITALIST

## 2018-06-04 PROCEDURE — 83735 ASSAY OF MAGNESIUM: CPT | Performed by: HOSPITALIST

## 2018-06-04 PROCEDURE — 97530 THERAPEUTIC ACTIVITIES: CPT | Mod: GP | Performed by: PHYSICAL THERAPIST

## 2018-06-04 PROCEDURE — 40000133 ZZH STATISTIC OT WARD VISIT: Performed by: OCCUPATIONAL THERAPY ASSISTANT

## 2018-06-04 PROCEDURE — 40000193 ZZH STATISTIC PT WARD VISIT: Performed by: PHYSICAL THERAPIST

## 2018-06-04 PROCEDURE — 25000132 ZZH RX MED GY IP 250 OP 250 PS 637: Performed by: INTERNAL MEDICINE

## 2018-06-04 PROCEDURE — A9270 NON-COVERED ITEM OR SERVICE: HCPCS | Mod: GY | Performed by: INTERNAL MEDICINE

## 2018-06-04 PROCEDURE — 99239 HOSP IP/OBS DSCHRG MGMT >30: CPT | Performed by: INTERNAL MEDICINE

## 2018-06-04 PROCEDURE — 36415 COLL VENOUS BLD VENIPUNCTURE: CPT | Performed by: HOSPITALIST

## 2018-06-04 PROCEDURE — 25000132 ZZH RX MED GY IP 250 OP 250 PS 637: Mod: GY | Performed by: PHYSICIAN ASSISTANT

## 2018-06-04 PROCEDURE — 83735 ASSAY OF MAGNESIUM: CPT | Performed by: INTERNAL MEDICINE

## 2018-06-04 PROCEDURE — 97530 THERAPEUTIC ACTIVITIES: CPT | Mod: GO | Performed by: OCCUPATIONAL THERAPY ASSISTANT

## 2018-06-04 PROCEDURE — A9270 NON-COVERED ITEM OR SERVICE: HCPCS | Mod: GY | Performed by: NURSE PRACTITIONER

## 2018-06-04 PROCEDURE — 25000132 ZZH RX MED GY IP 250 OP 250 PS 637: Mod: GY | Performed by: NURSE PRACTITIONER

## 2018-06-04 PROCEDURE — A9270 NON-COVERED ITEM OR SERVICE: HCPCS | Mod: GY | Performed by: PHYSICIAN ASSISTANT

## 2018-06-04 PROCEDURE — A9270 NON-COVERED ITEM OR SERVICE: HCPCS | Mod: GY | Performed by: HOSPITALIST

## 2018-06-04 RX ORDER — AMIODARONE HYDROCHLORIDE 200 MG/1
200 TABLET ORAL DAILY
Qty: 60 TABLET | DISCHARGE
Start: 2018-06-05 | End: 2018-06-22

## 2018-06-04 RX ORDER — AMOXICILLIN 250 MG
1-2 CAPSULE ORAL 2 TIMES DAILY
Qty: 100 TABLET | DISCHARGE
Start: 2018-06-04 | End: 2018-06-14

## 2018-06-04 RX ORDER — LIDOCAINE 4 G/G
1-3 PATCH TOPICAL EVERY 24 HOURS
DISCHARGE
Start: 2018-06-04 | End: 2018-06-28

## 2018-06-04 RX ORDER — OXYCODONE HYDROCHLORIDE 5 MG/1
5 TABLET ORAL
Qty: 6 TABLET | Refills: 0 | Status: SHIPPED | OUTPATIENT
Start: 2018-06-04 | End: 2018-06-28

## 2018-06-04 RX ORDER — LISINOPRIL 10 MG/1
5 TABLET ORAL DAILY
Qty: 90 TABLET | Refills: 0 | DISCHARGE
Start: 2018-06-04 | End: 2018-06-19

## 2018-06-04 RX ORDER — METHOCARBAMOL 500 MG/1
500 TABLET, FILM COATED ORAL 3 TIMES DAILY
Qty: 12 TABLET | Refills: 0 | Status: SHIPPED | OUTPATIENT
Start: 2018-06-04 | End: 2018-06-14

## 2018-06-04 RX ORDER — ACETAMINOPHEN 325 MG/1
650 TABLET ORAL 4 TIMES DAILY
Qty: 100 TABLET | DISCHARGE
Start: 2018-06-04 | End: 2018-06-14

## 2018-06-04 RX ADMIN — OXYCODONE HYDROCHLORIDE 5 MG: 5 TABLET ORAL at 00:42

## 2018-06-04 RX ADMIN — OXYCODONE HYDROCHLORIDE 5 MG: 5 TABLET ORAL at 08:59

## 2018-06-04 RX ADMIN — POLYETHYLENE GLYCOL 3350 17 G: 17 POWDER, FOR SOLUTION ORAL at 08:58

## 2018-06-04 RX ADMIN — AMLODIPINE BESYLATE 5 MG: 5 TABLET ORAL at 08:59

## 2018-06-04 RX ADMIN — AMIODARONE HYDROCHLORIDE 200 MG: 200 TABLET ORAL at 08:59

## 2018-06-04 RX ADMIN — METHOCARBAMOL 500 MG: 500 TABLET ORAL at 15:25

## 2018-06-04 RX ADMIN — OXYCODONE HYDROCHLORIDE 5 MG: 5 TABLET ORAL at 03:49

## 2018-06-04 RX ADMIN — ACETAMINOPHEN 650 MG: 325 TABLET ORAL at 15:25

## 2018-06-04 RX ADMIN — LIDOCAINE 1 PATCH: 560 PATCH PERCUTANEOUS; TOPICAL; TRANSDERMAL at 08:58

## 2018-06-04 RX ADMIN — OXYCODONE HYDROCHLORIDE 5 MG: 5 TABLET ORAL at 15:25

## 2018-06-04 RX ADMIN — METHOCARBAMOL 500 MG: 500 TABLET ORAL at 08:59

## 2018-06-04 RX ADMIN — LISINOPRIL 5 MG: 2.5 TABLET ORAL at 08:59

## 2018-06-04 RX ADMIN — MAGNESIUM SULFATE HEPTAHYDRATE 2 G: 40 INJECTION, SOLUTION INTRAVENOUS at 02:14

## 2018-06-04 RX ADMIN — ACETAMINOPHEN 650 MG: 325 TABLET ORAL at 08:59

## 2018-06-04 ASSESSMENT — ACTIVITIES OF DAILY LIVING (ADL)
ADLS_ACUITY_SCORE: 14
ADLS_ACUITY_SCORE: 16
ADLS_ACUITY_SCORE: 14

## 2018-06-04 ASSESSMENT — VISUAL ACUITY
OU: BASELINE;GLASSES
OU: GLASSES;BASELINE
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
## 2018-06-04 NOTE — PLAN OF CARE
Problem: Patient Care Overview  Goal: Plan of Care/Patient Progress Review  Discharge Planner PT   Patient plan for discharge: TCU  Current status: Patient rating pain 9/10 but agreeable to try standing. Transferred sup to sit with mod assist of 2 and sit to sup with mod assist of 2. Sit to stand with mod assist of 1. Unable to take a step with his right LE due to inability to tolerate weight through his left LE.   Barriers to return to prior living situation: Pain, amount of assist needed for all mobility.  Recommendations for discharge: TCU  Rationale for recommendations: Patient currently needing mod assist of 1 to 2 for bed mobility and unable to tolerate amb due to pain. Would benefit from continued skilled PT to progress his independence prior to discharge to home.        Entered by: Kathy Estrada 06/04/2018 1:23 PM
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
## 2018-06-04 NOTE — PLAN OF CARE
Problem: Patient Care Overview  Goal: Plan of Care/Patient Progress Review  Discharge Planner OT   Patient plan for discharge: none stated   Current status: Pt required max encouragement to participated with getting OOB and up to chair for breakfast, pt c/o increase pain, completed supine to sit EOB with log roll mod A, max A sit to stand, mod/max A of 1 and SBA of 1 to complete bed to chair transfer for breakfast.   Barriers to return to prior living situation: current level of assist; pain; decreased endurance for I/ADLs  Recommendations for discharge: ARU  Rationale for recommendations: pt will benefit from daily therapies to advance mobility and ADLs     Entered by: Vanessa Johns 06/04/2018 8:26 AM

## 2018-06-04 NOTE — PLAN OF CARE
Problem: Patient Care Overview  Goal: Plan of Care/Patient Progress Review  Outcome: Improving  Bilateral SDH, left sided rib fractures, and pelvix fracture. A&O x4 but forgetful. Neuros include LLE weakness and pain. VSS. Tolerating mod carb diet  poorly but better today - needs encouragement. Up with A2+GB + walker and pivot to chair. Oxycodone given for pain. Patient reported better pain management today. Continue to monitor and follow POC.

## 2018-06-04 NOTE — CONSULTS
CLINICAL NUTRITION SERVICES - REASSESSMENT NOTE    RN consult - Ariel <3    Recommendations Ordered by Registered Dietitian (RD):   Boost Glucose Control between meals     Malnutrition (dx'd 530):  % Weight Loss:  None noted  % Intake:  </= 50% for >/= 1 month (severe malnutrition)  Subcutaneous Fat Loss:  None observed  Muscle Loss:  Temporal region mild depletion  Fluid Retention:  None noted     Malnutrition Diagnosis: Non-Severe malnutrition  In Context of:  Acute illness or injury       EVALUATION OF PROGRESS TOWARD GOALS   Diet:  Mod carb --> regular. Boost Glucose Control between meals  Intake:  Pt has only been eating 25-50%. Visited him at lunch, he only ordered 1/2 sandwich and fruit ice.   He hasn't touched his tray, says he doesn't have an appetite. Denies N/V, abd pain.      NEW FINDINGS:   Vitals:    05/26/18 0908 05/30/18 0600 05/31/18 0500   Weight: 77.1 kg (170 lb) 76.6 kg (168 lb 14 oz) 78.9 kg (173 lb 15.1 oz)         Previous Goals:   Intake will improve to 50% of meals and 50% of supplements   Evaluation: Not met    Previous Nutrition Diagnosis:   Inadequate oral intake related to poor appetite as evidenced by taking bites of food  Evaluation: No change      CURRENT NUTRITION DIAGNOSIS  Inadequate oral intake related to poor appetite as evidenced by documented intake 25-50%    INTERVENTIONS  Recommendations / Nutrition Prescription  Continue current diet    Implementation  Medical Food Supplement - Continue Boost Glucose Control between meals    Goals  Pt will consume at least 50% of all meals and supplements      MONITORING AND EVALUATION:  Progress towards goals will be monitored and evaluated per protocol and Practice Guidelines    Cris Olivera RD  Pager [phone] (M-F)            [phone] (W/[facility] Hol)

## 2018-06-04 NOTE — PROGRESS NOTES
Patient will be discharged to [facility] today. He has EP appointment already scheduled. Info on AVS for Brain/spine follow up. Communication handoff sent.

## 2018-06-04 NOTE — PLAN OF CARE
Problem: Patient Care Overview  Goal: Plan of Care/Patient Progress Review  Outcome: No Change  A&Ox4. VSS. /77. Neuros intact. C/o Left pelvic pain, weak LLE due to pain, moderately impaired mobility. Oxy given x2. Bruising on Left side, scrotum and L forehead. Mg 1.9, replaced, Redraw at 0600 today. Barry for retention , adequate UOP. IV SL. Tolerating Mod carb diet with room service, poor appetite. Not OOB this shift, A2 turn/pivot, T/r q 2 hrs. Plan to DC to TCU today. Continue to monitor

## 2018-06-04 NOTE — PLAN OF CARE
Problem: Patient Care Overview  Goal: Plan of Care/Patient Progress Review  Physical Therapy Discharge Summary    Reason for therapy discharge:    Discharged to transitional care facility.    Progress towards therapy goal(s). See goals on Care Plan in [facility] electronic health record for goal details.  Goals partially met.  Barriers to achieving goals:   discharge from facility.    Therapy recommendation(s):    Continued therapy is recommended.  Rationale/Recommendations:  Per previous PT: Patient currently needing mod assist of 1 to 2 for bed mobility and unable to tolerate amb due to pain. Would benefit from continued skilled PT to progress his independence prior to discharge to home. .
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
## 2018-06-05 ENCOUNTER — TELEPHONE (OUTPATIENT)
Dept: INTERNAL MEDICINE | Facility: CLINIC | Age: 83
End: 2018-06-05

## 2018-06-05 ENCOUNTER — NURSING HOME VISIT (OUTPATIENT)
Dept: GERIATRICS | Facility: CLINIC | Age: 83
End: 2018-06-05
Payer: MEDICARE

## 2018-06-05 ENCOUNTER — TELEPHONE (OUTPATIENT)
Facility: CLINIC | Age: 83
End: 2018-06-05

## 2018-06-05 DIAGNOSIS — I48.91 ATRIAL FIBRILLATION WITH RVR (H): ICD-10-CM

## 2018-06-05 DIAGNOSIS — N18.30 CKD (CHRONIC KIDNEY DISEASE) STAGE 3, GFR 30-59 ML/MIN (H): ICD-10-CM
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
## 2018-06-05 DIAGNOSIS — D62 ANEMIA DUE TO BLOOD LOSS, ACUTE: ICD-10-CM

## 2018-06-05 DIAGNOSIS — S22.42XD CLOSED FRACTURE OF MULTIPLE RIBS OF LEFT SIDE WITH ROUTINE HEALING, SUBSEQUENT ENCOUNTER: ICD-10-CM

## 2018-06-05 DIAGNOSIS — S30.22XD: ICD-10-CM

## 2018-06-05 DIAGNOSIS — R33.9 URINARY RETENTION: Primary | ICD-10-CM

## 2018-06-05 DIAGNOSIS — I62.00 SUBDURAL HEMORRHAGE (H): Primary | ICD-10-CM

## 2018-06-05 DIAGNOSIS — I10 BENIGN ESSENTIAL HYPERTENSION: ICD-10-CM

## 2018-06-05 PROCEDURE — 99310 SBSQ NF CARE HIGH MDM 45: CPT | Performed by: NURSE PRACTITIONER

## 2018-06-05 NOTE — TELEPHONE ENCOUNTER
Lexi from the [facility] ([phone]) is calling requesting a Urology referral on behalf of Jose. Patient needs to have his govea out and also have a void trial within the next 2-4 weeks.
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
## 2018-06-05 NOTE — PLAN OF CARE
Problem: Patient Care Overview  Goal: Plan of Care/Patient Progress Review  Occupational Therapy Discharge Summary    Reason for therapy discharge:    Discharged to transitional care facility.    Progress towards therapy goal(s). See goals on Care Plan in [facility] electronic health record for goal details.  Goals not met.  Barriers to achieving goals:   discharge from facility.    Therapy recommendation(s):    Continued therapy is recommended.  Rationale/Recommendations:  recommended ARC, pt discharged to TCU, recommend cont'd daily OT to increase ADL and functional mobility I and safety to PLOF. .
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
## 2018-06-09 ENCOUNTER — NURSING HOME VISIT (OUTPATIENT)
Dept: GERIATRICS | Facility: CLINIC | Age: 83
End: 2018-06-09
Payer: MEDICARE

## 2018-06-09 DIAGNOSIS — S30.22XD: ICD-10-CM

## 2018-06-09 DIAGNOSIS — I62.00 SUBDURAL HEMORRHAGE (H): Primary | ICD-10-CM

## 2018-06-09 DIAGNOSIS — S22.42XD CLOSED FRACTURE OF MULTIPLE RIBS OF LEFT SIDE WITH ROUTINE HEALING, SUBSEQUENT ENCOUNTER: ICD-10-CM

## 2018-06-09 DIAGNOSIS — D62 ANEMIA DUE TO BLOOD LOSS, ACUTE: ICD-10-CM

## 2018-06-09 PROCEDURE — 99305 1ST NF CARE MODERATE MDM 35: CPT | Performed by: INTERNAL MEDICINE
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
## 2018-06-11 ENCOUNTER — TELEPHONE (OUTPATIENT)
Dept: PHARMACY | Facility: CLINIC | Age: 83
End: 2018-06-11

## 2018-06-11 ENCOUNTER — TRANSFERRED RECORDS (OUTPATIENT)
Dept: HEALTH INFORMATION MANAGEMENT | Facility: CLINIC | Age: 83
End: 2018-06-11

## 2018-06-11 LAB
BUN SERPL-MCNC: 33 MG/DL (ref 9–26)
CALCIUM SERPL-MCNC: 8.6 MG/DL (ref 8.4–10.4)
CHLORIDE SERPLBLD-SCNC: 101 MMOL/L (ref 98–109)
CO2 SERPL-SCNC: 20 MMOL/L (ref 22–31)
CREAT SERPL-MCNC: 1.84 MG/DL (ref 0.73–1.18)
GFR SERPL CREATININE-BSD FRML MDRD: 32 ML/MIN/1.73M2
GLUCOSE SERPL-MCNC: 62 MG/DL (ref 70–100)
HEMOGLOBIN: 9.2 G/DL (ref 13.4–17.5)
POTASSIUM SERPL-SCNC: 4.9 MMOL/L (ref 3.5–5.2)
SODIUM SERPL-SCNC: 130 MMOL/L (ref 136–145)

## 2018-06-11 NOTE — TELEPHONE ENCOUNTER
Called Jose to f/up from SUDEEP visit. He is not interested in scheduling at this time, declined number to schedule. He doesn't have any questions today, and reports having someone at the facility who helps set up his medications.

## 2018-06-12 NOTE — TELEPHONE ENCOUNTER
Vincent left several messages over the course of an hour (10:38 -11:31 pm) saying he wasn't sure who he was talking to, but please call him back. Returned call. No questions/concerns today, agreed he may have seen my number come up and forgot we spoke. He is currently at [facility].    Gave verbal permission to speak to Sydnie, one of his therapists at [facility], since he seemed confused. Vincent requested my number in case he has questions going forward, when he leaves [facility].

## 2018-06-14 ENCOUNTER — NURSING HOME VISIT (OUTPATIENT)
Dept: GERIATRICS | Facility: CLINIC | Age: 83
End: 2018-06-14
Payer: MEDICARE

## 2018-06-14 VITALS
BODY MASS INDEX: 23.7 KG/M2 | SYSTOLIC BLOOD PRESSURE: 115 MMHG | TEMPERATURE: 96.9 F | WEIGHT: 151.3 LBS | RESPIRATION RATE: 13 BRPM | DIASTOLIC BLOOD PRESSURE: 55 MMHG | HEART RATE: 63 BPM | OXYGEN SATURATION: 92 %

## 2018-06-14 DIAGNOSIS — S30.22XD: ICD-10-CM

## 2018-06-14 DIAGNOSIS — D62 ANEMIA DUE TO BLOOD LOSS, ACUTE: ICD-10-CM
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
## 2018-06-14 DIAGNOSIS — I10 BENIGN ESSENTIAL HYPERTENSION: ICD-10-CM

## 2018-06-14 DIAGNOSIS — N18.30 CKD (CHRONIC KIDNEY DISEASE) STAGE 3, GFR 30-59 ML/MIN (H): ICD-10-CM

## 2018-06-14 DIAGNOSIS — I62.00 SUBDURAL HEMORRHAGE (H): Primary | ICD-10-CM

## 2018-06-14 DIAGNOSIS — I48.91 ATRIAL FIBRILLATION WITH RVR (H): ICD-10-CM

## 2018-06-14 DIAGNOSIS — S22.42XD CLOSED FRACTURE OF MULTIPLE RIBS OF LEFT SIDE WITH ROUTINE HEALING, SUBSEQUENT ENCOUNTER: ICD-10-CM

## 2018-06-14 PROCEDURE — 99309 SBSQ NF CARE MODERATE MDM 30: CPT | Performed by: NURSE PRACTITIONER

## 2018-06-14 RX ORDER — OXYCODONE HCL 10 MG/1
TABLET, FILM COATED, EXTENDED RELEASE ORAL
COMMUNITY
End: 2018-06-19

## 2018-06-14 RX ORDER — POLYETHYLENE GLYCOL 3350 17 G/17G
1 POWDER, FOR SOLUTION ORAL DAILY PRN
COMMUNITY
End: 2019-08-26

## 2018-06-14 RX ORDER — SENNOSIDES 8.6 MG
2 TABLET ORAL 2 TIMES DAILY
COMMUNITY
End: 2019-08-26
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
## 2018-06-15 ENCOUNTER — TRANSFERRED RECORDS (OUTPATIENT)
Dept: HEALTH INFORMATION MANAGEMENT | Facility: CLINIC | Age: 83
End: 2018-06-15

## 2018-06-15 LAB
BUN SERPL-MCNC: 35 MG/DL (ref 9–26)
CALCIUM SERPL-MCNC: 8.7 MG/DL (ref 8.4–10.4)
CHLORIDE SERPLBLD-SCNC: 103 MMOL/L (ref 98–109)
CO2 SERPL-SCNC: 17 MMOL/L (ref 22–31)
CREAT SERPL-MCNC: 1.67 MG/DL (ref 0.73–1.18)
GFR SERPL CREATININE-BSD FRML MDRD: 35 ML/MIN/1.73M2
GLUCOSE SERPL-MCNC: 66 MG/DL (ref 70–100)
POTASSIUM SERPL-SCNC: 4.9 MMOL/L (ref 3.5–5.2)
SODIUM SERPL-SCNC: 132 MMOL/L (ref 136–145)

## 2018-06-19 VITALS
RESPIRATION RATE: 18 BRPM | OXYGEN SATURATION: 98 % | DIASTOLIC BLOOD PRESSURE: 70 MMHG | TEMPERATURE: 96.7 F | SYSTOLIC BLOOD PRESSURE: 140 MMHG | WEIGHT: 143.7 LBS | BODY MASS INDEX: 22.51 KG/M2 | HEART RATE: 76 BPM

## 2018-06-19 PROBLEM — I48.0 PAROXYSMAL ATRIAL FIBRILLATION (H): Status: ACTIVE | Noted: 2018-05-28

## 2018-06-20 ENCOUNTER — NURSING HOME VISIT (OUTPATIENT)
Dept: GERIATRICS | Facility: CLINIC | Age: 83
End: 2018-06-20
Payer: MEDICARE
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
## 2018-06-20 DIAGNOSIS — S30.22XD: ICD-10-CM

## 2018-06-20 DIAGNOSIS — N18.30 CKD (CHRONIC KIDNEY DISEASE) STAGE 3, GFR 30-59 ML/MIN (H): ICD-10-CM

## 2018-06-20 DIAGNOSIS — I62.00 SUBDURAL HEMORRHAGE (H): Primary | ICD-10-CM

## 2018-06-20 DIAGNOSIS — S22.42XD CLOSED FRACTURE OF MULTIPLE RIBS OF LEFT SIDE WITH ROUTINE HEALING, SUBSEQUENT ENCOUNTER: ICD-10-CM

## 2018-06-20 DIAGNOSIS — R63.4 LOSS OF WEIGHT: ICD-10-CM

## 2018-06-20 DIAGNOSIS — D62 ANEMIA DUE TO BLOOD LOSS, ACUTE: ICD-10-CM

## 2018-06-20 DIAGNOSIS — I48.91 ATRIAL FIBRILLATION WITH RVR (H): ICD-10-CM

## 2018-06-20 DIAGNOSIS — I10 BENIGN ESSENTIAL HYPERTENSION: ICD-10-CM

## 2018-06-20 PROCEDURE — 99310 SBSQ NF CARE HIGH MDM 45: CPT | Performed by: NURSE PRACTITIONER
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
## 2018-06-21 ENCOUNTER — TRANSFERRED RECORDS (OUTPATIENT)
Dept: HEALTH INFORMATION MANAGEMENT | Facility: CLINIC | Age: 83
End: 2018-06-21

## 2018-06-21 LAB — HEMOGLOBIN: 10.3 G/DL (ref 13.4–17.5)

## 2018-06-22 ENCOUNTER — OFFICE VISIT (OUTPATIENT)
Dept: CARDIOLOGY | Facility: CLINIC | Age: 83
End: 2018-06-22
Attending: INTERNAL MEDICINE
Payer: MEDICARE

## 2018-06-22 VITALS
DIASTOLIC BLOOD PRESSURE: 77 MMHG | BODY MASS INDEX: 22.44 KG/M2 | WEIGHT: 143 LBS | HEART RATE: 84 BPM | SYSTOLIC BLOOD PRESSURE: 136 MMHG | HEIGHT: 67 IN

## 2018-06-22 DIAGNOSIS — I48.0 PAROXYSMAL ATRIAL FIBRILLATION (H): Primary | ICD-10-CM

## 2018-06-22 PROCEDURE — 93000 ELECTROCARDIOGRAM COMPLETE: CPT | Performed by: INTERNAL MEDICINE

## 2018-06-22 PROCEDURE — 99214 OFFICE O/P EST MOD 30 MIN: CPT | Mod: 25 | Performed by: INTERNAL MEDICINE

## 2018-06-22 RX ORDER — METOPROLOL SUCCINATE 25 MG/1
25 TABLET, EXTENDED RELEASE ORAL DAILY
Qty: 30 TABLET | Refills: 3 | Status: SHIPPED | OUTPATIENT
Start: 2018-06-22 | End: 2018-12-03
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
## 2018-06-22 NOTE — MR AVS SNAPSHOT
After Visit Summary   6/22/2018    Jose Gomez    MRN: [id number]           Patient Information     Date Of Birth          1/21/1928        Visit Information        Provider Department      6/22/2018 11:15 AM Ankush Oviedo MD [facility]        Today's Diagnoses     Paroxysmal atrial fibrillation (H)    -  1       Follow-ups after your visit        Additional Services     Follow-Up with Cardiac Advanced Practice Provider                 Your next 10 appointments already scheduled     Jul 05, 2018 12:15 PM CDT   CT HEAD W/O CONTRAST with SHCT1   [facility] CT ([facility])    [address] [facility] [id number] [phone]           Please bring any scans or X-rays taken at other hospitals, if similar tests were done. Also bring a list of your medicines, including vitamins, minerals and over-the-counter drugs. It is safest to leave personal items at home.  Be sure to tell your doctor:   If you have any allergies.   If there s any chance you are pregnant.   If you are breastfeeding.  You do not need to do anything special to prepare for this exam.  Please wear loose clothing, such as a sweat suit or jogging clothes. Avoid snaps, zippers and other metal. We may ask you to undress and put on a hospital gown.            Jul 05, 2018 12:50 PM CDT   Return Visit with Kelly Long PA-C   [facility] ([facility])    [address] [id number] [phone]              Future tests that were ordered for you today     Open Future Orders        Priority Expected Expires Ordered    Follow-Up with Cardiac Advanced Practice Provider Routine 9/20/2018 6/22/2019 6/22/2018    Zio Patch Monitor Routine 8/29/2018 6/22/2019 6/22/2018            Who to contact     If you have questions or need follow up information about today's clinic visit or your schedule  "please contact [facility] directly at [phone].  Normal or non-critical lab and imaging results will be communicated to you by MyChart, letter or phone within 4 business days after the clinic has received the results. If you do not hear from us within 7 days, please contact the clinic through MyChart or phone. If you have a critical or abnormal lab result, we will notify you by phone as soon as possible.  Submit refill requests through [employer] or call your pharmacy and they will forward the refill request to us. Please allow 3 business days for your refill to be completed.          Additional Information About Your Visit        Ethics Resource [employer] Information     [employer] lets you send messages to your doctor, view your test results, renew your prescriptions, schedule appointments and more. To sign up, go to [email or web address] . Click on \"Log in\" on the left side of the screen, which will take you to the Welcome page. Then click on \"Sign up Now\" on the right side of the page.     You will be asked to enter the access code listed below, as well as some personal information. Please follow the directions to create your username and password.     Your access code is: [email or web address]  Expires: 2018 11:24 AM     Your access code will  in 90 days. If you need help or a new code, please call your [facility] clinic or [phone].        Care EveryWhere ID     This is your Care EveryWhere ID. This could be used by other organizations to access your [facility] medical records  [id number]        Your Vitals Were     Pulse Height BMI (Body Mass Index)             84 1.702 m (5' 7\") 22.4 kg/m2          Blood Pressure from Last 3 Encounters:   18 136/77   18 140/70   18 115/55    Weight from Last 3 Encounters:   18 64.9 kg (143 lb)   18 65.2 kg (143 lb 11.2 oz)   18 68.6 kg (151 lb 4.8 oz)              We Performed the Following     EKG 12-lead complete " w/read  (to be scheduled)     Follow-Up with Electrophysiologist          Today's Medication Changes          These changes are accurate as of 6/22/18 11:52 AM.  If you have any questions, ask your nurse or doctor.               Start taking these medicines.        Dose/Directions    metoprolol succinate 25 MG 24 hr tablet   Commonly known as:  TOPROL-XL   Used for:  Paroxysmal atrial fibrillation (H)   Started by:  Ankush Oviedo MD        Dose:  25 mg   Take 1 tablet (25 mg) by mouth daily   Quantity:  30 tablet   Refills:  3         Stop taking these medicines if you haven't already. Please contact your care team if you have questions.     amiodarone 200 MG tablet   Commonly known as:  PACERONE/CODARONE   Stopped by:  Ankush Oviedo MD                Where to get your medicines      These medications were sent to [facility] #[id number] - [place], [place] - [address] [place] [address]  [address] [place] [address]     Phone:  [phone]     metoprolol succinate 25 MG 24 hr tablet                Primary Care Provider Office Phone # Fax #    Gabriel Carroll [phone]       [address] [facility] [id number]        Equal Access to Services     ESDRAS LOPEZ AH: Hadii elza lopes hadmandeepo Sonancyali, waaxda luqadaha, qaybta kaalmada adeegyada, julianna cannon. So [facility] [phone].    ATENCIÓN: Si habla español, tiene a davis disposición servicios gratuitos de asistencia lingüística. [facility] [phone].    We comply with applicable federal civil rights laws and [place] laws. We do not discriminate on the basis of race, color, national origin, age, disability, sex, sexual orientation, or gender identity.            Thank you!     Thank you for choosing [facility]  for your care. Our goal is always to provide you with excellent care. Hearing back from our patients is one way we can continue to improve our services.  Please take a few minutes to complete the written survey that you may receive in the mail after your visit with us. Thank you!             Your Updated Medication List - Protect others around you: Learn how to safely use, store and throw away your medicines at www.disposemymeds.org.          This list is accurate as of 6/22/18 11:52 AM.  Always use your most recent med list.                   Brand Name Dispense Instructions for use Diagnosis    amLODIPine 5 MG tablet    NORVASC    90 tablet    Take 1 tablet (5 mg) by mouth daily    Hypertension, unspecified type       B-12 2000 MCG Tabs      Take 1 tablet by mouth daily    Need for prophylactic vaccination against Streptococcus pneumoniae (pneumococcus)       Lidocaine 4 % Patch    LIDOCARE     Place 1-3 patches onto the skin every 24 hours    Closed fracture of multiple ribs of left side, initial encounter       menthol 5 % Ptch    ICY HOT     Apply 1 patch topically every 24 hours Apply to Skin. -- Place when lidoderm is off--Remove when lidoderm is placed Remove 'old patch' and chart on Medication Patch Removal order when new patch is applied. Avoid placing heating pad over the patch.    Closed fracture of multiple ribs of left side, initial encounter       METHOCARBAMOL PO      Take 750 mg by mouth 2 times daily        metoprolol succinate 25 MG 24 hr tablet    TOPROL-XL    30 tablet    Take 1 tablet (25 mg) by mouth daily    Paroxysmal atrial fibrillation (H)       ONDANSETRON PO      Take 4 mg by mouth every 6 hours as needed for nausea        oxyCODONE IR 5 MG tablet    ROXICODONE    6 tablet    Take 1 tablet (5 mg) by mouth every 3 hours as needed for moderate to severe pain    Closed fracture of multiple ribs of left side, initial encounter, Closed nondisplaced fracture of pelvis, unspecified part of pelvis, initial encounter (H)       polyethylene glycol Packet    MIRALAX/GLYCOLAX     Take 1 packet by mouth daily as needed for constipation         sennosides 8.6 MG tablet    SENOKOT     Take 2 tablets by mouth 2 times daily        simvastatin 20 MG tablet    ZOCOR    90 tablet    Take 1 tablet (20 mg) by mouth At Bedtime    Hyperlipidemia LDL goal <100       TYLENOL PO      Take 1,000 mg by mouth every 6 hours
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
## 2018-06-28 ENCOUNTER — TRANSFERRED RECORDS (OUTPATIENT)
Dept: HEALTH INFORMATION MANAGEMENT | Facility: CLINIC | Age: 83
End: 2018-06-28

## 2018-06-28 ENCOUNTER — NURSING HOME VISIT (OUTPATIENT)
Dept: GERIATRICS | Facility: CLINIC | Age: 83
End: 2018-06-28
Payer: MEDICARE

## 2018-06-28 VITALS
RESPIRATION RATE: 16 BRPM | TEMPERATURE: 97.7 F | HEART RATE: 69 BPM | WEIGHT: 143.6 LBS | OXYGEN SATURATION: 96 % | DIASTOLIC BLOOD PRESSURE: 74 MMHG | BODY MASS INDEX: 22.49 KG/M2 | SYSTOLIC BLOOD PRESSURE: 145 MMHG
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
## 2018-06-28 DIAGNOSIS — S22.42XD CLOSED FRACTURE OF MULTIPLE RIBS OF LEFT SIDE WITH ROUTINE HEALING, SUBSEQUENT ENCOUNTER: ICD-10-CM

## 2018-06-28 DIAGNOSIS — I10 BENIGN ESSENTIAL HYPERTENSION: ICD-10-CM

## 2018-06-28 DIAGNOSIS — I62.00 SUBDURAL HEMORRHAGE (H): Primary | ICD-10-CM

## 2018-06-28 DIAGNOSIS — D62 ANEMIA DUE TO BLOOD LOSS, ACUTE: ICD-10-CM

## 2018-06-28 DIAGNOSIS — I48.91 ATRIAL FIBRILLATION WITH RVR (H): ICD-10-CM

## 2018-06-28 DIAGNOSIS — N18.30 CKD (CHRONIC KIDNEY DISEASE) STAGE 3, GFR 30-59 ML/MIN (H): ICD-10-CM

## 2018-06-28 LAB — HEMOGLOBIN: 9.8 G/DL (ref 13.4–17.5)

## 2018-06-28 PROCEDURE — 99309 SBSQ NF CARE MODERATE MDM 30: CPT | Performed by: NURSE PRACTITIONER
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
## 2018-07-02 ENCOUNTER — TRANSFERRED RECORDS (OUTPATIENT)
Dept: HEALTH INFORMATION MANAGEMENT | Facility: CLINIC | Age: 83
End: 2018-07-02

## 2018-07-02 LAB
BUN SERPL-MCNC: 20 MG/DL (ref 9–26)
CALCIUM SERPL-MCNC: 8.7 MG/DL (ref 8.4–10.4)
CHLORIDE SERPLBLD-SCNC: 109 MMOL/L (ref 98–109)
CO2 SERPL-SCNC: 20 MMOL/L (ref 22–31)
CREAT SERPL-MCNC: 1.33 MG/DL (ref 0.73–1.18)
GFR SERPL CREATININE-BSD FRML MDRD: 47 ML/MIN/1.73M2
GLUCOSE SERPL-MCNC: 108 MG/DL (ref 70–100)
HEMOGLOBIN: 10.1 G/DL (ref 13.4–17.5)
POTASSIUM SERPL-SCNC: 3.7 MMOL/L (ref 3.5–5.2)
SODIUM SERPL-SCNC: 137 MMOL/L (ref 136–145)

## 2018-07-04 ENCOUNTER — MEDICAL CORRESPONDENCE (OUTPATIENT)
Dept: HEALTH INFORMATION MANAGEMENT | Facility: CLINIC | Age: 83
End: 2018-07-04

## 2018-07-05 ENCOUNTER — HOSPITAL ENCOUNTER (OUTPATIENT)
Dept: CT IMAGING | Facility: CLINIC | Age: 83
Discharge: HOME | End: 2018-07-05
Attending: PHYSICIAN ASSISTANT | Admitting: PHYSICIAN ASSISTANT
Payer: MEDICARE

## 2018-07-05 ENCOUNTER — OFFICE VISIT (OUTPATIENT)
Dept: NEUROSURGERY | Facility: CLINIC | Age: 83
End: 2018-07-05
Attending: PHYSICIAN ASSISTANT
Payer: MEDICARE

## 2018-07-05 VITALS
TEMPERATURE: 97.2 F | OXYGEN SATURATION: 97 % | HEART RATE: 74 BPM | SYSTOLIC BLOOD PRESSURE: 142 MMHG | DIASTOLIC BLOOD PRESSURE: 79 MMHG

## 2018-07-05 DIAGNOSIS — I62.00 SUBDURAL HEMORRHAGE (H): ICD-10-CM

## 2018-07-05 DIAGNOSIS — S06.5XAA SUBDURAL HEMATOMA (H): Primary | ICD-10-CM

## 2018-07-05 PROCEDURE — 99213 OFFICE O/P EST LOW 20 MIN: CPT | Performed by: PHYSICIAN ASSISTANT

## 2018-07-05 PROCEDURE — G0463 HOSPITAL OUTPT CLINIC VISIT: HCPCS | Performed by: PHYSICIAN ASSISTANT

## 2018-07-05 PROCEDURE — 70450 CT HEAD/BRAIN W/O DYE: CPT

## 2018-07-05 ASSESSMENT — PAIN SCALES - GENERAL: PAINLEVEL: NO PAIN (0)

## 2018-07-05 NOTE — MR AVS SNAPSHOT
After Visit Summary   7/5/2018    Jose Gomez    MRN: [id number]           Patient Information     Date Of Birth          1/21/1928        Visit Information        Provider Department      7/5/2018 12:50 PM Kelly Long PA-C [facility]        Today's Diagnoses     Subdural hematoma (H)    -  1      Care Instructions    - Call the clinic at [phone] for increased pain or any other questions and concerns.  - Go to ER with any seizure activity, mental status change (increasing confusion), difficulty with speech or increasing or acute weakness           Follow-ups after your visit        Your next 10 appointments already scheduled     Aug 28, 2018 10:00 AM CDT   ZIOPATCH MONITOR with KVNG   [facility] - [facility] ([facility])    [address]  [facility] [id number] [phone]            Sep 27, 2018 12:30 PM CDT   [facility] EP RETURN with SABIHA Bajwa CNP   [facility] ([facility])    [address]  [facility] [id number] [phone] OPT 2              Who to contact     If you have questions or need follow up information about today's clinic visit or your schedule please contact [facility] directly at [phone].  Normal or non-critical lab and imaging results will be communicated to you by MyChart, letter or phone within 4 business days after the clinic has received the results. If you do not hear from us within 7 days, please contact the clinic through MyChart or phone. If you have a critical or abnormal lab result, we will notify you by phone as soon as possible.  Submit refill requests through [employer] or call your pharmacy and they will forward the refill request to us. Please allow 3 business days for your refill to be completed.          Additional Information About Your Visit        Drop â€™til you [employer]  "Information     [employer] lets you send messages to your doctor, view your test results, renew your prescriptions, schedule appointments and more. To sign up, go to [email or web address] . Click on \"Log in\" on the left side of the screen, which will take you to the Welcome page. Then click on \"Sign up Now\" on the right side of the page.     You will be asked to enter the access code listed below, as well as some personal information. Please follow the directions to create your username and password.     Your access code is: [email or web address]  Expires: 2018 11:24 AM     Your access code will  in 90 days. If you need help or a new code, please call your [facility] clinic or [phone].        Care EveryWhere ID     This is your Care EveryWhere ID. This could be used by other organizations to access your [facility] medical records  [id number]        Your Vitals Were     Pulse Temperature Pulse Oximetry             74 97.2  F (36.2  C) (Oral) 97%          Blood Pressure from Last 3 Encounters:   18 156/80   18 145/74   18 136/77    Weight from Last 3 Encounters:   18 143 lb 9.6 oz (65.1 kg)   18 143 lb (64.9 kg)   18 143 lb 11.2 oz (65.2 kg)              Today, you had the following     No orders found for display       Primary Care Provider Office Phone # Fax #    Gabriel Carroll [phone]       [address] [place] [id number]        Equal Access to Services     [facility]: Hadii elza Atkinson, warulada onur, qaybta ajaljulianna costa. So [facility] [phone].    ATENCIÓN: Si habla español, tiene a davis disposición servicios gratuitos de asistencia lingüística. Edison al [phone].    We comply with applicable federal civil rights laws and [place] laws. We do not discriminate on the basis of race, color, national origin, age, disability, sex, sexual orientation, or gender identity.          "   Thank you!     Thank you for choosing [facility]UROS[facility]  for your care. Our goal is always to provide you with excellent care. Hearing back from our patients is one way we can continue to improve our services. Please take a few minutes to complete the written survey that you may receive in the mail after your visit with us. Thank you!             Your Updated Medication List - Protect others around you: Learn how to safely use, store and throw away your medicines at www.disposemymeds.org.          This list is accurate as of 7/5/18  1:17 PM.  Always use your most recent med list.                   Brand Name Dispense Instructions for use Diagnosis    amLODIPine 5 MG tablet    NORVASC    90 tablet    Take 1 tablet (5 mg) by mouth daily    Hypertension, unspecified type       B-12 2000 MCG Tabs      Take 1 tablet by mouth daily    Need for prophylactic vaccination against Streptococcus pneumoniae (pneumococcus)       menthol 5 % Ptch    ICY HOT     Apply 1 patch topically every 24 hours Apply to Skin. -- Place when lidoderm is off--Remove when lidoderm is placed Remove 'old patch' and chart on Medication Patch Removal order when new patch is applied. Avoid placing heating pad over the patch.    Closed fracture of multiple ribs of left side, initial encounter       metoprolol succinate 25 MG 24 hr tablet    TOPROL-XL    30 tablet    Take 1 tablet (25 mg) by mouth daily    Paroxysmal atrial fibrillation (H)       ONDANSETRON PO      Take 4 mg by mouth every 6 hours as needed for nausea        polyethylene glycol Packet    MIRALAX/GLYCOLAX     Take 1 packet by mouth daily as needed for constipation        sennosides 8.6 MG tablet    SENOKOT     Take 2 tablets by mouth 2 times daily        simvastatin 20 MG tablet    ZOCOR    90 tablet    Take 1 tablet (20 mg) by mouth At Bedtime    Hyperlipidemia LDL goal <100       TRAZODONE HCL PO      Take 12.5 mg by mouth nightly as needed for sleep         TYLENOL PO      Take 1,000 mg by mouth every 6 hours
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
## 2018-07-05 NOTE — PATIENT INSTRUCTIONS
- Call the clinic at [phone] for increased pain or any other questions and concerns.  - Go to ER with any seizure activity, mental status change (increasing confusion), difficulty with speech or increasing or acute weakness

## 2018-07-05 NOTE — NURSING NOTE
"Jose Gomez is a 89 year old male who presents for:  Chief Complaint   Patient presents with     Neurologic Problem     FVSD  5-26-18  sub dural hematoma        Vitals:    There were no vitals filed for this visit.    BMI:  Estimated body mass index is 22.49 kg/(m^2) as calculated from the following:    Height as of 6/22/18: 5' 7\" (1.702 m).    Weight as of 6/28/18: 143 lb 9.6 oz (65.1 kg).    Pain Score:  Data Unavailable        Nena Rocha, LUNA, AAS      "

## 2018-07-09 ENCOUNTER — TRANSFERRED RECORDS (OUTPATIENT)
Dept: HEALTH INFORMATION MANAGEMENT | Facility: CLINIC | Age: 83
End: 2018-07-09

## 2018-07-09 LAB
BUN SERPL-MCNC: 25 MG/DL (ref 9–26)
CALCIUM SERPL-MCNC: 8.5 MG/DL (ref 8.4–10.4)
CHLORIDE SERPLBLD-SCNC: 110 MMOL/L (ref 98–109)
CO2 SERPL-SCNC: 22 MMOL/L (ref 22–31)
CREAT SERPL-MCNC: 1.22 MG/DL (ref 0.73–1.18)
GFR SERPL CREATININE-BSD FRML MDRD: 52 ML/MIN/1.73M2
GLUCOSE SERPL-MCNC: 101 MG/DL (ref 70–100)
HEMOGLOBIN: 9.7 G/DL (ref 13.4–17.5)
POTASSIUM SERPL-SCNC: 3.8 MMOL/L (ref 3.5–5.2)
SODIUM SERPL-SCNC: 139 MMOL/L (ref 136–145)

## 2018-07-11 VITALS
HEART RATE: 87 BPM | BODY MASS INDEX: 22.33 KG/M2 | RESPIRATION RATE: 16 BRPM | OXYGEN SATURATION: 98 % | DIASTOLIC BLOOD PRESSURE: 76 MMHG | TEMPERATURE: 97 F | WEIGHT: 142.6 LBS | SYSTOLIC BLOOD PRESSURE: 148 MMHG

## 2018-07-12 ENCOUNTER — TRANSFERRED RECORDS (OUTPATIENT)
Dept: HEALTH INFORMATION MANAGEMENT | Facility: CLINIC | Age: 83
End: 2018-07-12

## 2018-07-12 ENCOUNTER — DISCHARGE SUMMARY NURSING HOME (OUTPATIENT)
Dept: GERIATRICS | Facility: CLINIC | Age: 83
End: 2018-07-12
Payer: MEDICARE

## 2018-07-12 DIAGNOSIS — D62 ANEMIA DUE TO BLOOD LOSS, ACUTE: ICD-10-CM

## 2018-07-12 DIAGNOSIS — I10 BENIGN ESSENTIAL HYPERTENSION: ICD-10-CM

## 2018-07-12 DIAGNOSIS — S30.22XD: ICD-10-CM
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
## 2018-07-12 DIAGNOSIS — S22.42XD CLOSED FRACTURE OF MULTIPLE RIBS OF LEFT SIDE WITH ROUTINE HEALING, SUBSEQUENT ENCOUNTER: ICD-10-CM

## 2018-07-12 DIAGNOSIS — I62.00 SUBDURAL HEMORRHAGE (H): Primary | ICD-10-CM

## 2018-07-12 DIAGNOSIS — I48.91 ATRIAL FIBRILLATION WITH RVR (H): ICD-10-CM

## 2018-07-12 DIAGNOSIS — N18.30 CKD (CHRONIC KIDNEY DISEASE) STAGE 3, GFR 30-59 ML/MIN (H): ICD-10-CM

## 2018-07-12 LAB — HEMOGLOBIN: 9.3 G/DL (ref 13.4–17.5)

## 2018-07-12 PROCEDURE — 99316 NF DSCHRG MGMT 30 MIN+: CPT | Performed by: NURSE PRACTITIONER
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
## 2018-07-13 ENCOUNTER — TELEPHONE (OUTPATIENT)
Dept: INTERNAL MEDICINE | Facility: CLINIC | Age: 83
End: 2018-07-13

## 2018-07-13 NOTE — TELEPHONE ENCOUNTER
Carol, Home Health Care nurse wanted you to know this patient is refusing home care.  Any questions please call her at [phone]

## 2018-07-23 ENCOUNTER — OFFICE VISIT (OUTPATIENT)
Dept: INTERNAL MEDICINE | Facility: CLINIC | Age: 83
End: 2018-07-23
Payer: MEDICARE

## 2018-07-23 VITALS
BODY MASS INDEX: 23.27 KG/M2 | TEMPERATURE: 97.6 F | RESPIRATION RATE: 16 BRPM | DIASTOLIC BLOOD PRESSURE: 54 MMHG | SYSTOLIC BLOOD PRESSURE: 136 MMHG | OXYGEN SATURATION: 98 % | WEIGHT: 148.6 LBS | HEART RATE: 74 BPM

## 2018-07-23 DIAGNOSIS — R41.89 COGNITIVE IMPAIRMENT: ICD-10-CM

## 2018-07-23 DIAGNOSIS — E11.42 TYPE 2 DIABETES MELLITUS WITH DIABETIC POLYNEUROPATHY, WITHOUT LONG-TERM CURRENT USE OF INSULIN (H): ICD-10-CM
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
## 2018-07-23 DIAGNOSIS — S06.5XAA SUBDURAL HEMATOMA (H): Primary | ICD-10-CM

## 2018-07-23 DIAGNOSIS — I48.0 PAROXYSMAL ATRIAL FIBRILLATION (H): ICD-10-CM

## 2018-07-23 PROCEDURE — 99214 OFFICE O/P EST MOD 30 MIN: CPT | Performed by: INTERNAL MEDICINE
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
## 2018-08-15 ENCOUNTER — PATIENT OUTREACH (OUTPATIENT)
Dept: CARE COORDINATION | Facility: CLINIC | Age: 83
End: 2018-08-15

## 2018-08-15 DIAGNOSIS — E78.5 HYPERLIPIDEMIA LDL GOAL <100: ICD-10-CM

## 2018-08-15 DIAGNOSIS — I10 HYPERTENSION, UNSPECIFIED TYPE: ICD-10-CM

## 2018-08-15 RX ORDER — SIMVASTATIN 20 MG
20 TABLET ORAL AT BEDTIME
Qty: 90 TABLET | Refills: 3 | Status: SHIPPED | OUTPATIENT
Start: 2018-08-15 | End: 2019-08-26

## 2018-08-15 NOTE — TELEPHONE ENCOUNTER
Requested Prescriptions   Pending Prescriptions Disp Refills     amLODIPine (NORVASC) 5 MG tablet    Last Written Prescription Date:  05/02/18  Last Fill Quantity: 90,  # refills: 0   Last office visit: 7/23/2018 with prescribing provider:  07/23/18   Future Office Visit:  0 90 tablet 0     Sig: Take 1 tablet (5 mg) by mouth daily    There is no refill protocol information for this order        simvastatin (ZOCOR) 20 MG tablet    Last Written Prescription Date:  05/02/18  Last Fill Quantity: 90,  # refills: 0   Last office visit: 7/23/2018 with prescribing provider:  07/23/18   Future Office Visit:  0 90 tablet 0     Sig: Take 1 tablet (20 mg) by mouth At Bedtime    There is no refill protocol information for this order

## 2018-08-15 NOTE — TELEPHONE ENCOUNTER
Simvastatin Prescription approved per [facility] Refill Protocol.    Routing refill request to provider for review/approval because:  Labs out of range:  Creatinine

## 2018-08-15 NOTE — PROGRESS NOTES
Clinic Care Coordination Contact    Situation: Patient chart reviewed by care coordinator.    Background:    Hospital/Nursing Home/IP Rehab Facility: [facility]  Date of Admission: 5-26-18 & 6-4-18  Date of Discharge: 6-4-18 & 7-13-18      Reason(s) for Admission: Traumatic bilateral subdural hematomas  Multiple left-sided rib fractures   Pelvic fracture  Chronic back pain  Metabolic encephalopathy vs delirium: Resolved  Barry trauma    Narrow complex tachycardia  NSVT  New onset Afib w/ RVR  Stage III CKD  DM II  Hypertension  Hyperlipidemia  Thrombophlebitis right forearm, peripheral IV line related, line is out  Acute blood loss anemia dt SDH, scrotal ecchymosis        Assessment: Patient attended follow up visit with PCP on 7-23-18   Warm hand off to RN CC assigned to clinic   Plan/Recommendations: New RN CC at clinic to outreach for introduction and assess Care coordination   needs.   Christine Fabian RN  Clinic Care Coordinator  Mobile: [phone]  [email or web address]

## 2018-08-16 RX ORDER — AMLODIPINE BESYLATE 5 MG/1
5 TABLET ORAL DAILY
Qty: 90 TABLET | Refills: 0 | Status: SHIPPED | OUTPATIENT
Start: 2018-08-16 | End: 2018-12-03

## 2018-08-28 ENCOUNTER — PATIENT OUTREACH (OUTPATIENT)
Dept: CARE COORDINATION | Facility: CLINIC | Age: 83
End: 2018-08-28

## 2018-08-28 NOTE — PROGRESS NOTES
Clinic Care Coordination Contact  [facility]/Voicemail    Referral Source: Pro-Active Outreach  CCC RN transition    Clinical Data: Care Coordinator Outreach  Outreach attempted x 1.  Left message with family member, patient not available; request to call back in a few days.  Plan: Care Coordinator will try to reach patient again in 3-5 business days.    Rob Hunter RN  Clinic Care Coordinator  [facility] & [facility]  Ph: [phone]

## 2018-09-12 ENCOUNTER — PATIENT OUTREACH (OUTPATIENT)
Dept: CARE COORDINATION | Facility: CLINIC | Age: 83
End: 2018-09-12
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
## 2018-09-12 NOTE — LETTER
[place] CARE COORDINATION  [facility]  [address] , [place] [address]    September 12, 2018    Jose CANDIDO Gomez  [address] [address] [address]      Dear Jose,    I am a clinic care coordinator who works with Gabriel Carroll MD at [facility]. I wanted to thank you for spending the time to talk with me.  I wanted to introduce myself and provide you with my contact information so that you can call me with questions or concerns about your health care. Below is a description of clinic care coordination and how I can further assist you.     The clinic care coordinator is a registered nurse and/or  who understand the health care system. The goal of clinic care coordination is to help you manage your health and improve access to the [facility] system in the most efficient manner. The registered nurse can assist you in meeting your health care goals by providing education, coordinating services, and strengthening the communication among your providers. The  can assist you with financial, behavioral, psychosocial, chemical dependency, counseling, and/or psychiatric resources.    Please feel free to contact me at [phone] with any questions or concerns. We at [facility] are focused on providing you with the highest-quality healthcare experience possible and that all starts with you.     Sincerely,     Rob Hunter RN  Clinic Care Coordinator  [facility] & [facility]  Ph: [phone]    Enclosed: I have enclosed a copy of a 24 Hour Access Plan. This has helpful phone numbers for you to call when needed. Please keep this in an easy to access place to use as needed.

## 2018-09-24 ENCOUNTER — TELEPHONE (OUTPATIENT)
Dept: CARDIOLOGY | Facility: CLINIC | Age: 83
End: 2018-09-24

## 2018-09-24 NOTE — TELEPHONE ENCOUNTER
Phone call to patient and spoke to daughter Armida. I told her that her father has an appointment on 9/27 with Sol to review  ZIOPATCH but I noted that the hook up for this on 8/28 was cancelled.This was ordered by Dr. Oviedo on 6/22 when he saw patient for afib. At that time he stopped his amiodarone and placed him on Toprol 25 MG daily. I explained to her the rationale for the monitor. I also wanted to inquire as to their wishes for his cardiac care. She told me that he is doing really well and is up around w/o any difficulties and plays cards on a regular basis. She further says that she has not heard of him complain of any symptoms. At this point they would like to cancel the 9/27 appointment with Sol. I gave her the afib clinic number and told her that if any concerning develop along the way to call the clinic. She expressed satisfaction with this plan. I will remove patient from schedule. Chloe

## 2018-12-03 DIAGNOSIS — I48.0 PAROXYSMAL ATRIAL FIBRILLATION (H): ICD-10-CM

## 2018-12-03 DIAGNOSIS — I10 HYPERTENSION, UNSPECIFIED TYPE: ICD-10-CM

## 2018-12-03 NOTE — TELEPHONE ENCOUNTER
"Requested Prescriptions   Pending Prescriptions Disp Refills     amLODIPine (NORVASC) 5 MG tablet [Pharmacy Med Name: AmLODIPine Besylate Oral Tablet 5 MG] 90 tablet 0     Sig: TAKE ONE TABLET BY MOUTH ONE TIME DAILY    Calcium Channel Blockers Protocol  Failed    12/3/2018 12:38 PM       Failed - Normal serum creatinine on file in past 12 months    Recent Labs   Lab Test 07/09/18   CR  1.22*            Passed - Blood pressure under 140/90 in past 12 months    BP Readings from Last 3 Encounters:   07/23/18 136/54   07/11/18 148/76   07/05/18 142/79                Passed - Recent (12 mo) or future (30 days) visit within the authorizing provider's specialty    Patient had office visit in the last 12 months or has a visit in the next 30 days with authorizing provider or within the authorizing provider's specialty.  See \"Patient Info\" tab in inbasket, or \"Choose Columns\" in Meds & Orders section of the refill encounter.             Passed - Patient is age 18 or older        Last Written Prescription Date:  08/16/2018  Last Fill Quantity: 90,  # refills: 0   Last office visit: 7/23/2018 with prescribing provider:  Dr Carroll   Future Office Visit:      "

## 2018-12-04 RX ORDER — METOPROLOL SUCCINATE 25 MG/1
25 TABLET, EXTENDED RELEASE ORAL DAILY
Qty: 30 TABLET | Refills: 7 | Status: SHIPPED | OUTPATIENT
Start: 2018-12-04 | End: 2019-08-06

## 2018-12-04 RX ORDER — AMLODIPINE BESYLATE 5 MG/1
TABLET ORAL
Qty: 90 TABLET | Refills: 1 | Status: SHIPPED | OUTPATIENT
Start: 2018-12-04 | End: 2019-07-02

## 2018-12-04 NOTE — TELEPHONE ENCOUNTER
"Requested Prescriptions   Pending Prescriptions Disp Refills     metoprolol succinate ER (TOPROL-XL) 25 MG 24 hr tablet  Last Written Prescription Date:  06/22/2018  Last Fill Quantity: 30,  # refills: 03   Last Office Visit: 7/23/2018   Future Office Visit:      30 tablet 3     Sig: Take 1 tablet (25 mg) by mouth daily    Beta-Blockers Protocol Passed    12/3/2018  8:27 PM       Passed - Blood pressure under 140/90 in past 12 months    BP Readings from Last 3 Encounters:   07/23/18 136/54   07/11/18 148/76   07/05/18 142/79                Passed - Patient is age 6 or older       Passed - Recent (12 mo) or future (30 days) visit within the authorizing provider's specialty    Patient had office visit in the last 12 months or has a visit in the next 30 days with authorizing provider or within the authorizing provider's specialty.  See \"Patient Info\" tab in inbasket, or \"Choose Columns\" in Meds & Orders section of the refill encounter.                "

## 2019-07-02 DIAGNOSIS — I10 HYPERTENSION, UNSPECIFIED TYPE: ICD-10-CM

## 2019-07-02 RX ORDER — AMLODIPINE BESYLATE 5 MG/1
TABLET ORAL
Qty: 30 TABLET | Refills: 0 | Status: SHIPPED | OUTPATIENT
Start: 2019-07-02 | End: 2019-08-07

## 2019-08-06 DIAGNOSIS — I48.0 PAROXYSMAL ATRIAL FIBRILLATION (H): ICD-10-CM

## 2019-08-06 DIAGNOSIS — I10 HYPERTENSION, UNSPECIFIED TYPE: ICD-10-CM

## 2019-08-06 RX ORDER — METOPROLOL SUCCINATE 25 MG/1
25 TABLET, EXTENDED RELEASE ORAL DAILY
Qty: 30 TABLET | Refills: 0 | Status: SHIPPED | OUTPATIENT
Start: 2019-08-06 | End: 2019-09-03

## 2019-08-06 NOTE — TELEPHONE ENCOUNTER
"Requested Prescriptions   Pending Prescriptions Disp Refills     metoprolol succinate ER (TOPROL-XL) 25 MG 24 hr tablet 30 tablet 7     Sig: Take 1 tablet (25 mg) by mouth daily       Beta-Blockers Protocol Failed - 8/6/2019  1:42 PM        Failed - Blood pressure under 140/90 in past 12 months     BP Readings from Last 3 Encounters:   07/23/18 136/54   07/11/18 148/76   07/05/18 142/79                 Failed - Recent (12 mo) or future (30 days) visit within the authorizing provider's specialty     Patient had office visit in the last 12 months or has a visit in the next 30 days with authorizing provider or within the authorizing provider's specialty.  See \"Patient Info\" tab in inbasket, or \"Choose Columns\" in Meds & Orders section of the refill encounter.              Passed - Patient is age 6 or older        Passed - Medication is active on med list        Last Written Prescription Date:  12/4/2019  Last Fill Quantity: 30,  # refills: 7   Last office visit: 7/23/2018 with prescribing provider:  Gabriel Carroll     Future Office Visit:    Medication is being filled for 1 time refill only due to:  Patient needs to be seen because it has been more than one year since last visit.    Prescription approved per [facility] Refill Protocol.    Angelica SULLIVAN RN, BSN, PHN      "

## 2019-08-06 NOTE — LETTER
[facility]  [address] [id number] [phone]            Jose Gomez  [address] [facility] [address]        August 6, 2019    Dear Jose,    While refilling your prescription today, we noticed that you are due for an appointment with your provider.  We will refill your prescription for 30 days, but a follow-up appointment must be made before any additional refills can be approved.     Taking care of your health is important to us and we look forward to seeing you in the near future.  Please call us at [phone] or [phone] (or use [employer] to schedule an appointment.     Please disregard this notice if you have already made an appointment.    Sincerely,        [facility]

## 2019-08-06 NOTE — TELEPHONE ENCOUNTER
Requested Prescriptions   Pending Prescriptions Disp Refills     amLODIPine (NORVASC) 5 MG tablet 30 tablet 0     Sig: Take 1 tablet (5 mg) by mouth daily       There is no refill protocol information for this order      Last Written Prescription Date:  07/02/19  Last Fill Quantity: 30,  # refills: 0   Last office visit: 7/23/2018 with prescribing provider:  07/23/18   Future Office Visit:  0

## 2019-08-06 NOTE — TELEPHONE ENCOUNTER
"Requested Prescriptions   Pending Prescriptions Disp Refills     amLODIPine (NORVASC) 5 MG tablet 30 tablet 0     Sig: Take 1 tablet (5 mg) by mouth daily       Calcium Channel Blockers Protocol  Failed - 8/6/2019  1:45 PM        Failed - Blood pressure under 140/90 in past 12 months     BP Readings from Last 3 Encounters:   07/23/18 136/54   07/11/18 148/76   07/05/18 142/79                 Failed - Recent (12 mo) or future (30 days) visit within the authorizing provider's specialty     Patient had office visit in the last 12 months or has a visit in the next 30 days with authorizing provider or within the authorizing provider's specialty.  See \"Patient Info\" tab in inbasket, or \"Choose Columns\" in Meds & Orders section of the refill encounter.              Failed - Normal serum creatinine on file in past 12 months     Recent Labs   Lab Test 07/09/18   CR 1.22*             Passed - Medication is active on med list        Passed - Patient is age 18 or older        Routing refill request to provider for review/approval because:  Jyothi given x1 and patient did not follow up, please advise  Labs out of range:  creat  Patient needs to be seen because it has been more than 1 year since last office visit.          "

## 2019-08-06 NOTE — TELEPHONE ENCOUNTER
Requested Prescriptions   Pending Prescriptions Disp Refills     metoprolol succinate ER (TOPROL-XL) 25 MG 24 hr tablet 30 tablet 7     Sig: Take 1 tablet (25 mg) by mouth daily       There is no refill protocol information for this order      Last Written Prescription Date:  12/04/18  Last Fill Quantity: 30,  # refills: 7   Last office visit: 7/23/2018 with prescribing provider:  07/23/18   Future Office Visit:  0

## 2019-08-07 RX ORDER — AMLODIPINE BESYLATE 5 MG/1
5 TABLET ORAL DAILY
Qty: 30 TABLET | Refills: 0 | Status: SHIPPED | OUTPATIENT
Start: 2019-08-07 | End: 2019-09-03

## 2019-08-26 ENCOUNTER — OFFICE VISIT (OUTPATIENT)
Dept: INTERNAL MEDICINE | Facility: CLINIC | Age: 84
End: 2019-08-26
Payer: MEDICARE

## 2019-08-26 VITALS
DIASTOLIC BLOOD PRESSURE: 62 MMHG | SYSTOLIC BLOOD PRESSURE: 118 MMHG | RESPIRATION RATE: 16 BRPM | OXYGEN SATURATION: 96 % | HEART RATE: 80 BPM | WEIGHT: 169 LBS | BODY MASS INDEX: 26.47 KG/M2

## 2019-08-26 DIAGNOSIS — E78.5 HYPERLIPIDEMIA LDL GOAL <100: ICD-10-CM

## 2019-08-26 DIAGNOSIS — E11.9 TYPE 2 DIABETES MELLITUS WITHOUT COMPLICATION, WITHOUT LONG-TERM CURRENT USE OF INSULIN (H): ICD-10-CM

## 2019-08-26 DIAGNOSIS — N18.30 CKD (CHRONIC KIDNEY DISEASE) STAGE 3, GFR 30-59 ML/MIN (H): ICD-10-CM

## 2019-08-26 DIAGNOSIS — I10 HYPERTENSION, UNSPECIFIED TYPE: Primary | ICD-10-CM

## 2019-08-26 LAB
ANION GAP SERPL CALCULATED.3IONS-SCNC: 4 MMOL/L (ref 3–14)
BUN SERPL-MCNC: 34 MG/DL (ref 7–30)
CALCIUM SERPL-MCNC: 9.3 MG/DL (ref 8.5–10.1)
CHLORIDE SERPL-SCNC: 104 MMOL/L (ref 94–109)
CHOLEST SERPL-MCNC: 157 MG/DL
CO2 SERPL-SCNC: 28 MMOL/L (ref 20–32)
CREAT SERPL-MCNC: 1.76 MG/DL (ref 0.66–1.25)
CREAT UR-MCNC: 164 MG/DL
GFR SERPL CREATININE-BSD FRML MDRD: 33 ML/MIN/{1.73_M2}
GLUCOSE SERPL-MCNC: 127 MG/DL (ref 70–99)
HBA1C MFR BLD: 6.5 % (ref 0–5.6)
HDLC SERPL-MCNC: 53 MG/DL
HGB BLD-MCNC: 14.1 G/DL (ref 13.3–17.7)
LDLC SERPL CALC-MCNC: 90 MG/DL
MICROALBUMIN UR-MCNC: 151 MG/L
MICROALBUMIN/CREAT UR: 92.07 MG/G CR (ref 0–17)
NONHDLC SERPL-MCNC: 104 MG/DL
POTASSIUM SERPL-SCNC: 4.5 MMOL/L (ref 3.4–5.3)
SODIUM SERPL-SCNC: 136 MMOL/L (ref 133–144)
TRIGL SERPL-MCNC: 70 MG/DL
TSH SERPL DL<=0.005 MIU/L-ACNC: 1.7 MU/L (ref 0.4–4)

## 2019-08-26 PROCEDURE — 99214 OFFICE O/P EST MOD 30 MIN: CPT | Performed by: INTERNAL MEDICINE

## 2019-08-26 PROCEDURE — 82043 UR ALBUMIN QUANTITATIVE: CPT | Performed by: INTERNAL MEDICINE

## 2019-08-26 PROCEDURE — 84443 ASSAY THYROID STIM HORMONE: CPT | Performed by: INTERNAL MEDICINE

## 2019-08-26 PROCEDURE — 83036 HEMOGLOBIN GLYCOSYLATED A1C: CPT | Performed by: INTERNAL MEDICINE

## 2019-08-26 PROCEDURE — 85018 HEMOGLOBIN: CPT | Performed by: INTERNAL MEDICINE

## 2019-08-26 PROCEDURE — 80061 LIPID PANEL: CPT | Performed by: INTERNAL MEDICINE

## 2019-08-26 PROCEDURE — 36415 COLL VENOUS BLD VENIPUNCTURE: CPT | Performed by: INTERNAL MEDICINE

## 2019-08-26 PROCEDURE — 80048 BASIC METABOLIC PNL TOTAL CA: CPT | Performed by: INTERNAL MEDICINE

## 2019-08-26 RX ORDER — SIMVASTATIN 20 MG
20 TABLET ORAL AT BEDTIME
Qty: 90 TABLET | Refills: 3 | Status: SHIPPED | OUTPATIENT
Start: 2019-08-26 | End: 2020-10-07

## 2019-08-26 NOTE — PROGRESS NOTES
Subjective     Jose Gomez is a 89 year old male who presents to clinic today for the following health issues:      Hypertension Follow-up      Do you check your blood pressure regularly outside of the clinic? No     Are you following a low salt diet? Yes    Are your blood pressures ever more than 140 on the top number (systolic) OR more   than 90 on the bottom number (diastolic), for example 140/90? No      How many servings of fruits and vegetables do you eat daily?  2-3    On average, how many sweetened beverages do you drink each day (soda, juice, sweet tea, etc)?   1    How many days per week do you miss taking your medication? 0      Patient is not really interested in much also than having his blood pressure check.  He is here with his daughter states that he is been doing well at home.  His memory seems to continue to decline but gradual decline.  She states he still quite independent at home and along with her brother they help their father out.  He does not drive any longer.  But seems independent in most other functions.            Reviewed and updated as needed this visit by Provider         Review of Systems   ROS COMP: Constitutional, HEENT, cardiovascular, pulmonary, gi and gu systems are negative, except as otherwise noted.      Objective    /62   Pulse 80   Resp 16   Wt 76.7 kg (169 lb)   SpO2 96%   BMI 26.47 kg/m    Body mass index is 26.47 kg/m .  Physical Exam   GENERAL APPEARANCE: healthy, alert and no distress  HENT: nose and mouth without ulcers or lesions and normal cephalic/atraumatic  NECK: no adenopathy, no asymmetry, masses, or scars and thyroid normal to palpation  RESP: lungs clear to auscultation - no rales, rhonchi or wheezes  CV: regular rates and rhythm, normal S1 S2, no S3 or S4 and no murmur, click or rub  MS: extremities normal- no gross deformities noted  SKIN: no suspicious lesions or rashes  NEURO: Normal strength and tone, mentation intact and speech  normal  PSYCH: mentation appears normal and affect normal/bright    Results for orders placed or performed in visit on 08/26/19 (from the past 24 hour(s))   TSH WITH FREE T4 REFLEX   Result Value Ref Range    TSH 1.70 0.40 - 4.00 mU/L   Albumin Random Urine Quantitative with Creat Ratio   Result Value Ref Range    Creatinine Urine 164 mg/dL    Albumin Urine mg/L 151 mg/L    Albumin Urine mg/g Cr 92.07 (H) 0 - 17 mg/g Cr   HEMOGLOBIN A1C   Result Value Ref Range    Hemoglobin A1C 6.5 (H) 0 - 5.6 %   Lipid panel reflex to direct LDL Fasting   Result Value Ref Range    Cholesterol 157 <200 mg/dL    Triglycerides 70 <150 mg/dL    HDL Cholesterol 53 >39 mg/dL    LDL Cholesterol Calculated 90 <100 mg/dL    Non HDL Cholesterol 104 <130 mg/dL   BASIC METABOLIC PANEL   Result Value Ref Range    Sodium 136 133 - 144 mmol/L    Potassium 4.5 3.4 - 5.3 mmol/L    Chloride 104 94 - 109 mmol/L    Carbon Dioxide 28 20 - 32 mmol/L    Anion Gap 4 3 - 14 mmol/L    Glucose 127 (H) 70 - 99 mg/dL    Urea Nitrogen 34 (H) 7 - 30 mg/dL    Creatinine 1.76 (H) 0.66 - 1.25 mg/dL    GFR Estimate 33 (L) >60 mL/min/[1.73_m2]    GFR Estimate If Black 38 (L) >60 mL/min/[1.73_m2]    Calcium 9.3 8.5 - 10.1 mg/dL   Hemoglobin   Result Value Ref Range    Hemoglobin 14.1 13.3 - 17.7 g/dL           Assessment & Plan     1. Hypertension, unspecified type  2. CKD (chronic kidney disease) stage 3, GFR 30-59 ml/min (H)  We could consider adding an ACE though I think we have to cut back on his amlodipine given his blood pressure readings.  His GFR is declined some since last year but in general worries that it is more his baseline.  He might be a little dry given his BUN.  In general I will not push too hard for any significant changes here -> recheck labs in  3 to 6 months  - BASIC METABOLIC PANEL  - Hemoglobin    3. Type 2 diabetes mellitus without complication, without long-term current use of insulin (H)  Diet- stable. Monitor only  - TSH WITH FREE T4  REFLEX  - Albumin Random Urine Quantitative with Creat Ratio  - HEMOGLOBIN A1C    4. Hyperlipidemia LDL goal <100  Statin   - Lipid panel reflex to direct LDL Fasting           Return in about 6 months (around 2/26/2020) for Wellness Visit with labs before.    Gabriel Carroll MD  [facility]

## 2019-08-26 NOTE — LETTER
September 11, 2019      Jose Gomez  [address] [facility] [address]        Dear ,    We are writing to inform you of your test results.    With the protein noted in your urine we may want to switch BP medications.  Using a medication such as an ACE inhibitor is better for your kidney than the amlodipine. Please see me for follow up.     Resulted Orders   TSH WITH FREE T4 REFLEX   Result Value Ref Range    TSH 1.70 0.40 - 4.00 mU/L   Albumin Random Urine Quantitative with Creat Ratio   Result Value Ref Range    Creatinine Urine 164 mg/dL    Albumin Urine mg/L 151 mg/L    Albumin Urine mg/g Cr 92.07 (H) 0 - 17 mg/g Cr   HEMOGLOBIN A1C   Result Value Ref Range    Hemoglobin A1C 6.5 (H) 0 - 5.6 %      Comment:      Normal <5.7% Prediabetes 5.7-6.4%  Diabetes 6.5% or higher - adopted from ADA   consensus guidelines.     Lipid panel reflex to direct LDL Fasting   Result Value Ref Range    Cholesterol 157 <200 mg/dL    Triglycerides 70 <150 mg/dL    HDL Cholesterol 53 >39 mg/dL    LDL Cholesterol Calculated 90 <100 mg/dL      Comment:      Desirable:       <100 mg/dl    Non HDL Cholesterol 104 <130 mg/dL   BASIC METABOLIC PANEL   Result Value Ref Range    Sodium 136 133 - 144 mmol/L    Potassium 4.5 3.4 - 5.3 mmol/L    Chloride 104 94 - 109 mmol/L    Carbon Dioxide 28 20 - 32 mmol/L    Anion Gap 4 3 - 14 mmol/L    Glucose 127 (H) 70 - 99 mg/dL    Urea Nitrogen 34 (H) 7 - 30 mg/dL    Creatinine 1.76 (H) 0.66 - 1.25 mg/dL    GFR Estimate 33 (L) >60 mL/min/[1.73_m2]      Comment:      Non  GFR Calc  Starting 12/18/2018, serum creatinine based estimated GFR (eGFR) will be   calculated using the Chronic Kidney Disease Epidemiology Collaboration   (CKD-EPI) equation.      GFR Estimate If Black 38 (L) >60 mL/min/[1.73_m2]      Comment:       GFR Calc  Starting 12/18/2018, serum creatinine based estimated GFR (eGFR) will be   calculated using the Chronic Kidney Disease  Epidemiology Collaboration   (CKD-EPI) equation.      Calcium 9.3 8.5 - 10.1 mg/dL   Hemoglobin   Result Value Ref Range    Hemoglobin 14.1 13.3 - 17.7 g/dL       If you have any questions or concerns, please call the clinic at the number listed above.       Sincerely,        Gabriel Carroll MD

## 2019-09-03 DIAGNOSIS — I10 HYPERTENSION, UNSPECIFIED TYPE: ICD-10-CM

## 2019-09-03 DIAGNOSIS — I48.0 PAROXYSMAL ATRIAL FIBRILLATION (H): ICD-10-CM

## 2019-09-03 RX ORDER — AMLODIPINE BESYLATE 5 MG/1
TABLET ORAL
Qty: 30 TABLET | Refills: 5 | Status: SHIPPED | OUTPATIENT
Start: 2019-09-03 | End: 2019-09-19 | Stop reason: ALTCHOICE

## 2019-09-03 RX ORDER — METOPROLOL SUCCINATE 25 MG/1
TABLET, EXTENDED RELEASE ORAL
Qty: 90 TABLET | Refills: 3 | Status: SHIPPED | OUTPATIENT
Start: 2019-09-03 | End: 2020-02-11

## 2019-09-03 NOTE — TELEPHONE ENCOUNTER
"Requested Prescriptions   Pending Prescriptions Disp Refills     metoprolol succinate ER (TOPROL-XL) 25 MG 24 hr tablet [Pharmacy Med Name: METOPROLOL ER SUCCINATE 25MG TABS] 30 tablet 0     Sig: TAKE 1 TABLET BY MOUTH EVERY DAY       Beta-Blockers Protocol Passed - 9/3/2019 12:54 PM        Passed - Blood pressure under 140/90 in past 12 months     BP Readings from Last 3 Encounters:   08/26/19 118/62   07/23/18 136/54   07/11/18 148/76                 Passed - Patient is age 6 or older        Passed - Recent (12 mo) or future (30 days) visit within the authorizing provider's specialty     Patient had office visit in the last 12 months or has a visit in the next 30 days with authorizing provider or within the authorizing provider's specialty.  See \"Patient Info\" tab in inbasket, or \"Choose Columns\" in Meds & Orders section of the refill encounter.              Passed - Medication is active on med list        amLODIPine (NORVASC) 5 MG tablet [Pharmacy Med Name: AMLODIPINE BESYLATE 5MG TABLETS] 30 tablet 0     Sig: TAKE 1 TABLET(5 MG) BY MOUTH DAILY       Calcium Channel Blockers Protocol  Failed - 9/3/2019 12:54 PM        Failed - Normal serum creatinine on file in past 12 months     Recent Labs   Lab Test 08/26/19  1054   CR 1.76*             Passed - Blood pressure under 140/90 in past 12 months     BP Readings from Last 3 Encounters:   08/26/19 118/62   07/23/18 136/54   07/11/18 148/76                 Passed - Recent (12 mo) or future (30 days) visit within the authorizing provider's specialty     Patient had office visit in the last 12 months or has a visit in the next 30 days with authorizing provider or within the authorizing provider's specialty.  See \"Patient Info\" tab in inbasket, or \"Choose Columns\" in Meds & Orders section of the refill encounter.              Passed - Medication is active on med list        Passed - Patient is age 18 or older        Routing refill request to provider for review/approval " because:  Labs out of range:  creat

## 2019-09-19 ENCOUNTER — OFFICE VISIT (OUTPATIENT)
Dept: INTERNAL MEDICINE | Facility: CLINIC | Age: 84
End: 2019-09-19
Payer: MEDICARE

## 2019-09-19 VITALS
SYSTOLIC BLOOD PRESSURE: 136 MMHG | WEIGHT: 170.8 LBS | BODY MASS INDEX: 26.81 KG/M2 | DIASTOLIC BLOOD PRESSURE: 78 MMHG | TEMPERATURE: 97.6 F | HEART RATE: 62 BPM | HEIGHT: 67 IN | OXYGEN SATURATION: 96 %

## 2019-09-19 DIAGNOSIS — I10 HYPERTENSION, UNSPECIFIED TYPE: ICD-10-CM

## 2019-09-19 DIAGNOSIS — R80.9 TYPE 2 DIABETES MELLITUS WITH MICROALBUMINURIA, WITHOUT LONG-TERM CURRENT USE OF INSULIN (H): ICD-10-CM

## 2019-09-19 DIAGNOSIS — N18.30 CKD (CHRONIC KIDNEY DISEASE) STAGE 3, GFR 30-59 ML/MIN (H): ICD-10-CM

## 2019-09-19 DIAGNOSIS — I10 HYPERTENSION, UNSPECIFIED TYPE: Primary | ICD-10-CM

## 2019-09-19 DIAGNOSIS — E11.29 TYPE 2 DIABETES MELLITUS WITH MICROALBUMINURIA, WITHOUT LONG-TERM CURRENT USE OF INSULIN (H): ICD-10-CM

## 2019-09-19 PROCEDURE — 99214 OFFICE O/P EST MOD 30 MIN: CPT | Performed by: INTERNAL MEDICINE

## 2019-09-19 RX ORDER — LISINOPRIL 10 MG/1
10 TABLET ORAL DAILY
Qty: 30 TABLET | Refills: 2 | Status: SHIPPED | OUTPATIENT
Start: 2019-09-19 | End: 2019-09-19

## 2019-09-19 ASSESSMENT — MIFFLIN-ST. JEOR: SCORE: 1388.37

## 2019-09-19 NOTE — TELEPHONE ENCOUNTER
"Requested Prescriptions   Pending Prescriptions Disp Refills     lisinopril (PRINIVIL/ZESTRIL) 10 MG tablet [Pharmacy Med Name: LISINOPRIL 10MG TABLETS] 90 tablet 2     Sig: TAKE 1 TABLET BY MOUTH DAILY       ACE Inhibitors (Including Combos) Protocol Failed - 9/19/2019 10:06 AM        Failed - Normal serum creatinine on file in past 12 months     Recent Labs   Lab Test 08/26/19  1054   CR 1.76*             Passed - Blood pressure under 140/90 in past 12 months     BP Readings from Last 3 Encounters:   09/19/19 136/78   08/26/19 118/62   07/23/18 136/54                 Passed - Recent (12 mo) or future (30 days) visit within the authorizing provider's specialty     Patient had office visit in the last 12 months or has a visit in the next 30 days with authorizing provider or within the authorizing provider's specialty.  See \"Patient Info\" tab in inbasket, or \"Choose Columns\" in Meds & Orders section of the refill encounter.              Passed - Medication is active on med list        Passed - Patient is age 18 or older        Passed - Normal serum potassium on file in past 12 months     Recent Labs   Lab Test 08/26/19  1054   POTASSIUM 4.5             Last Written Prescription Date:  9/19/19  Last Fill Quantity: 30,  # refills: 2   Last office visit: 9/19/2019 with prescribing provider:  9/19/19   Future Office Visit:      "

## 2019-09-19 NOTE — PATIENT INSTRUCTIONS
Stop the amlodipine  Start the lisinopril    Recheck your BP here in 2-3 weeks at our pharmacy  Get labs done at our lab next to pharmacy on same day

## 2019-09-19 NOTE — NURSING NOTE
"Chief Complaint   Patient presents with     Results     f/u on recent lab results, possible switch of BP     /78   Pulse 62   Temp 97.6  F (36.4  C) (Oral)   Ht 1.702 m (5' 7\")   Wt 77.5 kg (170 lb 12.8 oz)   SpO2 96%   BMI 26.75 kg/m   Estimated body mass index is 26.75 kg/m  as calculated from the following:    Height as of this encounter: 1.702 m (5' 7\").    Weight as of this encounter: 77.5 kg (170 lb 12.8 oz).        Health Maintenance due pending provider review:  NONE    n/a    Francine Brito CMA  "

## 2019-09-19 NOTE — PROGRESS NOTES
"Subjective     Jose Gomez is a 89 year old male who presents to clinic today for the following health issues:    HPI   F/u after recent lab results-chronic kidney disease with mild amount of microalbuminuria to his diabetes.  Previously had been on ACE inhibitor in years past but this is discontinued during a rehab stay due to some hypotension along with the his other medications.  Since then he has been managed with amlodipine and beta-blocker with adequate BP control.       Reviewed and updated as needed this visit by Provider         Review of Systems   ROS COMP: Constitutional, HEENT, cardiovascular, pulmonary, gi and gu systems are negative, except as otherwise noted.      Objective    /78   Pulse 62   Temp 97.6  F (36.4  C) (Oral)   Ht 1.702 m (5' 7\")   Wt 77.5 kg (170 lb 12.8 oz)   SpO2 96%   BMI 26.75 kg/m    Body mass index is 26.75 kg/m .  Physical Exam   GENERAL APPEARANCE: alert and no distress  RESP: lungs clear to auscultation - no rales, rhonchi or wheezes  CV: regular rates and rhythm, normal S1 S2, no S3 or S4 and no murmur, click or rub    Labs reviewed in Epic        Assessment & Plan     1. Hypertension, unspecified type  2. Type 2 diabetes mellitus with microalbuminuria, without long-term current use of insulin (H)  3. CKD (chronic kidney disease) stage 3, GFR 30-59 ml/min (H)  Preference would be for him to be on an ACE inhibitor.  Given prior history of some hypotension when we had him on both ACE inhibitor and the amlodipine will go ahead and DC the amlodipine titrate the lisinopril up according to his blood pressure readings.  Recheck labs in 2 weeks along with a pharmacy BP check  - lisinopril (PRINIVIL/ZESTRIL) 10 MG tablet; Take 1 tablet (10 mg) by mouth daily  Dispense: 30 tablet; Refill: 2  - **Basic metabolic panel FUTURE 14d; Future     BMI:   Estimated body mass index is 26.75 kg/m  as calculated from the following:    Height as of this encounter: 1.702 m (5' 7\").    " Weight as of this encounter: 77.5 kg (170 lb 12.8 oz).           See Patient Instructions    Return in about 2 weeks (around 10/3/2019) for Lab Work.    Gabriel Carroll MD  [facility]

## 2019-09-20 RX ORDER — LISINOPRIL 10 MG/1
TABLET ORAL
Qty: 90 TABLET | Refills: 0 | Status: SHIPPED | OUTPATIENT
Start: 2019-09-20 | End: 2019-10-23

## 2019-09-20 NOTE — TELEPHONE ENCOUNTER
Lisinopril sent for 90 day instead of 30 day supply.    Prescription approved per [facility] Refill Protocol.    Angelica SULLIVAN RN, BSN, PHN

## 2019-10-07 ENCOUNTER — OFFICE VISIT (OUTPATIENT)
Dept: INTERNAL MEDICINE | Facility: CLINIC | Age: 84
End: 2019-10-07
Payer: MEDICARE

## 2019-10-07 VITALS
BODY MASS INDEX: 26.19 KG/M2 | DIASTOLIC BLOOD PRESSURE: 66 MMHG | WEIGHT: 167.2 LBS | HEART RATE: 78 BPM | OXYGEN SATURATION: 96 % | SYSTOLIC BLOOD PRESSURE: 112 MMHG | RESPIRATION RATE: 16 BRPM | TEMPERATURE: 98.2 F

## 2019-10-07 DIAGNOSIS — I10 HYPERTENSION, UNSPECIFIED TYPE: ICD-10-CM

## 2019-10-07 DIAGNOSIS — M94.0 COSTOCHONDRITIS: ICD-10-CM

## 2019-10-07 DIAGNOSIS — N18.30 CKD (CHRONIC KIDNEY DISEASE) STAGE 3, GFR 30-59 ML/MIN (H): ICD-10-CM

## 2019-10-07 DIAGNOSIS — R07.89 CHEST WALL PAIN: Primary | ICD-10-CM

## 2019-10-07 LAB
ANION GAP SERPL CALCULATED.3IONS-SCNC: 10 MMOL/L (ref 3–14)
BUN SERPL-MCNC: 39 MG/DL (ref 7–30)
CALCIUM SERPL-MCNC: 9 MG/DL (ref 8.5–10.1)
CHLORIDE SERPL-SCNC: 104 MMOL/L (ref 94–109)
CO2 SERPL-SCNC: 22 MMOL/L (ref 20–32)
CREAT SERPL-MCNC: 2 MG/DL (ref 0.66–1.25)
GFR SERPL CREATININE-BSD FRML MDRD: 28 ML/MIN/{1.73_M2}
GLUCOSE SERPL-MCNC: 100 MG/DL (ref 70–99)
POTASSIUM SERPL-SCNC: 4.3 MMOL/L (ref 3.4–5.3)
SODIUM SERPL-SCNC: 136 MMOL/L (ref 133–144)

## 2019-10-07 PROCEDURE — 36415 COLL VENOUS BLD VENIPUNCTURE: CPT | Performed by: INTERNAL MEDICINE

## 2019-10-07 PROCEDURE — 99214 OFFICE O/P EST MOD 30 MIN: CPT | Performed by: PHYSICIAN ASSISTANT

## 2019-10-07 PROCEDURE — 80048 BASIC METABOLIC PNL TOTAL CA: CPT | Performed by: INTERNAL MEDICINE

## 2019-10-07 NOTE — PATIENT INSTRUCTIONS
Patient Education     Chest Wall Pain: Costochondritis    The chest pain that you have had today is caused by costochondritis. This condition is caused by an inflammation of the cartilage joining your ribs to your breastbone. It is not caused by heart or lung problems. Your healthcare team has made sure that the chest pain you feel is not from a life threatening cause of chest pain such as heart attack, collapsed lung, blood clot in the lung, tear in the aorta, or esophageal rupture. The inflammation may have been brought on by a blow to the chest, lifting heavy objects, intense exercise, or an illness that made you cough and sneeze a lot. It often occurs during times of emotional stress. It can be painful, but it is not dangerous. It usually goes away in 1 to 2 weeks. But it may happen again. Rarely, a more serious condition may cause symptoms similar to costochondritis. That s why it s important to watch for the warning signs listed below.  Home care  Follow these guidelines when caring for yourself at home:    If you feel that emotional stress is a cause of your condition, try to figure out the sources of that stress. It may not be obvious. Learn ways to deal with the stress in your life. This can include regular exercise, muscle relaxation, meditation, or simply taking time out for yourself.    You may use acetaminophen, to control pain, unless another pain medicine was prescribed. If you have liver or kidney disease or ever had a stomach ulcer, talk with your healthcare provider before using these medicines.    You can also help ease pain by using a hot, wet compress or heating pad. Use this with or without a medicated skin cream that helps relieves pain.    Do stretching exercise as advised by your provider.    Take any prescribed medicines as directed.  Follow-up care  Follow up with your healthcare provider, or as advised, if you do not start to get better in the next 2 days.  When to seek medical  advice  Call your healthcare provider right away if any of these occur:    A change in the type of pain. Call if it feels different, becomes more serious, lasts longer, or spreads into your shoulder, arm, neck, jaw, or back.    Shortness of breath or pain gets worse when you breathe    Weakness, dizziness, or fainting    Cough with dark-colored sputum (phlegm) or blood    Abdominal pain    Dark red or black stools    Fever of 100.4 F (38 C) or higher, or as directed by your healthcare provider  Date Last Reviewed: 12/1/2016 2000-2018 [employer]. [address] [place] [id number]. All rights reserved. This information is not intended as a substitute for professional medical care. Always follow your healthcare professional's instructions.

## 2019-10-07 NOTE — PROGRESS NOTES
Subjective     Jose Gomez is a 89 year old male who presents to clinic today for the following health issues:    HPI     Patient is here today because his medication was switched at last appointment with Dr Carroll. Now for about a week has been having right sided pain. Kind of towards the right side towards the back. They are wondering if this is from the new medication.  Spoke with pharmacist and was told maybe from lisinopril- recommendation to ? Hold lisinopril. Daughter was not sure about this and so schedule appt.  See by PCP in August for HTN followup. Medications changed then. Patient had been on amlodipine and metoprolol prior for BP control.   Per last PCP note-    Previously had been on ACE inhibitor in years past but this is discontinued during a rehab stay due to some hypotension along with the his other medications.  Since then he has been managed with amlodipine and beta-blocker with adequate BP control.    Patient currently taking lisinopril 10 mg daily and has stopped amlodipine     Patient does not recall any injury to the area.  Denies any N/V diarrhea or constipation. Appetite ok.     -------------------------------------    BP Readings from Last 3 Encounters:   10/07/19 112/66   09/19/19 136/78   08/26/19 118/62    Wt Readings from Last 3 Encounters:   10/07/19 75.8 kg (167 lb 3.2 oz)   09/19/19 77.5 kg (170 lb 12.8 oz)   08/26/19 76.7 kg (169 lb)                    -------------------------------------  Reviewed and updated as needed this visit by Provider  Allergies  Meds         Review of Systems   ROS COMP: Constitutional, HEENT, cardiovascular, pulmonary, gi and gu systems are negative, except as otherwise noted.      Objective    /66   Pulse 78   Temp 98.2  F (36.8  C) (Oral)   Resp 16   Wt 75.8 kg (167 lb 3.2 oz)   SpO2 96%   BMI 26.19 kg/m    Body mass index is 26.19 kg/m .  Physical Exam   GENERAL: healthy, alert and no distress  RESP: lungs clear to auscultation - no  "rales, rhonchi or wheezes  CV: regular rates and rhythm and normal S1 S2, no S3 or S4  ABDOMEN: soft, nontender, no hepatosplenomegaly, no masses and bowel sounds normal  MS: tenderness right lower rib cage and abdominal/chest wall   SKIN: no suspicious lesions or rashes    Diagnostic Test Results:  none         Assessment & Plan       ICD-10-CM    1. Chest wall pain R07.89    2. Costochondritis M94.0         BMI:   Estimated body mass index is 26.19 kg/m  as calculated from the following:    Height as of 9/19/19: 1.702 m (5' 7\").    Weight as of this encounter: 75.8 kg (167 lb 3.2 oz).           Continue on Lisinopril for now. Patient just had lab done today PCP to review.  Reassurance pain not from lisinopril     See Patient Instructions    25 minutes spent with patient > 50% of time on counseling and plan of care.    Return in about 3 months (around 1/7/2020) for Routine Visit, regular primary provider.    Margot Jaimes PA-C  [facility]        "

## 2019-10-23 DIAGNOSIS — I10 HYPERTENSION, UNSPECIFIED TYPE: ICD-10-CM

## 2019-10-23 DIAGNOSIS — N18.30 CKD (CHRONIC KIDNEY DISEASE) STAGE 3, GFR 30-59 ML/MIN (H): ICD-10-CM

## 2019-10-23 RX ORDER — LISINOPRIL 10 MG/1
5 TABLET ORAL DAILY
Qty: 90 TABLET | Refills: 0
Start: 2019-10-23 | End: 2019-11-18

## 2019-11-11 DIAGNOSIS — I10 HYPERTENSION, UNSPECIFIED TYPE: ICD-10-CM

## 2019-11-11 DIAGNOSIS — N18.30 CKD (CHRONIC KIDNEY DISEASE) STAGE 3, GFR 30-59 ML/MIN (H): ICD-10-CM

## 2019-11-11 LAB
ANION GAP SERPL CALCULATED.3IONS-SCNC: 4 MMOL/L (ref 3–14)
BUN SERPL-MCNC: 27 MG/DL (ref 7–30)
CALCIUM SERPL-MCNC: 9.2 MG/DL (ref 8.5–10.1)
CHLORIDE SERPL-SCNC: 108 MMOL/L (ref 94–109)
CO2 SERPL-SCNC: 25 MMOL/L (ref 20–32)
CREAT SERPL-MCNC: 1.8 MG/DL (ref 0.66–1.25)
GFR SERPL CREATININE-BSD FRML MDRD: 32 ML/MIN/{1.73_M2}
GLUCOSE SERPL-MCNC: 117 MG/DL (ref 70–99)
POTASSIUM SERPL-SCNC: 4.4 MMOL/L (ref 3.4–5.3)
SODIUM SERPL-SCNC: 137 MMOL/L (ref 133–144)

## 2019-11-11 PROCEDURE — 36415 COLL VENOUS BLD VENIPUNCTURE: CPT | Performed by: INTERNAL MEDICINE

## 2019-11-11 PROCEDURE — 80048 BASIC METABOLIC PNL TOTAL CA: CPT | Performed by: INTERNAL MEDICINE

## 2019-11-18 ENCOUNTER — OFFICE VISIT (OUTPATIENT)
Dept: INTERNAL MEDICINE | Facility: CLINIC | Age: 84
End: 2019-11-18
Payer: MEDICARE

## 2019-11-18 VITALS
DIASTOLIC BLOOD PRESSURE: 76 MMHG | TEMPERATURE: 98.5 F | OXYGEN SATURATION: 96 % | HEART RATE: 82 BPM | HEIGHT: 67 IN | WEIGHT: 163.3 LBS | BODY MASS INDEX: 25.63 KG/M2 | SYSTOLIC BLOOD PRESSURE: 128 MMHG

## 2019-11-18 DIAGNOSIS — E11.9 TYPE 2 DIABETES MELLITUS WITHOUT COMPLICATION, WITHOUT LONG-TERM CURRENT USE OF INSULIN (H): ICD-10-CM

## 2019-11-18 DIAGNOSIS — N18.30 CKD (CHRONIC KIDNEY DISEASE) STAGE 3, GFR 30-59 ML/MIN (H): ICD-10-CM

## 2019-11-18 DIAGNOSIS — I10 ESSENTIAL HYPERTENSION, BENIGN: Primary | ICD-10-CM

## 2019-11-18 DIAGNOSIS — I10 HYPERTENSION, UNSPECIFIED TYPE: ICD-10-CM

## 2019-11-18 PROCEDURE — 90662 IIV NO PRSV INCREASED AG IM: CPT | Performed by: INTERNAL MEDICINE

## 2019-11-18 PROCEDURE — 99214 OFFICE O/P EST MOD 30 MIN: CPT | Mod: 25 | Performed by: INTERNAL MEDICINE

## 2019-11-18 PROCEDURE — G0008 ADMIN INFLUENZA VIRUS VAC: HCPCS | Performed by: INTERNAL MEDICINE

## 2019-11-18 RX ORDER — LISINOPRIL 5 MG/1
5 TABLET ORAL DAILY
Qty: 90 TABLET | Refills: 1 | Status: ON HOLD | OUTPATIENT
Start: 2019-11-18 | End: 2020-01-31

## 2019-11-18 ASSESSMENT — MIFFLIN-ST. JEOR: SCORE: 1354.35

## 2019-11-18 NOTE — NURSING NOTE
"Chief Complaint   Patient presents with     Hypertension     /76   Pulse 82   Temp 98.5  F (36.9  C) (Temporal)   Ht 1.702 m (5' 7\")   Wt 74.1 kg (163 lb 4.8 oz)   SpO2 96%   BMI 25.58 kg/m   Estimated body mass index is 25.58 kg/m  as calculated from the following:    Height as of this encounter: 1.702 m (5' 7\").    Weight as of this encounter: 74.1 kg (163 lb 4.8 oz).        Health Maintenance due pending provider review:  NONE    n/a    Francine Brito CMA  "

## 2019-11-18 NOTE — PROGRESS NOTES
"Subjective     Jose Gomez is a 89 year old male who presents to clinic today for the following health issues:    HPI   Hypertension Follow-up    Do you check your blood pressure regularly outside of the clinic? No     Are you following a low salt diet? Yes    Are your blood pressures ever more than 140 on the top number (systolic) OR more   than 90 on the bottom number (diastolic), for example 140/90? n/a    How many servings of fruits and vegetables do you eat daily?  0-1    On average, how many sweetened beverages do you drink each day (soda, juice, sweet tea, etc)?   0    How many days per week do you miss taking your medication? 0          Reviewed and updated as needed this visit by Provider         Review of Systems   ROS COMP: Constitutional, HEENT, cardiovascular, pulmonary, gi and gu systems are negative, except as otherwise noted.      Objective    /76   Pulse 82   Temp 98.5  F (36.9  C) (Temporal)   Ht 1.702 m (5' 7\")   Wt 74.1 kg (163 lb 4.8 oz)   SpO2 96%   BMI 25.58 kg/m    Body mass index is 25.58 kg/m .  Physical Exam   GENERAL: healthy, alert and no distress  HENT: normal cephalic/atraumatic  RESP: lungs clear to auscultation - no rales, rhonchi or wheezes  CV: regular rate and rhythm, normal S1 S2, no S3 or S4, no murmur, click or rub, no peripheral edema and peripheral pulses strong    Labs reviewed in Epic        Assessment & Plan   (I10) Essential hypertension, benign  (primary encounter diagnosis)  (N18.3) CKD (chronic kidney disease) stage 3, GFR 30-59 ml/min (H)  Plan: Cr stable on ACEi and BP quite good  Continue as is- recheck 6 mo        BMI:   Estimated body mass index is 25.58 kg/m  as calculated from the following:    Height as of this encounter: 1.702 m (5' 7\").    Weight as of this encounter: 74.1 kg (163 lb 4.8 oz).           See Patient Instructions    Return in about 6 months (around 5/18/2020) for BP Recheck, Wellness Visit with labs before.    Gabriel Carroll, " MD  [facility]

## 2020-01-26 ENCOUNTER — HOSPITAL ENCOUNTER (INPATIENT)
Facility: CLINIC | Age: 85
LOS: 5 days | Discharge: NURSING, REHAB OR LONG-TERM CARE | DRG: 308 | End: 2020-01-31
Attending: EMERGENCY MEDICINE | Admitting: HOSPITALIST
Payer: MEDICARE

## 2020-01-26 ENCOUNTER — APPOINTMENT (OUTPATIENT)
Dept: GENERAL RADIOLOGY | Facility: CLINIC | Age: 85
DRG: 308 | End: 2020-01-26
Attending: EMERGENCY MEDICINE
Payer: MEDICARE

## 2020-01-26 DIAGNOSIS — I48.91 ATRIAL FIBRILLATION WITH RVR (H): ICD-10-CM

## 2020-01-26 DIAGNOSIS — N19 RENAL FAILURE, UNSPECIFIED CHRONICITY: ICD-10-CM

## 2020-01-26 DIAGNOSIS — L30.4 INTERTRIGO: ICD-10-CM

## 2020-01-26 DIAGNOSIS — I24.89 DEMAND ISCHEMIA (H): ICD-10-CM

## 2020-01-26 DIAGNOSIS — E05.90 HYPERTHYROIDISM: Primary | ICD-10-CM

## 2020-01-26 PROBLEM — R53.1 GENERALIZED WEAKNESS: Status: ACTIVE | Noted: 2020-01-26

## 2020-01-26 LAB
ALBUMIN SERPL-MCNC: 3.2 G/DL (ref 3.4–5)
ALP SERPL-CCNC: 93 U/L (ref 40–150)
ALT SERPL W P-5'-P-CCNC: 26 U/L (ref 0–70)
ANION GAP SERPL CALCULATED.3IONS-SCNC: 14 MMOL/L (ref 3–14)
AST SERPL W P-5'-P-CCNC: 31 U/L (ref 0–45)
BASOPHILS # BLD AUTO: 0 10E9/L (ref 0–0.2)
BASOPHILS NFR BLD AUTO: 0.1 %
BILIRUB DIRECT SERPL-MCNC: 0.1 MG/DL (ref 0–0.2)
BILIRUB SERPL-MCNC: 0.7 MG/DL (ref 0.2–1.3)
BUN SERPL-MCNC: 75 MG/DL (ref 7–30)
CA-I SERPL ISE-MCNC: 5 MG/DL (ref 4.4–5.2)
CALCIUM SERPL-MCNC: 10.5 MG/DL (ref 8.5–10.1)
CHLORIDE SERPL-SCNC: 102 MMOL/L (ref 94–109)
CO2 SERPL-SCNC: 21 MMOL/L (ref 20–32)
CREAT SERPL-MCNC: 2.24 MG/DL (ref 0.66–1.25)
DIFFERENTIAL METHOD BLD: ABNORMAL
EOSINOPHIL # BLD AUTO: 0.4 10E9/L (ref 0–0.7)
EOSINOPHIL NFR BLD AUTO: 3.8 %
ERYTHROCYTE [DISTWIDTH] IN BLOOD BY AUTOMATED COUNT: 12.1 % (ref 10–15)
GFR SERPL CREATININE-BSD FRML MDRD: 25 ML/MIN/{1.73_M2}
GLUCOSE BLDC GLUCOMTR-MCNC: 113 MG/DL (ref 70–99)
GLUCOSE BLDC GLUCOMTR-MCNC: 96 MG/DL (ref 70–99)
GLUCOSE SERPL-MCNC: 94 MG/DL (ref 70–99)
HBA1C MFR BLD: 6.4 % (ref 0–5.6)
HCT VFR BLD AUTO: 41.2 % (ref 40–53)
HGB BLD-MCNC: 13.6 G/DL (ref 13.3–17.7)
IMM GRANULOCYTES # BLD: 0 10E9/L (ref 0–0.4)
IMM GRANULOCYTES NFR BLD: 0.4 %
INTERPRETATION ECG - MUSE: NORMAL
LACTATE BLD-SCNC: 1.4 MMOL/L (ref 0.7–2)
LYMPHOCYTES # BLD AUTO: 2.7 10E9/L (ref 0.8–5.3)
LYMPHOCYTES NFR BLD AUTO: 27.3 %
MCH RBC QN AUTO: 31.9 PG (ref 26.5–33)
MCHC RBC AUTO-ENTMCNC: 33 G/DL (ref 31.5–36.5)
MCV RBC AUTO: 97 FL (ref 78–100)
MONOCYTES # BLD AUTO: 0.9 10E9/L (ref 0–1.3)
MONOCYTES NFR BLD AUTO: 9.5 %
NEUTROPHILS # BLD AUTO: 5.7 10E9/L (ref 1.6–8.3)
NEUTROPHILS NFR BLD AUTO: 58.9 %
NRBC # BLD AUTO: 0 10*3/UL
NRBC BLD AUTO-RTO: 0 /100
NT-PROBNP SERPL-MCNC: 5440 PG/ML (ref 0–1800)
PLATELET # BLD AUTO: 261 10E9/L (ref 150–450)
POTASSIUM SERPL-SCNC: 5.2 MMOL/L (ref 3.4–5.3)
PROT SERPL-MCNC: 7.4 G/DL (ref 6.8–8.8)
RBC # BLD AUTO: 4.27 10E12/L (ref 4.4–5.9)
SODIUM SERPL-SCNC: 137 MMOL/L (ref 133–144)
T4 FREE SERPL-MCNC: 5.3 NG/DL (ref 0.76–1.46)
TROPONIN I SERPL-MCNC: 0.05 UG/L (ref 0–0.04)
TSH SERPL DL<=0.005 MIU/L-ACNC: <0.01 MU/L (ref 0.4–4)
VIT B12 SERPL-MCNC: >6000 PG/ML (ref 193–986)
WBC # BLD AUTO: 9.7 10E9/L (ref 4–11)

## 2020-01-26 PROCEDURE — 00000146 ZZHCL STATISTIC GLUCOSE BY METER IP

## 2020-01-26 PROCEDURE — 84439 ASSAY OF FREE THYROXINE: CPT | Performed by: EMERGENCY MEDICINE

## 2020-01-26 PROCEDURE — 84484 ASSAY OF TROPONIN QUANT: CPT | Performed by: EMERGENCY MEDICINE

## 2020-01-26 PROCEDURE — 80076 HEPATIC FUNCTION PANEL: CPT | Performed by: EMERGENCY MEDICINE

## 2020-01-26 PROCEDURE — 83883 ASSAY NEPHELOMETRY NOT SPEC: CPT | Performed by: HOSPITALIST

## 2020-01-26 PROCEDURE — 82784 ASSAY IGA/IGD/IGG/IGM EACH: CPT | Performed by: HOSPITALIST

## 2020-01-26 PROCEDURE — 84165 PROTEIN E-PHORESIS SERUM: CPT | Performed by: HOSPITALIST

## 2020-01-26 PROCEDURE — 84443 ASSAY THYROID STIM HORMONE: CPT | Performed by: EMERGENCY MEDICINE

## 2020-01-26 PROCEDURE — 93005 ELECTROCARDIOGRAM TRACING: CPT

## 2020-01-26 PROCEDURE — 36415 COLL VENOUS BLD VENIPUNCTURE: CPT | Performed by: HOSPITALIST

## 2020-01-26 PROCEDURE — 99285 EMERGENCY DEPT VISIT HI MDM: CPT | Mod: 25

## 2020-01-26 PROCEDURE — 96374 THER/PROPH/DIAG INJ IV PUSH: CPT

## 2020-01-26 PROCEDURE — 86376 MICROSOMAL ANTIBODY EACH: CPT | Performed by: HOSPITALIST

## 2020-01-26 PROCEDURE — 21000001 ZZH R&B HEART CARE

## 2020-01-26 PROCEDURE — 25000125 ZZHC RX 250: Performed by: EMERGENCY MEDICINE

## 2020-01-26 PROCEDURE — 99207 ZZC CDG-MDM COMPONENT: MEETS MODERATE - UP CODED: CPT | Performed by: HOSPITALIST

## 2020-01-26 PROCEDURE — 84445 ASSAY OF TSI GLOBULIN: CPT | Performed by: HOSPITALIST

## 2020-01-26 PROCEDURE — 82607 VITAMIN B-12: CPT | Performed by: EMERGENCY MEDICINE

## 2020-01-26 PROCEDURE — 80048 BASIC METABOLIC PNL TOTAL CA: CPT | Performed by: EMERGENCY MEDICINE

## 2020-01-26 PROCEDURE — 96361 HYDRATE IV INFUSION ADD-ON: CPT

## 2020-01-26 PROCEDURE — 86334 IMMUNOFIX E-PHORESIS SERUM: CPT | Performed by: HOSPITALIST

## 2020-01-26 PROCEDURE — 83036 HEMOGLOBIN GLYCOSYLATED A1C: CPT | Performed by: EMERGENCY MEDICINE

## 2020-01-26 PROCEDURE — 83605 ASSAY OF LACTIC ACID: CPT | Performed by: HOSPITALIST

## 2020-01-26 PROCEDURE — 25800030 ZZH RX IP 258 OP 636: Performed by: EMERGENCY MEDICINE

## 2020-01-26 PROCEDURE — 85025 COMPLETE CBC W/AUTO DIFF WBC: CPT | Performed by: EMERGENCY MEDICINE

## 2020-01-26 PROCEDURE — 82330 ASSAY OF CALCIUM: CPT | Performed by: HOSPITALIST

## 2020-01-26 PROCEDURE — 00000402 ZZHCL STATISTIC TOTAL PROTEIN: Performed by: HOSPITALIST

## 2020-01-26 PROCEDURE — 83880 ASSAY OF NATRIURETIC PEPTIDE: CPT | Performed by: EMERGENCY MEDICINE

## 2020-01-26 PROCEDURE — 71046 X-RAY EXAM CHEST 2 VIEWS: CPT

## 2020-01-26 PROCEDURE — 25000132 ZZH RX MED GY IP 250 OP 250 PS 637: Mod: GY | Performed by: HOSPITALIST

## 2020-01-26 PROCEDURE — 25800030 ZZH RX IP 258 OP 636: Performed by: HOSPITALIST

## 2020-01-26 PROCEDURE — 99223 1ST HOSP IP/OBS HIGH 75: CPT | Mod: AI | Performed by: HOSPITALIST

## 2020-01-26 RX ORDER — SODIUM CHLORIDE 9 MG/ML
INJECTION, SOLUTION INTRAVENOUS ONCE
Status: DISCONTINUED | OUTPATIENT
Start: 2020-01-26 | End: 2020-01-26

## 2020-01-26 RX ORDER — MAGNESIUM SULFATE HEPTAHYDRATE 40 MG/ML
4 INJECTION, SOLUTION INTRAVENOUS EVERY 4 HOURS PRN
Status: DISCONTINUED | OUTPATIENT
Start: 2020-01-26 | End: 2020-01-31 | Stop reason: HOSPADM

## 2020-01-26 RX ORDER — POTASSIUM CHLORIDE 7.45 MG/ML
10 INJECTION INTRAVENOUS
Status: DISCONTINUED | OUTPATIENT
Start: 2020-01-26 | End: 2020-01-26

## 2020-01-26 RX ORDER — METOPROLOL TARTRATE 1 MG/ML
2.5 INJECTION, SOLUTION INTRAVENOUS EVERY 4 HOURS PRN
Status: DISCONTINUED | OUTPATIENT
Start: 2020-01-26 | End: 2020-01-31 | Stop reason: HOSPADM

## 2020-01-26 RX ORDER — SODIUM CHLORIDE 9 MG/ML
INJECTION, SOLUTION INTRAVENOUS CONTINUOUS
Status: DISCONTINUED | OUTPATIENT
Start: 2020-01-26 | End: 2020-01-30

## 2020-01-26 RX ORDER — POTASSIUM CHLORIDE 1500 MG/1
20-40 TABLET, EXTENDED RELEASE ORAL
Status: DISCONTINUED | OUTPATIENT
Start: 2020-01-26 | End: 2020-01-26

## 2020-01-26 RX ORDER — DEXTROSE MONOHYDRATE 25 G/50ML
25-50 INJECTION, SOLUTION INTRAVENOUS
Status: DISCONTINUED | OUTPATIENT
Start: 2020-01-26 | End: 2020-01-31 | Stop reason: HOSPADM

## 2020-01-26 RX ORDER — POTASSIUM CHLORIDE 1.5 G/1.58G
20-40 POWDER, FOR SOLUTION ORAL
Status: DISCONTINUED | OUTPATIENT
Start: 2020-01-26 | End: 2020-01-26

## 2020-01-26 RX ORDER — SIMVASTATIN 20 MG
20 TABLET ORAL AT BEDTIME
Status: DISCONTINUED | OUTPATIENT
Start: 2020-01-26 | End: 2020-01-31 | Stop reason: HOSPADM

## 2020-01-26 RX ORDER — SODIUM CHLORIDE 9 MG/ML
INJECTION, SOLUTION INTRAVENOUS ONCE
Status: COMPLETED | OUTPATIENT
Start: 2020-01-26 | End: 2020-01-26

## 2020-01-26 RX ORDER — ONDANSETRON 4 MG/1
4 TABLET, ORALLY DISINTEGRATING ORAL EVERY 6 HOURS PRN
Status: DISCONTINUED | OUTPATIENT
Start: 2020-01-26 | End: 2020-01-31 | Stop reason: HOSPADM

## 2020-01-26 RX ORDER — METHIMAZOLE 10 MG/1
10 TABLET ORAL 2 TIMES DAILY
Status: DISCONTINUED | OUTPATIENT
Start: 2020-01-26 | End: 2020-01-31 | Stop reason: HOSPADM

## 2020-01-26 RX ORDER — LIDOCAINE 40 MG/G
CREAM TOPICAL
Status: DISCONTINUED | OUTPATIENT
Start: 2020-01-26 | End: 2020-01-31 | Stop reason: HOSPADM

## 2020-01-26 RX ORDER — DILTIAZEM HYDROCHLORIDE 5 MG/ML
15 INJECTION INTRAVENOUS ONCE
Status: COMPLETED | OUTPATIENT
Start: 2020-01-26 | End: 2020-01-26

## 2020-01-26 RX ORDER — ONDANSETRON 2 MG/ML
4 INJECTION INTRAMUSCULAR; INTRAVENOUS EVERY 6 HOURS PRN
Status: DISCONTINUED | OUTPATIENT
Start: 2020-01-26 | End: 2020-01-31 | Stop reason: HOSPADM

## 2020-01-26 RX ORDER — NICOTINE POLACRILEX 4 MG
15-30 LOZENGE BUCCAL
Status: DISCONTINUED | OUTPATIENT
Start: 2020-01-26 | End: 2020-01-31 | Stop reason: HOSPADM

## 2020-01-26 RX ORDER — POTASSIUM CHLORIDE 29.8 MG/ML
20 INJECTION INTRAVENOUS
Status: DISCONTINUED | OUTPATIENT
Start: 2020-01-26 | End: 2020-01-26

## 2020-01-26 RX ORDER — ACETAMINOPHEN 325 MG/1
650 TABLET ORAL EVERY 4 HOURS PRN
Status: DISCONTINUED | OUTPATIENT
Start: 2020-01-26 | End: 2020-01-31 | Stop reason: HOSPADM

## 2020-01-26 RX ORDER — NALOXONE HYDROCHLORIDE 0.4 MG/ML
.1-.4 INJECTION, SOLUTION INTRAMUSCULAR; INTRAVENOUS; SUBCUTANEOUS
Status: DISCONTINUED | OUTPATIENT
Start: 2020-01-26 | End: 2020-01-31 | Stop reason: HOSPADM

## 2020-01-26 RX ORDER — POTASSIUM CL/LIDO/0.9 % NACL 10MEQ/0.1L
10 INTRAVENOUS SOLUTION, PIGGYBACK (ML) INTRAVENOUS
Status: DISCONTINUED | OUTPATIENT
Start: 2020-01-26 | End: 2020-01-26

## 2020-01-26 RX ADMIN — SIMVASTATIN 20 MG: 20 TABLET, FILM COATED ORAL at 23:03

## 2020-01-26 RX ADMIN — SODIUM CHLORIDE: 9 INJECTION, SOLUTION INTRAVENOUS at 15:56

## 2020-01-26 RX ADMIN — SODIUM CHLORIDE: 9 INJECTION, SOLUTION INTRAVENOUS at 19:29

## 2020-01-26 RX ADMIN — DILTIAZEM HYDROCHLORIDE 15 MG: 5 INJECTION INTRAVENOUS at 15:27

## 2020-01-26 RX ADMIN — METHIMAZOLE 10 MG: 10 TABLET ORAL at 22:10

## 2020-01-26 ASSESSMENT — ENCOUNTER SYMPTOMS
DYSURIA: 0
SORE THROAT: 0
APPETITE CHANGE: 1
VOMITING: 0
DIARRHEA: 0
COUGH: 0
FATIGUE: 1
SHORTNESS OF BREATH: 0
WEAKNESS: 1
FEVER: 0

## 2020-01-26 ASSESSMENT — MIFFLIN-ST. JEOR: SCORE: 1258.63

## 2020-01-26 ASSESSMENT — ACTIVITIES OF DAILY LIVING (ADL)
ADLS_ACUITY_SCORE: 14
BATHING: 2-->ASSISTIVE PERSON
RETIRED_EATING: 0-->INDEPENDENT
AMBULATION: 0-->INDEPENDENT
FALL_HISTORY_WITHIN_LAST_SIX_MONTHS: YES
DRESS: 0-->INDEPENDENT
NUMBER_OF_TIMES_PATIENT_HAS_FALLEN_WITHIN_LAST_SIX_MONTHS: 4
TOILETING: 0-->INDEPENDENT
TRANSFERRING: 0-->INDEPENDENT
COGNITION: 1 - ATTENTION OR MEMORY DEFICITS
WHICH_OF_THE_ABOVE_FUNCTIONAL_RISKS_HAD_A_RECENT_ONSET_OR_CHANGE?: FALL HISTORY
RETIRED_COMMUNICATION: 0-->UNDERSTANDS/COMMUNICATES WITHOUT DIFFICULTY
SWALLOWING: 0-->SWALLOWS FOODS/LIQUIDS WITHOUT DIFFICULTY

## 2020-01-26 NOTE — PROGRESS NOTES
RECEIVING UNIT ED HANDOFF REVIEW    ED Nurse Handoff Report was reviewed by: Latrice Sanderson on January 26, 2020 at 5:23 PM

## 2020-01-26 NOTE — ED NOTES
[facility]  ED Nurse Handoff Report    ED Chief complaint: Generalized Weakness      ED Diagnosis:   Final diagnoses:   Atrial fibrillation with RVR (H)   Renal failure, unspecified chronicity   Demand ischemia (H)       Code Status: MD tarun hinojosa    Allergies:   Allergies   Allergen Reactions     No Known Drug Allergies        Patient Story: generalized weakness with poor appetite  Focused Assessment:  Patient presents to ED with generalized weakness, family realized patient is not eating much last 2 days and been very weak. EKG showed patient with Atril fib with heart rate up to 140-150 upon ED arrival, them heart rate improved with Diltiazem IVP. Diltiazem drip was ordered but on hold since patient with blood pressure not meeting the criteria. NS bolus 250 ml being infused at this time in addition to continuous IVF.     Treatments and/or interventions provided: NS bolus 250 ml, Diltiazem IVF 15 mg  Patient's response to treatments and/or interventions: needs to monitor    To be done/followed up on inpatient unit:  close monitor    Does this patient have any cognitive concerns?: na    Activity level - Baseline/Home:  Independent  Activity Level - Current:   Stand with Assist    Patient's Preferred language: English   Needed?: No    Isolation: None  Infection: Not Applicable  Bariatric?: No    Vital Signs:   Vitals:    01/26/20 1540 01/26/20 1605 01/26/20 1610 01/26/20 1615   BP:  103/65  98/81   Pulse:  103  105   Resp: 9 9 13 18   Temp:       TempSrc:       SpO2: 97% 97% 96% 99%       Cardiac Rhythm:Cardiac Rhythm: Atrial fibrillation    Was the PSS-3 completed:   Yes  What interventions are required if any?               Family Comments: daughter and son at bedside  OBS brochure/video discussed/provided to patient/family: N/A              Name of person given brochure if not patient: na              Relationship to patient: na    For the majority of the shift this patient's behavior  was Green.   Behavioral interventions performed were none.    ED NURSE PHONE NUMBER: *[phone]

## 2020-01-26 NOTE — PHARMACY-ADMISSION MEDICATION HISTORY
Pharmacy Medication History  Admission medication history interview status for the 1/26/2020  admission is complete. See EPIC admission navigator for prior to admission medications     Medication history sources: Patient's family/friend (Daughter), Russell  Medication history source reliability: Good  Adherence assessment: Moderate    Significant changes made to the medication list:  None      Additional medication history information:   Patients' daughter states that she has been crushing medications to get patient to take medications in the past few days.    Medication reconciliation completed by provider prior to medication history? No    Time spent in this activity: 15 minutes      Prior to Admission medications    Medication Sig Last Dose Taking? Auth Provider   Acetaminophen (TYLENOL PO) Take 1,000 mg by mouth every 6 hours as needed  prn at prn Yes Reported, Patient   Apoaequorin (PREVAGEN PO) Take 1 tablet by mouth daily Past Week at Unknown time Yes Reported, Patient   Cyanocobalamin (B-12) 2000 MCG TABS Take 1 tablet by mouth daily 1/25/2020 at Unknown time Yes Gabriel Carroll MD   lisinopril (PRINIVIL/ZESTRIL) 5 MG tablet Take 1 tablet (5 mg) by mouth daily 1/26/2020 at am Yes Gabriel Carroll MD   metoprolol succinate ER (TOPROL-XL) 25 MG 24 hr tablet TAKE 1 TABLET BY MOUTH EVERY DAY 1/26/2020 at am Yes Gabriel Carroll MD   simvastatin (ZOCOR) 20 MG tablet Take 1 tablet (20 mg) by mouth At Bedtime 1/25/2020 at pm Yes Gabriel Carroll MD

## 2020-01-26 NOTE — H&P
[facility]    History and Physical - Hospitalist Service       Date of Admission:  1/26/2020    Assessment & Plan   Jose Gomez is a 89 year old male admitted on 1/26/2020    Generalized weakness/failure to thrive/anorexia, likely secondary to atrial fibrillation with RVR  Only obvious finding is afib currently, although more insidious process of myeloma or malignancy could be considered given his lack of appetite and progressive weakness  Known atrial fibrillation at baseline  At baseline not anticoagulated  At this point will focus on rate control and make sure there has been no development of a rate related CM  With his frequent falling and past severe subdurals would avoid anticoagulation at this point.   Could consider reimaging his head if anticoagulation is considered  He denies any recent falls in the last 2 weeks and does not remember hitting his head but family cannot confirm.  Plan  - diltiazem gtt  - echocardiogram  - TSH  - cardiology consultation, follows with EP  - consider panscan pending workup, of limited utility now d/t SHERWIN, would have to be done without contrast    Elevated troponin most likely demand ischemia from tachycardia  - serial trops and echocardiogram  - he denies urinary symptoms    Acute on CKD III  Mild hypercalcemia  Slowly rising creatinine over the last few months despite PCP adjusting the lisinopril  Does not appear volume overload on CXR despite the elevated BNP and his heart rate has improved with the boluses in the ED department  No anemia, bone pain to suggest myeloma, but should be considered.  Plan  - hold nephrotoxins and provide light hydration  - given the mildly elevated calcium, add on protein electrophoresis with immunofixation and free light chains to eval for myeloma  - ionized calcium, LFT to assess albumin/protein    Chronic falls  - PT consult  - b12 given the pernicious anemia history  - PT/OT    Hypercalcemia  - mild hydration  - further  testing pending    HTN/HLD  - pending the pharm reconciliation    DM2  - check A1c, diabetic diet and SSI     Diet: cardiac  DVT Prophylaxis: Pneumatic Compression Devices  Barry Catheter: in place, indication:    Code Status: full, despite in depth discussion    Disposition Plan   Expected discharge: 2 - 3 days, recommended to prior living arrangement once treated.  Entered: Salavdor Carrillo DO 01/26/2020, 3:55 PM     The patient's care was discussed with the Patient and Patient's Family.    Salvador Carrillo DO  [facility]    ______________________________________________________________________    Chief Complaint   Generalized weakness and poor appetite.    History is obtained from the patient    History of Present Illness   Jose Gomez is a 89 year old male with past medical history of atrial fibrillation not on anticoagulation, history of traumatic subdurals with frequent falls, diet-controlled diabetes mellitus, hypertension, hyperlipidemia who presents with failure to thrive and generalized weakness.    The patient himself denies any complaints.  He states his family made him come to the hospital because of 2 to 3 weeks of worsening appetite.  His family concerned that he is not eating or drinking.  They do not know if he has lost weight, however they have certain that given his intake he has lost a lot of weight.  They also noticed generalized weakness in regards to his ability to walk around.  This lives with his daughter and she states that he has been more more weak and less interactive.    In the emergency department he was found to be in atrial fibrillation on EKG with heart rates initially 150.  He is asymptomatic.  These rates did improve after some fluid boluses.  His kidney function was worse from his baseline of about 1.5.  Family states that his primary care doctor has been cutting down on his lisinopril because of the worsening renal function.    Did discuss CODE STATUS  with the patient with his family present including 2 daughters and the son-in-law.  They still elect for full code despite advanced age.    Review of Systems    The 10 point Review of Systems is negative other than noted in the HPI or here.     Past Medical History    I have reviewed this patient's medical history and updated it with pertinent information if needed.   Past Medical History:   Diagnosis Date     CKD (chronic kidney disease) stage 3, GFR 30-59 ml/min (H)      Esophageal reflux     very mild     Essential hypertension, benign      Hypertensive emergency 2018     Mixed hyperlipidemia      Paroxysmal atrial fibrillation (H) 2018     Pernicious anemia 3/19/2008     Type 2 diabetes, HbA1c goal < 7% (H)      Unspecified internal derangement of knee     LEFT       Past Surgical History   I have reviewed this patient's surgical history and updated it with pertinent information if needed.  Past Surgical History:   Procedure Laterality Date     COLONOSCOPY       NO HISTORY OF SURGERY       PHACOEMULSIFICATION CLEAR CORNEA WITH STANDARD INTRAOCULAR LENS IMPLANT  2013    Procedure: PHACOEMULSIFICATION CLEAR CORNEA WITH STANDARD INTRAOCULAR LENS IMPLANT;  RIGHT PHACOEMULSIFICATION CLEAR CORNEA WITH STANDARD INTRAOCULAR LENS IMPLANT ;  Surgeon: Hieu Jaimes MD;  Location: [facility]     PHACOEMULSIFICATION CLEAR CORNEA WITH STANDARD INTRAOCULAR LENS IMPLANT  10/14/2013    Procedure: PHACOEMULSIFICATION CLEAR CORNEA WITH STANDARD INTRAOCULAR LENS IMPLANT;  LEFT PHACOEMULSIFICATION CLEAR CORNEA WITH STANDARD INTRAOCULAR LENS IMPLANT ;  Surgeon: Hieu Jaimes MD;  Location: [facility]       Social History   I have reviewed this patient's social history and updated it with pertinent information if needed.  Social History     Tobacco Use     Smoking status: Former Smoker     Types: Cigars     Last attempt to quit: 5/3/1984     Years since quittin.7     Smokeless tobacco: Never Used    Substance Use Topics     Alcohol use: Yes     Comment: 1-2x per month     Drug use: No       Family History   I have reviewed this patient's family history and updated it with pertinent information if needed.   Family History   Problem Relation Age of Onset     Heart Disease Father         enlarged heart  at age 66     Family History Negative Mother          at age 88     Cancer Sister          at age 69     Cerebrovascular Disease Brother          at age 81     Cerebrovascular Disease Brother          at age 78     Cerebrovascular Disease Brother          at age 88     Cerebrovascular Disease Sister         b, 1930       Prior to Admission Medications   Prior to Admission Medications   Prescriptions Last Dose Informant Patient Reported? Taking?   Acetaminophen (TYLENOL PO)   Yes No   Sig: Take 1,000 mg by mouth every 6 hours   Apoaequorin (PREVAGEN PO)   Yes No   Sig: Take 1 tablet by mouth daily   Cyanocobalamin (B-12) 2000 MCG TABS  Daughter Yes No   Sig: Take 1 tablet by mouth daily   lisinopril (PRINIVIL/ZESTRIL) 5 MG tablet   No No   Sig: Take 1 tablet (5 mg) by mouth daily   metoprolol succinate ER (TOPROL-XL) 25 MG 24 hr tablet   No No   Sig: TAKE 1 TABLET BY MOUTH EVERY DAY   simvastatin (ZOCOR) 20 MG tablet   No No   Sig: Take 1 tablet (20 mg) by mouth At Bedtime      Facility-Administered Medications: None     Allergies   Allergies   Allergen Reactions     No Known Drug Allergies        Physical Exam   Vital Signs: Temp: 97.3  F (36.3  C) Temp src: Oral BP: 106/54 Pulse: 118 Heart Rate: 92 Resp: 9 SpO2: 97 % O2 Device: None (Room air)    Weight: 0 lbs 0 oz    Physical Exam  Constitutional:       Appearance: He is normal weight.   HENT:      Head: Atraumatic.      Mouth/Throat:      Mouth: Mucous membranes are moist.      Pharynx: Oropharynx is clear.   Eyes:      Conjunctiva/sclera: Conjunctivae normal.      Pupils: Pupils are equal, round, and reactive to light.    Cardiovascular:      Rate and Rhythm: Tachycardia present. Rhythm irregular.      Pulses: Normal pulses.      Heart sounds: Normal heart sounds.   Pulmonary:      Effort: Pulmonary effort is normal.      Breath sounds: Normal breath sounds.   Abdominal:      General: Abdomen is flat. Bowel sounds are normal.      Palpations: Abdomen is soft.   Neurological:      General: No focal deficit present.      Mental Status: He is alert and oriented to person, place, and time.   Psychiatric:         Mood and Affect: Mood normal.         Behavior: Behavior normal.         Thought Content: Thought content normal.         Judgment: Judgment normal.           Data   Data reviewed today: I reviewed all medications, new labs and imaging results over the last 24 hours. I personally reviewed the EKG tracing showing Afib with RVR but no ischemic change and the chest x-ray image(s) showing old rib fractures.    Recent Labs   Lab 01/26/20  1501   WBC 9.7   HGB 13.6   MCV 97         POTASSIUM 5.2   CHLORIDE 102   CO2 21   BUN 75*   CR 2.24*   ANIONGAP 14   CAMILA 10.5*   GLC 94   TROPI 0.046*     Most Recent 3 CBC's:  Recent Labs   Lab Test 01/26/20  1501 08/26/19  1054 07/12/18 06/02/18  0708 06/01/18  0810   WBC 9.7  --   --   --  5.8 6.5   HGB 13.6 14.1 9.3*   < > 8.1* 8.1*   MCV 97  --   --   --  94 94     --   --   --  201 188    < > = values in this interval not displayed.     Most Recent 3 BMP's:  Recent Labs   Lab Test 01/26/20  1501 11/11/19  1108 10/07/19  0941    137 136   POTASSIUM 5.2 4.4 4.3   CHLORIDE 102 108 104   CO2 21 25 22   BUN 75* 27 39*   CR 2.24* 1.80* 2.00*   ANIONGAP 14 4 10   CAMILA 10.5* 9.2 9.0   GLC 94 117* 100*     Most Recent 2 LFT's:  Recent Labs   Lab Test 05/26/18  0927 04/26/18  2140   AST 27 18   ALT 26 15   ALKPHOS 86 74   BILITOTAL 0.7 0.8     Most Recent 3 INR's:  Recent Labs   Lab Test 05/26/18  1010 04/26/18  1638   INR 1.07 1.01     Recent Results (from the past 24  hour(s))   XR Chest 2 Views    Narrative    CHEST TWO VIEW   1/26/2020 3:23 PM     HISTORY: Weakness.    COMPARISON: Chest x-ray 5/30/2018.      Impression    IMPRESSION: PA and lateral views of the chest. Lungs are clear. Heart  is normal in size. No effusions are evident. No pneumothorax. Old  healed posterolateral left rib fractures are noted. These appear to be  new in the interval since prior chest x-ray.    ATTILA SWAN MD

## 2020-01-26 NOTE — ED NOTES
Bed: ED30  Expected date: 1/26/20  Expected time: 2:47 PM  Means of arrival:   Comments:  Triage
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
## 2020-01-27 LAB
ALBUMIN SERPL ELPH-MCNC: 2.9 G/DL (ref 3.7–5.1)
ALBUMIN SERPL-MCNC: 2.4 G/DL (ref 3.4–5)
ALP SERPL-CCNC: 75 U/L (ref 40–150)
ALPHA1 GLOB SERPL ELPH-MCNC: 0.3 G/DL (ref 0.2–0.4)
ALPHA2 GLOB SERPL ELPH-MCNC: 0.9 G/DL (ref 0.5–0.9)
ALT SERPL W P-5'-P-CCNC: 21 U/L (ref 0–70)
ANION GAP SERPL CALCULATED.3IONS-SCNC: 14 MMOL/L (ref 3–14)
AST SERPL W P-5'-P-CCNC: 16 U/L (ref 0–45)
B-GLOBULIN SERPL ELPH-MCNC: 0.7 G/DL (ref 0.6–1)
BASOPHILS # BLD AUTO: 0 10E9/L (ref 0–0.2)
BASOPHILS NFR BLD AUTO: 0.2 %
BILIRUB SERPL-MCNC: 0.9 MG/DL (ref 0.2–1.3)
BUN SERPL-MCNC: 70 MG/DL (ref 7–30)
CALCIUM SERPL-MCNC: 9.2 MG/DL (ref 8.5–10.1)
CHLORIDE SERPL-SCNC: 107 MMOL/L (ref 94–109)
CO2 SERPL-SCNC: 17 MMOL/L (ref 20–32)
CREAT SERPL-MCNC: 1.8 MG/DL (ref 0.66–1.25)
DIFFERENTIAL METHOD BLD: ABNORMAL
EOSINOPHIL # BLD AUTO: 0.1 10E9/L (ref 0–0.7)
EOSINOPHIL NFR BLD AUTO: 2.5 %
ERYTHROCYTE [DISTWIDTH] IN BLOOD BY AUTOMATED COUNT: 12.1 % (ref 10–15)
GAMMA GLOB SERPL ELPH-MCNC: 0.9 G/DL (ref 0.7–1.6)
GFR SERPL CREATININE-BSD FRML MDRD: 32 ML/MIN/{1.73_M2}
GLUCOSE BLDC GLUCOMTR-MCNC: 101 MG/DL (ref 70–99)
GLUCOSE BLDC GLUCOMTR-MCNC: 104 MG/DL (ref 70–99)
GLUCOSE BLDC GLUCOMTR-MCNC: 110 MG/DL (ref 70–99)
GLUCOSE BLDC GLUCOMTR-MCNC: 69 MG/DL (ref 70–99)
GLUCOSE BLDC GLUCOMTR-MCNC: 81 MG/DL (ref 70–99)
GLUCOSE BLDC GLUCOMTR-MCNC: 96 MG/DL (ref 70–99)
GLUCOSE BLDC GLUCOMTR-MCNC: 97 MG/DL (ref 70–99)
GLUCOSE BLDC GLUCOMTR-MCNC: 98 MG/DL (ref 70–99)
GLUCOSE SERPL-MCNC: 79 MG/DL (ref 70–99)
HCT VFR BLD AUTO: 35 % (ref 40–53)
HGB BLD-MCNC: 11.6 G/DL (ref 13.3–17.7)
IGA SERPL-MCNC: 324 MG/DL (ref 84–499)
IGG SERPL-MCNC: 827 MG/DL (ref 610–1616)
IGM SERPL-MCNC: 50 MG/DL (ref 35–242)
IMM GRANULOCYTES # BLD: 0 10E9/L (ref 0–0.4)
IMM GRANULOCYTES NFR BLD: 0.4 %
KAPPA LC UR-MCNC: 5.25 MG/DL (ref 0.33–1.94)
KAPPA LC/LAMBDA SER: 1.73 {RATIO} (ref 0.26–1.65)
LAMBDA LC SERPL-MCNC: 3.04 MG/DL (ref 0.57–2.63)
LYMPHOCYTES # BLD AUTO: 1.1 10E9/L (ref 0.8–5.3)
LYMPHOCYTES NFR BLD AUTO: 20.2 %
M PROTEIN SERPL ELPH-MCNC: 0 G/DL
MAGNESIUM SERPL-MCNC: 1.7 MG/DL (ref 1.6–2.3)
MCH RBC QN AUTO: 32 PG (ref 26.5–33)
MCHC RBC AUTO-ENTMCNC: 33.1 G/DL (ref 31.5–36.5)
MCV RBC AUTO: 96 FL (ref 78–100)
MONOCYTES # BLD AUTO: 0.6 10E9/L (ref 0–1.3)
MONOCYTES NFR BLD AUTO: 11.5 %
NEUTROPHILS # BLD AUTO: 3.4 10E9/L (ref 1.6–8.3)
NEUTROPHILS NFR BLD AUTO: 65.2 %
NRBC # BLD AUTO: 0 10*3/UL
NRBC BLD AUTO-RTO: 0 /100
PLATELET # BLD AUTO: 201 10E9/L (ref 150–450)
POTASSIUM SERPL-SCNC: 4.8 MMOL/L (ref 3.4–5.3)
PROT PATTERN SERPL ELPH-IMP: ABNORMAL
PROT PATTERN SERPL IFE-IMP: NORMAL
PROT SERPL-MCNC: 5.9 G/DL (ref 6.8–8.8)
RBC # BLD AUTO: 3.63 10E12/L (ref 4.4–5.9)
SODIUM SERPL-SCNC: 138 MMOL/L (ref 133–144)
T4 FREE SERPL-MCNC: 4.04 NG/DL (ref 0.76–1.46)
THYROPEROXIDASE AB SERPL-ACNC: 194 IU/ML
TROPONIN I SERPL-MCNC: 0.04 UG/L (ref 0–0.04)
TROPONIN I SERPL-MCNC: 0.04 UG/L (ref 0–0.04)
TROPONIN I SERPL-MCNC: 0.05 UG/L (ref 0–0.04)
TSH SERPL DL<=0.005 MIU/L-ACNC: <0.01 MU/L (ref 0.4–4)
VIT B12 SERPL-MCNC: 5276 PG/ML (ref 193–986)
WBC # BLD AUTO: 5.2 10E9/L (ref 4–11)

## 2020-01-27 PROCEDURE — 84439 ASSAY OF FREE THYROXINE: CPT | Performed by: HOSPITALIST

## 2020-01-27 PROCEDURE — 99223 1ST HOSP IP/OBS HIGH 75: CPT | Performed by: INTERNAL MEDICINE

## 2020-01-27 PROCEDURE — 83735 ASSAY OF MAGNESIUM: CPT | Performed by: HOSPITALIST

## 2020-01-27 PROCEDURE — 80053 COMPREHEN METABOLIC PANEL: CPT | Performed by: HOSPITALIST

## 2020-01-27 PROCEDURE — 36415 COLL VENOUS BLD VENIPUNCTURE: CPT | Performed by: HOSPITALIST

## 2020-01-27 PROCEDURE — 25800030 ZZH RX IP 258 OP 636: Performed by: HOSPITALIST

## 2020-01-27 PROCEDURE — 00000146 ZZHCL STATISTIC GLUCOSE BY METER IP

## 2020-01-27 PROCEDURE — 84443 ASSAY THYROID STIM HORMONE: CPT | Performed by: HOSPITALIST

## 2020-01-27 PROCEDURE — 84484 ASSAY OF TROPONIN QUANT: CPT | Performed by: HOSPITALIST

## 2020-01-27 PROCEDURE — 25000132 ZZH RX MED GY IP 250 OP 250 PS 637: Mod: GY | Performed by: HOSPITALIST

## 2020-01-27 PROCEDURE — 21000001 ZZH R&B HEART CARE

## 2020-01-27 PROCEDURE — 82607 VITAMIN B-12: CPT | Performed by: HOSPITALIST

## 2020-01-27 PROCEDURE — 99232 SBSQ HOSP IP/OBS MODERATE 35: CPT | Performed by: HOSPITALIST

## 2020-01-27 PROCEDURE — 85025 COMPLETE CBC W/AUTO DIFF WBC: CPT | Performed by: HOSPITALIST

## 2020-01-27 RX ORDER — MULTIPLE VITAMINS W/ MINERALS TAB 9MG-400MCG
1 TAB ORAL DAILY
Status: DISCONTINUED | OUTPATIENT
Start: 2020-01-27 | End: 2020-01-31 | Stop reason: HOSPADM

## 2020-01-27 RX ADMIN — METHIMAZOLE 10 MG: 10 TABLET ORAL at 20:13

## 2020-01-27 RX ADMIN — METHIMAZOLE 10 MG: 10 TABLET ORAL at 08:41

## 2020-01-27 RX ADMIN — MULTIPLE VITAMINS W/ MINERALS TAB 1 TABLET: TAB at 10:50

## 2020-01-27 RX ADMIN — SODIUM CHLORIDE 250 ML: 9 INJECTION, SOLUTION INTRAVENOUS at 09:00

## 2020-01-27 RX ADMIN — SODIUM CHLORIDE: 9 INJECTION, SOLUTION INTRAVENOUS at 00:11

## 2020-01-27 RX ADMIN — METOPROLOL TARTRATE 12.5 MG: 25 TABLET, FILM COATED ORAL at 20:12

## 2020-01-27 RX ADMIN — SIMVASTATIN 20 MG: 20 TABLET, FILM COATED ORAL at 22:27

## 2020-01-27 RX ADMIN — SODIUM CHLORIDE 250 ML: 9 INJECTION, SOLUTION INTRAVENOUS at 10:04

## 2020-01-27 RX ADMIN — SODIUM CHLORIDE: 9 INJECTION, SOLUTION INTRAVENOUS at 10:07

## 2020-01-27 RX ADMIN — METOPROLOL TARTRATE 12.5 MG: 25 TABLET, FILM COATED ORAL at 12:37

## 2020-01-27 ASSESSMENT — ACTIVITIES OF DAILY LIVING (ADL)
ADLS_ACUITY_SCORE: 16
ADLS_ACUITY_SCORE: 16
ADLS_ACUITY_SCORE: 20
ADLS_ACUITY_SCORE: 14
ADLS_ACUITY_SCORE: 21
ADLS_ACUITY_SCORE: 16

## 2020-01-27 ASSESSMENT — MIFFLIN-ST. JEOR: SCORE: 1264.98

## 2020-01-27 NOTE — PROGRESS NOTES
MD Notification    Notified Person: MD    Notified Person Name: Kamla Sandoval    Notification Date/Time: 1/27/2020  145    Notification Interaction: page    Purpose of Notification: Low BP, Tachy    Orders Received: awaiting call back..  0155: NP at bedside, small bolus and bedrest for overnight.     Comments: Dr Ashton originally paged and recommended to call MARIANO Sandoval as had seen pt just last evening.

## 2020-01-27 NOTE — PLAN OF CARE
PT/OT: Orders received and chart reviewed. Pt admitted with generalize weakness and failure to thrive, elevated troponin, presently hypotensive BP 76/68, 's-140's. Nursing in agreement to hold therapy evals today until vital signs are more stable.   > 2

## 2020-01-27 NOTE — PLAN OF CARE
Neuro forgetful, short term memory loss  VS HR up and BP soft   Tele/Cardiac A-Fib CVT/RVR  Respiratory RA  Activity Up with 2, RW  Pain denies  Drips SL  Drains/Tubes PIV  Skin good  CI/ BUS for 244  Aggression color green  Plan Monitor HR  Miscellaneous Metoprolol on hold due to parameter
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
## 2020-01-27 NOTE — PLAN OF CARE
Patient Name: Jose Gomez       Room#: 0258/0258-01                 Admit Date: 2020              Primary Diagnosis:   1. Atrial fibrillation with RVR (H)    2. Renal failure, unspecified chronicity    3. Demand ischemia (H)                   Inpatient Status: Cardiac              Procedures: None              Drips:  NS 50/hr              Drains & Devices:  RPIV              Rhythm:  Afib CVR-RVR 90-110s              Activity Level: Bedrest till morning              Oxygen needs: RA              Anticipated D/C Date: TBD  Aggression Color: Green                 Acuity Ratin              Concerns:    Bedrest till morning. A/ox4. Hypotensive 60/40s again after ambulated to BR, -190s; aysmtomatic otherwise. House NP paged, ordered bedrest till morning and 250ml bolus. Pt responded well, /50s, HR 90-110s. Evening meds were held d/t BP&HR. LS clear, dim. Denies CP, dizziness, SOB, nausea or HA. Tele: Afib CVR-RVR. Trops neg x3. BNP +5K. BG 96, 101. Pt had 2 sm, loose BMs and is voiding. RPIV NS 50/hr. Plan for cards consult in morning.

## 2020-01-27 NOTE — PROVIDER NOTIFICATION
MD Notification    Notified Person Name: Dr. Callahan    Notification Date/Time: January 27, 2020 9:54 AM    Notification Interaction: text page    Purpose of Notification: Update- bolus given. BP now 90/59, 's-120's at rest and up to 150's with cares in bed.    Orders Received: 250cc bolus ordered over 30min    Comments:

## 2020-01-27 NOTE — PROGRESS NOTES
[facility]    Medicine Progress Note - Hospitalist Service       Date of Admission:  1/26/2020  Assessment & Plan   Jose Gomez is a 89 year old male who presented with progressive generalized weakness and poor appetite. After evaluation in the ER, there was concern for atrial fibrillation with RVR. He was admitted to the hospitalist service for further evaluation and management.     Generalized weakness/failure to thrive/anorexia, likely secondary to atrial fibrillation with RVR  Only obvious finding is afib currently, although more insidious process of myeloma or malignancy could be considered given his lack of appetite and progressive weakness. Known atrial fibrillation at baseline. At baseline not anticoagulated. At this point will focus on rate control and make sure there has been no development of a rate related CM. With his frequent falling and past severe subdurals would avoid anticoagulation at this point.   Could consider reimaging his head if anticoagulation is considered. He denies any recent falls in the last 2 weeks and does not remember hitting his head but family cannot confirm.  - Diltiazem drip started initially., had to be discontinued due to hypotension.  - Continues to have episodes of low blood pressure and tachycardia, responds to IV fluid boluses.  - TTE pending.  - Cardiology consult pending.  - Metoprolol ordered, but limited due to low blood pressures.  - Found to have hyperthyroidism as noted below.    Hyperthyroidism  - TSH <0.01. Free T4 4.04 this morning.  - New diagnosis for patient.  - Started on methimazole, continue the same.  - Consider adjusting beta-blocker as blood pressure allows.  - Follow-up with endocrinology as an outpatient.    Elevated troponin most likely demand ischemia from tachycardia  - Serial trops stable.  - TTE pending.  - Asymptomatic.     Acute kidney injury on CKD III  Mild hypercalcemia  Slowly rising creatinine over the last few months  despite PCP adjusting the lisinopril  Does not appear volume overload on CXR despite the elevated BNP and his heart rate has improved with the boluses in the ED department. No anemia, bone pain to suggest myeloma, but should be considered.  - Hold nephrotoxins and provide light hydration.  - Creatinine improving with IV fluids.  - Given the mildly elevated calcium, add on protein electrophoresis with immunofixation and free light chains to eval for myeloma, results pending.  - Ionized calcium within normal limits.     Chronic falls  - PT/OT consulted.  - Vitamin B12 elevated, will hold supplement for now.     Hypercalcemia  - Mild, resolved with hydration.  - Further testing pending as above.     Hypertension  - PTA lisinopril discontinued due to lower blood pressures.  - Metoprolol ordered as noted above, dosing limited by low blood pressures.    Hyperlipidemia  - Continue PTA simvastatin.     DM2  - Hemoglobin A1c 6.4.  - Not on any treatment prior to admission.  - Continue accuchecks and sliding scale insulin.        Diet: Combination Diet Low Saturated Fat Na <2400mg Diet, No Caffeine Diet  Snacks/Supplements Adult: Other; Plus2 Shake @ 2pm (RD); Between Meals    DVT Prophylaxis: Pneumatic Compression Devices  Barry Catheter: in place, indication:    Code Status: Full Code      Disposition Plan   Expected discharge: 2-3 days pending improvement in symptoms, heart rate control, cardiology evaluation, safe discharge plan  Entered: Junior Callahan MD 01/27/2020, 10:31 AM       The patient's care was discussed with the Bedside Nurse and Patient.    Junior Callahan MD  Hospitalist Service  [facility]    ______________________________________________________________________    Interval History   Jose Gomez feels OK this morning. No specific complaints/concerns for me. Denies fevers, chest pain, palpitations, shortness of breath, nausea, abdominal pain, diarrhea, urinary symptoms. Called by  nursing earlier regarding low blood pressure and tachycardia, two fluid boluses ordered with improvement in blood pressure and heart rate.    Data reviewed today: I reviewed all medications, new labs and imaging results over the last 24 hours. I personally reviewed no images or EKG's today.    Physical Exam   Vital Signs: Temp: 97.3  F (36.3  C) Temp src: Oral BP: 107/81 Pulse: 129 Heart Rate: 136 Resp: 14 SpO2: 92 % O2 Device: None (Room air)    Weight: 134 lbs 3.2 oz  Constitutional: awake, alert, cooperative, no apparent distress  Respiratory: clear to auscultation bilaterally, no crackles or wheezing  Cardiovascular: irregular rhythm, tachycardic, normal S1 and S2, no murmur noted  GI: soft, non-distended, non-tender, no masses palpated  Skin: warm, dry  Musculoskeletal: no lower extremity pitting edema present  Neurologic: awake, alert, oriented to name, place and time    Data   Recent Labs   Lab 01/27/20  0626 01/27/20  0006 01/26/20  1501   WBC 5.2  --  9.7   HGB 11.6*  --  13.6   MCV 96  --  97     --  261     --  137   POTASSIUM 4.8  --  5.2   CHLORIDE 107  --  102   CO2 17*  --  21   BUN 70*  --  75*   CR 1.80*  --  2.24*   ANIONGAP 14  --  14   CAMILA 9.2  --  10.5*   GLC 79  --  94   ALBUMIN 2.4*  --  3.2*   PROTTOTAL 5.9*  --  7.4   BILITOTAL 0.9  --  0.7   ALKPHOS 75  --  93   ALT 21  --  26   AST 16  --  31   TROPI 0.042 0.044 0.046*     Recent Results (from the past 24 hour(s))   XR Chest 2 Views    Narrative    CHEST TWO VIEW   1/26/2020 3:23 PM     HISTORY: Weakness.    COMPARISON: Chest x-ray 5/30/2018.      Impression    IMPRESSION: PA and lateral views of the chest. Lungs are clear. Heart  is normal in size. No effusions are evident. No pneumothorax. Old  healed posterolateral left rib fractures are noted. These appear to be  new in the interval since prior chest x-ray.    ATTILA SWAN MD     Medications     sodium chloride 50 mL/hr at 01/27/20 1007       sodium chloride 0.9%  250 mL  Intravenous Once     sodium chloride 0.9%  250 mL Intravenous Once     insulin aspart  1-7 Units Subcutaneous TID AC     insulin aspart  1-5 Units Subcutaneous At Bedtime     methimazole  10 mg Oral BID     metoprolol tartrate  12.5 mg Oral BID     multivitamin w/minerals  1 tablet Oral Daily     simvastatin  20 mg Oral At Bedtime     sodium chloride (PF)  3 mL Intracatheter Q8H

## 2020-01-27 NOTE — CONSULTS
"CLINICAL NUTRITION SERVICES  -  ASSESSMENT NOTE    Recommendations Ordered by Registered Dietitian (RD):   Diet per MD    Add Plus2 Shake @ 2 pm    Add thera vit M daily    Malnutrition:   % Weight Loss:  > 10% in 6 months (severe malnutrition)  % Intake:  </= 75% for >/= 1 month (severe malnutrition)  Subcutaneous Fat Loss:  Orbital region moderate depletion and Upper arm region moderate-severe depletion  Muscle Loss:  Temporal region moderate-severe depletion, Clavicle bone region moderate depletion, Acromion bone region moderate depletion and Dorsal hand region severe depletion  Fluid Retention:  None noted    Malnutrition Diagnosis: Severe malnutrition  In Context of:  Chronic illness or disease     REASON FOR ASSESSMENT  Jose Gomez is a 89 year old male seen by Registered Dietitian for Admission Nutrition Risk Screen for reduced oral intake over the last month    NUTRITION HISTORY  - Information obtained from patient, EMR.   - PMH: afib, h/o traumatic subdurals w/ frequent falls, diet-controlled diabetes mellitus, hypertension, hyperlipidemia. Presented with failure to thrive and generalized weakness.   - \"has not been eating much over past 2 weeks\"    - No family at bedside during visit. Accuracy of patient's report is unclear:   - Lives w/ daughter and son. Vincent states that his daughter does the cooking and shopping. When asked how many meals/day she prepares, or tries to get him to eat, he states \"I don't eat much\". Unable to provide a normal day of eating.   - He states that his appetite \"has been the same\" for some time now, no acute changes.   - Eats cheese/crackers, bananas, eggs, some soups, chicken. He does not like yogurt, beef, pork, turkey, milk, peanut butter, vegetables.   - does not use high protein oral supplement.   - NKFA    CURRENT NUTRITION ORDERS  Diet Order:     Low Saturated Fat/2400 mg Sodium, No Caffeine    Current Intake/Tolerance:  50% intake for one meal since admission.   During " "visit patient stated that he had not had breakfast (though meal ordering system indicates a meal was delivered). \"It is too early to tell\" how his appetite will be today. Agrees to a strawberry shake this afternoon, but not this morning.     NUTRITION FOCUSED PHYSICAL ASSESSMENT FOR DIAGNOSING MALNUTRITION)  Yes          Observed:    Muscle wasting (refer to documentation in Malnutrition section) and Subcutaneous fat loss (refer to documentation in Malnutrition section)    Obtained from Chart/Interdisciplinary Team:  BM x1 1/27  Ariel - Nutrition 2: Total 16    ANTHROPOMETRICS  Height: 5' 10\"  Weight: 134 lbs 3.2 oz (60.9 kg)   Body mass index is 19.26 kg/m .  Weight Status:  Normal BMI  IBW: 75.5 kg  % IBW: 80.7%  Weight History: Pt unsure about recent weight changes. Thinks his UBW has been in the 140s (only able to give this information after being prompted with potential weight ranges). Suspect weights in the 160s-170s inaccurate. 14# loss indicated, likely in <6 months (9.4%).   Wt Readings from Last 10 Encounters:   01/27/20 60.9 kg (134 lb 3.2 oz)   11/18/19 74.1 kg (163 lb 4.8 oz)   10/07/19 75.8 kg (167 lb 3.2 oz)   09/19/19 77.5 kg (170 lb 12.8 oz)   08/26/19 76.7 kg (169 lb)   07/23/18 67.4 kg (148 lb 9.6 oz)   07/11/18 64.7 kg (142 lb 9.6 oz)   06/28/18 65.1 kg (143 lb 9.6 oz)   06/22/18 64.9 kg (143 lb)   06/19/18 65.2 kg (143 lb 11.2 oz)     LABS  Labs reviewed  Recent Labs   Lab Test 01/27/20  0626 01/26/20  1501 11/11/19  1108 10/07/19  0941 08/26/19  1054   POTASSIUM 4.8 5.2 4.4 4.3 4.5     Recent Labs   Lab Test 01/27/20  0626 01/26/20  1501 11/11/19  1108 10/07/19  0941 08/26/19  1054    137 137 136 136     Recent Labs   Lab Test 01/27/20  0626 01/26/20  1501 11/11/19  1108 10/07/19  0941 08/26/19  1054   CR 1.80* 2.24* 1.80* 2.00* 1.76*     Recent Labs   Lab 01/27/20  0626 01/26/20  1501   GLC 79 94     Lab Results   Component Value Date    A1C 6.4 01/26/2020    A1C 6.5 08/26/2019    A1C " 6.3 04/26/2018    A1C 5.9 02/22/2017    A1C 6.4 01/16/2015       MEDICATIONS  Medications reviewed  MSSI  NaCl IVF @ 50 mL/hr     ASSESSED NUTRITION NEEDS PER APPROVED PRACTICE GUIDELINES:  Dosing Weight 60.9 kg   Estimated Energy Needs: 9064-2859 kcals (30-35 Kcal/Kg)  Justification: repletion  Estimated Protein Needs: 73-91 grams protein (1.2-1.5 g pro/Kg)  Justification: Repletion  Estimated Fluid Needs: 1mL/kcal  Justification: or Per MD    MALNUTRITION:  % Weight Loss:  > 10% in 6 months (severe malnutrition)  % Intake:  </= 75% for >/= 1 month (severe malnutrition)  Subcutaneous Fat Loss:  Orbital region moderate depletion and Upper arm region moderate-severe depletion  Muscle Loss:  Temporal region moderate-severe depletion, Clavicle bone region moderate depletion, Acromion bone region moderate depletion and Dorsal hand region severe depletion  Fluid Retention:  None noted    Malnutrition Diagnosis: Severe malnutrition  In Context of:  Chronic illness or disease    NUTRITION DIAGNOSIS:  Malnutrition related to inadequate oral intake w/ lack of appetite as evidenced by weight loss of up to 10% in <6 months, e/o moderate-severe fat and muscle loss, intakes <50-75% est needs per patient report.       NUTRITION INTERVENTIONS  Recommendations / Nutrition Prescription  Diet per MD    Add Plus2 Shake @ 2 pm    Add thera vit M daily       Implementation  Nutrition education: Provided education on supplements  Medical Food Supplement and Multivitamin/Mineral: above      Nutrition Goals  Intake of at least 50% meals BID + 1 supplement daily.       MONITORING AND EVALUATION:  Progress towards goals will be monitored and evaluated per protocol and Practice Guidelines    Ann Joe RD, LD  [facility], [address], 55, [facility]   Pager: [phone]  Weekend Pager: [phone]

## 2020-01-27 NOTE — PROVIDER NOTIFICATION
MD Notification    Notified Person: MD     Notified Person Name:  [place]    Notification Date/Time:01/27/20 8:48 AM    Notification Interaction: text page    Purpose of Notification:FYI- Unable to administer metoprolol due to BP 76/68. 's-140's. Pt asymptomatic.    Orders Received:250 bolus over 30 mins ordered    Comments:

## 2020-01-27 NOTE — PROGRESS NOTES
Patient floridly hyperthyroid  Plan  - TPO/TPI abx sent  - start methimazole 10 mg bid (FT4 3x ULN)  - start low dose metoprolol  - had some transient hypotension and so the diltiazem is off  - small bolus over 2 hours for the blood pressure   endo as outpatient      Also his vitamin b12 is too high, would hold for now

## 2020-01-27 NOTE — PROGRESS NOTES
BRIEF HOUSE OFFICER NOTE:    I was called to evaluate the patient for an episode of hypotension in the setting of rapid atrial fibrillation. The patient had one blood pressure reading of 60/46 at 1815 with a subsequent recheck of 95/49. HR briefly at this time was >130. His heart rates are now between . The patient denies any chest pain, palpitation, lightheadedness, dizziness, or shortness of breath. The patient appears well perfused, alert and oriented with appropriate behavior.     INTERVENTIONS:  -Discontinue cardizem drip  -Metoprolol 2.5mg IV q4hr PRN for HR >120    Kamla Sandoval, SABIHA CNP  Text Page     No charge

## 2020-01-27 NOTE — PROGRESS NOTES
BRIEF HOUSE OFFICER NOTE:    I was called for episode of recurrent hypotension. The patient was normotensive following a fluid bolus of NS 250ml until he got up to the bathroom. The patient's HR elevated to 170-190's atrial fibrillation with activity and blood pressure dropped to the 60/40's. The patient continues to be asymptomatic. I suspect that the patient's severe hyperthyroidism is driving his high heart rates with activity. The patient's CXR from 1/26/2020 was clear without any evidence of pulmonary edema, lung sound clear with ausculation throughout, no cough or increased work of breathing. BNP is elevated at 5,440. HR are no longer sustained >120 will defer any medications at this time for rate control.  Blood glucose 101.    INTERVENTION:  -NS 250ml IV bolus x1 now  -Bedrest until morning  -Intake and output monitoring    SABIHA Chatterjee CNP  Text Page

## 2020-01-28 ENCOUNTER — APPOINTMENT (OUTPATIENT)
Dept: OCCUPATIONAL THERAPY | Facility: CLINIC | Age: 85
DRG: 308 | End: 2020-01-28
Attending: HOSPITALIST
Payer: MEDICARE

## 2020-01-28 ENCOUNTER — APPOINTMENT (OUTPATIENT)
Dept: PHYSICAL THERAPY | Facility: CLINIC | Age: 85
DRG: 308 | End: 2020-01-28
Attending: HOSPITALIST
Payer: MEDICARE

## 2020-01-28 LAB
ANION GAP SERPL CALCULATED.3IONS-SCNC: 12 MMOL/L (ref 3–14)
BUN SERPL-MCNC: 57 MG/DL (ref 7–30)
CALCIUM SERPL-MCNC: 9.2 MG/DL (ref 8.5–10.1)
CHLORIDE SERPL-SCNC: 107 MMOL/L (ref 94–109)
CO2 SERPL-SCNC: 18 MMOL/L (ref 20–32)
CREAT SERPL-MCNC: 1.45 MG/DL (ref 0.66–1.25)
ERYTHROCYTE [DISTWIDTH] IN BLOOD BY AUTOMATED COUNT: 12.1 % (ref 10–15)
GFR SERPL CREATININE-BSD FRML MDRD: 42 ML/MIN/{1.73_M2}
GLUCOSE BLDC GLUCOMTR-MCNC: 107 MG/DL (ref 70–99)
GLUCOSE BLDC GLUCOMTR-MCNC: 114 MG/DL (ref 70–99)
GLUCOSE BLDC GLUCOMTR-MCNC: 119 MG/DL (ref 70–99)
GLUCOSE BLDC GLUCOMTR-MCNC: 128 MG/DL (ref 70–99)
GLUCOSE BLDC GLUCOMTR-MCNC: 91 MG/DL (ref 70–99)
GLUCOSE SERPL-MCNC: 99 MG/DL (ref 70–99)
HCT VFR BLD AUTO: 36.6 % (ref 40–53)
HGB BLD-MCNC: 12.1 G/DL (ref 13.3–17.7)
MCH RBC QN AUTO: 31.9 PG (ref 26.5–33)
MCHC RBC AUTO-ENTMCNC: 33.1 G/DL (ref 31.5–36.5)
MCV RBC AUTO: 97 FL (ref 78–100)
PLATELET # BLD AUTO: 190 10E9/L (ref 150–450)
POTASSIUM SERPL-SCNC: 4.7 MMOL/L (ref 3.4–5.3)
RBC # BLD AUTO: 3.79 10E12/L (ref 4.4–5.9)
SODIUM SERPL-SCNC: 137 MMOL/L (ref 133–144)
WBC # BLD AUTO: 6.6 10E9/L (ref 4–11)

## 2020-01-28 PROCEDURE — 97535 SELF CARE MNGMENT TRAINING: CPT | Mod: GO | Performed by: OCCUPATIONAL THERAPIST

## 2020-01-28 PROCEDURE — 97530 THERAPEUTIC ACTIVITIES: CPT | Mod: GO | Performed by: OCCUPATIONAL THERAPIST

## 2020-01-28 PROCEDURE — 97530 THERAPEUTIC ACTIVITIES: CPT | Mod: GP

## 2020-01-28 PROCEDURE — 99233 SBSQ HOSP IP/OBS HIGH 50: CPT | Performed by: INTERNAL MEDICINE

## 2020-01-28 PROCEDURE — 80048 BASIC METABOLIC PNL TOTAL CA: CPT | Performed by: HOSPITALIST

## 2020-01-28 PROCEDURE — 25000132 ZZH RX MED GY IP 250 OP 250 PS 637: Mod: GY | Performed by: HOSPITALIST

## 2020-01-28 PROCEDURE — 21000001 ZZH R&B HEART CARE

## 2020-01-28 PROCEDURE — 99232 SBSQ HOSP IP/OBS MODERATE 35: CPT | Performed by: INTERNAL MEDICINE

## 2020-01-28 PROCEDURE — 85027 COMPLETE CBC AUTOMATED: CPT | Performed by: HOSPITALIST

## 2020-01-28 PROCEDURE — 25000132 ZZH RX MED GY IP 250 OP 250 PS 637: Mod: GY | Performed by: INTERNAL MEDICINE

## 2020-01-28 PROCEDURE — 97161 PT EVAL LOW COMPLEX 20 MIN: CPT | Mod: GP

## 2020-01-28 PROCEDURE — 25800030 ZZH RX IP 258 OP 636: Performed by: HOSPITALIST

## 2020-01-28 PROCEDURE — 97166 OT EVAL MOD COMPLEX 45 MIN: CPT | Mod: GO | Performed by: OCCUPATIONAL THERAPIST

## 2020-01-28 PROCEDURE — 00000146 ZZHCL STATISTIC GLUCOSE BY METER IP

## 2020-01-28 PROCEDURE — 97110 THERAPEUTIC EXERCISES: CPT | Mod: GP

## 2020-01-28 PROCEDURE — 36415 COLL VENOUS BLD VENIPUNCTURE: CPT | Performed by: HOSPITALIST

## 2020-01-28 RX ORDER — METOPROLOL TARTRATE 25 MG/1
25 TABLET, FILM COATED ORAL 2 TIMES DAILY
Status: COMPLETED | OUTPATIENT
Start: 2020-01-28 | End: 2020-01-30

## 2020-01-28 RX ORDER — METOPROLOL TARTRATE 25 MG/1
25 TABLET, FILM COATED ORAL 2 TIMES DAILY
Status: DISCONTINUED | OUTPATIENT
Start: 2020-01-28 | End: 2020-01-28

## 2020-01-28 RX ADMIN — MULTIPLE VITAMINS W/ MINERALS TAB 1 TABLET: TAB at 10:15

## 2020-01-28 RX ADMIN — METHIMAZOLE 10 MG: 10 TABLET ORAL at 21:23

## 2020-01-28 RX ADMIN — METOPROLOL TARTRATE 25 MG: 25 TABLET ORAL at 10:14

## 2020-01-28 RX ADMIN — SODIUM CHLORIDE: 9 INJECTION, SOLUTION INTRAVENOUS at 02:07

## 2020-01-28 RX ADMIN — METHIMAZOLE 10 MG: 10 TABLET ORAL at 10:21

## 2020-01-28 RX ADMIN — SIMVASTATIN 20 MG: 20 TABLET, FILM COATED ORAL at 21:24

## 2020-01-28 RX ADMIN — SODIUM CHLORIDE: 9 INJECTION, SOLUTION INTRAVENOUS at 20:08

## 2020-01-28 RX ADMIN — METOPROLOL TARTRATE 25 MG: 25 TABLET ORAL at 21:24

## 2020-01-28 ASSESSMENT — ACTIVITIES OF DAILY LIVING (ADL)
ADLS_ACUITY_SCORE: 21
ADLS_ACUITY_SCORE: 20
ADLS_ACUITY_SCORE: 19

## 2020-01-28 ASSESSMENT — MIFFLIN-ST. JEOR
SCORE: 1233.68
SCORE: 1279.04
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
## 2020-01-28 NOTE — PROGRESS NOTES
01/28/20 1507   Quick Adds   Type of Visit Initial Occupational Therapy Evaluation   Living Environment   Lives With child(katarzyna), adult   Living Arrangements house   Home Accessibility stairs within home   Number of Stairs, Within Home, Primary 6   Transportation Anticipated family or friend will provide   Self-Care   Regular Exercise No   Functional Level   Ambulation 0-->independent   Transferring 0-->independent   Toileting 0-->independent   Dressing 0-->independent   Eating 0-->independent   Communication 0-->understands/communicates without difficulty   Fall history within last six months yes   Number of times patient has fallen within last six months 2   General Information   Onset of Illness/Injury or Date of Surgery - Date 01/26/20   Referring Physician Salvador Carrillo, DO   Patient/Family Goals Statement home   Additional Occupational Profile Info/Pertinent History of Current Problem Jose Gomez is a 89 year old male with past medical history of atrial fibrillation not on anticoagulation, history of traumatic subdurals with frequent falls, diet-controlled diabetes mellitus, hypertension, hyperlipidemia who presents with failure to thrive and generalized weakness. concern for A-fib and elevated troponin, per chart demand ischemia due to A-fib.   Precautions/Limitations fall precautions   Cognitive Status Examination   Orientation person;place  (pt reoriented to [facility], current month and situation)   Level of Consciousness alert   Follows Commands (Cognition) delayed response/completion;increased processing time needed;repetition of directions required;verbal cues/prompting required   Memory impaired   Cognitive Comment continue to monitor and screen cognition   Sensory Examination   Sensory Quick Adds No deficits were identified   Pain Assessment   Patient Currently in Pain No   Range of Motion (ROM)   ROM Comment B UE AROM WFL   Strength   Strength Comments overall decreased strength, B UE  strength 4 to 4-/5   Bed Mobility Skill: Scooting/Bridging   Level of Sharkey: Scooting/Bridging minimum assist (75% patients effort)   Bed Mobility Skill: Sit to Supine   Level of Sharkey: Sit/Supine contact guard   Bed Mobility Skill: Supine to Sit   Level of Sharkey: Supine/Sit stand-by assist   Transfer Skill: Bed to Chair/Chair to Bed   Level of Sharkey: Bed to Chair minimum assist (75% patients effort)   Transfer Skill: Sit to Stand   Level of Sharkey: Sit/Stand contact guard   Transfer Skill: Toilet Transfer   Level of Sharkey: Toilet minimum assist (75% patients effort)   Assistive Device grab bars   Lower Body Dressing   Level of Sharkey: Dress Lower Body contact guard   Instrumental Activities of Daily Living (IADL)   Previous Responsibilities   (pt reports doesn't have meds and dtr A's with other IADl's)   Activities of Daily Living Analysis   Impairments Contributing to Impaired Activities of Daily Living balance impaired;cognition impaired;strength decreased  (decreased activity tolerance)   General Therapy Interventions   Planned Therapy Interventions ADL retraining;cognition;transfer training;progressive activity/exercise;home program guidelines   Clinical Impression   Criteria for Skilled Therapeutic Interventions Met yes, treatment indicated   OT Diagnosis decreased I with ADl/IADl's and functional mobility   Influenced by the following impairments impaired balance, overall decreased strength, activity tolerance, safety, memory, etc   Assessment of Occupational Performance 3-5 Performance Deficits   Identified Performance Deficits impaired I with dressing, toilet and shower transfer, med mgmt, etc   Clinical Decision Making (Complexity) Moderate complexity   Therapy Frequency Daily   Predicted Duration of Therapy Intervention (days/wks) 3 days   Anticipated Discharge Disposition Transitional Care Facility   Risks and Benefits of Treatment have been explained. Yes  "  Patient, Family & other staff in agreement with plan of care Yes   [facility] AM-PAC  \"6 Clicks\" Daily Activity Inpatient Short Form   1. Putting on and taking off regular lower body clothing? 3 - A Little   2. Bathing (including washing, rinsing, drying)? 3 - A Little   3. Toileting, which includes using toilet, bedpan or urinal? 3 - A Little   4. Putting on and taking off regular upper body clothing? 3 - A Little   5. Taking care of personal grooming such as brushing teeth? 3 - A Little   6. Eating meals? 3 - A Little   Daily Activity Raw Score (Score out of 24.Lower scores equate to lower levels of function) 18   Total Evaluation Time   Total Evaluation Time (Minutes) 10     "

## 2020-01-28 NOTE — PLAN OF CARE
Discharge Planner PT   Patient plan for discharge: None stated  Current status: Pt is a 89 year old male admitted with failure to thrive. At baseline, pt lives with his adult children in a split level home. Pt reports independence with mobility and ADLs, no use of AD prior to admit.  This date, pt requires min assist for bed mobility and transfers. Did not test ambulation this date 2/2 decreased activity tolerance.   Barriers to return to prior living situation: Decreased activity tolerance, assist with all mobility, stairs, fall risk, generalized weakness  Recommendations for discharge: TCU  Rationale for recommendations: Pt is well below baseline and would be a considerable fall risk if he returned to home environment. Pt currently requires assist with all mobility. Pt will benefit from continued therapy at TCU to address impairments and increase mobility and functional independence prior to returning home.         Entered by: Lupe Dutton 01/28/2020 9:48 AM

## 2020-01-28 NOTE — PROGRESS NOTES
01/28/20 0900   Quick Adds   Type of Visit Initial PT Evaluation   Living Environment   Lives With child(katarzyna), adult   Living Arrangements house   Home Accessibility stairs within home   Number of Stairs, Within Home, Primary 6  (Split level home)   Stair Railings, Within Home, Primary railings safe and in good condition;railing on right side (ascending)   Transportation Anticipated family or friend will provide   Self-Care   Usual Activity Tolerance good   Current Activity Tolerance fair   Regular Exercise No   Equipment Currently Used at Home none   Functional Level Prior   Ambulation 0-->independent   Transferring 0-->independent   Fall history within last six months yes   Number of times patient has fallen within last six months 4   Which of the above functional risks had a recent onset or change? fall history   Prior Functional Level Comment Pt reports independence with mobility prior to admit.    General Information   Onset of Illness/Injury or Date of Surgery - Date 01/26/20   Referring Physician Dr. Carrillo   Patient/Family Goals Statement To go home   Pertinent History of Current Problem (include personal factors and/or comorbidities that impact the POC) Pt is a 89 year old male admitted with failure to thrive.   Precautions/Limitations fall precautions   Cognitive Status Examination   Level of Consciousness alert   Pain Assessment   Patient Currently in Pain No   Range of Motion (ROM)   ROM Quick Adds No deficits were identified   Strength   Strength Comments Gross LE strength 4/5 bilaterally   Bed Mobility   Bed Mobility Comments Supine to sit min assist   Transfer Skills   Transfer Comments Sit to/from stand min assist   Gait   Gait Comments NT   Balance   Balance Comments Good in sitting, fair in standing   General Therapy Interventions   Planned Therapy Interventions balance training;bed mobility training;gait training;strengthening;transfer training;progressive activity/exercise;home program  "guidelines   Clinical Impression   Criteria for Skilled Therapeutic Intervention yes, treatment indicated   PT Diagnosis Impaired ambulation   Influenced by the following impairments Decreased strength, decreased endurance, decreased balance   Functional limitations due to impairments Difficulty with bed mobility, transfers, ambulation, stair management   Clinical Presentation Stable/Uncomplicated   Clinical Presentation Rationale VSS, pain controlled   Clinical Decision Making (Complexity) Low complexity   Therapy Frequency 5x/week   Predicted Duration of Therapy Intervention (days/wks) 1 week   Anticipated Discharge Disposition Transitional Care Facility   Risk & Benefits of therapy have been explained Yes   Patient, Family & other staff in agreement with plan of care Yes   [facility] TM \"6 Clicks\"   2016, Trustees of [facility], under license to [employer].  All rights reserved.   6 Clicks Short Forms Basic Mobility Inpatient Short Form   [facility]  \"6 Clicks\" V.2 Basic Mobility Inpatient Short Form   1. Turning from your back to your side while in a flat bed without using bedrails? 3 - A Little   2. Moving from lying on your back to sitting on the side of a flat bed without using bedrails? 3 - A Little   3. Moving to and from a bed to a chair (including a wheelchair)? 3 - A Little   4. Standing up from a chair using your arms (e.g., wheelchair, or bedside chair)? 3 - A Little   5. To walk in hospital room? 2 - A Lot   6. Climbing 3-5 steps with a railing? 1 - Total   Basic Mobility Raw Score (Score out of 24.Lower scores equate to lower levels of function) 15   Total Evaluation Time   Total Evaluation Time (Minutes) 10     "

## 2020-01-28 NOTE — PLAN OF CARE
Neuro: Alert and oriented x 4, forgetful at times  Recent vital signs:HR in the 100s, other VSS  Respiratory: Room air, denies SOB, clear lung sounds  Pain:Denies  Tele:AFib RVR, HR in the 140-160s range with activity  Activity:assist of 2 with activities  Drips/Drains/IVF:NS infusing at 50 ml/hr  Skin:red blanchable coccyx, covered with mepilex  GI/:2 gm Na no caffeine, incontinent of bladder. No stools this shift  Aggression color:green  Plan:Test/Consult:  Labs:  Misc:   Events this shift:  Bed alarm on for safety,  and 119. Pills crushed with apple sauce.

## 2020-01-28 NOTE — PLAN OF CARE
"Discharge Planner OT   Patient plan for discharge: home  Current status: Eval completed and treatment initiated. Pt lives in a house with his adult son and daughter. Pt reports does not use ww and reports I with ADl's, pt reports his dtr does the cleaning, laundry and cooking. Unsure of reliability of PLOF as pt oriented to self, hospital and year, but needed cues and reorientation to current month and situation. Pt admitted due to failure to thrive, A-fib and falls.   Pt currently requires SBA/CGA for bed mobility, sit <> stand and toilet transfer with CGA/miN A without ww, pt needing cues for safety and unsteady, pt educated in ww but reports, \"I won't need one at home.\" pt able to complete LE dress with CGA for balance and steadiness. Pt slow to respond/processing needed with simple directions. Pt in A-fib throughout activity with -160's at times. BP blunted. See vital sign flow sheet for details.   Barriers to return to prior living situation: impaired balance, safety, activity tolerance, strength and appears to have impaired STM.  Recommendations for discharge: TCU  Rationale for recommendations: pt requires increased A for ADls and functional mobility. Pt will require daily therapy to increase ADL and functional mobility independence and safety to PLOF. At this time pt declining TCU, if home will need A with all IADL's ie med mgmt, transportation, cooking, shower transfer, etc and home OT and RN for med mgmt and ADl/IAdl's, home safety and further cognitive assessment.       Entered by: Garima Dos Santos 01/28/2020 3:25 PM      "

## 2020-01-28 NOTE — PROGRESS NOTES
[facility]    Hospitalist Progress Note    Date of Service (when I saw the patient): 01/28/2020    Assessment & Plan   Jose Gomez is a pleasant 89 year old gentleman who presented 1/26 for evaluation of weakness and poor appetite.  He was also found to be in atrial fibrillation with RVR.  Current problems include:     Generalized weakness/deconditioning/anorexia - attributed to atrial fibrillation with RVR; stable.  - continue therapies; will likely need TCU at discharge    A-fib. With RVR; attributed to dehydration and hyperthyroidism.  Rates still elevated w/ walking in room today.  - Diltiazem drip started earlier this admission, had to be discontinued due to hypotension.  - as of 1/27, continued to have episodes of low blood pressure and tachycardia; pressures improved today.  - TTE pending.  - Cardiology consult pending.  - Metoprolol increased to 25 mg BID today per Cardiology; reassess 1/29     Hyperthyroidism; stable.  New diagnosis for patient.  - continue methimazole  - Endocrinology appt. After discharge     Elevated troponin; likely demand ischemia from tachycardia  - Serial trops stable.  - TTE pending.  - Asymptomatic.     Acute kidney injury on CKD III; improving.  - repeat BMP 1/29  - Hold nephrotoxins and provide light hydration.    Mild hypercalcemia; unclear cause.  No anemia, or bone pain to suggest myeloma, but should be considered.  - follow up on protein electrophoresis with immunofixation and free light chains to eval for myeloma (pending)     Chronic falls; stable  - PT/OT consulted.  - Vitamin B12 elevated, will hold supplement for now.      Hypertension  - PTA lisinopril discontinued due to lower blood pressures.  - Metoprolol ordered as noted above, dosing limited by low blood pressures.     Dyslipidemia  - Continue PTA simvastatin.     D.M. type II; stable.  Hemoglobin A1c = 6.4%.  - Continue accuchecks and sliding scale insulin.        Diet: Combination Diet Low  "Saturated Fat Na <2400mg Diet, No Caffeine Diet  Snacks/Supplements Adult: Other; Plus2 Shake @ 2pm (RD); Between Meals       DVT Prophylaxis: Pneumatic Compression Devices and Ambulate every shift  Code Status: Full Code    Disposition: Expected discharge in 2-3 days.      ZAYDA Darling MD, [facility]     Internal Medicine Hospitalist  Text Page (7am - 6pm)    Interval History   Doing fairly well today; appetite \"so-so\", but says he doesn't usually eat much.  No f/c/SOB; no chest or abdominal Sx.  Did transfers w/ SBA today, but no walking with PT.    Per his RN, HR's up to 170 when walking to bathroom. No orthostatic Sx during this.      -Data reviewed today: I reviewed all new labs and imaging results over the last 24 hours. I personally reviewed recent labs and imaging reports.    Physical Exam   Temp: 98.2  F (36.8  C) Temp src: Oral BP: 119/66 Pulse: 74 Heart Rate: 134 Resp: 18 SpO2: 95 % O2 Device: None (Room air)    Vitals:    01/27/20 0108 01/28/20 0514 01/28/20 0642   Weight: 60.9 kg (134 lb 3.2 oz) 62.3 kg (137 lb 4.8 oz) 57.7 kg (127 lb 4.8 oz)     Vital Signs with Ranges  Temp:  [97.5  F (36.4  C)-98.2  F (36.8  C)] 98.2  F (36.8  C)  Pulse:  [] 74  Heart Rate:  [] 134  Resp:  [17-20] 18  BP: ()/(45-96) 119/66  SpO2:  [90 %-98 %] 95 %  I/O last 3 completed shifts:  In: 1027 [P.O.:400; I.V.:627]  Out: 50 [Urine:50]    Constitutional: awake, no apparent distress; lying in bed  HEENT: sclerae clear; MM's moist  Respiratory: fair a/e bilaterally, no wheezing or rhonchi  Cardiovascular: Irregular rate and rhythm, S1, S2 noted; no m/r/g  GI: abdomen flat, + bowel sounds; soft, non-tender, non-distended  Skin/Integumen: no rashes, no cyanosis, no jaundice; pIV in Left dorsal hand  Musculoskeletal: no edema  Neurologic: follows directions well; no focal deficits      Medications     sodium chloride 50 mL/hr at 01/28/20 1023       sodium chloride 0.9%  250 mL Intravenous Once     insulin aspart  1-7 " Units Subcutaneous TID AC     insulin aspart  1-5 Units Subcutaneous At Bedtime     methimazole  10 mg Oral BID     metoprolol tartrate  25 mg Oral BID     multivitamin w/minerals  1 tablet Oral Daily     simvastatin  20 mg Oral At Bedtime     sodium chloride (PF)  3 mL Intracatheter Q8H       Data   Recent Labs   Lab 01/28/20  0529 01/27/20  1210 01/27/20  0626 01/27/20  0006 01/26/20  1501   WBC 6.6  --  5.2  --  9.7   HGB 12.1*  --  11.6*  --  13.6   MCV 97  --  96  --  97     --  201  --  261     --  138  --  137   POTASSIUM 4.7  --  4.8  --  5.2   CHLORIDE 107  --  107  --  102   CO2 18*  --  17*  --  21   BUN 57*  --  70*  --  75*   CR 1.45*  --  1.80*  --  2.24*   ANIONGAP 12  --  14  --  14   CAMILA 9.2  --  9.2  --  10.5*   GLC 99  --  79  --  94   ALBUMIN  --   --  2.4*  --  3.2*   PROTTOTAL  --   --  5.9*  --  7.4   BILITOTAL  --   --  0.9  --  0.7   ALKPHOS  --   --  75  --  93   ALT  --   --  21  --  26   AST  --   --  16  --  31   TROPI  --  0.051* 0.042 0.044 0.046*

## 2020-01-28 NOTE — PLAN OF CARE
A&Ox3, disoriented to situation, forgetful at times. Impulsive when needing to void. Denies CP or SOB. AFIB RVR, particularly with activity, resolves with rest. BP soft in morning requiring 250mL bolus of NS x 2, improved in afternoon. Asymptomatic when tachycardic and hypotensive. RA, clear lungs. Poor appetite. Assist x1 to BSC. Coccyx blanchable, mepilex in place.

## 2020-01-29 ENCOUNTER — APPOINTMENT (OUTPATIENT)
Dept: OCCUPATIONAL THERAPY | Facility: CLINIC | Age: 85
DRG: 308 | End: 2020-01-29
Payer: MEDICARE

## 2020-01-29 ENCOUNTER — APPOINTMENT (OUTPATIENT)
Dept: CARDIOLOGY | Facility: CLINIC | Age: 85
DRG: 308 | End: 2020-01-29
Attending: INTERNAL MEDICINE
Payer: MEDICARE

## 2020-01-29 LAB
ANION GAP SERPL CALCULATED.3IONS-SCNC: 7 MMOL/L (ref 3–14)
BUN SERPL-MCNC: 41 MG/DL (ref 7–30)
CALCIUM SERPL-MCNC: 9.3 MG/DL (ref 8.5–10.1)
CHLORIDE SERPL-SCNC: 113 MMOL/L (ref 94–109)
CO2 SERPL-SCNC: 23 MMOL/L (ref 20–32)
CREAT SERPL-MCNC: 1.26 MG/DL (ref 0.66–1.25)
GFR SERPL CREATININE-BSD FRML MDRD: 49 ML/MIN/{1.73_M2}
GLUCOSE BLDC GLUCOMTR-MCNC: 110 MG/DL (ref 70–99)
GLUCOSE BLDC GLUCOMTR-MCNC: 114 MG/DL (ref 70–99)
GLUCOSE BLDC GLUCOMTR-MCNC: 114 MG/DL (ref 70–99)
GLUCOSE BLDC GLUCOMTR-MCNC: 115 MG/DL (ref 70–99)
GLUCOSE BLDC GLUCOMTR-MCNC: 136 MG/DL (ref 70–99)
GLUCOSE SERPL-MCNC: 117 MG/DL (ref 70–99)
POTASSIUM SERPL-SCNC: 4.4 MMOL/L (ref 3.4–5.3)
SODIUM SERPL-SCNC: 143 MMOL/L (ref 133–144)
TSI SER-ACNC: <1 TSI INDEX

## 2020-01-29 PROCEDURE — 25000132 ZZH RX MED GY IP 250 OP 250 PS 637: Mod: GY | Performed by: HOSPITALIST

## 2020-01-29 PROCEDURE — 93306 TTE W/DOPPLER COMPLETE: CPT

## 2020-01-29 PROCEDURE — 25500064 ZZH RX 255 OP 636: Performed by: HOSPITALIST

## 2020-01-29 PROCEDURE — 21000001 ZZH R&B HEART CARE

## 2020-01-29 PROCEDURE — 99233 SBSQ HOSP IP/OBS HIGH 50: CPT | Mod: 25 | Performed by: INTERNAL MEDICINE

## 2020-01-29 PROCEDURE — 36415 COLL VENOUS BLD VENIPUNCTURE: CPT | Performed by: INTERNAL MEDICINE

## 2020-01-29 PROCEDURE — 93306 TTE W/DOPPLER COMPLETE: CPT | Mod: 26 | Performed by: INTERNAL MEDICINE

## 2020-01-29 PROCEDURE — 00000146 ZZHCL STATISTIC GLUCOSE BY METER IP

## 2020-01-29 PROCEDURE — 25000132 ZZH RX MED GY IP 250 OP 250 PS 637: Mod: GY | Performed by: NURSE PRACTITIONER

## 2020-01-29 PROCEDURE — 97535 SELF CARE MNGMENT TRAINING: CPT | Mod: GO | Performed by: OCCUPATIONAL THERAPY ASSISTANT

## 2020-01-29 PROCEDURE — 25000132 ZZH RX MED GY IP 250 OP 250 PS 637: Mod: GY | Performed by: INTERNAL MEDICINE

## 2020-01-29 PROCEDURE — 80048 BASIC METABOLIC PNL TOTAL CA: CPT | Performed by: INTERNAL MEDICINE

## 2020-01-29 PROCEDURE — 25800030 ZZH RX IP 258 OP 636: Performed by: HOSPITALIST

## 2020-01-29 PROCEDURE — 40000256 ZZH STATISTIC CARDIOPULM RESUSCITATION

## 2020-01-29 PROCEDURE — 99232 SBSQ HOSP IP/OBS MODERATE 35: CPT | Performed by: INTERNAL MEDICINE

## 2020-01-29 RX ADMIN — METHIMAZOLE 10 MG: 10 TABLET ORAL at 09:35

## 2020-01-29 RX ADMIN — METOPROLOL TARTRATE 25 MG: 25 TABLET ORAL at 09:35

## 2020-01-29 RX ADMIN — HUMAN ALBUMIN MICROSPHERES AND PERFLUTREN 9 ML: 10; .22 INJECTION, SOLUTION INTRAVENOUS at 12:02

## 2020-01-29 RX ADMIN — MULTIPLE VITAMINS W/ MINERALS TAB 1 TABLET: TAB at 09:35

## 2020-01-29 RX ADMIN — DIGOXIN 62.5 MCG: 125 TABLET ORAL at 12:19

## 2020-01-29 RX ADMIN — METOPROLOL TARTRATE 25 MG: 25 TABLET ORAL at 21:34

## 2020-01-29 RX ADMIN — SODIUM CHLORIDE: 9 INJECTION, SOLUTION INTRAVENOUS at 15:45

## 2020-01-29 RX ADMIN — SIMVASTATIN 20 MG: 20 TABLET, FILM COATED ORAL at 21:34

## 2020-01-29 RX ADMIN — METHIMAZOLE 10 MG: 10 TABLET ORAL at 21:34

## 2020-01-29 ASSESSMENT — ACTIVITIES OF DAILY LIVING (ADL)
ADLS_ACUITY_SCORE: 21

## 2020-01-29 NOTE — PROGRESS NOTES
[facility]    Hospitalist Progress Note    Date of Service (when I saw the patient): 01/29/2020    Assessment & Plan   Jose Gomez is a pleasant 89 year old gentleman who presented 1/26 for evaluation of weakness and poor appetite.  He was also found to be in atrial fibrillation with RVR.  Current problems include:     Generalized weakness/deconditioning/anorexia - attributed to atrial fibrillation with RVR; stable.  - continue therapies; will likely need TCU at discharge    A-fib. With RVR; attributed to hyperthyroidism and dehydration.  V Rates still elevated   - Cardiology consulted:   - Diltiazem drip started earlier this admission, had to be discontinued due to hypotension.  - Metoprolol increased to 25 mg BID today per Cardiology  - blood pressure improved with iv fluid but still tachycardic and BP soft on increased dose of metoprolol. Not much room to increase further  - Digoxin added by cardiology  - TTE pending.  - Unable to anticoagulate patient given history of traumatic subdurals and frequent falls.     Hyperthyroidism;T 4 still levated  New diagnosis for patient.  - continue methimazole  - Endocrinology appt. After discharge     Elevated troponin; likely demand ischemia from tachycardia  - Serial trops stable.  - TTE pending.  - Asymptomatic.     Acute kidney injury on CKD III; improving.  - repeat BMP 1/29  - Hold nephrotoxins and providing hydration.    Mild hypercalcemia; undetermined yet but suspect related to hyperthyroidism.  No anemia, or bone pain to suggest myeloma, but should be considered.  - follow up on protein electrophoresis with immunofixation and free light chains to eval for myeloma (pending)  - PTH if calcium elevated after euthyoid     Chronic falls; stable  - PT/OT consulted.  - Vitamin B12 elevated, will hold supplement for now.      Hypertension  - PTA lisinopril discontinued due to lower blood pressures.  - Metoprolol ordered as noted above, dosing limited by  low blood pressures.     Dyslipidemia  - Continue PTA simvastatin.     D.M. type II; stable.  Hemoglobin A1c = 6.4%.  - Continue accuchecks and sliding scale insulin.        Diet: Combination Diet Low Saturated Fat Na <2400mg Diet, No Caffeine Diet  Snacks/Supplements Adult: Other; Plus2 Shake @ 2pm (RD); Between Meals       DVT Prophylaxis: Pneumatic Compression Devices and Ambulate every shift  Code Status: Full Code    Disposition: Expected discharge in 2-3 days.    Aj Mane MD    Interval History    Ongoing RVR. NO chest pain, SOB or new symptoms      -Data reviewed today: I reviewed all new labs and imaging results over the last 24 hours. I personally reviewed recent labs and imaging reports.    Physical Exam   Temp: 98.5  F (36.9  C) Temp src: Oral BP: (!) 100/94 Pulse: 74 Heart Rate: 125 Resp: 18 SpO2: 96 % O2 Device: None (Room air)    Vitals:    01/27/20 0108 01/28/20 0514 01/28/20 0642   Weight: 60.9 kg (134 lb 3.2 oz) 62.3 kg (137 lb 4.8 oz) 57.7 kg (127 lb 4.8 oz)     Vital Signs with Ranges  Temp:  [97.5  F (36.4  C)-98.5  F (36.9  C)] 98.5  F (36.9  C)  Pulse:  [74] 74  Heart Rate:  [111-167] 125  Resp:  [17-18] 18  BP: (100-125)/(45-96) 100/94  SpO2:  [90 %-97 %] 96 %  I/O last 3 completed shifts:  In: 1067 [P.O.:400; I.V.:667]  Out: -     Constitutional: awake, no apparent distress; lying in bed  HEENT: sclerae clear  Respiratory: clear bilaterally, no wheezing or rhonchi  Cardiovascular: Irregular rate and rhythm, S1, S2 noted; no m/r/g  GI: abdomen flat, + bowel sounds; soft, non-tender, non-distended  Skin/Integumen: no rashes, no cyanosis, no jaundice; pIV in Left dorsal hand  Musculoskeletal: no edema  Neurologic: follows directions well; no focal deficits      Medications     sodium chloride 50 mL/hr at 01/28/20 2008       sodium chloride 0.9%  250 mL Intravenous Once     insulin aspart  1-7 Units Subcutaneous TID AC     insulin aspart  1-5 Units Subcutaneous At Bedtime      methimazole  10 mg Oral BID     metoprolol tartrate  25 mg Oral BID     multivitamin w/minerals  1 tablet Oral Daily     simvastatin  20 mg Oral At Bedtime     sodium chloride (PF)  3 mL Intracatheter Q8H       Data   Recent Labs   Lab 01/29/20  0642 01/28/20  0529 01/27/20  1210 01/27/20  0626 01/27/20  0006 01/26/20  1501   WBC  --  6.6  --  5.2  --  9.7   HGB  --  12.1*  --  11.6*  --  13.6   MCV  --  97  --  96  --  97   PLT  --  190  --  201  --  261    137  --  138  --  137   POTASSIUM 4.4 4.7  --  4.8  --  5.2   CHLORIDE 113* 107  --  107  --  102   CO2 23 18*  --  17*  --  21   BUN 41* 57*  --  70*  --  75*   CR 1.26* 1.45*  --  1.80*  --  2.24*   ANIONGAP 7 12  --  14  --  14   CAMILA 9.3 9.2  --  9.2  --  10.5*   * 99  --  79  --  94   ALBUMIN  --   --   --  2.4*  --  3.2*   PROTTOTAL  --   --   --  5.9*  --  7.4   BILITOTAL  --   --   --  0.9  --  0.7   ALKPHOS  --   --   --  75  --  93   ALT  --   --   --  21  --  26   AST  --   --   --  16  --  31   TROPI  --   --  0.051* 0.042 0.044 0.046*

## 2020-01-29 NOTE — PROGRESS NOTES
[facility]    Cardiology Progress Note    Date of Service (when I saw the patient): 01/29/2020     Assessment & Plan   Jose Gomez is a 89 year old male who was admitted on 1/26/2020 for 2-3 week hx of worsening appetite. Hx of afib not on anticoagulation  due to traumatic subdural hematoma secondary to falls, diabetes, hypertension, hyperlipidemia.      Atrial fibrillation with rapid ventricular response  -improved with IV fluid administration.    -Newly diagnosed with hyperthyroidism.    --115, HR up to 133 today  -PTA toprol 25 daily increased to BID  -creat 1.26  Assessment/Plan  -A. fib with RVR is likely secondary to dehydration with a combination of hyperthyroidism.  -Unable to anticoagulate patient given history of traumatic subdurals and frequent falls.    -Continue rhythm control, avoid CF unless hemodynamically unstable  -Echo pending  -still tachy, will add low dose digoxin       Interval History   Lying in bed, appears comfortable on room air    Physical Exam   Temp: 97.7  F (36.5  C) Temp src: Axillary BP: 115/86 Pulse: 74 Heart Rate: 133 Resp: 18 SpO2: 92 % O2 Device: None (Room air)    Vitals:    01/27/20 0108 01/28/20 0514 01/28/20 0642   Weight: 60.9 kg (134 lb 3.2 oz) 62.3 kg (137 lb 4.8 oz) 57.7 kg (127 lb 4.8 oz)     Vital Signs with Ranges  Temp:  [97.5  F (36.4  C)-98.5  F (36.9  C)] 97.7  F (36.5  C)  Pulse:  [74] 74  Heart Rate:  [] 133  Resp:  [17-18] 18  BP: (100-125)/(45-96) 115/86  SpO2:  [90 %-97 %] 92 %  I/O last 3 completed shifts:  In: 1067 [P.O.:400; I.V.:667]  Out: -     Constitutional     alert and oriented, in bed on RA     Skin     warm and dry to touch    ENT     no pallor or cyanosis    Neck    Supple    Chest     no tenderness to palpation    Lungs  clear to auscultation     Cardiac  tachycardic    Abdomen     abdomen soft    Extremities and Back     No edema observed.        Neurological     no gross motor deficits noted, affect  appropriate    Medications     sodium chloride 50 mL/hr at 01/28/20 2008       sodium chloride 0.9%  250 mL Intravenous Once     insulin aspart  1-7 Units Subcutaneous TID AC     insulin aspart  1-5 Units Subcutaneous At Bedtime     methimazole  10 mg Oral BID     metoprolol tartrate  25 mg Oral BID     multivitamin w/minerals  1 tablet Oral Daily     simvastatin  20 mg Oral At Bedtime     sodium chloride (PF)  3 mL Intracatheter Q8H       Data   Results for orders placed or performed during the hospital encounter of 01/26/20 (from the past 24 hour(s))   Glucose by meter   Result Value Ref Range    Glucose 128 (H) 70 - 99 mg/dL   Glucose by meter   Result Value Ref Range    Glucose 107 (H) 70 - 99 mg/dL   Glucose by meter   Result Value Ref Range    Glucose 114 (H) 70 - 99 mg/dL   Glucose by meter   Result Value Ref Range    Glucose 114 (H) 70 - 99 mg/dL   Basic metabolic panel   Result Value Ref Range    Sodium 143 133 - 144 mmol/L    Potassium 4.4 3.4 - 5.3 mmol/L    Chloride 113 (H) 94 - 109 mmol/L    Carbon Dioxide 23 20 - 32 mmol/L    Anion Gap 7 3 - 14 mmol/L    Glucose 117 (H) 70 - 99 mg/dL    Urea Nitrogen 41 (H) 7 - 30 mg/dL    Creatinine 1.26 (H) 0.66 - 1.25 mg/dL    GFR Estimate 49 (L) >60 mL/min/[1.73_m2]    GFR Estimate If Black 57 (L) >60 mL/min/[1.73_m2]    Calcium 9.3 8.5 - 10.1 mg/dL   Glucose by meter   Result Value Ref Range    Glucose 115 (H) 70 - 99 mg/dL       SABIHA Harry, CNP  Cardiology  Pager:  [phone]

## 2020-01-29 NOTE — PLAN OF CARE
[phone]: Pt is Alert only to self with intermittently to place; disoriented to time,and situation. Tele: A-fib RVR. VSS on RA with exception to HR up to 130's [id number]' with activity; at rest [id number]. Up assist of 1 with gait belt and walker can be impulsive at times. Incontinent of urine at times. Reported pt's coccyx red blanchable mepilex in place C/D/I.  PT/OT following.  Aggression color - green. Discharge plan pending pt progress. Possibly to TCU for therapy. Will continue to observe.

## 2020-01-29 NOTE — PLAN OF CARE
Tele: A-fib RVR. HR up to 170s' with activity, 100-120s at rest. Other VSS. Disoriented to time, situation, and intermittently to place. Up assist of 1 with gait belt and walker. Incontinent of urine at times. PT/OT working with patient. Aggression color - green.

## 2020-01-29 NOTE — PLAN OF CARE
Discharge Planner OT   Patient plan for discharge: none stated  Current status: pt in bed upon OT arrival, HR  at rest, /54, pt completed supine to sit EOB RUBEN, sit to stand CGA, amb with FWW to/from bathroom with CGA, toilet transfer Ruben, pt HR increase to 174-185. Pt returned to sit EOB CGA with cues to step up closer to HOB, sit to supine SBA. Nursing informed of pt increase in HR with activity.   Barriers to return to prior living situation: impaired balance, safety, activity tolerance, strength and appears to have impaired STM.  Recommendations for discharge: TCU per plan established by the Occupational Therapist  Rationale for recommendations: pt requires increased A for ADls and functional mobility. Pt will require daily therapy to increase ADL and functional mobility independence and safety to PLOF. per previous note pt declining TCU, if home will need A with all IADL's ie med mgmt, transportation, cooking, shower transfer, etc and home OT and RN for med mgmt and ADl/IAdl's, home safety and further cognitive assessment.       Entered by: Vanessa Johns 01/29/2020 3:19 PM

## 2020-01-29 NOTE — PLAN OF CARE
A&O x2-3-time and situation. VSS on RA-tachy. Denies pain. Up A1 with gait belt and walker. Low fat, low sodium, no caffeine diet. Poor appetite. , 110, 114. PIV infusing NS @50ml/hr. LS clear. Tele A-Fib CVR following digoxin administration. BS active, + Flatus. Voiding adequately in BR. Progressing toward plan of care.

## 2020-01-29 NOTE — CONSULTS
Care Transition Initial Assessment -      Met with: Family  (Armida)  Active Problems:    Generalized weakness       DATA  Lives With: child(katarzyna), adult   Living Arrangements: house  Quality of Family Relationships: involved, supportive  Description of Support System: Supportive, Involved  Who is your support system?: Children  Support Assessment: Adequate family and caregiver support.   Identified issues/concerns regarding health management:    Quality of Family Relationships: involved, supportive  Transportation Anticipated: family or friend will provide    Per care transitions consult for discharge planning.   Patient was admitted on 1-26-20 with generalized weakness.  The tentative date of discharge is 1-30-20 or 1-31-20.  Reviewed chart and call placed to patient's daughter Armida to discuss discharge plans.  Per patient's daughter's report, patient lives with his son and daughter in a split level house with 6 steps up and 6 steps down.  Patient does not use any assistive devices at baseline.  Patient is independent with ADL's.  Patient's daughter assists with cleaning, cooking, and laundry.  Reviewed the therapy discharge recommendations of tcu placement on discharge and patient's daughter is in agreement.  [facility]    Pt/family was given the Medicare Compare list for SNF, with associated star ratings to assist with choice for referrals/discharge planning Yes    Education was given to pt/family that star ratings are updated/maintained by Medicare and can be reviewed by visiting www.medicare.gov Yes (declined)    Referrals sent, via discharge on the double, to check bed availability.      Inquired as to whether patient's daughter had an additional phone number and she states she does not.    ASSESSMENT  Cognitive Status:  Did not meet patient, spoke with his daughter on the phone  Concerns to be addressed: discharge planning, tcu placement on discharge.      PLAN  Financial costs for the patient includes N/A.  Patient given options and choices for discharge TCU placement on dishcarge.  Patient/family is agreeable to the plan?  Yes  Transportation/person available to transport on day of discharge  is TBD and have they been notified/set up TBD  Patient Goals and Preferences: TCU placement on discharge.  Patient anticipates discharging to:  TCU.    Will confirm a bed, continue to follow, and assist with a safe discharge plan.      ANN Summers, [facility]  Lead   [phone]  [facility]

## 2020-01-29 NOTE — PROGRESS NOTES
SW:  D:  Call placed to patient's daughter Armida to discuss discharge plans. Attempted to leave a message and the mailbox is full.  Will attempt again at a later time.  P:  Will continue to follow.      ANN Summers, [facility]  Lead   [phone]  [facility]

## 2020-01-30 ENCOUNTER — APPOINTMENT (OUTPATIENT)
Dept: PHYSICAL THERAPY | Facility: CLINIC | Age: 85
DRG: 308 | End: 2020-01-30
Payer: MEDICARE

## 2020-01-30 LAB
ANION GAP SERPL CALCULATED.3IONS-SCNC: 7 MMOL/L (ref 3–14)
BUN SERPL-MCNC: 35 MG/DL (ref 7–30)
CALCIUM SERPL-MCNC: 9.1 MG/DL (ref 8.5–10.1)
CHLORIDE SERPL-SCNC: 111 MMOL/L (ref 94–109)
CO2 SERPL-SCNC: 22 MMOL/L (ref 20–32)
CREAT SERPL-MCNC: 1.38 MG/DL (ref 0.66–1.25)
GFR SERPL CREATININE-BSD FRML MDRD: 44 ML/MIN/{1.73_M2}
GLUCOSE BLDC GLUCOMTR-MCNC: 112 MG/DL (ref 70–99)
GLUCOSE BLDC GLUCOMTR-MCNC: 116 MG/DL (ref 70–99)
GLUCOSE BLDC GLUCOMTR-MCNC: 127 MG/DL (ref 70–99)
GLUCOSE BLDC GLUCOMTR-MCNC: 139 MG/DL (ref 70–99)
GLUCOSE BLDC GLUCOMTR-MCNC: 164 MG/DL (ref 70–99)
GLUCOSE SERPL-MCNC: 132 MG/DL (ref 70–99)
POTASSIUM SERPL-SCNC: 4.3 MMOL/L (ref 3.4–5.3)
SODIUM SERPL-SCNC: 140 MMOL/L (ref 133–144)

## 2020-01-30 PROCEDURE — 00000146 ZZHCL STATISTIC GLUCOSE BY METER IP

## 2020-01-30 PROCEDURE — 97530 THERAPEUTIC ACTIVITIES: CPT | Mod: GO

## 2020-01-30 PROCEDURE — 25000132 ZZH RX MED GY IP 250 OP 250 PS 637: Mod: GY | Performed by: INTERNAL MEDICINE

## 2020-01-30 PROCEDURE — 25000132 ZZH RX MED GY IP 250 OP 250 PS 637: Mod: GY | Performed by: NURSE PRACTITIONER

## 2020-01-30 PROCEDURE — 25000132 ZZH RX MED GY IP 250 OP 250 PS 637: Mod: GY | Performed by: HOSPITALIST

## 2020-01-30 PROCEDURE — 21000001 ZZH R&B HEART CARE

## 2020-01-30 PROCEDURE — 99232 SBSQ HOSP IP/OBS MODERATE 35: CPT | Performed by: INTERNAL MEDICINE

## 2020-01-30 PROCEDURE — 97535 SELF CARE MNGMENT TRAINING: CPT | Mod: GO

## 2020-01-30 PROCEDURE — 36415 COLL VENOUS BLD VENIPUNCTURE: CPT | Performed by: INTERNAL MEDICINE

## 2020-01-30 PROCEDURE — 99233 SBSQ HOSP IP/OBS HIGH 50: CPT | Performed by: INTERNAL MEDICINE

## 2020-01-30 PROCEDURE — 97116 GAIT TRAINING THERAPY: CPT | Mod: GP

## 2020-01-30 PROCEDURE — 80048 BASIC METABOLIC PNL TOTAL CA: CPT | Performed by: INTERNAL MEDICINE

## 2020-01-30 RX ORDER — METOPROLOL SUCCINATE 25 MG/1
25 TABLET, EXTENDED RELEASE ORAL DAILY
Status: DISCONTINUED | OUTPATIENT
Start: 2020-01-31 | End: 2020-01-31 | Stop reason: HOSPADM

## 2020-01-30 RX ADMIN — METHIMAZOLE 10 MG: 10 TABLET ORAL at 19:48

## 2020-01-30 RX ADMIN — METOPROLOL TARTRATE 25 MG: 25 TABLET ORAL at 09:14

## 2020-01-30 RX ADMIN — DIGOXIN 62.5 MCG: 125 TABLET ORAL at 09:14

## 2020-01-30 RX ADMIN — METHIMAZOLE 10 MG: 10 TABLET ORAL at 09:14

## 2020-01-30 RX ADMIN — SIMVASTATIN 20 MG: 20 TABLET, FILM COATED ORAL at 19:48

## 2020-01-30 RX ADMIN — METOPROLOL TARTRATE 25 MG: 25 TABLET ORAL at 19:48

## 2020-01-30 RX ADMIN — ACETAMINOPHEN 650 MG: 325 TABLET, FILM COATED ORAL at 19:48

## 2020-01-30 ASSESSMENT — ACTIVITIES OF DAILY LIVING (ADL)
ADLS_ACUITY_SCORE: 21
ADLS_ACUITY_SCORE: 22

## 2020-01-30 ASSESSMENT — MIFFLIN-ST. JEOR: SCORE: 1291.29

## 2020-01-30 NOTE — PROGRESS NOTES
"CLINICAL NUTRITION SERVICES - REASSESSMENT NOTE    Recommendations Ordered by Registered Dietitian (RD):   Diet per MD - consider liberalization      Continue Plus2 Shake @ 2 pm     Continue thera vit M daily    Malnutrition: (1/27)   % Weight Loss:  > 10% in 6 months (severe malnutrition)  % Intake:  </= 75% for >/= 1 month (severe malnutrition)  Subcutaneous Fat Loss:  Orbital region moderate depletion and Upper arm region moderate-severe depletion  Muscle Loss:  Temporal region moderate-severe depletion, Clavicle bone region moderate depletion, Acromion bone region moderate depletion and Dorsal hand region severe depletion  Fluid Retention:  None noted     Malnutrition Diagnosis: Severe malnutrition  In Context of:  Chronic illness or disease     EVALUATION OF PROGRESS TOWARD GOALS   Diet:  LSF, Na <2400 mg, No Caffeine  Plus2 Vanilla shake @ 2 pm    Intake/Tolerance:    - Pt ordering smaller meals + 1 supplement daily.  - He feels he is eating 100% of most meals, but mentions multiple times that he is \"not a heavy eater\". Unable to recall if he is receiving/drinking a vanilla shake in the afternoons. At first states \"I haven't had that in a long time\", then \"I can't remember\". States that he is okay with receiving a vanilla shake.   - Per meal review, pt receiving [id number] kcal and 50-75 g protein/day (this includes 625 kcal and 19 g protein from milkshake). Documented intakes range from 50-75%.     ASSESSED NUTRITION NEEDS:  Dosing Weight 60.9 kg   Estimated Energy Needs: 9444-8894 kcals (30-35 Kcal/Kg)  Justification: repletion  Estimated Protein Needs: 73-91 grams protein (1.2-1.5 g pro/Kg)  Justification: Repletion  Estimated Fluid Needs: 1mL/kcal  Justification: or Per MD    NEW FINDINGS:   - Planned discharge on 1/30 or 1/31. TCU recommended.     Previous Goals:   Intake of at least 50% meals BID + 1 supplement daily.   Evaluation: Met (with meals - hard to assess for supplement given pt's " memory)    Previous Nutrition Diagnosis:   Malnutrition related to inadequate oral intake w/ lack of appetite as evidenced by weight loss of up to 10% in <6 months, e/o moderate-severe fat and muscle loss, intakes <50-75% est needs per patient report.   Evaluation: No change    CURRENT NUTRITION DIAGNOSIS  Malnutrition related to inadequate oral intake w/ lack of appetite as evidenced by weight loss of up to 10% in <6 months, e/o moderate-severe fat and muscle loss, intakes <50-75% est needs per patient report.     INTERVENTIONS  Recommendations / Nutrition Prescription  Diet per MD - consider liberalization      Continue Plus2 Shake @ 2 pm     Continue thera vit M daily      Implementation  None new - encouragement of PO     Goals  Intake of at least 50% meals BID + 1 supplement daily.       MONITORING AND EVALUATION:  Progress towards goals will be monitored and evaluated per protocol and Practice Guidelines    Ann Joe RD, LD  [facility], [facility], 55, [facility]   Pager: [phone]  Weekend Pager: [phone]

## 2020-01-30 NOTE — PLAN OF CARE
Discharge Planner OT   Patient plan for discharge: TCU  Current status: pt seen in AM. Amb to/from bathroom with FWW CGA. Patient requiring step by step cueing to find bathroom .  Stood at sink x 5+min for grooming tasks requiring cues to initiate each task but then able to sequence and complete each one without cueing in thorough manner. Required seated break after this activity, noted 's-130's. Following rest ambulated 150ft with FWW CGA requiring several standing rest breaks en route. 's at completion. /74, 02 96% on RA.   Barriers to return to prior living situation: current level of A  Recommendations for discharge: TCU  Rationale for recommendations: pt will benefit from daily therapies to improve general strength, safety and independence with ADLs and mobilities prior to return home.       Entered by: Robin Smith 01/30/2020 12:47 PM

## 2020-01-30 NOTE — PLAN OF CARE
OT: attempted to work with patient in AM however he was sitting up in bed eating lunch meal and asked to reschedule.

## 2020-01-30 NOTE — PLAN OF CARE
VSS except HR 90-115s, occassional 130-140s, 160s with activity, notified MD. Tele: A fib with RVR. RA. A&Ox2, forgetful, [place]. Denies pain. [place], clear LS. Incontinent at times, blanchable erythema to coccyx, mepilex changed. Tolerating diet, BG checks 112 and 139. Up Ax1 GB walker. discharge pending progress.

## 2020-01-30 NOTE — PROGRESS NOTES
[facility]    Cardiology Progress Note    Date of Service (when I saw the patient): 01/30/2020     Assessment & Plan   Jose Gomez is a 89 year old male who was admitted on 1/26/2020 for 2-3 week hx of worsening appetite. Hx of afib not on anticoagulation  due to traumatic subdural hematoma secondary to falls, diabetes, hypertension, hyperlipidemia.      Atrial fibrillation with rapid ventricular response  -improved with IV fluid administration.    -Newly diagnosed with hyperthyroidism.    --138, HR overall improving  -PTA toprol 25 daily was switched on admission to lopressor, would return to toprol tomorrow am  -low dose digoxin added yesterday, would attempt to stop when thyroid normalizes  -creat 1.26->1.38  -EF 55-60%, no WMA, RA mod-sev dilated  Assessment/Plan  -A. fib with RVR is likely secondary to dehydration with a combination of hyperthyroidism.  -Unable to anticoagulate patient given history of traumatic subdurals and frequent falls.    -Continue rhythm control, avoid CV unless hemodynamically unstable, return to pta toprol tomorrow am since he already got lopressor this am.  -when tsh normalizes, attempt to discontinue dig  -has elevated jvp on exam, consider stopping fluids, wt appears up, he is positive 3.8L    Follows with Dr. Oviedo in clinic, will arrange f/u with Sandra Baez Feb 14 and sign off. Needs ekg at ov, does not need earlier OV.    Interval History   Lying in bed, appears comfortable on room air    Physical Exam   Temp: 97.9  F (36.6  C) Temp src: Oral BP: 130/69   Heart Rate: 105 Resp: 18 SpO2: 95 % O2 Device: None (Room air)    Vitals:    01/28/20 0514 01/28/20 0642 01/30/20 0434   Weight: 62.3 kg (137 lb 4.8 oz) 57.7 kg (127 lb 4.8 oz) 63.5 kg (140 lb)     Vital Signs with Ranges  Temp:  [97.2  F (36.2  C)-97.9  F (36.6  C)] 97.9  F (36.6  C)  Heart Rate:  [] 105  Resp:  [16-18] 18  BP: (115-138)/(51-86) 130/69  SpO2:  [92 %-96 %] 95 %  I/O last 3  completed shifts:  In: 360 [P.O.:360]  Out: 2 [Urine:2]    Constitutional     alert and oriented, mild confusion     Skin     warm and dry to touch    ENT     no pallor or cyanosis    Neck    Supple, JVP elevated, +HJR    Chest     no tenderness to palpation    Lungs  clear to auscultation     Cardiac  tachycardic    Abdomen     abdomen soft    Extremities and Back     No edema observed.        Neurological     no gross motor deficits noted, affect appropriate    Medications     sodium chloride 50 mL/hr at 20 1545       sodium chloride 0.9%  250 mL Intravenous Once     digoxin  62.5 mcg Oral Daily     insulin aspart  1-7 Units Subcutaneous TID AC     insulin aspart  1-5 Units Subcutaneous At Bedtime     methimazole  10 mg Oral BID     metoprolol tartrate  25 mg Oral BID     multivitamin w/minerals  1 tablet Oral Daily     simvastatin  20 mg Oral At Bedtime     sodium chloride (PF)  10 mL Intravenous Once     sodium chloride (PF)  3 mL Intracatheter Q8H       Data   Results for orders placed or performed during the hospital encounter of 20 (from the past 24 hour(s))   Echocardiogram Complete    Narrative    [id number]  [id number]  [id number]^MEIR^TRUDY           [facility]  Echocardiography Laboratory  [address] [place] [address]        Name: NEO VILLAVICENCIO  MRN: [id number]  : 1928  Study Date: 2020 11:35 AM  Age: 89 yrs  Gender: Male  Patient Location: [facility]  Reason For Study: Afib  Ordering Physician: TRUDY WORLEY  Referring Physician: BRANDAN LOPEZ  Performed By: Kimo Castillo RDCS     BSA: 1.7 m2  Height: 70 in  Weight: 127 lb  HR: 112  BP: 138/69 mmHg  _____________________________________________________________________________  __        Procedure  Complete Portable Echo Adult. Optison (NDC #[id number]) given intravenously.  _____________________________________________________________________________  __        Interpretation Summary      1. Left ventricular systolic function is normal. The visual ejection fraction  is estimated at 55-60%.  2. No regional wall motion abnormalities noted.  3. The right ventricle is mildly dilated. The right ventricular systolic  function is normal.  4. The right atrium is moderate to severely dilated. The left atrium is  moderately dilated.  5. There is mild (1+) mitral regurgitation.  6. There is mild (1+) tricuspid regurgitation.  _____________________________________________________________________________  __        Left Ventricle  The left ventricle is normal in size. There is mild concentric left  ventricular hypertrophy. Left ventricular systolic function is normal. The  visual ejection fraction is estimated at 55-60%. Diastolic function not  assessed due to atrial fibrillation. No regional wall motion abnormalities  noted.     Right Ventricle  The right ventricle is mildly dilated. The right ventricular systolic function  is normal.     Atria  The left atrium is moderately dilated. The right atrium is moderate to  severely dilated. There is no color Doppler evidence of an atrial shunt.     Mitral Valve  The mitral valve is normal in structure and function. There is mild (1+)  mitral regurgitation.        Tricuspid Valve  The tricuspid valve is normal in structure and function. There is mild (1+)  tricuspid regurgitation.     Aortic Valve  The aortic valve is trileaflet with aortic valve sclerosis.     Pulmonic Valve  The pulmonic valve is normal in structure and function. There is trace  pulmonic valvular regurgitation.     Vessels  Normal size aorta. IVC diameter and respiratory changes fall into an  intermediate range suggesting an RA pressure of 8 mmHg.     Pericardium  There is no pericardial effusion.        Rhythm  The rhythm was atrial fibrillation.  _____________________________________________________________________________  __  MMode/2D Measurements & Calculations  IVSd: 1.2 cm     LVIDd: 2.8  cm  LVIDs: 2.1 cm  LVPWd: 0.79 cm  FS: 27.2 %  LV mass(C)d: 74.7 grams  LV mass(C)dI: 43.4 grams/m2  Ao root diam: 3.8 cm  asc Aorta Diam: 3.5 cm  LVOT diam: 2.4 cm  LVOT area: 4.6 cm2  LA Volume (BP): 55.8 ml  LA Volume Index (BP): 32.4 ml/m2  RWT: 0.56           Doppler Measurements & Calculations  LV V1 max PG: 3.1 mmHg  LV V1 max: 87.0 cm/sec  LV V1 VTI: 14.5 cm  SV(LVOT): 66.2 ml  SI(LVOT): 38.5 ml/m2  PA acc time: 0.09 sec  TR max domenico: 253.7 cm/sec  TR max P.8 mmHg           _____________________________________________________________________________  __           Report approved by: Jerad Foote 2020 01:29 PM      Glucose by meter   Result Value Ref Range    Glucose 110 (H) 70 - 99 mg/dL   Glucose by meter   Result Value Ref Range    Glucose 114 (H) 70 - 99 mg/dL   Glucose by meter   Result Value Ref Range    Glucose 136 (H) 70 - 99 mg/dL   Glucose by meter   Result Value Ref Range    Glucose 127 (H) 70 - 99 mg/dL   Basic metabolic panel   Result Value Ref Range    Sodium 140 133 - 144 mmol/L    Potassium 4.3 3.4 - 5.3 mmol/L    Chloride 111 (H) 94 - 109 mmol/L    Carbon Dioxide 22 20 - 32 mmol/L    Anion Gap 7 3 - 14 mmol/L    Glucose 132 (H) 70 - 99 mg/dL    Urea Nitrogen 35 (H) 7 - 30 mg/dL    Creatinine 1.38 (H) 0.66 - 1.25 mg/dL    GFR Estimate 44 (L) >60 mL/min/[1.73_m2]    GFR Estimate If Black 51 (L) >60 mL/min/[1.73_m2]    Calcium 9.1 8.5 - 10.1 mg/dL   Glucose by meter   Result Value Ref Range    Glucose 112 (H) 70 - 99 mg/dL       SABIHA Harry, CNP  Cardiology  Pager:  [phone]

## 2020-01-30 NOTE — PLAN OF CARE
AOx2-3, VSS RA, Tele: AF RVR, up SBA w/walker, impulsive at times, blanchable redness on coccyx w/mepilex on, declines repositioning at times and to be cleaned up, overall pleasant overnight.  Discharge pending, continue to monitor

## 2020-01-30 NOTE — PROGRESS NOTES
[facility]    Hospitalist Progress Note    Date of Service (when I saw the patient): 01/30/2020    Assessment & Plan   Jose Gomez is a pleasant 89 year old gentleman who presented 1/26 for evaluation of weakness and poor appetite.  He was also found to be in atrial fibrillation with RVR.  Current problems include:     Generalized weakness/deconditioning/anorexia - attributed to atrial fibrillation with RVR; stable.  - continue therapies; will likely need TCU at discharge    A-fib. With RVR; attributed to dehydration and hyperthyroidism.  Rates less elevated w/ walking in room today.  - Diltiazem drip started earlier this admission, had to be discontinued due to hypotension.  - appreciate input by Cardiology   - continue Metoprolol 25 mg BID and digoxin 62.5 mcg begun 1/29     Hyperthyroidism; stable.  New diagnosis for patient.  - continue methimazole  - Endocrinology appt. After discharge     Elevated troponin; likely demand ischemia from tachycardia  - Serial troponins stable.     Acute kidney injury on CKD III; improving, stable.  - Hold nephrotoxins and provide light hydration.    Mild hypercalcemia; unclear cause.  No anemia, or bone pain to suggest myeloma, but should be considered.  - follow up on protein electrophoresis with immunofixation and free light chains to eval for myeloma (pending)  - PTH if calcium elevated after euthyoid    Chronic falls; stable  - PT/OT consulted.      Hypertension  - PTA lisinopril discontinued due to lower blood pressures.  - continue Metoprolol 25 mg BID; begin succinate 25 mg Q day 1/31, per Cardiology     Dyslipidemia  - Continue PTA simvastatin.     D.M. type II; stable.  Hemoglobin A1c = 6.4%.  - Continue accuchecks and sliding scale insulin.        Diet: Combination Diet Low Saturated Fat Na <2400mg Diet, No Caffeine Diet  Snacks/Supplements Adult: Other; Plus2 Shake @ 2pm (RD); Between Meals       DVT Prophylaxis: Pneumatic Compression Devices and  Ambulate every shift  Code Status: Full Code    Disposition: Expected discharge 1/31 to [place] (or [facility]);  LSW to follow up in a.m.      D. Salvador Darling MD, [facility]     Internal Medicine Hospitalist  Text Page (7am - 6pm)    Interval History   Doing fairly well today; appetite fair to better.  No f/c/SOB; no chest or abdominal Sx.  Per his RN, HR's up to 120's-130's when walking to bathroom. No orthostatic Sx during this.      -Data reviewed today: I reviewed all new labs and imaging results over the last 24 hours. I personally reviewed recent labs and imaging reports.    Physical Exam   Temp: 97.9  F (36.6  C) Temp src: Oral BP: 130/69   Heart Rate: 105 Resp: 18 SpO2: 95 % O2 Device: None (Room air)    Vitals:    01/28/20 0514 01/28/20 0642 01/30/20 0434   Weight: 62.3 kg (137 lb 4.8 oz) 57.7 kg (127 lb 4.8 oz) 63.5 kg (140 lb)     Vital Signs with Ranges  Temp:  [97.2  F (36.2  C)-97.9  F (36.6  C)] 97.9  F (36.6  C)  Heart Rate:  [] 105  Resp:  [16-18] 18  BP: (116-138)/(51-80) 130/69  SpO2:  [92 %-95 %] 95 %  I/O last 3 completed shifts:  In: 360 [P.O.:360]  Out: 2 [Urine:2]    Constitutional: awake, no apparent distress; lying in bed  HEENT: sclerae clear; MM's moist  Respiratory: fair a/e bilaterally, no wheezing or rhonchi  Cardiovascular: Irregular rate and rhythm, S1, S2 noted; no m/r/g  GI: abdomen flat, + bowel sounds; soft, non-tender, non-distended  Skin/Integumen: no rashes, no cyanosis, no jaundice; pIV in Left dorsal hand  Musculoskeletal: no edema  Neurologic: follows directions well; no focal deficits      Medications       sodium chloride 0.9%  250 mL Intravenous Once     digoxin  62.5 mcg Oral Daily     insulin aspart  1-7 Units Subcutaneous TID AC     insulin aspart  1-5 Units Subcutaneous At Bedtime     methimazole  10 mg Oral BID     [START ON 1/31/2020] metoprolol succinate ER  25 mg Oral Daily     metoprolol tartrate  25 mg Oral BID     multivitamin w/minerals  1 tablet  Oral Daily     simvastatin  20 mg Oral At Bedtime     sodium chloride (PF)  10 mL Intravenous Once     sodium chloride (PF)  3 mL Intracatheter Q8H       Data   Recent Labs   Lab 20  0551 20  0642 20  0529 20  1210 20  0626 20  0006 20  1501   WBC  --   --  6.6  --  5.2  --  9.7   HGB  --   --  12.1*  --  11.6*  --  13.6   MCV  --   --  97  --  96  --  97   PLT  --   --  190  --  201  --  261    143 137  --  138  --  137   POTASSIUM 4.3 4.4 4.7  --  4.8  --  5.2   CHLORIDE 111* 113* 107  --  107  --  102   CO2 22 23 18*  --  17*  --  21   BUN 35* 41* 57*  --  70*  --  75*   CR 1.38* 1.26* 1.45*  --  1.80*  --  2.24*   ANIONGAP 7 7 12  --  14  --  14   CAMILA 9.1 9.3 9.2  --  9.2  --  10.5*   * 117* 99  --  79  --  94   ALBUMIN  --   --   --   --  2.4*  --  3.2*   PROTTOTAL  --   --   --   --  5.9*  --  7.4   BILITOTAL  --   --   --   --  0.9  --  0.7   ALKPHOS  --   --   --   --  75  --  93   ALT  --   --   --   --  21  --  26   AST  --   --   --   --  16  --  31   TROPI  --   --   --  0.051* 0.042 0.044 0.046*     Echocardiogram Complete   Order: [id number]   Status:  Edited Result - FINAL   Visible to patient:  No (Not Released) Next appt:  Today at 02:00 PM in Occupational Therapy (Robin Smith, RENY)       Reading Physician Reading Date Result Priority   Cyn Yousif MD 2020       Narrative & Impression     [id number]  [id number]  [id number]  [id number]^MEIR^TRUDY     [facility]  Echocardiography Laboratory  [address] [place] [address]     Name: NEO VILLAVICNECIO  MRN: [id number]  : 1928  Study Date: 2020 11:35 AM  Age: 89 yrs  Gender: Male  Patient [facility]: [facility]  Reason For Study: Afib  Ordering Physician: TRUDY WORLEY  Referring Physician: BRANDAN LOPEZ  Performed By: Kimo Castillo RDCS     BSA: 1.7 m2  Height: 70 in  Weight: 127 lb  HR: 112  BP: 138/69  mmHg  _____________________________________________________________________________  Procedure  Complete Portable Echo Adult. Optison (NDC #[id number]) given intravenously.  _____________________________________________________________________________  Interpretation Summary     1. Left ventricular systolic function is normal. The visual ejection fraction  is estimated at 55-60%.  2. No regional wall motion abnormalities noted.  3. The right ventricle is mildly dilated. The right ventricular systolic  function is normal.  4. The right atrium is moderate to severely dilated. The left atrium is  moderately dilated.  5. There is mild (1+) mitral regurgitation.  6. There is mild (1+) tricuspid regurgitation.

## 2020-01-30 NOTE — PLAN OF CARE
Discharge Planner PT   Patient plan for discharge: none stated.   Current status: supine to sit mod IND with railing. SBA with sit to supine with assist with blankets and cues for position. Pt IND with donning his shoes sitting at the EOB. Sit to stand x 2 reps with min A. Pt unsteady with standing and a few steps and reaching for UE support, encouraged pt to use the FWW (pt initially declining walker use-appears pt with decreased safety awareness of his current limitations). Gait training with FWW and min A due to instability. 75ft x 2 reps with a sitting rest break due to fatigue.   Barriers to return to prior living situation: Decreased activity tolerance, assist with all mobility, stairs, fall risk, generalized weakness  Recommendations for discharge: TCU  Rationale for recommendations: Pt is well below baseline and would be a considerable fall risk if he returned to home environment. Pt currently requires assist with all mobility. Pt will benefit from continued therapy at TCU to address impairments and increase mobility and functional independence prior to returning home.        Entered by: Marion Bazzi 01/30/2020 2:45 PM

## 2020-01-30 NOTE — PROVIDER NOTIFICATION
MD Notification    Notified Person: MD    Notified Person Name: Dr. Darling    Notification Date/Time: 1/30/2020 9453    Notification Interaction: paged MD    Purpose of Notification: FYI pt's HR has been 90-115s most of the day. Occasionally can brief go up to 130-140, 160 with activity. Do we need to increase doses of heart meds?    Orders Received: request to page and ask cardiology       1/30/2020 1524 Paged Courtney Zurita CNP from cardiology, spoke with on phone.    Recommendations: Hold off on changing meds currently since started digoxin yesterday. Can address tomorrow as well if remains high. If an issue later could consider going up on beta blocker but not digoxin.

## 2020-01-30 NOTE — PROGRESS NOTES
SW:  D:  Received a call back from [facility].  They have a bed available for patient tomorrow.  Received a message, via discharge on the double stating that [facility] also has a bed available for patient.  [facility] is the first choice as it is closer to the house.  P:  Will continue to follow.      ANN Summers, [facility]  Lead   [phone]  [facility]

## 2020-01-31 VITALS
RESPIRATION RATE: 14 BRPM | HEART RATE: 81 BPM | WEIGHT: 139.4 LBS | OXYGEN SATURATION: 95 % | HEIGHT: 70 IN | BODY MASS INDEX: 19.96 KG/M2 | SYSTOLIC BLOOD PRESSURE: 125 MMHG | DIASTOLIC BLOOD PRESSURE: 69 MMHG | TEMPERATURE: 97.7 F

## 2020-01-31 LAB
GLUCOSE BLDC GLUCOMTR-MCNC: 104 MG/DL (ref 70–99)
GLUCOSE BLDC GLUCOMTR-MCNC: 110 MG/DL (ref 70–99)
GLUCOSE BLDC GLUCOMTR-MCNC: 119 MG/DL (ref 70–99)

## 2020-01-31 PROCEDURE — 99239 HOSP IP/OBS DSCHRG MGMT >30: CPT | Performed by: INTERNAL MEDICINE

## 2020-01-31 PROCEDURE — 25000132 ZZH RX MED GY IP 250 OP 250 PS 637: Mod: GY | Performed by: NURSE PRACTITIONER

## 2020-01-31 PROCEDURE — 25000132 ZZH RX MED GY IP 250 OP 250 PS 637: Mod: GY | Performed by: INTERNAL MEDICINE

## 2020-01-31 PROCEDURE — 25000132 ZZH RX MED GY IP 250 OP 250 PS 637: Mod: GY | Performed by: HOSPITALIST

## 2020-01-31 PROCEDURE — 00000146 ZZHCL STATISTIC GLUCOSE BY METER IP

## 2020-01-31 RX ORDER — MULTIPLE VITAMINS W/ MINERALS TAB 9MG-400MCG
1 TAB ORAL DAILY
DISCHARGE
Start: 2020-01-31 | End: 2020-03-13

## 2020-01-31 RX ORDER — DIGOXIN 125 MCG
62.5 TABLET ORAL DAILY
DISCHARGE
Start: 2020-01-31 | End: 2020-02-13

## 2020-01-31 RX ORDER — METHIMAZOLE 10 MG/1
10 TABLET ORAL 2 TIMES DAILY
DISCHARGE
Start: 2020-01-31 | End: 2020-02-21

## 2020-01-31 RX ADMIN — MULTIPLE VITAMINS W/ MINERALS TAB 1 TABLET: TAB at 09:18

## 2020-01-31 RX ADMIN — DIGOXIN 62.5 MCG: 125 TABLET ORAL at 09:17

## 2020-01-31 RX ADMIN — METHIMAZOLE 10 MG: 10 TABLET ORAL at 09:17

## 2020-01-31 RX ADMIN — MICONAZOLE NITRATE: 20 POWDER TOPICAL at 02:35

## 2020-01-31 RX ADMIN — METOPROLOL SUCCINATE 25 MG: 25 TABLET, EXTENDED RELEASE ORAL at 09:17

## 2020-01-31 ASSESSMENT — ACTIVITIES OF DAILY LIVING (ADL)
ADLS_ACUITY_SCORE: 22

## 2020-01-31 ASSESSMENT — MIFFLIN-ST. JEOR: SCORE: 1288.56

## 2020-01-31 NOTE — PLAN OF CARE
Patient Name: Jose Gomez       Room#: 0258/0258-01                 Admit Date: 1/26/2020              Primary Diagnosis:   1. Atrial fibrillation with RVR (H)    2. Renal failure, unspecified chronicity    3. Demand ischemia (H)                   Inpatient Status: Cardiac              Procedures: None              Drips:  none              Drains & Devices:  PIV              Rhythm:  Afib CVR--150s              Activity Level: Assist x1-2 with walker              Oxygen needs: RA              Anticipated D/C Date: TBD  Aggression Color: Green                 Acuity Rating: 3              Concerns: Patient alert to self impulsive at times forgetful, [place]. Denies pain.  VSS except HR 90-115s, occassional 130-140s, 160s card aware of it Tele: A fib with RVR. RA. LS  Diminish regular die up with 1 GB to BR  Incontinent at times, blanchable erythema to coccyx, nystatin applied, BG checks Q4 plan to go home pending nursing will continue to

## 2020-01-31 NOTE — PROGRESS NOTES
SW:  D:  Received discharge orders for patient.  Bed available at [facility] for today after 13:00.  Patient's daughter is asking for transport to be arranged and understands that this is private pay.  Call placed to [facility] to arrange for wheelchair transport.  Requested 13:00, however 15:15 was the earliest time available.  Updated patient's daughter and she is in agreement.  Patient and daughter informed of the plan and in agreement to the plan.  Call placed to update [facility] and faxed the orders and the PAS.      PAS-RR    D: Per DHS regulation, SW completed and submitted PAS-RR to [place] [employer] Direct Connect via the Senior LinkAge Line.  PAS-RR confirmation # is : [id number].    I: SW spoke with patient and daughter and they are aware a PAS-RR has been submitted.  SW reviewed with patient and daughter that they may be contacted for a follow up appointment within 10 days of hospital discharge if their SNF stay is < 30 days.  Contact information for [facility] LinkAge Line was also provided.    A: Patient and daughter verbalized understanding.    P: Further questions may be directed to [facility] LinkAge Line at #[phone], option #4 for PAS-RR staff.      ANN Summers, [facility]  Lead   [phone]  [facility]

## 2020-01-31 NOTE — PLAN OF CARE
Physical Therapy Discharge Summary    Reason for therapy discharge:    Discharged to transitional care facility.    Progress towards therapy goal(s). See goals on Care Plan in [facility] electronic health record for goal details.  Goals partially met.  Barriers to achieving goals:   discharge from facility.    Therapy recommendation(s):    Continued therapy is recommended.  Rationale/Recommendations:  To improve functional mobility as pt below baseline.

## 2020-01-31 NOTE — PLAN OF CARE
Pt not oriented to time and place, VSS except HR 90-115s, occassional 130-140s, 160s with activity, notified MD. Tele: A fib with RVR. RA. A&Ox2, forgetful, [place]. Denies pain. [place], clear  upper and crackly bases. Incontinent at times, blanchable erythema to coccyx, mepilex on. Tolerating diet, BG checks 116.  Up Ax1 GB walker. discharge pending progress. Plan monitor HR.

## 2020-01-31 NOTE — PLAN OF CARE
Occupational Therapy Discharge Summary    Reason for therapy discharge:    Discharged to transitional care facility.    Progress towards therapy goal(s). See goals on Care Plan in [facility] electronic health record for goal details.  Goals not met.  Barriers to achieving goals:   discharge from facility.    Therapy recommendation(s):    Continued therapy is recommended.  Rationale/Recommendations:  OT at TCU to increase ADL and functional mobility I and safety to PLOF.

## 2020-01-31 NOTE — PROGRESS NOTES
Spiritual Health  Heart    SH visited Pt per length of stay. Pt says he resting right now and has no SH needs at this time.     SH will remain available as needed.     Shannan Garcia  Chaplain Resident

## 2020-01-31 NOTE — PROGRESS NOTES
Nursing [id number]: No change in status. Forgetful. Few attempts to get out of bed without using the call light. Bed alarm to be alarmed at all times. A-1. PRN tylenol x 1 for a headache. Denies CP. Tele: marion RVR.

## 2020-01-31 NOTE — DISCHARGE INSTRUCTIONS
Patient will discharge to [facility] today, via [facility] wheelchair at 15:15.  [facility]'s phone number is [phone].

## 2020-01-31 NOTE — DISCHARGE SUMMARY
[facility]    Discharge Summary  Hospitalist    Date of Admission:  1/26/2020  Date of Discharge:  1/31/2020  Discharging Provider: Oxana Marie  Date of Service (when I saw the patient): 01/31/20    Discharge Diagnoses     1. Atrial fibrillation with RVR  2. Hyperthyroidism  3. Elevated troponin due to demand ischemia  4. SHERWIN on CKD stage III  5. Mild hypercalcemia  6. DM2 with nephropathy  7. Essential hypertension  8. Dyslipidemia  9. Recurrent falls  10. Severe malnutrition in context of chronic illness    History of Present Illness   Please see H&P by Dr. Carrillo on 1/26/2020    Hospital Course   Jose Gomez is a 89 year old male with hx of paroxysmal a-fib not on anticoagulation, DM2, HTN, CKD, and recurrent falls who presented on 1/26/2020 for evaluation of generalized weakness and poor appetite, and was found to be in a-fib/RVR with new diagnosis of hyperthyroidism. He is now discharging to TCU. The following problems were addressed during his hospitalization:    Atrial fibrillation with RVR  *  Likely triggered by hyperthyroidism  *  Incidentally noted to be in a-fib on arrival with HR 90s-140s. He has no known prior diagnosis of a-fib. TSH on arrival was undetectable and FT4 was elevated to 5.30. TTE (1/29) showed LVEF 55-60%, mildly dilated RV, moderate to severely dilated right atrium, moderately dilated left atrium, 1+ MR, and 1+ TR. On admission, he was ultimately started on a diltiazem infusion, but this was subsequently discontinued due to hypotension. Cardiology was consulted on admission, and patient was ultimately loaded with digoxin with adequate rate control.   - He will be discharged on digoxin 62.5 mcg daily  - He continues on PTA metoprolol XL 25 mg daily  - See hyperthyroidism below  - CHADS2-VASc score 4. Not on anticoagulation due to history of recurrent falls and traumatic SDH  - He has a follow up appointment with EP Cardiology on Feb  14    Hyperthyroidism  Started on methimazole during admission. TPO antibody elevated to 194. TSI negative.   - He will be discharged on methimazole 10 mg BID  - We will arrange follow up with Endocrinology after discharge    Elevated troponin due to demand ischemia  Serial troponins were trivially elevated on admission to 0.05. Suspect demand ischemia due to a-fib/RVR.    SHERWIN on CKD stage III  Creatinine up to 2.24 from baseline 1.2-1.6. Suspect pre-renal state due to dehydration from decreased PO intake and RVR. He received IV fluids with improvement  - Cr 1.38 at the time of discharge    Mild hypercalcemia, Resolved  Calcium up to 10.5 in the setting of renal failure. Resolved with improvement in renal function. SPEP/immunofixation during admission was negative for monoclonal protein    DM2 with nephropathy  Diet-controlled. A1c 6.4. Blood sugars were adequately controlled during this hospitalization without need for supplemental insulin  Recent Labs   Lab 01/31/20  1219 01/31/20  0819 01/31/20  0240 01/30/20  2142 01/30/20  1805 01/30/20  1147  01/30/20  0551  01/29/20  0642  01/28/20  0529  01/27/20  0626  01/26/20  1501   GLC  --   --   --   --   --   --   --  132*  --  117*  --  99  --  79  --  94   * 104* 119* 164* 116* 139*   < >  --    < >  --    < >  --    < >  --    < >  --     < > = values in this interval not displayed.       Essential hypertension  *  PTA: metoprolol XL 25 mg daily, lisinopril 5 mg daily  - Holding PTA lisinopril due to fluctuating blood pressures  - Continues on PTA metoprolol XL    Dyslipidemia  - Continues on PTA simvastatin    Recurrent falls  He was evaluated by PT/OT who has recommended TCU    Severe malnutrition in context of chronic illness  Nutrition has recommended Plus2 shakes daily    Oxana Marie MD    Significant Results and Procedures   As noted above    Pending Results   These results will be followed up by me  Unresulted Labs Ordered in the Past 30 Days  of this Admission     Date and Time Order Name Status Description    1/27/2020 0626 T4 free In process     1/26/2020 1501 T4 free In process           Code Status   Full Code       Primary Care Physician   Gabriel Carroll    Physical Exam   Temp: 97.7  F (36.5  C) Temp src: Oral BP: 125/69 Pulse: 81 Heart Rate: 92 Resp: 14 SpO2: 95 % O2 Device: None (Room air)    Vitals:    01/28/20 0642 01/30/20 0434 01/31/20 0647   Weight: 57.7 kg (127 lb 4.8 oz) 63.5 kg (140 lb) 63.2 kg (139 lb 6.4 oz)     Vital Signs with Ranges  Temp:  [97.7  F (36.5  C)-98.8  F (37.1  C)] 97.7  F (36.5  C)  Pulse:  [81] 81  Heart Rate:  [] 92  Resp:  [14-20] 14  BP: (125-138)/(66-97) 125/69  SpO2:  [94 %-96 %] 95 %  I/O last 3 completed shifts:  In: 670 [P.O.:420; I.V.:250]  Out: 300 [Urine:300]    Constitutional: Resting comfortably, NAD  HEENT: NC/AT, Sclera white, conjunctiva clear, EOMI, MMM  Respiratory: Breathing non-labored. Lungs CTAB - no wheezes/crackles/rhonchi  Cardiovascular: Heart irregular rhythm, normal rate. No pedal edema.   GI: +BS. Abd soft/NT  Lymph/Hematologic: No cervical LAD  Skin/Integument: Warm and dry. No rash.  Musculoskeletal: Normal muscle bulk and tone  Neurologic: Alert and appropriate. SUAZO  Psychiatric: Calm and cooperative    Discharge Disposition   Discharged to short-term care facility  Condition at discharge: Satisfactory    Consultations This Hospital Stay   1. CARDIOLOGY IP CONSULT  2. PHYSICAL THERAPY ADULT IP CONSULT  3. OCCUPATIONAL THERAPY ADULT IP CONSULT  4. CARE TRANSITION RN/SW IP CONSULT    Time Spent on this Encounter   I, Oxana Marie MD, personally saw the patient today and spent 35 minutes discharging this patient.    Discharge Orders      General info for SNF    Length of Stay Estimate: Short Term Care: Estimated # of Days <30  Condition at Discharge: Improving  Level of care:skilled   Rehabilitation Potential: Good  Admission H&P remains valid and up-to-date: Yes  Recent  Chemotherapy: N/A  Use Nursing Home Standing Orders: Yes     Mantoux instructions    Give two-step Mantoux (PPD) Per Facility Policy Yes     Reason for your hospital stay    Atrial fibrillation with rapid heart rate - your medications were adjusted with improvement     Activity - Up with nursing assistance     Follow Up and recommended labs and tests    Follow up with TCU physician.  The following labs/tests are recommended: basic metabolic panel within 1 week  Follow up with Cardiology on Feb 14 as scheduled.     Physical Therapy Adult Consult    Evaluate and treat as clinically indicated.    Reason:  Generalized weakness with deconditioning     Occupational Therapy Adult Consult    Evaluate and treat as clinically indicated.    Reason:  Generalized weakness with deconditioning     Fall precautions     Advance Diet as Tolerated    Follow this diet upon discharge: Regular diet, Plus 2 shake in the afternoon     Discharge Medications   Current Discharge Medication List      START taking these medications    Details   digoxin (LANOXIN) 125 MCG tablet Take 0.5 tablets (62.5 mcg) by mouth daily    Associated Diagnoses: Atrial fibrillation with RVR (H)      methimazole (TAPAZOLE) 10 MG tablet Take 1 tablet (10 mg) by mouth 2 times daily    Associated Diagnoses: Hyperthyroidism      miconazole (MICATIN/MICRO GUARD) 2 % external powder Apply topically 3 times daily as needed for other (to groin area for yeast)    Associated Diagnoses: Intertrigo      multivitamin w/minerals (THERA-VIT-M) tablet Take 1 tablet by mouth daily    Associated Diagnoses: Intertrigo         CONTINUE these medications which have NOT CHANGED    Details   Acetaminophen (TYLENOL PO) Take 1,000 mg by mouth every 6 hours as needed       Apoaequorin (PREVAGEN PO) Take 1 tablet by mouth daily      Cyanocobalamin (B-12) 2000 MCG TABS Take 1 tablet by mouth daily    Associated Diagnoses: Need for prophylactic vaccination against Streptococcus pneumoniae  (pneumococcus)      metoprolol succinate ER (TOPROL-XL) 25 MG 24 hr tablet TAKE 1 TABLET BY MOUTH EVERY DAY  Qty: 90 tablet, Refills: 3    Associated Diagnoses: Paroxysmal atrial fibrillation (H)      simvastatin (ZOCOR) 20 MG tablet Take 1 tablet (20 mg) by mouth At Bedtime  Qty: 90 tablet, Refills: 3    Associated Diagnoses: Hyperlipidemia LDL goal <100         STOP taking these medications       lisinopril (PRINIVIL/ZESTRIL) 5 MG tablet Comments:   Reason for Stopping:             Allergies   Allergies   Allergen Reactions     No Known Drug Allergies      Data   Most Recent 3 CBC's:  Recent Labs   Lab Test 01/28/20  0529 01/27/20  0626 01/26/20  1501   WBC 6.6 5.2 9.7   HGB 12.1* 11.6* 13.6   MCV 97 96 97    201 261      Most Recent 3 BMP's:  Recent Labs   Lab Test 01/30/20  0551 01/29/20  0642 01/28/20  0529    143 137   POTASSIUM 4.3 4.4 4.7   CHLORIDE 111* 113* 107   CO2 22 23 18*   BUN 35* 41* 57*   CR 1.38* 1.26* 1.45*   ANIONGAP 7 7 12   CAMILA 9.1 9.3 9.2   * 117* 99     Most Recent 2 LFT's:  Recent Labs   Lab Test 01/27/20  0626 01/26/20  1501   AST 16 31   ALT 21 26   ALKPHOS 75 93   BILITOTAL 0.9 0.7     Most Recent INR's and Anticoagulation Dosing History:  Anticoagulation Dose History     Recent Dosing and Labs Latest Ref Rng & Units 4/26/2018 5/26/2018    INR 0.86 - 1.14 1.01 1.07        Most Recent 3 Troponin's:  Recent Labs   Lab Test 01/27/20  1210 01/27/20  0626 01/27/20  0006   TROPI 0.051* 0.042 0.044     Most Recent Cholesterol Panel:  Recent Labs   Lab Test 08/26/19  1054   CHOL 157   LDL 90   HDL 53   TRIG 70     Most Recent 6 Bacteria Isolates From Any Culture (See EPIC Reports for Culture Details):  Recent Labs   Lab Test 03/10/18  1040 07/10/14  1136   CULT >100,000 colonies/mL  Enterococcus faecalis  *  <10,000 colonies/mL  urogenital roger  Susceptibility testing not routinely done   <10,000 colonies/mL Mixed gram positive roger  Multiple species present, probable  perineal contamination.  Susceptibility testing not routinely done       Most Recent TSH, T4 and A1c Labs:  Recent Labs   Lab Test 20  0626 20  1501   TSH <0.01* <0.01*   T4 4.04* 5.30*   A1C  --  6.4*     Results for orders placed or performed during the hospital encounter of 20   XR Chest 2 Views    Narrative    CHEST TWO VIEW   2020 3:23 PM     HISTORY: Weakness.    COMPARISON: Chest x-ray 2018.      Impression    IMPRESSION: PA and lateral views of the chest. Lungs are clear. Heart  is normal in size. No effusions are evident. No pneumothorax. Old  healed posterolateral left rib fractures are noted. These appear to be  new in the interval since prior chest x-ray.    ATTILA SWAN MD   Echocardiogram Complete    Narrative    [id number]  [id number]  [id number]  [id number]^MEIR^TRUDY           [facility]  Echocardiography Laboratory  [address] [place] [address]        Name: NEO VILLAVICENCIO  MRN: [id number]  : 1928  Study Date: 2020 11:35 AM  Age: 89 yrs  Gender: Male  Patient Location: [facility]  Reason For Study: Afib  Ordering Physician: TRUDY WORLEY  Referring Physician: BRANDAN LOPEZ  Performed By: Kimo Castillo RDCS     BSA: 1.7 m2  Height: 70 in  Weight: 127 lb  HR: 112  BP: 138/69 mmHg  _____________________________________________________________________________  __        Procedure  Complete Portable Echo Adult. Optison (NDC #1113-8285) given intravenously.  _____________________________________________________________________________  __        Interpretation Summary     1. Left ventricular systolic function is normal. The visual ejection fraction  is estimated at 55-60%.  2. No regional wall motion abnormalities noted.  3. The right ventricle is mildly dilated. The right ventricular systolic  function is normal.  4. The right atrium is moderate to severely dilated. The left atrium is  moderately dilated.  5. There is mild (1+) mitral  regurgitation.  6. There is mild (1+) tricuspid regurgitation.  _____________________________________________________________________________  __        Left Ventricle  The left ventricle is normal in size. There is mild concentric left  ventricular hypertrophy. Left ventricular systolic function is normal. The  visual ejection fraction is estimated at 55-60%. Diastolic function not  assessed due to atrial fibrillation. No regional wall motion abnormalities  noted.     Right Ventricle  The right ventricle is mildly dilated. The right ventricular systolic function  is normal.     Atria  The left atrium is moderately dilated. The right atrium is moderate to  severely dilated. There is no color Doppler evidence of an atrial shunt.     Mitral Valve  The mitral valve is normal in structure and function. There is mild (1+)  mitral regurgitation.        Tricuspid Valve  The tricuspid valve is normal in structure and function. There is mild (1+)  tricuspid regurgitation.     Aortic Valve  The aortic valve is trileaflet with aortic valve sclerosis.     Pulmonic Valve  The pulmonic valve is normal in structure and function. There is trace  pulmonic valvular regurgitation.     Vessels  Normal size aorta. IVC diameter and respiratory changes fall into an  intermediate range suggesting an RA pressure of 8 mmHg.     Pericardium  There is no pericardial effusion.        Rhythm  The rhythm was atrial fibrillation.  _____________________________________________________________________________  __  MMode/2D Measurements & Calculations  IVSd: 1.2 cm     LVIDd: 2.8 cm  LVIDs: 2.1 cm  LVPWd: 0.79 cm  FS: 27.2 %  LV mass(C)d: 74.7 grams  LV mass(C)dI: 43.4 grams/m2  Ao root diam: 3.8 cm  asc Aorta Diam: 3.5 cm  LVOT diam: 2.4 cm  LVOT area: 4.6 cm2  LA Volume (BP): 55.8 ml  LA Volume Index (BP): 32.4 ml/m2  RWT: 0.56           Doppler Measurements & Calculations  LV V1 max PG: 3.1 mmHg  LV V1 max: 87.0 cm/sec  LV V1 VTI: 14.5  cm  SV(LVOT): 66.2 ml  SI(LVOT): 38.5 ml/m2  PA acc time: 0.09 sec  TR max domenico: 253.7 cm/sec  TR max P.8 mmHg           _____________________________________________________________________________  __           Report approved by: Jerad Foote 2020 01:29 PM

## 2020-01-31 NOTE — PROGRESS NOTES
Spoke with pts dtr Nickie via phone. Informed Nickie that her father had orders to discharge to [facility]. Nickie verbalizes acceptance of the information. Nickie wants her father to be brought to [facility] in a wheelchair van. [employer] was then contacted to set up the ride. Nickie would like a phone call when the timing of transport is known.

## 2020-02-02 VITALS
BODY MASS INDEX: 20.1 KG/M2 | HEART RATE: 104 BPM | OXYGEN SATURATION: 96 % | RESPIRATION RATE: 16 BRPM | HEIGHT: 70 IN | TEMPERATURE: 96.7 F | SYSTOLIC BLOOD PRESSURE: 140 MMHG | WEIGHT: 140.4 LBS | DIASTOLIC BLOOD PRESSURE: 74 MMHG

## 2020-02-02 ASSESSMENT — MIFFLIN-ST. JEOR: SCORE: 1293.1

## 2020-02-03 ENCOUNTER — NURSING HOME VISIT (OUTPATIENT)
Dept: GERIATRICS | Facility: CLINIC | Age: 85
End: 2020-02-03
Payer: MEDICARE

## 2020-02-03 ENCOUNTER — TELEPHONE (OUTPATIENT)
Dept: CARDIOLOGY | Facility: CLINIC | Age: 85
End: 2020-02-03

## 2020-02-03 DIAGNOSIS — I10 BENIGN ESSENTIAL HYPERTENSION: ICD-10-CM

## 2020-02-03 DIAGNOSIS — E53.8 VITAMIN B12 DEFICIENCY (NON ANEMIC): ICD-10-CM

## 2020-02-03 DIAGNOSIS — E05.90 HYPERTHYROIDISM: Primary | ICD-10-CM

## 2020-02-03 DIAGNOSIS — N18.30 CKD (CHRONIC KIDNEY DISEASE) STAGE 3, GFR 30-59 ML/MIN (H): ICD-10-CM

## 2020-02-03 DIAGNOSIS — I48.91 ATRIAL FIBRILLATION WITH RVR (H): ICD-10-CM

## 2020-02-03 DIAGNOSIS — R53.81 PHYSICAL DECONDITIONING: ICD-10-CM

## 2020-02-03 DIAGNOSIS — E11.42 TYPE 2 DIABETES MELLITUS WITH DIABETIC POLYNEUROPATHY, WITHOUT LONG-TERM CURRENT USE OF INSULIN (H): ICD-10-CM

## 2020-02-03 PROCEDURE — 99310 SBSQ NF CARE HIGH MDM 45: CPT | Performed by: NURSE PRACTITIONER

## 2020-02-03 NOTE — PROGRESS NOTES
[facility]  PRIMARY CARE PROVIDER AND CLINIC:  Gabriel Carroll MD, [address] / [facility] [address]  Chief Complaint   Patient presents with     Hospital F/U     [facility] Medical Record Number:  [id number]  Place of Service where encounter took place:  [facility] [[id number]]    Jose Gomez  is a 89 year old  (1/21/1928), hx of paroxysmal a-fib not on anticoagulation, DM2, HTN, CKD, and recurrent falls who presented on 1/26/2020 for evaluation of generalized weakness and poor appetite, admitted to the above facility from  [facility]. Hospital stay 01/26/2020 through 01/31/2020..  Admitted to this facility for  rehab, medical management and nursing care.    HPI:    HPI information obtained from: facility chart records.   Brief Summary of Hospital Course:   Atrial fib with RVR: hx of atrial fib, not on AC, new diagnosis of hyperthyroidism, TSH on arrival undetectable and fT4 elevated to 5.3, started on diltiazem initially but DC due to hypotension, loaded with digoxin and then transitioned to oral dosing and continues PTA metoprolol, CHADs Vasc score of 4, not on AC due to hx of recurrent falls, f/u with EP on 2/14  Hyperthyroidism: started on methimazole, f/u with endocrinology as OP  SHERWIN/CKD: baseline creat 1.2-1.6, up to 2.24, IVF with improvement, DC creat 1.38  HTN: hold PTA lisinopril due to soft BP, continue metoprolol  Updates on Status Since Skilled nursing Admission: ON exam today patient working with therapy, walking > 150 feet, denies SOB, cough, CP, palpitations, SALVADOR, PND, fever, chills, N/V/D or constipation, states she is sleeping well at night, appetite is good, no other concerns     CODE STATUS/ADVANCE DIRECTIVES DISCUSSION:   CPR/Full code   Patient's living condition: lives with family, (adult) children   ALLERGIES: No known drug allergies  PAST MEDICAL HISTORY:  has a past medical history of CKD (chronic kidney disease) stage  3, GFR 30-59 ml/min (H), Esophageal reflux, Essential hypertension, benign, Hypertensive emergency (4/26/2018), Mixed hyperlipidemia, Paroxysmal atrial fibrillation (H) (05/28/2018), Pernicious anemia (3/19/2008), Type 2 diabetes, HbA1c goal < 7% (H), and Unspecified internal derangement of knee. He also has no past medical history of Malignant hyperthermia or PONV (postoperative nausea and vomiting).  PAST SURGICAL HISTORY:   has a past surgical history that includes no history of surgery; colonoscopy; Phacoemulsification clear cornea with standard intraocular lens implant (9/23/2013); and Phacoemulsification clear cornea with standard intraocular lens implant (10/14/2013).  FAMILY HISTORY: family history includes Cancer in his sister; Cerebrovascular Disease in his brother, brother, brother, and sister; Family History Negative in his mother; Heart Disease in his father.  SOCIAL HISTORY:   reports that he quit smoking about 35 years ago. His smoking use included cigars. He has never used smokeless tobacco. He reports current alcohol use. He reports that he does not use drugs.    Post Discharge Medication Reconciliation Status: discharge medications reconciled, continue medications without change    Current Outpatient Medications   Medication Sig Dispense Refill     Acetaminophen (TYLENOL PO) Take 1,000 mg by mouth every 6 hours as needed        Apoaequorin (PREVAGEN PO) Take 1 tablet by mouth daily       Cyanocobalamin (B-12) 2000 MCG TABS Take 1 tablet by mouth daily       digoxin (LANOXIN) 125 MCG tablet Take 0.5 tablets (62.5 mcg) by mouth daily       methimazole (TAPAZOLE) 10 MG tablet Take 1 tablet (10 mg) by mouth 2 times daily       metoprolol succinate ER (TOPROL-XL) 25 MG 24 hr tablet TAKE 1 TABLET BY MOUTH EVERY DAY 90 tablet 3     miconazole (MICATIN/MICRO GUARD) 2 % external powder Apply topically 3 times daily as needed for other (to groin area for yeast)       multivitamin w/minerals (THERA-VIT-M)  "tablet Take 1 tablet by mouth daily       simvastatin (ZOCOR) 20 MG tablet Take 1 tablet (20 mg) by mouth At Bedtime 90 tablet 3       ROS:  10 point ROS of systems including Constitutional, Eyes, Respiratory, Cardiovascular, Gastroenterology, Genitourinary, Integumentary, Musculoskeletal, Psychiatric were all negative except for pertinent positives noted in my HPI.    Vitals:  BP (!) 140/74   Pulse 104   Temp 96.7  F (35.9  C)   Resp 16   Ht 1.778 m (5' 10\")   Wt 63.7 kg (140 lb 6.4 oz)   SpO2 96%   BMI 20.15 kg/m    Exam:  GENERAL APPEARANCE:  Alert, in no distress  ENT:  Mouth and posterior oropharynx normal, moist mucous membranes, [place]  EYES:  EOM, conjunctivae, lids, pupils and irises normal, PERRL  RESP:  respiratory effort and palpation of chest normal, lungs clear to auscultation , no respiratory distress  CV:  Palpation and auscultation of heart done , no edema, irregular rhythm tachy  rate  ABDOMEN:  normal bowel sounds, soft, nontender, no hepatosplenomegaly or other masses  M/S:   Examination of:   right upper extremity, left upper extremity, right lower extremity and left lower extremity  Inspection, ROM, stability and muscle strength normal  SKIN:  Inspection of skin and subcutaneous tissue baseline  NEURO:   Cranial nerves 2-12 are normal tested and grossly at patient's baseline, speech WNL    Lab/Diagnostic data:  Recent labs in [facility] reviewed by me today.     ASSESSMENT/PLAN:  Hyperthyroidism  Acute/ongoing: methimazole 10mg BID, f/u with endocrinology as OP  TSH and free T4 level in one week    Atrial fibrillation with RVR (H)  Physical deconditioning  Acute/ongoing: exacerbated by hyperthyroidism, continue digoxin 62.5mcg QD, metoprolol xl 25mg QD, no AC due to frequent falls    Benign essential hypertension  CKD (chronic kidney disease) stage 3, GFR 30-59 ml/min (H)  Acute/ongoing: vitals daily and prn, BMP weekly, continue metoprolol xl 25mg QD, hold PTA lisinopril due to low " BP    Type 2 diabetes mellitus with diabetic polyneuropathy, without long-term current use of insulin (H)  Ongoing: blood sugar monitoring QAM, diet controlled,      Vitamin B12 deficiency  Ongoing: B12 level on Thursday, decrease vitamin B12 to 500mcg QD      Orders written by provider at facility  BMP and Hgb on Thursday  Decrease vitamin B12 to 500mcg QD  Vitamin B12 level on thursday      Total time spent with patient visit at the [facility] nursing [facility] was 45 min including patient visit and review of past records. Greater than 50% of total time spent with counseling and coordinating care due to Discussed current medications and lab monitoring, discussed discharge disposition, patient lives with daughter in a house, will continue therapy for strengthening.     Electronically signed by:  Tonya Lynn Haase, APRN CNP

## 2020-02-03 NOTE — LETTER
2/3/2020        RE: Jose Gomez  [address]  [facility] [address]        [facility]  PRIMARY CARE PROVIDER AND CLINIC:  Gabriel Carroll MD, [address] / [facility] [address]  Chief Complaint   Patient presents with     Hospital F/U     [place] Medical Record Number:  [id number]  Place of Service where encounter took place:  [facility] (S) [[id number]]    Jose Gomez  is a 89 year old  (1/21/1928), hx of paroxysmal a-fib not on anticoagulation, DM2, HTN, CKD, and recurrent falls who presented on 1/26/2020 for evaluation of generalized weakness and poor appetite, admitted to the above facility from  [facility]. Hospital stay 01/26/2020 through 01/31/2020..  Admitted to this facility for  rehab, medical management and nursing care.    HPI:    HPI information obtained from: facility chart records.   Brief Summary of Hospital Course:   Atrial fib with RVR: hx of atrial fib, not on AC, new diagnosis of hyperthyroidism, TSH on arrival undetectable and fT4 elevated to 5.3, started on diltiazem initially but DC due to hypotension, loaded with digoxin and then transitioned to oral dosing and continues PTA metoprolol, CHADs Vasc score of 4, not on AC due to hx of recurrent falls, f/u with EP on 2/14  Hyperthyroidism: started on methimazole, f/u with endocrinology as OP  SHERWIN/CKD: baseline creat 1.2-1.6, up to 2.24, IVF with improvement, DC creat 1.38  HTN: hold PTA lisinopril due to soft BP, continue metoprolol  Updates on Status Since Skilled nursing Admission: ON exam today patient working with therapy, walking > 150 feet, denies SOB, cough, CP, palpitations, SALV[place]OR, PND, fever, chills, N/V/D or constipation, states she is sleeping well at night, appetite is good, no other concerns     CODE STATUS/ADVANCE DIRECTIVES DISCUSSION:   CPR/Full code   Patient's living condition: lives with family, (adult) children   ALLERGIES: No known drug  allergies  PAST MEDICAL HISTORY:  has a past medical history of CKD (chronic kidney disease) stage 3, GFR 30-59 ml/min (H), Esophageal reflux, Essential hypertension, benign, Hypertensive emergency (4/26/2018), Mixed hyperlipidemia, Paroxysmal atrial fibrillation (H) (05/28/2018), Pernicious anemia (3/19/2008), Type 2 diabetes, HbA1c goal < 7% (H), and Unspecified internal derangement of knee. He also has no past medical history of Malignant hyperthermia or PONV (postoperative nausea and vomiting).  PAST SURGICAL HISTORY:   has a past surgical history that includes no history of surgery; colonoscopy; Phacoemulsification clear cornea with standard intraocular lens implant (9/23/2013); and Phacoemulsification clear cornea with standard intraocular lens implant (10/14/2013).  FAMILY HISTORY: family history includes Cancer in his sister; Cerebrovascular Disease in his brother, brother, brother, and sister; Family History Negative in his mother; Heart Disease in his father.  SOCIAL HISTORY:   reports that he quit smoking about 35 years ago. His smoking use included cigars. He has never used smokeless tobacco. He reports current alcohol use. He reports that he does not use drugs.    Post Discharge Medication Reconciliation Status: discharge medications reconciled, continue medications without change    Current Outpatient Medications   Medication Sig Dispense Refill     Acetaminophen (TYLENOL PO) Take 1,000 mg by mouth every 6 hours as needed        Apoaequorin (PREVAGEN PO) Take 1 tablet by mouth daily       Cyanocobalamin (B-12) 2000 MCG TABS Take 1 tablet by mouth daily       digoxin (LANOXIN) 125 MCG tablet Take 0.5 tablets (62.5 mcg) by mouth daily       methimazole (TAPAZOLE) 10 MG tablet Take 1 tablet (10 mg) by mouth 2 times daily       metoprolol succinate ER (TOPROL-XL) 25 MG 24 hr tablet TAKE 1 TABLET BY MOUTH EVERY DAY 90 tablet 3     miconazole (MICATIN/MICRO GUARD) 2 % external powder Apply topically 3 times  "daily as needed for other (to groin area for yeast)       multivitamin w/minerals (THERA-VIT-M) tablet Take 1 tablet by mouth daily       simvastatin (ZOCOR) 20 MG tablet Take 1 tablet (20 mg) by mouth At Bedtime 90 tablet 3       ROS:  10 point ROS of systems including Constitutional, Eyes, Respiratory, Cardiovascular, Gastroenterology, Genitourinary, Integumentary, Musculoskeletal, Psychiatric were all negative except for pertinent positives noted in my HPI.    Vitals:  BP (!) 140/74   Pulse 104   Temp 96.7  F (35.9  C)   Resp 16   Ht 1.778 m (5' 10\")   Wt 63.7 kg (140 lb 6.4 oz)   SpO2 96%   BMI 20.15 kg/m     Exam:  GENERAL APPEARANCE:  Alert, in no distress  ENT:  Mouth and posterior oropharynx normal, moist mucous membranes, [place]  EYES:  EOM, conjunctivae, lids, pupils and irises normal, PERRL  RESP:  respiratory effort and palpation of chest normal, lungs clear to auscultation , no respiratory distress  CV:  Palpation and auscultation of heart done , no edema, irregular rhythm tachy  rate  ABDOMEN:  normal bowel sounds, soft, nontender, no hepatosplenomegaly or other masses  M/S:   Examination of:   right upper extremity, left upper extremity, right lower extremity and left lower extremity  Inspection, ROM, stability and muscle strength normal  SKIN:  Inspection of skin and subcutaneous tissue baseline  NEURO:   Cranial nerves 2-12 are normal tested and grossly at patient's baseline, speech WNL    Lab/Diagnostic data:  Recent labs in [facility] reviewed by me today.     ASSESSMENT/PLAN:  Hyperthyroidism  Acute/ongoing: methimazole 10mg BID, f/u with endocrinology as OP  TSH and free T4 level in one week    Atrial fibrillation with RVR (H)  Physical deconditioning  Acute/ongoing: exacerbated by hyperthyroidism, continue digoxin 62.5mcg QD, metoprolol xl 25mg QD, no AC due to frequent falls    Benign essential hypertension  CKD (chronic kidney disease) stage 3, GFR 30-59 ml/min (H)  Acute/ongoing: vitals " daily and prn, BMP weekly, continue metoprolol xl 25mg QD, hold PTA lisinopril due to low BP    Type 2 diabetes mellitus with diabetic polyneuropathy, without long-term current use of insulin (H)  Ongoing: blood sugar monitoring QAM, diet controlled,      Vitamin B12 deficiency  Ongoing: B12 level on Thursday, decrease vitamin B12 to 500mcg QD      Orders written by provider at facility  BMP and Hgb on Thursday  Decrease vitamin B12 to 500mcg QD  Vitamin B12 level on thursday      Total time spent with patient visit at the [facility] nursing [facility] was 45 min including patient visit and review of past records. Greater than 50% of total time spent with counseling and coordinating care due to Discussed current medications and lab monitoring, discussed discharge disposition, patient lives with daughter in a house, will continue therapy for strengthening.     Electronically signed by:  Tonya Lynn Haase, APRN CNP                         Sincerely,        Tonya Lynn Haase, APRN CNP

## 2020-02-03 NOTE — TELEPHONE ENCOUNTER
Patient was evaluated by cardiology while inpatient for decreased appetite and presented with A. Fib with RVR probably secondary to dehydration and hyperthyroidism. PMH of PAF without OAC secondary hx of falls and SDH. Rate control only with digoxin. RN called [facility] to confirm the follow up plan for patient with Care Coordinator. RN confirmed with Care Coordinator that patient has a scheduled F/U appt on 2/14/20 with [facility]. Reminded to send last progress note, MAR, active orders, and provider order sheet to appt. ZAYDA Groves RN.

## 2020-02-07 ENCOUNTER — NURSING HOME VISIT (OUTPATIENT)
Dept: GERIATRICS | Facility: CLINIC | Age: 85
End: 2020-02-07
Payer: MEDICARE

## 2020-02-07 VITALS
SYSTOLIC BLOOD PRESSURE: 133 MMHG | RESPIRATION RATE: 16 BRPM | HEART RATE: 68 BPM | BODY MASS INDEX: 20.14 KG/M2 | TEMPERATURE: 97.5 F | WEIGHT: 140.7 LBS | HEIGHT: 70 IN | DIASTOLIC BLOOD PRESSURE: 60 MMHG | OXYGEN SATURATION: 99 %

## 2020-02-07 DIAGNOSIS — I48.91 ATRIAL FIBRILLATION WITH RVR (H): ICD-10-CM

## 2020-02-07 DIAGNOSIS — E53.8 VITAMIN B12 DEFICIENCY (NON ANEMIC): ICD-10-CM

## 2020-02-07 DIAGNOSIS — E05.90 HYPERTHYROIDISM: Primary | ICD-10-CM

## 2020-02-07 DIAGNOSIS — N18.30 CKD (CHRONIC KIDNEY DISEASE) STAGE 3, GFR 30-59 ML/MIN (H): ICD-10-CM

## 2020-02-07 DIAGNOSIS — E11.42 TYPE 2 DIABETES MELLITUS WITH DIABETIC POLYNEUROPATHY, WITHOUT LONG-TERM CURRENT USE OF INSULIN (H): ICD-10-CM

## 2020-02-07 DIAGNOSIS — R53.81 PHYSICAL DECONDITIONING: ICD-10-CM

## 2020-02-07 DIAGNOSIS — I10 BENIGN ESSENTIAL HYPERTENSION: ICD-10-CM

## 2020-02-07 PROCEDURE — 99309 SBSQ NF CARE MODERATE MDM 30: CPT | Performed by: NURSE PRACTITIONER

## 2020-02-07 PROCEDURE — 99207 ZZC CDG-MDM COMPONENT: MEETS LOW - DOWN CODED: CPT | Performed by: NURSE PRACTITIONER

## 2020-02-07 ASSESSMENT — MIFFLIN-ST. JEOR: SCORE: 1294.46

## 2020-02-07 NOTE — LETTER
2/7/2020        RE: Jose Gomez  [address] [facility] [address]        [facility]  [place] Medical Record Number:  [id number]  Place of Service where encounter took place:  [facility] (S) [[id number]]  Chief Complaint   Patient presents with     Nursing Home Acute       HPI:    Jose Gomez  is a 89 year old (1/21/1928), who is being seen today for an episodic care visit.  HPI information obtained from: facility chart records. Today's concern is:  Hyperthyroidism: HR tachy prior to medications, stable after medications, denies palpitations, CP, SOB, states he is working with therapy no c/o palpitations or CP with therapy  Atrial fib: irregular HR, /60, 144/83, 139/76 with HR  range  Vitamin B12 deficiency: Vitamin B12 elevated level of 1311, anything over 800 is high.   CKD: last creat 1.62 on 2/6/20    Past Medical and Surgical History reviewed in Epic today.    MEDICATIONS:  Current Outpatient Medications   Medication Sig Dispense Refill     Acetaminophen (TYLENOL PO) Take 1,000 mg by mouth every 6 hours as needed        Cyanocobalamin (B-12) 2000 MCG TABS Take 1 tablet by mouth daily       digoxin (LANOXIN) 125 MCG tablet Take 0.5 tablets (62.5 mcg) by mouth daily       methimazole (TAPAZOLE) 10 MG tablet Take 1 tablet (10 mg) by mouth 2 times daily       metoprolol succinate ER (TOPROL-XL) 25 MG 24 hr tablet TAKE 1 TABLET BY MOUTH EVERY DAY 90 tablet 3     miconazole (MICATIN/MICRO GUARD) 2 % external powder Apply topically 3 times daily as needed for other (to groin area for yeast)       multivitamin w/minerals (THERA-VIT-M) tablet Take 1 tablet by mouth daily       simvastatin (ZOCOR) 20 MG tablet Take 1 tablet (20 mg) by mouth At Bedtime 90 tablet 3     Apoaequorin (PREVAGEN PO) Take 1 tablet by mouth daily           REVIEW OF SYSTEMS:  10 point ROS of systems including Constitutional, Eyes, Respiratory, Cardiovascular, Gastroenterology,  "Genitourinary, Integumentary, Musculoskeletal, Psychiatric were all negative except for pertinent positives noted in my HPI.    Objective:  /60   Pulse 68   Temp 97.5  F (36.4  C)   Resp 16   Ht 1.778 m (5' 10\")   Wt 63.8 kg (140 lb 11.2 oz)   SpO2 99%   BMI 20.19 kg/m     Exam:  Exam:  GENERAL APPEARANCE:  Alert, in no distress  ENT:  Mouth and posterior oropharynx normal, moist mucous membranes, [place]  EYES:  EOM, conjunctivae, lids, pupils and irises normal, PERRL  RESP:  respiratory effort and palpation of chest normal, lungs clear to auscultation , no respiratory distress  CV:  Palpation and auscultation of heart done , no edema, irregular rhythm  rate controlled on exam today  ABDOMEN:  normal bowel sounds, soft, nontender, no hepatosplenomegaly or other masses  M/S:   Examination of:   right upper extremity, left upper extremity, right lower extremity and left lower extremity  Inspection, ROM, stability and muscle strength normal  SKIN:  Inspection of skin and subcutaneous tissue baseline  NEURO:   Cranial nerves 2-12 are normal tested and grossly at patient's baseline, speech WNL       Labs:   Recent labs in EPIC reviewed by me today.   Vitamin B12 level 2/3/20 high as above, creat on 2/6/20 elevated 1.62    ASSESSMENT/PLAN:  Hyperthyroidism  Acute/ongoing: methimazole 10mg BID, f/u with endocrinology as OP  TSH and free T4 level on wednesday     Atrial fibrillation with RVR (H)  Physical deconditioning  Acute/ongoing: exacerbated by hyperthyroidism, continue digoxin 62.5mcg QD, increase metoprolol to xl 37.5mg QD, no AC due to frequent falls     Benign essential hypertension  CKD (chronic kidney disease) stage 3, GFR 30-59 ml/min (H)  Acute/ongoing: vitals daily and prn, BMP weekly, increase  metoprolol xl to 37.5mg QD, hold PTA lisinopril due to low BP     Type 2 diabetes mellitus with diabetic polyneuropathy, without long-term current use of insulin (H)  Ongoing: blood sugar monitoring QAM, " diet controlled,      Vitamin B12 deficiency  Ongoing/level high: B12 level elevated DC B12 supplement and repeat B12 level next wednesday       Orders written by provider at facility  TSH and free T4 level, B12 level next wednesday 2/12/20  DC vitamin B12   Increase metoprolol xl to 37.5mg qAM    Total time spent with patient visit at the [facility] nursing facility was 35 min including patient visit and review of past records. Greater than 50% of total time spent with counseling and coordinating care due to discussed elevated HR, will increase metoprolol, encouraged patient to report any CP, palpitations, SOB to nursing right away, discussed therapy and POC, will continue to work with therapy for strengthening, .  Electronically signed by:  Tonya Lynn Haase, APRN CNP               Sincerely,        Tonya Lynn Haase, APRN CNP

## 2020-02-07 NOTE — PROGRESS NOTES
[facility]  [place] Medical Record Number:  [id number]  Place of Service where encounter took place:  [facility] (S) [[id number]]  Chief Complaint   Patient presents with     Nursing Home Acute       HPI:    Jose Gomez  is a 89 year old (1/21/1928), who is being seen today for an episodic care visit.  HPI information obtained from: facility chart records. Today's concern is:  Hyperthyroidism: HR tachy prior to medications, stable after medications, denies palpitations, CP, SOB, states he is working with therapy no c/o palpitations or CP with therapy  Atrial fib: irregular HR, /60, 144/83, 139/76 with HR  range  Vitamin B12 deficiency: Vitamin B12 elevated level of 1311, anything over 800 is high.   CKD: last creat 1.62 on 2/6/20    Past Medical and Surgical History reviewed in Epic today.    MEDICATIONS:  Current Outpatient Medications   Medication Sig Dispense Refill     Acetaminophen (TYLENOL PO) Take 1,000 mg by mouth every 6 hours as needed        Cyanocobalamin (B-12) 2000 MCG TABS Take 1 tablet by mouth daily       digoxin (LANOXIN) 125 MCG tablet Take 0.5 tablets (62.5 mcg) by mouth daily       methimazole (TAPAZOLE) 10 MG tablet Take 1 tablet (10 mg) by mouth 2 times daily       metoprolol succinate ER (TOPROL-XL) 25 MG 24 hr tablet TAKE 1 TABLET BY MOUTH EVERY DAY 90 tablet 3     miconazole (MICATIN/MICRO GUARD) 2 % external powder Apply topically 3 times daily as needed for other (to groin area for yeast)       multivitamin w/minerals (THERA-VIT-M) tablet Take 1 tablet by mouth daily       simvastatin (ZOCOR) 20 MG tablet Take 1 tablet (20 mg) by mouth At Bedtime 90 tablet 3     Apoaequorin (PREVAGEN PO) Take 1 tablet by mouth daily           REVIEW OF SYSTEMS:  10 point ROS of systems including Constitutional, Eyes, Respiratory, Cardiovascular, Gastroenterology, Genitourinary, Integumentary, Musculoskeletal, Psychiatric were all negative except for pertinent  "positives noted in my HPI.    Objective:  /60   Pulse 68   Temp 97.5  F (36.4  C)   Resp 16   Ht 1.778 m (5' 10\")   Wt 63.8 kg (140 lb 11.2 oz)   SpO2 99%   BMI 20.19 kg/m    Exam:  Exam:  GENERAL APPEARANCE:  Alert, in no distress  ENT:  Mouth and posterior oropharynx normal, moist mucous membranes, [place]  EYES:  EOM, conjunctivae, lids, pupils and irises normal, PERRL  RESP:  respiratory effort and palpation of chest normal, lungs clear to auscultation , no respiratory distress  CV:  Palpation and auscultation of heart done , no edema, irregular rhythm rate controlled on exam today  ABDOMEN:  normal bowel sounds, soft, nontender, no hepatosplenomegaly or other masses  M/S:   Examination of:   right upper extremity, left upper extremity, right lower extremity and left lower extremity  Inspection, ROM, stability and muscle strength normal  SKIN:  Inspection of skin and subcutaneous tissue baseline  NEURO:   Cranial nerves 2-12 are normal tested and grossly at patient's baseline, speech WNL       Labs:   Recent labs in [facility] reviewed by me today.   Vitamin B12 level 2/3/20 high as above, creat on 2/6/20 elevated 1.62    ASSESSMENT/PLAN:  Hyperthyroidism  Acute/ongoing: methimazole 10mg BID, f/u with endocrinology as OP  TSH and free T4 level on wednesday     Atrial fibrillation with RVR (H)  Physical deconditioning  Acute/ongoing: exacerbated by hyperthyroidism, continue digoxin 62.5mcg QD, increase metoprolol to xl 37.5mg QD, no AC due to frequent falls     Benign essential hypertension  CKD (chronic kidney disease) stage 3, GFR 30-59 ml/min (H)  Acute/ongoing: vitals daily and prn, BMP weekly, increase  metoprolol xl to 37.5mg QD, hold PTA lisinopril due to low BP     Type 2 diabetes mellitus with diabetic polyneuropathy, without long-term current use of insulin (H)  Ongoing: blood sugar monitoring QAM, diet controlled,      Vitamin B12 deficiency  Ongoing/level high: B12 level elevated DC B12 supplement " and repeat B12 level next wednesday       Orders written by provider at facility  TSH and free T4 level, B12 level next wednesday 2/12/20  DC vitamin B12   Increase metoprolol xl to 37.5mg qAM    Total time spent with patient visit at the skilled nursing facility was 35 min including patient visit and review of past records. Greater than 50% of total time spent with counseling and coordinating care due to discussed elevated HR, will increase metoprolol, encouraged patient to report any CP, palpitations, SOB to nursing right away, discussed therapy and POC, will continue to work with therapy for strengthening, .  Electronically signed by:  Tonya Lynn Haase, APRN CNP

## 2020-02-11 ENCOUNTER — DISCHARGE SUMMARY NURSING HOME (OUTPATIENT)
Dept: GERIATRICS | Facility: CLINIC | Age: 85
End: 2020-02-11
Payer: MEDICARE

## 2020-02-11 VITALS
OXYGEN SATURATION: 98 % | WEIGHT: 140 LBS | HEART RATE: 106 BPM | BODY MASS INDEX: 21.97 KG/M2 | DIASTOLIC BLOOD PRESSURE: 88 MMHG | TEMPERATURE: 97.7 F | SYSTOLIC BLOOD PRESSURE: 152 MMHG | HEIGHT: 67 IN | RESPIRATION RATE: 16 BRPM

## 2020-02-11 DIAGNOSIS — I10 BENIGN ESSENTIAL HYPERTENSION: ICD-10-CM

## 2020-02-11 DIAGNOSIS — R53.81 PHYSICAL DECONDITIONING: ICD-10-CM

## 2020-02-11 DIAGNOSIS — I48.0 PAROXYSMAL ATRIAL FIBRILLATION (H): ICD-10-CM

## 2020-02-11 DIAGNOSIS — N18.30 CKD (CHRONIC KIDNEY DISEASE) STAGE 3, GFR 30-59 ML/MIN (H): ICD-10-CM

## 2020-02-11 DIAGNOSIS — I48.91 ATRIAL FIBRILLATION WITH RVR (H): ICD-10-CM

## 2020-02-11 DIAGNOSIS — E11.42 TYPE 2 DIABETES MELLITUS WITH DIABETIC POLYNEUROPATHY, WITHOUT LONG-TERM CURRENT USE OF INSULIN (H): ICD-10-CM

## 2020-02-11 DIAGNOSIS — E53.8 VITAMIN B12 DEFICIENCY (NON ANEMIC): ICD-10-CM

## 2020-02-11 DIAGNOSIS — E05.90 HYPERTHYROIDISM: Primary | ICD-10-CM

## 2020-02-11 PROCEDURE — 99316 NF DSCHRG MGMT 30 MIN+: CPT | Performed by: NURSE PRACTITIONER

## 2020-02-11 RX ORDER — METOPROLOL SUCCINATE 25 MG/1
25 TABLET, EXTENDED RELEASE ORAL DAILY
Qty: 90 TABLET | Refills: 3
Start: 2020-02-11 | End: 2020-02-13

## 2020-02-11 ASSESSMENT — MIFFLIN-ST. JEOR: SCORE: 1243.67

## 2020-02-11 NOTE — LETTER
2/11/2020        RE: Jose Gomez  [address]  [facility] [address]        [facility] [facility] DISCHARGE SUMMARY  PATIENT'S NAME: Jose Gomez  YOB: 1928  MEDICAL RECORD NUMBER:  [id number]  Place of Service where encounter took place:  [facility] (S) [[id number]]    PRIMARY CARE PROVIDER AND CLINIC RESPONSIBLE AFTER TRANSFER:   Gabriel Carroll MD, [address] / [facility] [address]    [facility] Provider     Transferring providers: Tonya Lynn Haase, APRN CNP, Jluis Luna MD  Recent Hospitalization/ED:  [facility] stay 1/26/2020 to 1/31/2020.  Date of SNF Admission: January / 31 / 2020  Date of SNF (anticipated) Discharge: February / 13 / 2020  Discharged to: previous independent home  Cognitive Scores: BIMS=7 SBT=22/28 suggesting that pt has severe cognitive impairment and that further assessment is warranted to assist with appropriate d/c planning.  Physical Function: Ambulating 100 ft with RW with SBA  DME: [facility]    CODE STATUS/ADVANCE DIRECTIVES DISCUSSION:  Full Code   ALLERGIES: No known drug allergies    DISCHARGE DIAGNOSIS/NURSING FACILITY COURSE:   Patient progressed in therapy to walking up to 100 feet using a RW with SBA, he is assist, supervision with ADL's, CTG assist with stairs, CARROLL balance is 45/56, patient lives at home with his daughter will DC back to home with home PT, OT, RN and HHA through [facility].     Past Medical History:  has a past medical history of CKD (chronic kidney disease) stage 3, GFR 30-59 ml/min (H), Esophageal reflux, Essential hypertension, benign, Hypertensive emergency (4/26/2018), Mixed hyperlipidemia, Paroxysmal atrial fibrillation (H) (05/28/2018), Pernicious anemia (3/19/2008), Type 2 diabetes, HbA1c goal < 7% (H), and Unspecified internal derangement of knee. He also has no past medical history of Malignant hyperthermia or PONV (postoperative nausea and vomiting).    Discharge  "Medications:  Current Outpatient Medications   Medication Sig Dispense Refill     Acetaminophen (TYLENOL PO) Take 1,000 mg by mouth every 6 hours as needed        Apoaequorin (PREVAGEN PO) Take 1 tablet by mouth daily       Cyanocobalamin (B-12) 2000 MCG TABS Take 1 tablet by mouth daily       digoxin (LANOXIN) 125 MCG tablet Take 0.5 tablets (62.5 mcg) by mouth daily       methimazole (TAPAZOLE) 10 MG tablet Take 1 tablet (10 mg) by mouth 2 times daily       metoprolol succinate ER (TOPROL-XL) 25 MG 24 hr tablet TAKE 1 TABLET BY MOUTH EVERY DAY 90 tablet 3     miconazole (MICATIN/MICRO GUARD) 2 % external powder Apply topically 3 times daily as needed for other (to groin area for yeast)       multivitamin w/minerals (THERA-VIT-M) tablet Take 1 tablet by mouth daily       simvastatin (ZOCOR) 20 MG tablet Take 1 tablet (20 mg) by mouth At Bedtime 90 tablet 3       Medication Changes/Rationale:     Metoprolol increased to 37.5mg QD    Controlled medications sent with patient:   not applicable/none     ROS:   10 point ROS of systems including Constitutional, Eyes, Respiratory, Cardiovascular, Gastroenterology, Genitourinary, Integumentary, Musculoskeletal, Psychiatric were all negative except for pertinent positives noted in my HPI.    Physical Exam:   Vitals: BP (!) 152/88   Pulse 106   Temp 97.7  F (36.5  C)   Resp 16   Ht 1.702 m (5' 7\")   Wt 63.5 kg (140 lb)   SpO2 98%   BMI 21.93 kg/m     BMI= Body mass index is 21.93 kg/m .  Exam:  GENERAL APPEARANCE:  Alert, in no distress  ENT:  Mouth and posterior oropharynx normal, moist mucous membranes, [place]  EYES:  EOM, conjunctivae, lids, pupils and irises normal, PERRL  RESP:  respiratory effort and palpation of chest normal, lungs clear to auscultation , no respiratory distress  CV:  Palpation and auscultation of heart done , no edema, irregular rhythm rate controlled on exam today  ABDOMEN:  normal bowel sounds, soft, nontender, no hepatosplenomegaly or other " masses  M/S:   Examination of:   right upper extremity, left upper extremity, right lower extremity and left lower extremity  Inspection, ROM, stability and muscle strength normal  SKIN:  Inspection of skin and subcutaneous tissue baseline  NEURO:   Cranial nerves 2-12 are normal tested and grossly at patient's baseline, speech WNL    SNF labs: Recent labs in [facility] reviewed by me today.   Recent TSH 2/12/20 0.03 low and Free T4 1.3    ASSESSMENT/PLAN:  Hyperthyroidism  Acute/ongoing: methimazole 10mg BID, f/u with endocrinology as OP  Labs stable as above     Atrial fibrillation with RVR (H)  Physical deconditioning  Acute/ongoing: exacerbated by hyperthyroidism, continue digoxin 62.5mcg QD,  continue metoprolol to xl 37.5mg QD, no AC due to frequent falls  DC to home to live with daughter with home PT, OT, RN and HHA through [facility].      Benign essential hypertension  CKD (chronic kidney disease) stage 3, GFR 30-59 ml/min (H)  Acute/ongoing: continue metoprolol xl to 37.5mg QD,   No further PTA lisinopril due to low BP     Type 2 diabetes mellitus with diabetic polyneuropathy, without long-term current use of insulin (H)  Ongoing: blood sugar monitoring stable in TCU  diet controlled,      Vitamin B12 deficiency  Ongoing/level high: B12 level elevated DC B12 supplement and repeat B12 level next wednesday       DISCHARGE PLAN:    Follow up labs: No labs orders/due    Medical Follow Up:      Follow up with primary care provider in 1-2 weeks    MTM referral needed and placed by this provider: No    Current [place] scheduled appointments:  Next 5 appointments (look out 90 days)    Feb 21, 2020 11:20 AM CST  SHORT with Gabriel Carroll MD  [facility] [address] [address] [phone]           Discharge Services: Home Care:  Occupational Therapy, Physical Therapy, Registered Nurse and Home Health Aide    Discharge Instructions  Verbalized to Patient at Discharge:     None      TOTAL DISCHARGE TIME:   Greater than 30 minutes  Electronically signed by:  Tonya Lynn Haase, APRN CNP                       Sincerely,        Tonya Lynn Haase, APRN CNP

## 2020-02-11 NOTE — PROGRESS NOTES
[facility] DISCHARGE SUMMARY  PATIENT'S NAME: Jose Gomez  YOB: 1928  MEDICAL RECORD NUMBER:  [id number]  Place of Service where encounter took place:  [facility] (S) [[id number]]    PRIMARY CARE PROVIDER AND CLINIC RESPONSIBLE AFTER TRANSFER:   Gabriel Carroll MD, [address] / [facility] [address]    G Provider     Transferring providers: Tonya Lynn Haase, SABIHA ARREOLA, Jluis Luna MD  Recent Hospitalization/ED:  [facility] Hospital stay 1/26/2020 to 1/31/2020.  Date of SNF Admission: January / 31 / 2020  Date of SNF (anticipated) Discharge: February / 13 / 2020  Discharged to: previous independent home  Cognitive Scores: BIMS=7 SBT=22/28 suggesting that pt has severe cognitive impairment and that further assessment is warranted to assist with appropriate d/c planning.  Physical Function: Ambulating 100 ft with RW with SBA  DME: Walker    CODE STATUS/ADVANCE DIRECTIVES DISCUSSION:  Full Code   ALLERGIES: No known drug allergies    DISCHARGE DIAGNOSIS/NURSING FACILITY COURSE:   Patient progressed in therapy to walking up to 100 feet using a RW with SBA, he is assist, supervision with ADL's, CTG assist with stairs, CARROLL balance is 45/56, patient lives at home with his daughter will DC back to home with home PT, OT, RN and HHA through [facility].     Past Medical History:  has a past medical history of CKD (chronic kidney disease) stage 3, GFR 30-59 ml/min (H), Esophageal reflux, Essential hypertension, benign, Hypertensive emergency (4/26/2018), Mixed hyperlipidemia, Paroxysmal atrial fibrillation (H) (05/28/2018), Pernicious anemia (3/19/2008), Type 2 diabetes, HbA1c goal < 7% (H), and Unspecified internal derangement of knee. He also has no past medical history of Malignant hyperthermia or PONV (postoperative nausea and vomiting).    Discharge Medications:  Current Outpatient Medications   Medication Sig Dispense Refill     Acetaminophen  "(TYLENOL PO) Take 1,000 mg by mouth every 6 hours as needed        Apoaequorin (PREVAGEN PO) Take 1 tablet by mouth daily       Cyanocobalamin (B-12) 2000 MCG TABS Take 1 tablet by mouth daily       digoxin (LANOXIN) 125 MCG tablet Take 0.5 tablets (62.5 mcg) by mouth daily       methimazole (TAPAZOLE) 10 MG tablet Take 1 tablet (10 mg) by mouth 2 times daily       metoprolol succinate ER (TOPROL-XL) 25 MG 24 hr tablet TAKE 1 TABLET BY MOUTH EVERY DAY 90 tablet 3     miconazole (MICATIN/MICRO GUARD) 2 % external powder Apply topically 3 times daily as needed for other (to groin area for yeast)       multivitamin w/minerals (THERA-VIT-M) tablet Take 1 tablet by mouth daily       simvastatin (ZOCOR) 20 MG tablet Take 1 tablet (20 mg) by mouth At Bedtime 90 tablet 3       Medication Changes/Rationale:     Metoprolol increased to 37.5mg QD    Controlled medications sent with patient:   not applicable/none     ROS:   10 point ROS of systems including Constitutional, Eyes, Respiratory, Cardiovascular, Gastroenterology, Genitourinary, Integumentary, Musculoskeletal, Psychiatric were all negative except for pertinent positives noted in my HPI.    Physical Exam:   Vitals: BP (!) 152/88   Pulse 106   Temp 97.7  F (36.5  C)   Resp 16   Ht 1.702 m (5' 7\")   Wt 63.5 kg (140 lb)   SpO2 98%   BMI 21.93 kg/m    BMI= Body mass index is 21.93 kg/m .  Exam:  GENERAL APPEARANCE:  Alert, in no distress  ENT:  Mouth and posterior oropharynx normal, moist mucous membranes, [place]  EYES:  EOM, conjunctivae, lids, pupils and irises normal, PERRL  RESP:  respiratory effort and palpation of chest normal, lungs clear to auscultation , no respiratory distress  CV:  Palpation and auscultation of heart done , no edema, irregular rhythm rate controlled on exam today  ABDOMEN:  normal bowel sounds, soft, nontender, no hepatosplenomegaly or other masses  M/S:   Examination of:   right upper extremity, left upper extremity, right lower extremity " and left lower extremity  Inspection, ROM, stability and muscle strength normal  SKIN:  Inspection of skin and subcutaneous tissue baseline  NEURO:   Cranial nerves 2-12 are normal tested and grossly at patient's baseline, speech WNL    SNF labs: Recent labs in [facility] reviewed by me today.   Recent TSH 2/12/20 0.03 low and Free T4 1.3    ASSESSMENT/PLAN:  Hyperthyroidism  Acute/ongoing: methimazole 10mg BID, f/u with endocrinology as OP  Labs stable as above     Atrial fibrillation with RVR (H)  Physical deconditioning  Acute/ongoing: exacerbated by hyperthyroidism, continue digoxin 62.5mcg QD, continue metoprolol to xl 37.5mg QD, no AC due to frequent falls  DC to home to live with daughter with home PT, OT, RN and HHA through [facility].      Benign essential hypertension  CKD (chronic kidney disease) stage 3, GFR 30-59 ml/min (H)  Acute/ongoing: continue metoprolol xl to 37.5mg QD,   No further PTA lisinopril due to low BP     Type 2 diabetes mellitus with diabetic polyneuropathy, without long-term current use of insulin (H)  Ongoing: blood sugar monitoring stable in TCU  diet controlled,      Vitamin B12 deficiency  Ongoing/level high: B12 level elevated DC B12 supplement and repeat B12 level next wednesday       DISCHARGE PLAN:    Follow up labs: No labs orders/due    Medical Follow Up:      Follow up with primary care provider in 1-2 weeks    MTM referral needed and placed by this provider: No    Current [place] scheduled appointments:  Next 5 appointments (look out 90 days)    Feb 21, 2020 11:20 AM CST  SHORT with Gabriel Carroll MD  [facility] ([facility] [address] [place] [id number] [phone]           Discharge Services: Home Care:  Occupational Therapy, Physical Therapy, Registered Nurse and Home Health Aide    Discharge Instructions Verbalized to Patient at Discharge:     None      TOTAL DISCHARGE TIME:   Greater than 30  minutes  Electronically signed by:  Tonya Lynn Haase, APRN CNP

## 2020-02-12 ENCOUNTER — OFFICE VISIT (OUTPATIENT)
Dept: ENDOCRINOLOGY | Facility: CLINIC | Age: 85
End: 2020-02-12
Payer: MEDICARE

## 2020-02-12 VITALS
HEART RATE: 112 BPM | DIASTOLIC BLOOD PRESSURE: 68 MMHG | HEIGHT: 70 IN | SYSTOLIC BLOOD PRESSURE: 110 MMHG | WEIGHT: 140 LBS | BODY MASS INDEX: 20.04 KG/M2

## 2020-02-12 DIAGNOSIS — I48.91 ATRIAL FIBRILLATION, UNSPECIFIED TYPE (H): ICD-10-CM

## 2020-02-12 DIAGNOSIS — E05.90 HYPERTHYROIDISM: Primary | ICD-10-CM

## 2020-02-12 PROCEDURE — 99204 OFFICE O/P NEW MOD 45 MIN: CPT | Performed by: INTERNAL MEDICINE

## 2020-02-12 ASSESSMENT — MIFFLIN-ST. JEOR: SCORE: 1291.29

## 2020-02-12 NOTE — PROGRESS NOTES
Name: Jose Gomez is a 89 year old man, seen at the request of Dr. Oxana Marie/[facility] for evaluation of     Chief Complaint   Patient presents with     Thyroid Problem     hyperthyroidism     HPI:  Recent issues:  Here for evaluation of hyperthyroidism, by himself today  Recent hospitalization at [facility] for AFib, diagnosed with hyperthyroidism  Reviewed medical history from patient and Epic chart record        1/2020. Hospitalization for tachycardia 1/26-1/31/20.  Diagnosed with atrial fibrillation with HR in the 's, also hyperthyroidism   Cardiology evaluation and procedures   ECHO showed LVEF 55-60% mildly dilated RV, moderate to severely dilated right atrium, moderately dilated left atrium, 1+ MR, and 1+ TR.   Treatment with IV diltiazem, then discontinued and digoxin started, metoprololXL continued   F/u cardiology appt plan with MARIANO Baez PAC 2/14/20  Hyperthyroidism treatment with methimazole medication 10 mg BID    Previous  thyroid tests include:     Lab Test 01/27/20  0626 01/26/20  1853 01/26/20  1501 08/26/19  1054   TSH <0.01*  --  <0.01* 1.70   T4 4.04*  --  5.30*  --    TPO  --  194*  --   --      Patient not aware of prior thyroid gland problems   No family history information available from patient    Recent  labs include:  Lab Results   Component Value Date    TSH <0.01 (L) 01/27/2020    T4 4.04 (H) 01/27/2020     (H) 01/26/2020     Current dose:  Methimazole  10 mg 1-tab in morning and evening    Recent symptoms:   Fatigue   Reduced appetite   Balance problems, leg weakness   Forgetfulness   Denies chest pain, palpitations, tachycardia, tremors  Other chronic illnesses include:   Atrial Fib:   Takes digoxin, metoprolol meds, followed by cardiologist   Anemia:    Followed by PCP   Hyperlipidemia: Takes simvastatin med, followed by PCP      Currently lives at [facility]  Previously lived lived in a single family house  with daughter Nickie and son Tye, per records  Sees Dr. Gabriel Carroll/[facility] for general medicine evaluations.    PMH/PSH:  Past Medical History:   Diagnosis Date     CKD (chronic kidney disease) stage 3, GFR 30-59 ml/min (H)      Esophageal reflux     very mild     Essential hypertension, benign      Hypertensive emergency 2018     Hyperthyroidism      Mixed hyperlipidemia      Paroxysmal atrial fibrillation (H) 2018     Pernicious anemia 3/19/2008     Type 2 diabetes, HbA1c goal < 7% (H)      Unspecified internal derangement of knee     LEFT     Past Surgical History:   Procedure Laterality Date     COLONOSCOPY       NO HISTORY OF SURGERY       PHACOEMULSIFICATION CLEAR CORNEA WITH STANDARD INTRAOCULAR LENS IMPLANT  2013    Procedure: PHACOEMULSIFICATION CLEAR CORNEA WITH STANDARD INTRAOCULAR LENS IMPLANT;  RIGHT PHACOEMULSIFICATION CLEAR CORNEA WITH STANDARD INTRAOCULAR LENS IMPLANT ;  Surgeon: Hieu Jaimes MD;  Location: [facility]     PHACOEMULSIFICATION CLEAR CORNEA WITH STANDARD INTRAOCULAR LENS IMPLANT  10/14/2013    Procedure: PHACOEMULSIFICATION CLEAR CORNEA WITH STANDARD INTRAOCULAR LENS IMPLANT;  LEFT PHACOEMULSIFICATION CLEAR CORNEA WITH STANDARD INTRAOCULAR LENS IMPLANT ;  Surgeon: Hieu Jaimes MD;  Location: [facility]       Family Hx:  Family History   Problem Relation Age of Onset     Heart Disease Father         enlarged heart  at age 66     Family History Negative Mother          at age 88     Cancer Sister          at age 69     Cerebrovascular Disease Brother          at age 81     Cerebrovascular Disease Brother          at age 78     Cerebrovascular Disease Brother          at age 88     Cerebrovascular Disease Sister         b, 1930         Social Hx:  Social History     Socioeconomic History     Marital status: Single     Spouse name: Not on file     Number of children: Not on file     Years of education: Not  on file     Highest education level: Not on file   Occupational History     Occupation: teacher- middle school     Employer: RETIRED   Social Needs     Financial resource strain: Not on file     Food insecurity:     Worry: Not on file     Inability: Not on file     Transportation needs:     Medical: Not on file     Non-medical: Not on file   Tobacco Use     Smoking status: Former Smoker     Types: Cigars     Last attempt to quit: 5/3/1984     Years since quittin.8     Smokeless tobacco: Never Used   Substance and Sexual Activity     Alcohol use: Yes     Comment: 1-2x per month     Drug use: No     Sexual activity: Not Currently   Lifestyle     Physical activity:     Days per week: Not on file     Minutes per session: Not on file     Stress: Not on file   Relationships     Social connections:     Talks on phone: Not on file     Gets together: Not on file     Attends Congregational service: Not on file     Active member of club or organization: Not on file     Attends meetings of clubs or organizations: Not on file     Relationship status: Not on file     Intimate partner violence:     Fear of current or ex partner: Not on file     Emotionally abused: Not on file     Physically abused: Not on file     Forced sexual activity: Not on file   Other Topics Concern     Parent/sibling w/ CABG, MI or angioplasty before 65F 55M? Not Asked   Social History Narrative     Not on file          MEDICATIONS:  has a current medication list which includes the following prescription(s): acetaminophen, digoxin, methimazole, metoprolol succinate er, miconazole, multivitamin w/minerals, simvastatin, apoaequorin, and b-12.    ROS:     ROS: 10 point ROS neg other than the symptoms noted above in the HPI   Limited ROS information available from patient, and no other historians available today.    GENERAL: fatigue, wt loss denies fevers, chills, night sweats.   HEENT: no dysphagia, odonophagia, diplopia, neck pain  THYROID:  no apparent hyper  "or hypothyroid symptoms  CV: no recent chest pain, pressure, palpitations  LUNGS: SALVADOR; no SOB, cough, wheezing   ABDOMEN: no diarrhea, constipation, abdominal pain  EXTREMITIES: no rashes, ulcers, edema  NEUROLOGY: forgetfulness; no headaches, denies changes in vision, tingling, extremitiy numbness   MSK: no muscle aches or pains, weakness  SKIN: no rashes or lesions  : some nocturia  PSYCH:  stable mood, no significant anxiety or depression  ENDOCRINE: no heat or cold intolerance    Physical Exam   VS: /68   Pulse 112   Ht 1.778 m (5' 10\")   Wt 63.5 kg (140 lb)   BMI 20.09 kg/m    GENERAL: AXOX3, NAD, slender, well dressed, answering questions appropriately, appears stated age.  THYROID:  Irregular thyroid gland contour, gland size estimate 25 gm, nontender  HEENT: reduced hearing; neck non-tender, no exopthalmous, no proptosis, EOMI  CV: soft heart sounds, RRR, no rubs, gallops, no murmurs  LUNGS: CTAB, no wheezes, rales, or ronchi  ABDOMEN: soft, nontender, nondistended  EXTREMITIES: mild hand tremors, 2+ ankle edema  NEUROLOGY: CN grossly intact, A+O to self, place only  MSK: grossly intact  SKIN: scalp hair thinning; no rashes, no lesions    LABS:    All pertinent notes, labs, and images personally reviewed by me.     A/P:  Encounter Diagnoses   Name Primary?     Hyperthyroidism Yes     Atrial fibrillation, unspecified type (H)        Comments:  Reviewed health history and the hyperthyroidism issues.  Patient a poor historian and difficult to understand recent/previous health history and symptoms  Recent T4 level still high despite taking methimazole medication, advise dose increase    Plan:  Discussed general issues with the hyperthyroidism diagnosis and management  Discussed lab tests used to assess patient thyroid hormone levels  We discussed treatment options with current antithyroid medication use     Recommend:  Continue methimazole but increase dose as 20 mg po BID, though will need further " dose titration  No lab tests ordered today  Monitor for symptom changes  Requested copy of the previously scheduled 2/12/20 thyroid lab tests to be faxed to our office (me)  No thyroid imaging needed at this time  Continue use of beta blocker medication for HR control  Monitor HR, BP, and periodic thyroid lab (TSH, FT4) lab testing  Important to have a family member or friend present for followup evaluations  Completed the [facility] Referral Order form, gave to patient.    Keep scheduled f/u cardiology evaluation  Addressed patient questions today    Labs ordered today: No orders of the defined types were placed in this encounter.    Radiology/Consults ordered today: None    More than 50% of the time spent with Mr. oGmez on counseling / coordinating his care.  Total appointment time was 50 minutes.    Follow-up:  4/1/20 at 10am, [facility]    CORRINE Reyes MD, MS  Endocrinology  [facility]    CC: BLAISE Marie, BLAISE Carroll, and LISANDRA Baez

## 2020-02-14 ENCOUNTER — TELEPHONE (OUTPATIENT)
Dept: INTERNAL MEDICINE | Facility: CLINIC | Age: 85
End: 2020-02-14

## 2020-02-14 NOTE — TELEPHONE ENCOUNTER
"patient was unsure of medication and discharge instructions. Gave verbal approval to speak with daughter  shelli went over medication and verified appointment with Dr. Carroll for 2/21/20  Christine PALACIORN BSN  [facility]  [phone]    Hospital/TCU/ED for chronic condition Discharge Protocol    \"Hi, my name is Prabha Dillard RN, a registered nurse, and I am calling from [facility].  I am calling to follow up and see how things are going for you after your recent emergency visit/hospital/TCU stay.\"    Tell me how you are doing now that you are home?\" better      Discharge Instructions    \"Let's review your discharge instructions.  What is/are the follow-up recommendations?  Pt. Response: follow up    \"Has an appointment with your primary care provider been scheduled?\"   Yes. (confirm)    \"When you see the provider, I would recommend that you bring your medications with you.\"    Medications    \"Tell me what changed about your medicines when you discharged?\"    Changes to chronic meds?    0-1    \"What questions do you have about your medications?\"    None     New diagnoses of heart failure, COPD, diabetes, or MI?    No              Post Discharge Medication Reconciliation Status: discharge medications reconciled, continue medications without change.    Was MTM referral placed (*Make sure to put transitions as reason for referral)?   No    Call Summary    \"What questions or concerns do you have about your recent visit and your follow-up care?\"     none    \"If you have questions or things don't continue to improve, we encourage you contact us through the main clinic number (give number).  Even if the clinic is not open, triage nurses are available 24/7 to help you.     We would like you to know that our clinic has extended hours (provide information).  We also have urgent care (provide details on closest location and hours/contact info)\"      \"Thank you for your time and take care!\"       "       DISCHARGE DIAGNOSIS/NURSING FACILITY COURSE:   Patient progressed in therapy to walking up to 100 feet using a RW with SBA, he is assist, supervision with ADL's, CTG assist with stairs, CARROLL balance is 45/56, patient lives at home with his daughter will DC back to home with home PT, OT, RN and HHA through [facility].      Past Medical History:  has a past medical history of CKD (chronic kidney disease) stage 3, GFR 30-59 ml/min (H), Esophageal reflux, Essential hypertension, benign, Hypertensive emergency (4/26/2018), Mixed hyperlipidemia, Paroxysmal atrial fibrillation (H) (05/28/2018), Pernicious anemia (3/19/2008), Type 2 diabetes, HbA1c goal < 7% (H), and Unspecified internal derangement of knee. He also has no past medical history of Malignant hyperthermia or PONV (postoperative nausea and vomiting).     Discharge Medications:  Current Outpatient Prescriptions          Current Outpatient Medications   Medication Sig Dispense Refill     Acetaminophen (TYLENOL PO) Take 1,000 mg by mouth every 6 hours as needed          Apoaequorin (PREVAGEN PO) Take 1 tablet by mouth daily         Cyanocobalamin (B-12) 2000 MCG TABS Take 1 tablet by mouth daily         digoxin (LANOXIN) 125 MCG tablet Take 0.5 tablets (62.5 mcg) by mouth daily         methimazole (TAPAZOLE) 10 MG tablet Take 1 tablet (10 mg) by mouth 2 times daily         metoprolol succinate ER (TOPROL-XL) 25 MG 24 hr tablet TAKE 1 TABLET BY MOUTH EVERY DAY 90 tablet 3     miconazole (MICATIN/MICRO GUARD) 2 % external powder Apply topically 3 times daily as needed for other (to groin area for yeast)         multivitamin w/minerals (THERA-VIT-M) tablet Take 1 tablet by mouth daily         simvastatin (ZOCOR) 20 MG tablet Take 1 tablet (20 mg) by mouth At Bedtime 90 tablet 3            Medication Changes/Rationale:     Metoprolol increased to 37.5mg QD

## 2020-02-17 ENCOUNTER — DOCUMENTATION ONLY (OUTPATIENT)
Dept: CARE COORDINATION | Facility: CLINIC | Age: 85
End: 2020-02-17

## 2020-02-17 NOTE — PROGRESS NOTES
[facility] now requests orders and shares plan of care/discharge summaries for some patients through [employer].  Please REPLY TO THIS MESSAGE OR ROUTE BACK TO THE AUTHOR in order to give authorization for orders when needed.  This is considered a verbal order, you will still receive a faxed copy of orders for signature.  Thank you for your assistance in improving collaboration for our patients.    ORDER    I spoke with pt and daughter today and they are not ready for home care to come out today for assessment. Pt is requesting a SOC nurse visit on Wednesday 2/19/20. We will need a new order for this visit. Please route back if you approve for a verbal order.       KAVON Marshall

## 2020-02-19 ENCOUNTER — TELEPHONE (OUTPATIENT)
Dept: INTERNAL MEDICINE | Facility: CLINIC | Age: 85
End: 2020-02-19

## 2020-02-19 NOTE — TELEPHONE ENCOUNTER
Polina calling from home care [phone], ok to leave detailed message.    Pt resistant to home care, but agreed to 3 weeks of services.    Would like ok for these orders:    Skilled nursing  PT/OT rosemary MCNALLY 2 times a week for 3 weeks    While nurse was there pt fell backwards going up stair.  Nurse was able to catch him and lower him to the ground, so no injuries.

## 2020-02-20 NOTE — TELEPHONE ENCOUNTER
Home care advised.   Pt is seeing you tomorrow and home care requested you do a face to face visit.

## 2020-02-21 ENCOUNTER — OFFICE VISIT (OUTPATIENT)
Dept: INTERNAL MEDICINE | Facility: CLINIC | Age: 85
End: 2020-02-21
Payer: MEDICARE

## 2020-02-21 VITALS
SYSTOLIC BLOOD PRESSURE: 94 MMHG | HEART RATE: 112 BPM | BODY MASS INDEX: 21.8 KG/M2 | WEIGHT: 152.3 LBS | TEMPERATURE: 98.3 F | HEIGHT: 70 IN | DIASTOLIC BLOOD PRESSURE: 64 MMHG | OXYGEN SATURATION: 98 %

## 2020-02-21 DIAGNOSIS — D64.9 ANEMIA, UNSPECIFIED TYPE: ICD-10-CM

## 2020-02-21 DIAGNOSIS — I48.21 PERMANENT ATRIAL FIBRILLATION (H): ICD-10-CM

## 2020-02-21 DIAGNOSIS — E05.90 HYPERTHYROIDISM: Primary | ICD-10-CM

## 2020-02-21 LAB
ERYTHROCYTE [DISTWIDTH] IN BLOOD BY AUTOMATED COUNT: 14.7 % (ref 10–15)
HCT VFR BLD AUTO: 35.4 % (ref 40–53)
HGB BLD-MCNC: 11.5 G/DL (ref 13.3–17.7)
MCH RBC QN AUTO: 32.6 PG (ref 26.5–33)
MCHC RBC AUTO-ENTMCNC: 32.5 G/DL (ref 31.5–36.5)
MCV RBC AUTO: 100 FL (ref 78–100)
PLATELET # BLD AUTO: 247 10E9/L (ref 150–450)
RBC # BLD AUTO: 3.53 10E12/L (ref 4.4–5.9)
WBC # BLD AUTO: 6.6 10E9/L (ref 4–11)

## 2020-02-21 PROCEDURE — 85027 COMPLETE CBC AUTOMATED: CPT | Performed by: INTERNAL MEDICINE

## 2020-02-21 PROCEDURE — 36415 COLL VENOUS BLD VENIPUNCTURE: CPT | Performed by: INTERNAL MEDICINE

## 2020-02-21 PROCEDURE — 99495 TRANSJ CARE MGMT MOD F2F 14D: CPT | Performed by: INTERNAL MEDICINE

## 2020-02-21 RX ORDER — METHIMAZOLE 10 MG/1
20 TABLET ORAL 2 TIMES DAILY
COMMUNITY
Start: 2020-02-21 | End: 2020-03-13

## 2020-02-21 ASSESSMENT — MIFFLIN-ST. JEOR: SCORE: 1347.08

## 2020-02-21 NOTE — NURSING NOTE
"Chief Complaint   Patient presents with     Hospital F/U     discharge from [facility] 02/13/2020     /56   Pulse 112   Temp 98.3  F (36.8  C) (Temporal)   Ht 1.778 m (5' 10\")   Wt 69.1 kg (152 lb 4.8 oz)   SpO2 98%   BMI 21.85 kg/m   Estimated body mass index is 21.85 kg/m  as calculated from the following:    Height as of this encounter: 1.778 m (5' 10\").    Weight as of this encounter: 69.1 kg (152 lb 4.8 oz).          Francine Brito CMA  "

## 2020-02-21 NOTE — PROGRESS NOTES
Subjective     Jose Gomez is a 89 year old male who presents to clinic today for the following health issues:    [facility]       Hospital Follow-up Visit:    Hospital/Nursing Home/IP Rehab Facility: [facility]  Date of Admission: 01/31/2020  Date of Discharge: 02/13/2020           Problems taking medications regularly:  None       Medication changes since discharge: None       Problems adhering to non-medication therapy:  None    Summary of hospitalization:  [facility] discharge summary reviewed and TCU d/c reviewed  Diagnostic Tests/Treatments reviewed.  Follow up needed: none  Other Healthcare Providers Involved in Patient s Care:         Specialist appointment - CV, Endo  Update since discharge: improved.     Post Discharge Medication Reconciliation: discharge medications reconciled, continue medications without change.  Plan of care communicated with patient and daughter   Jose Gomez is a 89 year old male with hx of paroxysmal a-fib not on anticoagulation, DM2, HTN, CKD, and recurrent falls who presented on 1/26/2020 for evaluation of generalized weakness and poor appetite, and was found to be in a-fib/RVR with new diagnosis of hyperthyroidism. He is now discharging to TCU. The following problems were addressed during his hospitalization:     Atrial fibrillation with RVR  *  Likely triggered by hyperthyroidism  *  Incidentally noted to be in a-fib on arrival with HR 90s-140s. He has no known prior diagnosis of a-fib. TSH on arrival was undetectable and FT4 was elevated to 5.30. TTE (1/29) showed LVEF 55-60%, mildly dilated RV, moderate to severely dilated right atrium, moderately dilated left atrium, 1+ MR, and 1+ TR. On admission, he was ultimately started on a diltiazem infusion, but this was subsequently discontinued due to hypotension. Cardiology was consulted on admission, and patient was ultimately loaded with digoxin with adequate rate control.   - He will be discharged on  digoxin 62.5 mcg daily  - He continues on PTA metoprolol XL 25 mg daily  - See hyperthyroidism below  - CHADS2-VASc score 4. Not on anticoagulation due to history of recurrent falls and traumatic SDH  - He has a follow up appointment with EP Cardiology on Feb 14     Hyperthyroidism  Started on  during admission. TPO antibody elevated to 194. TSI negative.   - He will be discharged on methimazole 10 mg BID  - We will arrange follow up with Endocrinology after discharge     Elevated troponin due to demand ischemia  Serial troponins were trivially elevated on admission to 0.05. Suspect demand ischemia due to a-fib/RVR.     SHERWIN on CKD stage III  Creatinine up to 2.24 from baseline 1.2-1.6. Suspect pre-renal state due to dehydration from decreased PO intake and RVR. He received IV fluids with improvement  - Cr 1.38 at the time of discharge     Mild hypercalcemia, Resolved  Calcium up to 10.5 in the setting of renal failure. Resolved with improvement in renal function. SPEP/immunofixation during admission was negative for monoclonal protein     DM2 with nephropathy  Diet-controlled. A1c 6.4. Blood sugars were adequately controlled during this hospitalization without need for supplemental insulin  Recent Labs   Lab 01/31/20  1219 01/31/20  0819 01/31/20  0240 01/30/20  2142 01/30/20  1805 01/30/20  1147   01/30/20  0551   01/29/20  0642   01/28/20  0529   01/27/20  0626   01/26/20  1501   GLC  --   --   --   --   --   --   --  132*  --  117*  --  99  --  79  --  94   * 104* 119* 164* 116* 139*   < >  --    < >  --    < >  --    < >  --    < >  --     < > = values in this interval not displayed.         Essential hypertension  *  PTA: metoprolol XL 25 mg daily, lisinopril 5 mg daily  - Holding PTA lisinopril due to fluctuating blood pressures  - Continues on PTA metoprolol XL     Dyslipidemia  - Continues on PTA simvastatin     Recurrent falls  He was evaluated by PT/OT who has recommended TCU     Severe malnutrition  "in context of chronic illness  Nutrition has recommended Plus2 shakes daily    Since discharge patient has been at home.  His daughter helping with care he is getting home care.  He states he feels okay he does not like to use his walker and would like to use his cane but his daughter has been attempting to reinforce the need to do this.  Follow-up with endocrinology methimazole was increased to 20 mg twice daily which she has been taking since.  He denies any significant palpitations or being dizzy or lightheaded      Reviewed and updated as needed this visit by Provider         Review of Systems   ROS COMP: Constitutional, HEENT, cardiovascular, pulmonary, gi and gu systems are negative, except as otherwise noted.      Objective    BP 94/64   Pulse 112   Temp 98.3  F (36.8  C) (Temporal)   Ht 1.778 m (5' 10\")   Wt 69.1 kg (152 lb 4.8 oz)   SpO2 98%   BMI 21.85 kg/m    Body mass index is 21.85 kg/m .  Physical Exam   GENERAL APPEARANCE: alert, no distress, fatigued and elderly  HENT: nose and mouth without ulcers or lesions and normal cephalic/atraumatic  NECK: no adenopathy, no asymmetry, masses, or scars and thyroid normal to palpation  RESP: lungs clear to auscultation - no rales, rhonchi or wheezes  CV: irregularly irregular rhythm and tachycardia  MS: extremities normal- no gross deformities noted  SKIN: no suspicious lesions or rashes    Labs reviewed in Epic  Results for orders placed or performed in visit on 02/21/20   CBC with platelets     Status: Abnormal   Result Value Ref Range    WBC 6.6 4.0 - 11.0 10e9/L    RBC Count 3.53 (L) 4.4 - 5.9 10e12/L    Hemoglobin 11.5 (L) 13.3 - 17.7 g/dL    Hematocrit 35.4 (L) 40.0 - 53.0 %     78 - 100 fl    MCH 32.6 26.5 - 33.0 pg    MCHC 32.5 31.5 - 36.5 g/dL    RDW 14.7 10.0 - 15.0 %    Platelet Count 247 150 - 450 10e9/L            Assessment & Plan     1. Hyperthyroidism  Per endocrinology continue increasing methimazole  - methimazole 10 MG PO tablet; " Take 2 tablets (20 mg) by mouth 2 times daily    2. Permanent atrial fibrillation  I like to have him under better rate controlled unfortunately his blood pressure is not allowing further increase in his beta-blocker at this time.  My hope is with the increase in his methimazole and further decrease in his T4 levels that cardiac stimulation that is occurring will decrease and his rate will come down.  Has cardiology follow-up soon.  As per previous discussions no anticoagulation due to prior bleeding, falls and reluctance to use his walker to prevent falls.    3. Anemia, unspecified type  This is improving.  Continue to follow  - CBC with platelets       See Patient Instructions    Return in about 2 months (around 4/21/2020) for Follow up visit.    Gabriel Carroll MD  [facility]

## 2020-02-21 NOTE — LETTER
February 26, 2020      Jose Gomez  [address] [address] [facility] [address]        Dear ,    We are writing to inform you of your test results.  Your blood count is improving since your hospital stay. We'll follow up on this at your appointment in April.       Resulted Orders   CBC with platelets   Result Value Ref Range    WBC 6.6 4.0 - 11.0 10e9/L    RBC Count 3.53 (L) 4.4 - 5.9 10e12/L    Hemoglobin 11.5 (L) 13.3 - 17.7 g/dL    Hematocrit 35.4 (L) 40.0 - 53.0 %     78 - 100 fl    MCH 32.6 26.5 - 33.0 pg    MCHC 32.5 31.5 - 36.5 g/dL    RDW 14.7 10.0 - 15.0 %    Platelet Count 247 150 - 450 10e9/L       If you have any questions or concerns, please call the clinic at the number listed above.       Sincerely,        Gabriel Carroll MD

## 2020-02-24 ENCOUNTER — DOCUMENTATION ONLY (OUTPATIENT)
Dept: CARE COORDINATION | Facility: CLINIC | Age: 85
End: 2020-02-24

## 2020-02-25 NOTE — PROGRESS NOTES
[facility] now requests orders and shares plan of care/discharge summaries for some patients through [employer].  Please REPLY TO THIS MESSAGE OR ROUTE BACK TO THE AUTHOR in order to give authorization for orders when needed.  This is considered a verbal order, you will still receive a faxed copy of orders for signature.  Thank you for your assistance in improving collaboration for our patients.    ORDER  Occupational Therapy to treat  1x per week for 3 weeks for caregiver education for cognition, equipment recommendations for home safety with ADLs, bathroom transfer training.
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
## 2020-02-28 ENCOUNTER — DOCUMENTATION ONLY (OUTPATIENT)
Dept: CARDIOLOGY | Facility: CLINIC | Age: 85
End: 2020-02-28

## 2020-02-28 ENCOUNTER — OFFICE VISIT (OUTPATIENT)
Dept: CARDIOLOGY | Facility: CLINIC | Age: 85
End: 2020-02-28
Payer: MEDICARE

## 2020-02-28 VITALS
HEART RATE: 108 BPM | WEIGHT: 147 LBS | SYSTOLIC BLOOD PRESSURE: 128 MMHG | BODY MASS INDEX: 21.05 KG/M2 | DIASTOLIC BLOOD PRESSURE: 92 MMHG | HEIGHT: 70 IN

## 2020-02-28 DIAGNOSIS — I48.0 PAROXYSMAL ATRIAL FIBRILLATION (H): ICD-10-CM

## 2020-02-28 DIAGNOSIS — I48.91 ATRIAL FIBRILLATION WITH RVR (H): Primary | ICD-10-CM

## 2020-02-28 DIAGNOSIS — E78.5 HYPERLIPIDEMIA LDL GOAL <100: ICD-10-CM

## 2020-02-28 DIAGNOSIS — I10 ESSENTIAL HYPERTENSION, BENIGN: ICD-10-CM

## 2020-02-28 PROCEDURE — 93000 ELECTROCARDIOGRAM COMPLETE: CPT | Performed by: PHYSICIAN ASSISTANT

## 2020-02-28 PROCEDURE — 99214 OFFICE O/P EST MOD 30 MIN: CPT | Mod: 25 | Performed by: PHYSICIAN ASSISTANT

## 2020-02-28 RX ORDER — METOPROLOL SUCCINATE 25 MG/1
TABLET, EXTENDED RELEASE ORAL
Refills: 0
Start: 2020-02-28 | End: 2020-03-13

## 2020-02-28 ASSESSMENT — MIFFLIN-ST. JEOR: SCORE: 1323.04
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
## 2020-03-03 NOTE — PROGRESS NOTES
Ankush Oviedo MD  You 14 minutes ago (11:25 AM)      I agree with Sandra aguilar.  At some point we need to talk to him about steven.       Ankush Serrano comment
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
## 2020-03-13 ENCOUNTER — OFFICE VISIT (OUTPATIENT)
Dept: CARDIOLOGY | Facility: CLINIC | Age: 85
End: 2020-03-13
Attending: PHYSICIAN ASSISTANT
Payer: MEDICARE

## 2020-03-13 ENCOUNTER — TELEPHONE (OUTPATIENT)
Dept: INTERNAL MEDICINE | Facility: CLINIC | Age: 85
End: 2020-03-13

## 2020-03-13 VITALS
SYSTOLIC BLOOD PRESSURE: 134 MMHG | DIASTOLIC BLOOD PRESSURE: 76 MMHG | HEIGHT: 70 IN | HEART RATE: 78 BPM | BODY MASS INDEX: 21.05 KG/M2 | WEIGHT: 147 LBS

## 2020-03-13 DIAGNOSIS — E05.90 HYPERTHYROIDISM: ICD-10-CM

## 2020-03-13 DIAGNOSIS — I48.91 ATRIAL FIBRILLATION WITH RVR (H): ICD-10-CM

## 2020-03-13 DIAGNOSIS — I48.0 PAROXYSMAL ATRIAL FIBRILLATION (H): ICD-10-CM

## 2020-03-13 PROCEDURE — 93000 ELECTROCARDIOGRAM COMPLETE: CPT | Performed by: PHYSICIAN ASSISTANT

## 2020-03-13 PROCEDURE — 99213 OFFICE O/P EST LOW 20 MIN: CPT | Performed by: PHYSICIAN ASSISTANT

## 2020-03-13 RX ORDER — METOPROLOL SUCCINATE 25 MG/1
TABLET, EXTENDED RELEASE ORAL
Qty: 90 TABLET | Refills: 5 | Status: SHIPPED | OUTPATIENT
Start: 2020-03-13 | End: 2020-10-08

## 2020-03-13 RX ORDER — METHIMAZOLE 10 MG/1
20 TABLET ORAL 2 TIMES DAILY
Qty: 120 TABLET | Refills: 1 | Status: SHIPPED | OUTPATIENT
Start: 2020-03-13 | End: 2020-04-01

## 2020-03-13 RX ORDER — DIGOXIN 125 MCG
62.5 TABLET ORAL DAILY
Qty: 15 TABLET | Refills: 5 | Status: SHIPPED | OUTPATIENT
Start: 2020-03-13 | End: 2020-04-02

## 2020-03-13 ASSESSMENT — MIFFLIN-ST. JEOR: SCORE: 1323.04

## 2020-03-13 NOTE — TELEPHONE ENCOUNTER
Message noted.  Methimazole med Rx sent to his pharmacy as requested.    CORRINE Reyes MD, MS  Endocrinology  [facility]

## 2020-03-13 NOTE — TELEPHONE ENCOUNTER
Methimazole      Last Written Prescription Date:  2/21/2020  Last Fill Quantity: ?,   # refills: ?  Last Office Visit: 2/12/2020  Future Office visit:    Next 5 appointments (look out 90 days)    Apr 01, 2020 10:00 AM CDT  [facility] with Medhat [facility]  [facility] [address] [place] [id number]  [phone]   Apr 27, 2020 10:20 AM CDT  SHORT with Gabriel Carroll MD  [facility] ([facility] [address] [place] [id number]  [phone]           Routing refill request to provider for review/approval because:  Medication is reported/historical    Angelica BERMUDEZN, RN, PHN

## 2020-03-13 NOTE — PATIENT INSTRUCTIONS
"1. EKG today looked much better! Still in AFib but HR went from 121 bpm to the 70s!    2. Start \"spot checking\" heart rate with pulse oximeter   Check pulse a few times per day and write down   Call if your heart rate is persistently getting into 50s as we may need to back off on the meds  [phone]    3. I'll work on getting Dr. Posadas's note    4. Call Dr. Posadas's office to refill the methimazole  "

## 2020-03-13 NOTE — TELEPHONE ENCOUNTER
Reason for Call:  Other prescription    Detailed comments: Need refill for Methimazole and only has 6 pills left. Any question, please call home first and then cell phone.    Phone Number Patient can be reached at: Home number on file [phone] (home)    Best Time: Anytime    Can we leave a detailed message on this number? YES    Call taken on 3/13/2020 at 2:10 PM by ALEJANDRINA MORTON
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
## 2020-04-01 ENCOUNTER — VIRTUAL VISIT (OUTPATIENT)
Dept: ENDOCRINOLOGY | Facility: CLINIC | Age: 85
End: 2020-04-01
Payer: MEDICARE

## 2020-04-01 DIAGNOSIS — E05.90 HYPERTHYROIDISM: ICD-10-CM

## 2020-04-01 PROCEDURE — 99442 ZZC PHYSICIAN TELEPHONE EVALUATION 11-20 MIN: CPT | Performed by: INTERNAL MEDICINE

## 2020-04-01 RX ORDER — METHIMAZOLE 10 MG/1
20 TABLET ORAL 2 TIMES DAILY
Qty: 120 TABLET | Refills: 5 | Status: SHIPPED | OUTPATIENT
Start: 2020-04-01 | End: 2020-10-26

## 2020-04-01 NOTE — PROGRESS NOTES
"Jose Gomez is a 89 year old male who is being evaluated via a telephone visit.      The patient has been notified of following (by RICARDO Bradley CMA    \"We have found that certain health care needs can be provided without the need for a physical exam.  This service lets us provide the care you need with a short phone conversation.  If a prescription is necessary we can send it directly to your pharmacy.  If lab work is needed we can place an order for that and you can then stop by our lab to have the test done at a later time.    This telephone visit will be conducted via 3 way call with the you (the patient) , the physician/provider, and a me all on the line at the same time.  This allows your physician/provider to have the phone conversation with you while I will be taking notes for your medical record.  We will have full access to your [place] medical record during this entire phone call.    Since this is like an office visit,  will bill your insurance company for this service.  Please check with your medical insurance if this type of telephone/virtual is covered . You may be responsible for the cost of this service if insurance coverage is denied.  The typical cost is $30 (10min), $59(11-20min) and $85 (21-30min)     If during the course of the call the physician/provider feels a telephone visit is not appropriate, you will not be charged for this service\"    Consent has been obtained for this service by care team member: yes.  See the scanned image in the medical record.      Pertinent parts of the the patient's medical history reviewed and confirmed by the provider included :      ===================================================    I have reviewed the note as documented above.  This accurately captures the substance of my conversation with the patient,    Additional provider notes:    Recent issues:  Previous hospitalization at [facility] for AFib, diagnosed with " hyperthyroidism  Continues on the methimazole medication as 10 mg 2-tabs BID  Discussed health issues with patient and his daughter Armida today  He reports feeling pretty well overall, denies heart racing, tremors, diarrhea           1/2020. Hospitalization for tachycardia 1/26-1/31/20.  Diagnosed with atrial fibrillation with HR in the 's, also hyperthyroidism              Cardiology evaluation and procedures              ECHO showed LVEF 55-60% mildly dilated RV, moderate to severely dilated right atrium, moderately dilated left atrium, 1+ MR, and 1+ TR.              Treatment with IV diltiazem, then discontinued and digoxin started, metoprololXL continued              F/u cardiology appt plan with MARIANO COFFEY 2/14/20  Hyperthyroidism treatment with methimazole medication 10 mg BID     Previous [facility] thyroid tests include:             Lab Test 01/27/20  0626 01/26/20  1853 01/26/20  1501 08/26/19  1054   TSH <0.01*  --  <0.01* 1.70   T4 4.04*  --  5.30*  --    TPO  --  194*  --   --       Patient not aware of prior thyroid gland problems   No family history information available from patient     Recent [facility] labs include:  Lab Results   Component Value Date    TSH 0.03 (A) 02/12/2020    T4 4.04 (H) 01/27/2020     (H) 01/26/2020     Current dose:  Methimazole  10 mg   2-tab in morning and evening        Previously lived lived in a single family house with daughter Nickie and son Tye, per records  Sees Dr. Gabriel Carroll/[facility] for general medicine evaluations.      ROS:      ROS: 10 point ROS neg other than the symptoms noted above in the HPI              Limited ROS information available from patient, and no other historians available today.     GENERAL: mild fatigue, wt loss denies fevers, chills, night sweats.   HEENT: no dysphagia, odonophagia, diplopia, neck pain  THYROID:  no apparent hyper or hypothyroid symptoms  CV: no recent chest pain, pressure,  palpitations  LUNGS: SALVADOR; no SOB, cough, wheezing   ABDOMEN: no diarrhea, constipation, abdominal pain  EXTREMITIES: no rashes, ulcers, edema  NEUROLOGY: forgetfulness; no headaches, denies changes in vision, tingling, extremitiy numbness   MSK: no muscle aches or pains, weakness  SKIN: no rashes or lesions  : some nocturia  PSYCH:  stable mood, no significant anxiety or depression  ENDOCRINE: no heat or cold intolerance       A/P:       Encounter Diagnoses   Name      Hyperthyroidism      Atrial fibrillation, unspecified type (H)         Comments:  Reviewed health history and the hyperthyroidism issues.       Plan:  Continue methimazole 20 mg po BID, though will likely need further dose titration  Monitor for symptom changes  No thyroid imaging needed at this time  Continue use of beta blocker medication for HR control  Monitor HR, BP, and periodic thyroid lab (TSH, FT4) lab testing  Since patient a poor historian, it is important to have a family member or friend present for followup evaluations  Plan f/u lab appt at [facility] in mid 5/2020, lab orders placed  Will call patient (or daughter Armida) with results, also send lab letter     Contact Dr. Baez/cardiology to clarify the digoxin med dosing its Rx  Addressed patient questions today     Labs ordered today: No orders of the defined types were placed in this encounter.    Radiology/Consults ordered today: None     More than 50% of the time spent with Mr. Gomez on counseling / coordinating his care.  Total appointment time was 15 minutes.     Follow-up:  TBD      Total time of call between patient and provider was 15 minutes     This telephone visit chart note is the equivalent of a 21240 office visit billing code      CORRINE Reyes MD, MS  Endocrinology  [facility]

## 2020-04-02 ENCOUNTER — TELEPHONE (OUTPATIENT)
Dept: CARDIOLOGY | Facility: CLINIC | Age: 85
End: 2020-04-02

## 2020-04-02 DIAGNOSIS — I48.91 ATRIAL FIBRILLATION WITH RVR (H): ICD-10-CM

## 2020-04-02 RX ORDER — DIGOXIN 125 MCG
62.5 TABLET ORAL DAILY
Qty: 45 TABLET | Refills: 3 | Status: SHIPPED | OUTPATIENT
Start: 2020-04-02 | End: 2021-04-07

## 2020-04-02 NOTE — TELEPHONE ENCOUNTER
04/02/20 Daughter Armida called and updated. Reinforced pt should take 1/2 tab ( 62.5 mcg) per day. New rx sent to pharmacy. Armida has no further questions at this time. Anish 327 pm

## 2020-04-02 NOTE — TELEPHONE ENCOUNTER
4/2 Daughter Armida called to report she went to pharmacy last weekend to  refill of Digoxin but was refused because it was too early. Pharmacist pointed out dose (1/2 tab) qday. Daughter realized she had been giving pt one full tablet everyday. Daughter now wondering what she should do. Pt has been on prescribed dose ( 1/2 tab) since 3/28. Daughter checks pts pulse and O2 levels every day. Pulse rates have been fluctuating inbetween 62-89. Will check with Sandra Baez PA-C and call daughter back w recommendations. She voiced understanding and agreement w plan. Anish 1050 am

## 2020-04-02 NOTE — TELEPHONE ENCOUNTER
Given age, pls back off to digoxin 1/2 tab daily (125 mcg).    Pls send new Epic Rx in.    Thx - K

## 2020-05-07 ENCOUNTER — VIRTUAL VISIT (OUTPATIENT)
Dept: INTERNAL MEDICINE | Facility: CLINIC | Age: 85
End: 2020-05-07
Payer: MEDICARE

## 2020-05-07 VITALS — HEART RATE: 64 BPM | OXYGEN SATURATION: 97 %

## 2020-05-07 DIAGNOSIS — I10 ESSENTIAL HYPERTENSION, BENIGN: Primary | ICD-10-CM

## 2020-05-07 DIAGNOSIS — E05.90 HYPERTHYROIDISM: ICD-10-CM

## 2020-05-07 DIAGNOSIS — I48.0 PAROXYSMAL ATRIAL FIBRILLATION (H): ICD-10-CM

## 2020-05-07 DIAGNOSIS — E11.42 TYPE 2 DIABETES MELLITUS WITH DIABETIC POLYNEUROPATHY, WITHOUT LONG-TERM CURRENT USE OF INSULIN (H): ICD-10-CM

## 2020-05-07 PROCEDURE — 99441 ZZC PHYSICIAN TELEPHONE EVALUATION 5-10 MIN: CPT | Performed by: INTERNAL MEDICINE

## 2020-05-07 NOTE — PROGRESS NOTES
"Jose Gomez is a 89 year old male who is being evaluated via a billable telephone visit.      The patient has been notified of following:     \"This telephone visit will be conducted via a call between you and your physician/provider. We have found that certain health care needs can be provided without the need for a physical exam.  This service lets us provide the care you need with a short phone conversation.  If a prescription is necessary we can send it directly to your pharmacy.  If lab work is needed we can place an order for that and you can then stop by our lab to have the test done at a later time.    Telephone visits are billed at different rates depending on your insurance coverage. During this emergency period, for some insurers they may be billed the same as an in-person visit.  Please reach out to your insurance provider with any questions.    If during the course of the call the physician/provider feels a telephone visit is not appropriate, you will not be charged for this service.\"    Patient has given verbal consent for Telephone visit?  Yes    What phone number would you like to be contacted at? [phone]    How would you like to obtain your AVS? Mail a copy    Subjective     Jose Gomez is a 89 year old male who presents to clinic today for the following health issues:    HPI  Hypertension Follow-up  Atrial Fib  Hyperthyroidism  T2DM-diet controlled    Daughter checks pulse ox daily. HR runs 60s typically. Nothing near 100 any longer.   No home BP monitoring  No glucose testing- diet controlled  Pt feels well- no complaints      Do you check your blood pressure regularly outside of the clinic? No     Are you following a low salt diet? No    Are your blood pressures ever more than 140 on the top number (systolic) OR more   than 90 on the bottom number (diastolic), for example 140/90?  unknown      How many servings of fruits and vegetables do you eat daily?  0-1    On average, how many " sweetened beverages do you drink each day (Examples: soda, juice, sweet tea, etc.  Do NOT count diet or artificially sweetened beverages)?   0    How many days per week do you exercise enough to make your heart beat faster? 3 or less    How many minutes a day do you exercise enough to make your heart beat faster? 9 or less    How many days per week do you miss taking your medication? 0, daughter helps with medications                 Reviewed and updated as needed this visit by Provider         Review of Systems   ROS COMP: Constitutional, HEENT, cardiovascular, pulmonary, gi and gu systems are negative, except as otherwise noted.       Objective   Reported vitals:  There were no vitals taken for this visit.   alert and no distress  PSYCH: Alert and oriented times 3; coherent speech, normal   rate and volume, able to articulate logical thoughts, able   to abstract reason, no tangential thoughts, no hallucinations   or delusions  His affect is normal  RESP: No cough, no audible wheezing, able to talk in full sentences  Remainder of exam unable to be completed due to telephone visits    Labs reviewed in Epic        Assessment/Plan:  1. Essential hypertension, benign  Most recent # look good but not monitoring at home.  No sx of hypotension. Monitor for need to cut back on B-Blocker as hyperthryoidism becomes better controlled    2. Paroxysmal atrial fibrillation (H)  Rate much better    3. Hyperthyroidism  As above- has labs this month    4. Type 2 diabetes mellitus with diabetic polyneuropathy, without long-term current use of insulin (H)  Diet controlled. Really not an issue.       No follow-ups on file.      Phone call duration:  8 minutes    Gabriel Carroll MD

## 2020-05-11 DIAGNOSIS — E11.9 TYPE 2 DIABETES MELLITUS WITHOUT COMPLICATION, WITHOUT LONG-TERM CURRENT USE OF INSULIN (H): ICD-10-CM

## 2020-05-11 DIAGNOSIS — I10 ESSENTIAL HYPERTENSION, BENIGN: ICD-10-CM

## 2020-05-11 DIAGNOSIS — N18.30 CKD (CHRONIC KIDNEY DISEASE) STAGE 3, GFR 30-59 ML/MIN (H): ICD-10-CM

## 2020-05-11 DIAGNOSIS — E05.90 HYPERTHYROIDISM: ICD-10-CM

## 2020-05-11 LAB
ALT SERPL W P-5'-P-CCNC: 36 U/L (ref 0–70)
ANION GAP SERPL CALCULATED.3IONS-SCNC: 7 MMOL/L (ref 3–14)
BUN SERPL-MCNC: 23 MG/DL (ref 7–30)
CALCIUM SERPL-MCNC: 9 MG/DL (ref 8.5–10.1)
CHLORIDE SERPL-SCNC: 105 MMOL/L (ref 94–109)
CO2 SERPL-SCNC: 25 MMOL/L (ref 20–32)
CREAT SERPL-MCNC: 1.59 MG/DL (ref 0.66–1.25)
GFR SERPL CREATININE-BSD FRML MDRD: 37 ML/MIN/{1.73_M2}
GLUCOSE SERPL-MCNC: 161 MG/DL (ref 70–99)
HBA1C MFR BLD: 6.5 % (ref 0–5.6)
POTASSIUM SERPL-SCNC: 4 MMOL/L (ref 3.4–5.3)
SODIUM SERPL-SCNC: 137 MMOL/L (ref 133–144)
T3FREE SERPL-MCNC: <0.5 PG/ML (ref 2.3–4.2)
T4 FREE SERPL-MCNC: 0.31 NG/DL (ref 0.76–1.46)
TSH SERPL DL<=0.005 MIU/L-ACNC: 124.93 MU/L (ref 0.4–4)

## 2020-05-11 PROCEDURE — 36415 COLL VENOUS BLD VENIPUNCTURE: CPT | Performed by: INTERNAL MEDICINE

## 2020-05-11 PROCEDURE — 80048 BASIC METABOLIC PNL TOTAL CA: CPT | Performed by: INTERNAL MEDICINE

## 2020-05-11 PROCEDURE — 84443 ASSAY THYROID STIM HORMONE: CPT | Performed by: INTERNAL MEDICINE

## 2020-05-11 PROCEDURE — 84460 ALANINE AMINO (ALT) (SGPT): CPT | Performed by: INTERNAL MEDICINE

## 2020-05-11 PROCEDURE — 84481 FREE ASSAY (FT-3): CPT | Performed by: INTERNAL MEDICINE

## 2020-05-11 PROCEDURE — 84439 ASSAY OF FREE THYROXINE: CPT | Performed by: INTERNAL MEDICINE

## 2020-05-11 PROCEDURE — 83036 HEMOGLOBIN GLYCOSYLATED A1C: CPT | Performed by: INTERNAL MEDICINE

## 2020-05-12 ENCOUNTER — TELEPHONE (OUTPATIENT)
Dept: ENDOCRINOLOGY | Facility: CLINIC | Age: 85
End: 2020-05-12

## 2020-05-12 DIAGNOSIS — E05.90 HYPERTHYROIDISM: Primary | ICD-10-CM

## 2020-05-12 NOTE — TELEPHONE ENCOUNTER
Labs reviewed, called patient and spoke with his daughter Armida.  I explained that his thyroid hormone levels are now very low and we need to discontinue the methimazole.  She reported he was feeling pretty well but tired.     He has been taking Methimazole 20 mg BID dosing for the hyperthyroidism.  Discontinue this medication (but don't throw away), monitor for symptom changes, and repeat the thyroid tests with another lab appt at [facility] ~7/1/20.  She agreed, will relay message to him, thanked me for the call and plan.     CORRINE Reyes MD, MS   Endocrinology   [facility]

## 2020-07-03 DIAGNOSIS — E05.90 HYPERTHYROIDISM: ICD-10-CM

## 2020-07-03 LAB — T3FREE SERPL-MCNC: 2.2 PG/ML (ref 2.3–4.2)

## 2020-07-03 PROCEDURE — 84439 ASSAY OF FREE THYROXINE: CPT | Performed by: INTERNAL MEDICINE

## 2020-07-03 PROCEDURE — 84481 FREE ASSAY (FT-3): CPT | Performed by: INTERNAL MEDICINE

## 2020-07-03 PROCEDURE — 84443 ASSAY THYROID STIM HORMONE: CPT | Performed by: INTERNAL MEDICINE

## 2020-07-03 PROCEDURE — 36415 COLL VENOUS BLD VENIPUNCTURE: CPT | Performed by: INTERNAL MEDICINE

## 2020-07-05 LAB
T4 FREE SERPL-MCNC: 1.11 NG/DL (ref 0.76–1.46)
TSH SERPL DL<=0.005 MIU/L-ACNC: 11.53 MU/L (ref 0.4–4)

## 2020-08-11 DIAGNOSIS — E05.90 HYPERTHYROIDISM: Primary | ICD-10-CM

## 2020-10-05 DIAGNOSIS — E05.90 HYPERTHYROIDISM: ICD-10-CM

## 2020-10-05 LAB
T3FREE SERPL-MCNC: 2.2 PG/ML (ref 2.3–4.2)
T4 FREE SERPL-MCNC: 1.05 NG/DL (ref 0.76–1.46)
TSH SERPL DL<=0.005 MIU/L-ACNC: 4.15 MU/L (ref 0.4–4)

## 2020-10-05 PROCEDURE — 84481 FREE ASSAY (FT-3): CPT | Performed by: INTERNAL MEDICINE

## 2020-10-05 PROCEDURE — 84439 ASSAY OF FREE THYROXINE: CPT | Performed by: INTERNAL MEDICINE

## 2020-10-05 PROCEDURE — 84443 ASSAY THYROID STIM HORMONE: CPT | Performed by: INTERNAL MEDICINE

## 2020-10-05 PROCEDURE — 36415 COLL VENOUS BLD VENIPUNCTURE: CPT | Performed by: INTERNAL MEDICINE

## 2020-10-07 DIAGNOSIS — E78.5 HYPERLIPIDEMIA LDL GOAL <100: ICD-10-CM

## 2020-10-07 RX ORDER — SIMVASTATIN 20 MG
TABLET ORAL
Qty: 90 TABLET | Refills: 0 | Status: SHIPPED | OUTPATIENT
Start: 2020-10-07 | End: 2021-01-08

## 2020-10-07 NOTE — LETTER
October 14, 2020    Jose Gomez  [id number] [address] [id number]        Dear Jose,    While refilling your prescription today, we noticed that you are due to have LABS DRAWN, and a visit for an ANNUAL WELLNESS EXAM.  We will refill your prescription for 90 days, but that appointment must be made before any additional refills can be approved.     Taking care of your health is important to us and we look forward to seeing you in the near future.  Please call us at [phone] or [phone] (or use [employer]) to schedule.  Please disregard this notice if you have already made an appointment.        Sincerely,          [facility] Team

## 2020-10-08 ENCOUNTER — DOCUMENTATION ONLY (OUTPATIENT)
Dept: CARDIOLOGY | Facility: CLINIC | Age: 85
End: 2020-10-08

## 2020-10-08 DIAGNOSIS — I48.0 PAROXYSMAL ATRIAL FIBRILLATION (H): ICD-10-CM

## 2020-10-08 RX ORDER — METOPROLOL SUCCINATE 25 MG/1
TABLET, EXTENDED RELEASE ORAL
Qty: 270 TABLET | Refills: 3 | Status: SHIPPED | OUTPATIENT
Start: 2020-10-08 | End: 2021-07-01

## 2020-10-08 NOTE — PROGRESS NOTES
10/08/20 Spoke with pts daughter Armida and explained refill for Metoprolol will be sent per request. Explained request for 24 hr Holter and virtual visit afterwards per Sandra Baez PA-C. Armida said they do not have video capabilities and would prefer not to come to clinic. Informed her that telephone visit would be okay. Armida stated she needed to check with friend who will help w transportation for monitor . Given Afib call back # and also scheduling # . Armida will call back tomorrow or Monday to schedule. All in agreement w plan.  Anish 911 am

## 2020-10-08 NOTE — PROGRESS NOTES
Nestor, JENNIFFER Jackson Patricia, RN             OK to fill metoprolol XL at this point (50 mg in AM, 25 in PM)  I just saw Dr. Posadas's note from 4/2020 - methimazole stopped 5/2020.   Pls have him get 24 hour Holter and see me (virtually OK) within next month. Pls order    Thx - Sandra    Previous Messages    ----- Message -----   From: Kim Conrad, RN   Sent: 10/7/2020   5:29 PM CDT   To: Gila Baez PA-C   Subject: FW: Metoprolol refill                             I have not refilled yet.  When do you want follow up.   Pat   ----- Message -----   From: Heather Velazquez RN   Sent: 10/7/2020   3:34 PM CDT   To: [facility] Nurse   Subject: Metoprolol refill                                 [facility] succ 25 mg tablets, two tabs in the a.m.; one tab in the p.m.. LOV with Sandra Baez 3/13/20. Unclear follow up plan per progress note and no follow up order in place. Thank you! Heather

## 2020-10-09 ENCOUNTER — TELEPHONE (OUTPATIENT)
Dept: ENDOCRINOLOGY | Facility: CLINIC | Age: 85
End: 2020-10-09

## 2020-10-09 NOTE — TELEPHONE ENCOUNTER
Tried to call pt  Line busy  Will need to recall  KAVON Nettles Thomas W, MD  [facility]             Patient had recent thyroid lab tests, which showed normal Free T4, mildly low Free T3 and mildly elevated TSH levels.  This pattern indicates the overall thyroid levels mildly low (mild hypothyroidism), since he stopped taking the methimazole medication months ago.     No thyroid medication needed at this time.  I recommend he have a followup endocrinology evaluation with me (on a Wednesday) in 1/2021 at the [facility].  Please relay message.     CORRINE Reyes MD, MS   Endocrinology   [facility]

## 2020-10-13 ENCOUNTER — HOSPITAL ENCOUNTER (EMERGENCY)
Facility: CLINIC | Age: 85
Discharge: HOME | End: 2020-10-13
Attending: EMERGENCY MEDICINE | Admitting: EMERGENCY MEDICINE
Payer: MEDICARE

## 2020-10-13 ENCOUNTER — APPOINTMENT (OUTPATIENT)
Dept: CT IMAGING | Facility: CLINIC | Age: 85
End: 2020-10-13
Attending: EMERGENCY MEDICINE
Payer: MEDICARE

## 2020-10-13 ENCOUNTER — OFFICE VISIT (OUTPATIENT)
Dept: URGENT CARE | Facility: URGENT CARE | Age: 85
End: 2020-10-13
Payer: MEDICARE

## 2020-10-13 ENCOUNTER — TELEPHONE (OUTPATIENT)
Dept: CARDIOLOGY | Facility: CLINIC | Age: 85
End: 2020-10-13

## 2020-10-13 VITALS
SYSTOLIC BLOOD PRESSURE: 165 MMHG | DIASTOLIC BLOOD PRESSURE: 83 MMHG | OXYGEN SATURATION: 97 % | HEART RATE: 84 BPM | RESPIRATION RATE: 16 BRPM | BODY MASS INDEX: 21.67 KG/M2 | WEIGHT: 151 LBS | TEMPERATURE: 97.7 F

## 2020-10-13 VITALS
DIASTOLIC BLOOD PRESSURE: 84 MMHG | WEIGHT: 151.2 LBS | RESPIRATION RATE: 20 BRPM | TEMPERATURE: 97.1 F | BODY MASS INDEX: 21.69 KG/M2 | OXYGEN SATURATION: 96 % | HEART RATE: 71 BPM | SYSTOLIC BLOOD PRESSURE: 166 MMHG

## 2020-10-13 DIAGNOSIS — I48.0 PAROXYSMAL ATRIAL FIBRILLATION (H): Primary | ICD-10-CM

## 2020-10-13 DIAGNOSIS — R27.9 LACK OF COORDINATION: ICD-10-CM

## 2020-10-13 DIAGNOSIS — R13.10 DYSPHAGIA, UNSPECIFIED TYPE: Primary | ICD-10-CM

## 2020-10-13 DIAGNOSIS — N39.0 URINARY TRACT INFECTION WITH HEMATURIA, SITE UNSPECIFIED: ICD-10-CM

## 2020-10-13 DIAGNOSIS — R31.9 URINARY TRACT INFECTION WITH HEMATURIA, SITE UNSPECIFIED: ICD-10-CM

## 2020-10-13 DIAGNOSIS — R47.9 SPEECH DISORDER: ICD-10-CM

## 2020-10-13 LAB
ALBUMIN UR-MCNC: 10 MG/DL
ANION GAP SERPL CALCULATED.3IONS-SCNC: 6 MMOL/L (ref 3–14)
APPEARANCE UR: ABNORMAL
BACTERIA #/AREA URNS HPF: ABNORMAL /HPF
BASOPHILS # BLD AUTO: 0 10E9/L (ref 0–0.2)
BASOPHILS NFR BLD AUTO: 0.1 %
BILIRUB UR QL STRIP: NEGATIVE
BUN SERPL-MCNC: 38 MG/DL (ref 7–30)
CALCIUM SERPL-MCNC: 9.2 MG/DL (ref 8.5–10.1)
CHLORIDE SERPL-SCNC: 109 MMOL/L (ref 94–109)
CO2 SERPL-SCNC: 23 MMOL/L (ref 20–32)
COLOR UR AUTO: YELLOW
CREAT SERPL-MCNC: 1.7 MG/DL (ref 0.66–1.25)
DIFFERENTIAL METHOD BLD: NORMAL
EOSINOPHIL # BLD AUTO: 0.5 10E9/L (ref 0–0.7)
EOSINOPHIL NFR BLD AUTO: 7.2 %
ERYTHROCYTE [DISTWIDTH] IN BLOOD BY AUTOMATED COUNT: 13.1 % (ref 10–15)
GFR SERPL CREATININE-BSD FRML MDRD: 34 ML/MIN/{1.73_M2}
GLUCOSE SERPL-MCNC: 115 MG/DL (ref 70–99)
GLUCOSE UR STRIP-MCNC: NEGATIVE MG/DL
HCT VFR BLD AUTO: 46.1 % (ref 40–53)
HGB BLD-MCNC: 15.7 G/DL (ref 13.3–17.7)
HGB UR QL STRIP: ABNORMAL
IMM GRANULOCYTES # BLD: 0 10E9/L (ref 0–0.4)
IMM GRANULOCYTES NFR BLD: 0.1 %
KETONES UR STRIP-MCNC: NEGATIVE MG/DL
LEUKOCYTE ESTERASE UR QL STRIP: ABNORMAL
LYMPHOCYTES # BLD AUTO: 2 10E9/L (ref 0.8–5.3)
LYMPHOCYTES NFR BLD AUTO: 27.8 %
MCH RBC QN AUTO: 32.7 PG (ref 26.5–33)
MCHC RBC AUTO-ENTMCNC: 34.1 G/DL (ref 31.5–36.5)
MCV RBC AUTO: 96 FL (ref 78–100)
MONOCYTES # BLD AUTO: 0.6 10E9/L (ref 0–1.3)
MONOCYTES NFR BLD AUTO: 8.7 %
MUCOUS THREADS #/AREA URNS LPF: PRESENT /LPF
NEUTROPHILS # BLD AUTO: 4.1 10E9/L (ref 1.6–8.3)
NEUTROPHILS NFR BLD AUTO: 56.1 %
NITRATE UR QL: POSITIVE
NRBC # BLD AUTO: 0 10*3/UL
NRBC BLD AUTO-RTO: 0 /100
PH UR STRIP: 5 PH (ref 5–7)
PLATELET # BLD AUTO: 157 10E9/L (ref 150–450)
POTASSIUM SERPL-SCNC: 4.3 MMOL/L (ref 3.4–5.3)
RBC # BLD AUTO: 4.8 10E12/L (ref 4.4–5.9)
RBC #/AREA URNS AUTO: 4 /HPF (ref 0–2)
SODIUM SERPL-SCNC: 138 MMOL/L (ref 133–144)
SOURCE: ABNORMAL
SP GR UR STRIP: 1.02 (ref 1–1.03)
TROPONIN I SERPL-MCNC: <0.015 UG/L (ref 0–0.04)
UROBILINOGEN UR STRIP-MCNC: NORMAL MG/DL (ref 0–2)
WBC # BLD AUTO: 7.4 10E9/L (ref 4–11)
WBC #/AREA URNS AUTO: 49 /HPF (ref 0–5)

## 2020-10-13 PROCEDURE — 70450 CT HEAD/BRAIN W/O DYE: CPT

## 2020-10-13 PROCEDURE — 85025 COMPLETE CBC W/AUTO DIFF WBC: CPT | Performed by: EMERGENCY MEDICINE

## 2020-10-13 PROCEDURE — 81001 URINALYSIS AUTO W/SCOPE: CPT | Performed by: EMERGENCY MEDICINE

## 2020-10-13 PROCEDURE — 99285 EMERGENCY DEPT VISIT HI MDM: CPT | Mod: 25

## 2020-10-13 PROCEDURE — 96365 THER/PROPH/DIAG IV INF INIT: CPT

## 2020-10-13 PROCEDURE — 84484 ASSAY OF TROPONIN QUANT: CPT | Performed by: EMERGENCY MEDICINE

## 2020-10-13 PROCEDURE — 99214 OFFICE O/P EST MOD 30 MIN: CPT | Performed by: FAMILY MEDICINE

## 2020-10-13 PROCEDURE — 250N000011 HC RX IP 250 OP 636: Performed by: EMERGENCY MEDICINE

## 2020-10-13 PROCEDURE — 80048 BASIC METABOLIC PNL TOTAL CA: CPT | Performed by: EMERGENCY MEDICINE

## 2020-10-13 RX ORDER — CEFTRIAXONE 2 G/1
2 INJECTION, POWDER, FOR SOLUTION INTRAMUSCULAR; INTRAVENOUS ONCE
Status: COMPLETED | OUTPATIENT
Start: 2020-10-13 | End: 2020-10-13

## 2020-10-13 RX ORDER — CEPHALEXIN 500 MG/1
500 CAPSULE ORAL 3 TIMES DAILY
Qty: 21 CAPSULE | Refills: 0 | Status: SHIPPED | OUTPATIENT
Start: 2020-10-13 | End: 2020-10-20

## 2020-10-13 RX ADMIN — CEFTRIAXONE SODIUM 2 G: 2 INJECTION, POWDER, FOR SOLUTION INTRAMUSCULAR; INTRAVENOUS at 22:26

## 2020-10-13 NOTE — TELEPHONE ENCOUNTER
----- Message from Gila Baez PA-C sent at 10/8/2020  7:14 AM CDT -----  Regarding: RE: Metoprolol refill  OK to fill metoprolol XL  I just saw Dr. Posadas's note from 4/2020 - methimazole stopped 5/2020.  Pls have him get 24 hour Holter and see me (virtually OK) within next month    Thx - Sandra  ----- Message -----  From: Kim Conrad, RN  Sent: 10/7/2020   5:29 PM CDT  To: Gila Baez PA-C  Subject: FW: Metoprolol refill                            I have not refilled yet.  When do you want follow up.  Pat  ----- Message -----  From: Heather Velazquez RN  Sent: 10/7/2020   3:34 PM CDT  To: [facility] Heart Afib Nurse  Subject: Metoprolol refill                                Received a refill request from [facility] for metoprolol succ 25 mg tablets, two tabs in the a.m.; one tab in the p.m.. LOV with Sandra Baez 3/13/20. Unclear follow up plan per progress note and no follow up order in place. Thank you! Heather

## 2020-10-13 NOTE — ED AVS SNAPSHOT
[facility] Emergency Dept  [address] [address]  Phone: [phone]  Fax: [phone]                                    Jose Gomez   MRN: [id number]    Department: [facility] Emergency Dept   Date of Visit: 10/13/2020           After Visit Summary Signature Page    I have received my discharge instructions, and my questions have been answered. I have discussed any challenges I see with this plan with the nurse or doctor.    ..........................................................................................................................................  Patient/Patient Representative Signature      ..........................................................................................................................................  Patient Representative Print Name and Relationship to Patient    ..................................................               ................................................  Date                                   Time    ..........................................................................................................................................  Reviewed by Signature/Title    ...................................................              ..............................................  Date                                               Time          22EPIC Rev 08/18

## 2020-10-13 NOTE — TELEPHONE ENCOUNTER
Patient scheduled for holter 10/16 with follow up appt on 10/26 with ASHWINI Figueroa.  KAVON Ferrer

## 2020-10-14 LAB — INTERPRETATION ECG - MUSE: NORMAL
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
## 2020-10-14 NOTE — ED TRIAGE NOTES
Pt was not able to eat a hard boiled egg this morning and not able to hold the coffee cup. Pt was found on his knees in the lobby restroom when RN found him.

## 2020-10-14 NOTE — PROGRESS NOTES
SUBJECTIVE: Jose Gomez is a 89 year old male presenting with a chief complaint of trouble swallowing, speech and coordination.  Onset of symptoms was this am ago.  Severity moderate  Current and Associated symptoms: none  Treatment measures tried include None tried.  Predisposing factors include see problem list.    Past Medical History:   Diagnosis Date     CKD (chronic kidney disease) stage 3, GFR 30-59 ml/min      Esophageal reflux     very mild     Essential hypertension, benign      Hypertensive emergency 2018     Hyperthyroidism      Mixed hyperlipidemia      Paroxysmal atrial fibrillation (H) 2018     Pernicious anemia 3/19/2008     Type 2 diabetes, HbA1c goal < 7% (H)      Unspecified internal derangement of knee     LEFT     Allergies   Allergen Reactions     No Known Drug Allergies      Social History     Tobacco Use     Smoking status: Former Smoker     Types: Cigars     Quit date: 5/3/1984     Years since quittin.4     Smokeless tobacco: Never Used   Substance Use Topics     Alcohol use: Yes     Comment: 1-2x per month       ROS:  SKIN: no rash  GI: no vomiting    OBJECTIVE:  BP (!) 166/84   Pulse 71   Temp 97.1  F (36.2  C) (Oral)   Resp 20   Wt 68.6 kg (151 lb 3.2 oz)   SpO2 96%   BMI 21.69 kg/m     GENERAL APPEARANCE: healthy, alert and no distress  EYES: EOMI,  PERRL, conjunctiva clear  HENT: ear canals and TM's normal.  Nose and mouth without ulcers, erythema or lesions  NECK: supple, nontender, no lymphadenopathy  RESP: lungs clear to auscultation - no rales, rhonchi or wheezes  CV: regular rates and rhythm, normal S1 S2, no murmur noted  NEURO: Normal strength and tone, sensory exam grossly normal,  normal speech and mentation  SKIN: no suspicious lesions or rashes      ICD-10-CM    1. Dysphagia, unspecified type  R13.10    2. Speech disorder  R47.9    3. Lack of coordination  R27.9      Will go through ED for cont w/u

## 2020-10-15 ENCOUNTER — HOSPITAL ENCOUNTER (OUTPATIENT)
Dept: CARDIOLOGY | Facility: CLINIC | Age: 85
Discharge: HOME | End: 2020-10-15
Attending: PHYSICIAN ASSISTANT | Admitting: PHYSICIAN ASSISTANT
Payer: MEDICARE

## 2020-10-15 DIAGNOSIS — I48.0 PAROXYSMAL ATRIAL FIBRILLATION (H): ICD-10-CM

## 2020-10-15 PROCEDURE — 93227 XTRNL ECG REC<48 HR R&I: CPT | Performed by: INTERNAL MEDICINE

## 2020-10-15 PROCEDURE — 93226 XTRNL ECG REC<48 HR SCAN A/R: CPT
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
## 2020-10-26 ENCOUNTER — VIRTUAL VISIT (OUTPATIENT)
Dept: CARDIOLOGY | Facility: CLINIC | Age: 85
End: 2020-10-26
Payer: MEDICARE

## 2020-10-26 DIAGNOSIS — I48.19 PERSISTENT ATRIAL FIBRILLATION (H): Primary | ICD-10-CM

## 2020-10-26 PROCEDURE — 99442 PR PHYSICIAN TELEPHONE EVALUATION 11-20 MIN: CPT | Performed by: PHYSICIAN ASSISTANT
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
## 2020-10-26 NOTE — PATIENT INSTRUCTIONS
Jose  (and Armida!) - it was nice to speak with you today!    1. Reviewed the heart monitor worn 10/15-10/16/2020. This showed AFib with a good average heart rate/pulse of 67 bpm. The lowest the HR got was 38 bpm and the fastest was 152 bpm. As you're feeling well, it appears that the current doses of digoxin and metoprolol are working well    2. Reviewed that you're still not interested in any procedures to help prevent stroke/blood clot from the AFib    PLAN:  1. No changes needed today  2. See us back in ~6 months (April) but CALL if issues with heart beforehand! [phone] (AFib nurses Christine Treviño and Nataliya)

## 2021-01-01 ENCOUNTER — HOSPITAL ENCOUNTER (OUTPATIENT)
Dept: ULTRASOUND IMAGING | Facility: CLINIC | Age: 86
Discharge: HOME | DRG: 690 | End: 2021-09-24
Attending: PODIATRIST | Admitting: PODIATRIST
Payer: MEDICARE

## 2021-01-01 ENCOUNTER — TELEPHONE (OUTPATIENT)
Dept: PODIATRY | Facility: CLINIC | Age: 86
End: 2021-01-01

## 2021-01-01 ENCOUNTER — HOSPITAL ENCOUNTER (INPATIENT)
Facility: CLINIC | Age: 86
LOS: 15 days | Discharge: NURSING, REHAB OR LONG-TERM CARE | DRG: 690 | End: 2021-10-12
Attending: EMERGENCY MEDICINE | Admitting: INTERNAL MEDICINE
Payer: MEDICARE

## 2021-01-01 ENCOUNTER — TELEPHONE (OUTPATIENT)
Dept: INTERNAL MEDICINE | Facility: CLINIC | Age: 86
End: 2021-01-01
Payer: MEDICARE

## 2021-01-01 ENCOUNTER — APPOINTMENT (OUTPATIENT)
Dept: OCCUPATIONAL THERAPY | Facility: CLINIC | Age: 86
DRG: 690 | End: 2021-01-01
Payer: MEDICARE

## 2021-01-01 ENCOUNTER — OFFICE VISIT (OUTPATIENT)
Dept: INTERNAL MEDICINE | Facility: CLINIC | Age: 86
End: 2021-01-01
Payer: MEDICARE

## 2021-01-01 ENCOUNTER — PATIENT OUTREACH (OUTPATIENT)
Dept: INTERNAL MEDICINE | Facility: CLINIC | Age: 86
End: 2021-01-01

## 2021-01-01 ENCOUNTER — APPOINTMENT (OUTPATIENT)
Dept: CT IMAGING | Facility: CLINIC | Age: 86
DRG: 690 | End: 2021-01-01
Attending: EMERGENCY MEDICINE
Payer: MEDICARE

## 2021-01-01 ENCOUNTER — APPOINTMENT (OUTPATIENT)
Dept: PHYSICAL THERAPY | Facility: CLINIC | Age: 86
DRG: 690 | End: 2021-01-01
Attending: PHYSICIAN ASSISTANT
Payer: MEDICARE

## 2021-01-01 ENCOUNTER — NURSE TRIAGE (OUTPATIENT)
Dept: NURSING | Facility: CLINIC | Age: 86
End: 2021-01-01

## 2021-01-01 ENCOUNTER — APPOINTMENT (OUTPATIENT)
Dept: GENERAL RADIOLOGY | Facility: CLINIC | Age: 86
End: 2021-01-01
Attending: EMERGENCY MEDICINE
Payer: MEDICARE

## 2021-01-01 ENCOUNTER — APPOINTMENT (OUTPATIENT)
Dept: ULTRASOUND IMAGING | Facility: CLINIC | Age: 86
DRG: 690 | End: 2021-01-01
Attending: INTERNAL MEDICINE
Payer: MEDICARE

## 2021-01-01 ENCOUNTER — HOSPITAL ENCOUNTER (EMERGENCY)
Facility: CLINIC | Age: 86
Discharge: HOME | End: 2021-08-20
Attending: EMERGENCY MEDICINE | Admitting: EMERGENCY MEDICINE
Payer: MEDICARE

## 2021-01-01 ENCOUNTER — TELEPHONE (OUTPATIENT)
Dept: INTERNAL MEDICINE | Facility: CLINIC | Age: 86
End: 2021-01-01

## 2021-01-01 ENCOUNTER — MEDICAL CORRESPONDENCE (OUTPATIENT)
Dept: HEALTH INFORMATION MANAGEMENT | Facility: CLINIC | Age: 86
End: 2021-01-01

## 2021-01-01 ENCOUNTER — APPOINTMENT (OUTPATIENT)
Dept: PHYSICAL THERAPY | Facility: CLINIC | Age: 86
DRG: 690 | End: 2021-01-01
Payer: MEDICARE

## 2021-01-01 ENCOUNTER — OFFICE VISIT (OUTPATIENT)
Dept: OTHER | Facility: CLINIC | Age: 86
End: 2021-01-01
Attending: PODIATRIST
Payer: MEDICARE

## 2021-01-01 ENCOUNTER — APPOINTMENT (OUTPATIENT)
Dept: OCCUPATIONAL THERAPY | Facility: CLINIC | Age: 86
DRG: 690 | End: 2021-01-01
Attending: HOSPITALIST
Payer: MEDICARE

## 2021-01-01 ENCOUNTER — MEDICAL CORRESPONDENCE (OUTPATIENT)
Dept: HEALTH INFORMATION MANAGEMENT | Facility: CLINIC | Age: 86
End: 2021-01-01
Payer: MEDICARE

## 2021-01-01 ENCOUNTER — OFFICE VISIT (OUTPATIENT)
Dept: URGENT CARE | Facility: URGENT CARE | Age: 86
End: 2021-01-01
Payer: MEDICARE

## 2021-01-01 ENCOUNTER — ANCILLARY PROCEDURE (OUTPATIENT)
Dept: GENERAL RADIOLOGY | Facility: CLINIC | Age: 86
End: 2021-01-01
Attending: PODIATRIST
Payer: MEDICARE

## 2021-01-01 ENCOUNTER — APPOINTMENT (OUTPATIENT)
Dept: PHYSICAL THERAPY | Facility: CLINIC | Age: 86
DRG: 690 | End: 2021-01-01
Attending: HOSPITALIST
Payer: MEDICARE

## 2021-01-01 ENCOUNTER — TELEPHONE (OUTPATIENT)
Dept: OTHER | Facility: CLINIC | Age: 86
End: 2021-01-01

## 2021-01-01 ENCOUNTER — VIRTUAL VISIT (OUTPATIENT)
Dept: INTERNAL MEDICINE | Facility: CLINIC | Age: 86
End: 2021-01-01
Payer: MEDICARE

## 2021-01-01 ENCOUNTER — OFFICE VISIT (OUTPATIENT)
Dept: PODIATRY | Facility: CLINIC | Age: 86
End: 2021-01-01
Payer: MEDICARE

## 2021-01-01 ENCOUNTER — LAB REQUISITION (OUTPATIENT)
Dept: LAB | Facility: CLINIC | Age: 86
End: 2021-01-01
Payer: MEDICARE

## 2021-01-01 VITALS
RESPIRATION RATE: 20 BRPM | HEIGHT: 70 IN | SYSTOLIC BLOOD PRESSURE: 137 MMHG | DIASTOLIC BLOOD PRESSURE: 82 MMHG | TEMPERATURE: 97.4 F | HEART RATE: 88 BPM | BODY MASS INDEX: 22.19 KG/M2 | OXYGEN SATURATION: 97 % | WEIGHT: 154.98 LBS

## 2021-01-01 VITALS — DIASTOLIC BLOOD PRESSURE: 95 MMHG | SYSTOLIC BLOOD PRESSURE: 161 MMHG | HEART RATE: 71 BPM

## 2021-01-01 VITALS
RESPIRATION RATE: 20 BRPM | DIASTOLIC BLOOD PRESSURE: 95 MMHG | SYSTOLIC BLOOD PRESSURE: 134 MMHG | HEART RATE: 63 BPM | OXYGEN SATURATION: 98 %

## 2021-01-01 VITALS
WEIGHT: 153.6 LBS | DIASTOLIC BLOOD PRESSURE: 90 MMHG | SYSTOLIC BLOOD PRESSURE: 130 MMHG | BODY MASS INDEX: 22.04 KG/M2 | OXYGEN SATURATION: 95 % | TEMPERATURE: 97 F | HEART RATE: 60 BPM

## 2021-01-01 VITALS
RESPIRATION RATE: 18 BRPM | SYSTOLIC BLOOD PRESSURE: 152 MMHG | OXYGEN SATURATION: 96 % | TEMPERATURE: 98.6 F | HEART RATE: 87 BPM | DIASTOLIC BLOOD PRESSURE: 98 MMHG

## 2021-01-01 VITALS — WEIGHT: 153 LBS | DIASTOLIC BLOOD PRESSURE: 80 MMHG | BODY MASS INDEX: 21.95 KG/M2 | SYSTOLIC BLOOD PRESSURE: 122 MMHG

## 2021-01-01 DIAGNOSIS — I73.9 PAD (PERIPHERAL ARTERY DISEASE) (H): ICD-10-CM

## 2021-01-01 DIAGNOSIS — N18.32 STAGE 3B CHRONIC KIDNEY DISEASE (H): ICD-10-CM

## 2021-01-01 DIAGNOSIS — L03.115 CELLULITIS OF RIGHT LOWER EXTREMITY: ICD-10-CM

## 2021-01-01 DIAGNOSIS — N39.0 URINARY TRACT INFECTION WITHOUT HEMATURIA, SITE UNSPECIFIED: ICD-10-CM

## 2021-01-01 DIAGNOSIS — Z09 ENCOUNTER FOR EXAMINATION FOLLOWING TREATMENT AT HOSPITAL: Primary | ICD-10-CM

## 2021-01-01 DIAGNOSIS — I48.21 PERMANENT ATRIAL FIBRILLATION (H): ICD-10-CM

## 2021-01-01 DIAGNOSIS — S92.354A CLOSED NONDISPLACED FRACTURE OF FIFTH METATARSAL BONE OF RIGHT FOOT, INITIAL ENCOUNTER: ICD-10-CM

## 2021-01-01 DIAGNOSIS — E11.42 TYPE 2 DIABETES MELLITUS WITH DIABETIC POLYNEUROPATHY, WITHOUT LONG-TERM CURRENT USE OF INSULIN (H): ICD-10-CM

## 2021-01-01 DIAGNOSIS — R29.6 RECURRENT FALLS: ICD-10-CM

## 2021-01-01 DIAGNOSIS — E05.90 HYPERTHYROIDISM: ICD-10-CM

## 2021-01-01 DIAGNOSIS — E11.22 TYPE 2 DIABETES MELLITUS WITH DIABETIC CHRONIC KIDNEY DISEASE (H): ICD-10-CM

## 2021-01-01 DIAGNOSIS — L97.519 ULCER OF RIGHT FOOT, UNSPECIFIED ULCER STAGE (H): ICD-10-CM

## 2021-01-01 DIAGNOSIS — F03.90 DEMENTIA WITHOUT BEHAVIORAL DISTURBANCE, UNSPECIFIED DEMENTIA TYPE: ICD-10-CM

## 2021-01-01 DIAGNOSIS — S92.354A NONDISPLACED FRACTURE OF FIFTH METATARSAL BONE, RIGHT FOOT, INITIAL ENCOUNTER FOR CLOSED FRACTURE: ICD-10-CM

## 2021-01-01 DIAGNOSIS — L03.115 CELLULITIS OF RIGHT LOWER EXTREMITY: Primary | ICD-10-CM

## 2021-01-01 DIAGNOSIS — S92.354D CLOSED NONDISPLACED FRACTURE OF FIFTH METATARSAL BONE OF RIGHT FOOT WITH ROUTINE HEALING, SUBSEQUENT ENCOUNTER: ICD-10-CM

## 2021-01-01 DIAGNOSIS — I10 ESSENTIAL HYPERTENSION, BENIGN: Primary | ICD-10-CM

## 2021-01-01 DIAGNOSIS — E03.9 HYPOTHYROIDISM, UNSPECIFIED: ICD-10-CM

## 2021-01-01 DIAGNOSIS — I73.9 PAD (PERIPHERAL ARTERY DISEASE) (H): Primary | ICD-10-CM

## 2021-01-01 DIAGNOSIS — E78.5 HYPERLIPIDEMIA LDL GOAL <100: ICD-10-CM

## 2021-01-01 DIAGNOSIS — S92.354A NONDISPLACED FRACTURE OF FIFTH METATARSAL BONE, RIGHT FOOT, INITIAL ENCOUNTER FOR CLOSED FRACTURE: Primary | ICD-10-CM

## 2021-01-01 DIAGNOSIS — Z53.9 DIAGNOSIS NOT YET DEFINED: Primary | ICD-10-CM

## 2021-01-01 LAB
ALBUMIN SERPL-MCNC: 3.3 G/DL (ref 3.4–5)
ALBUMIN UR-MCNC: 300 MG/DL
ALP SERPL-CCNC: 88 U/L (ref 40–150)
ALT SERPL W P-5'-P-CCNC: 20 U/L (ref 0–70)
ANION GAP SERPL CALCULATED.3IONS-SCNC: 10 MMOL/L (ref 3–14)
ANION GAP SERPL CALCULATED.3IONS-SCNC: 11 MMOL/L (ref 3–14)
ANION GAP SERPL CALCULATED.3IONS-SCNC: 2 MMOL/L (ref 3–14)
ANION GAP SERPL CALCULATED.3IONS-SCNC: 3 MMOL/L (ref 3–14)
ANION GAP SERPL CALCULATED.3IONS-SCNC: 5 MMOL/L (ref 3–14)
ANION GAP SERPL CALCULATED.3IONS-SCNC: 9 MMOL/L (ref 3–14)
APPEARANCE UR: ABNORMAL
AST SERPL W P-5'-P-CCNC: 28 U/L (ref 0–45)
ATRIAL RATE - MUSE: 75 BPM
BACTERIA #/AREA URNS HPF: ABNORMAL /HPF
BACTERIA BLD CULT: NO GROWTH
BACTERIA BLD CULT: NO GROWTH
BACTERIA UR CULT: ABNORMAL
BASOPHILS # BLD AUTO: 0 10E3/UL (ref 0–0.2)
BASOPHILS NFR BLD AUTO: 0 %
BILIRUB SERPL-MCNC: 2.4 MG/DL (ref 0.2–1.3)
BILIRUB UR QL STRIP: NEGATIVE
BUN SERPL-MCNC: 29 MG/DL (ref 7–30)
BUN SERPL-MCNC: 37 MG/DL (ref 7–30)
BUN SERPL-MCNC: 37 MG/DL (ref 7–30)
BUN SERPL-MCNC: 39 MG/DL (ref 7–30)
BUN SERPL-MCNC: 40 MG/DL (ref 7–30)
BUN SERPL-MCNC: 40 MG/DL (ref 7–30)
CALCIUM SERPL-MCNC: 8.2 MG/DL (ref 8.5–10.1)
CALCIUM SERPL-MCNC: 8.2 MG/DL (ref 8.5–10.1)
CALCIUM SERPL-MCNC: 8.5 MG/DL (ref 8.5–10.1)
CALCIUM SERPL-MCNC: 8.7 MG/DL (ref 8.5–10.1)
CALCIUM SERPL-MCNC: 8.9 MG/DL (ref 8.5–10.1)
CALCIUM SERPL-MCNC: 9.2 MG/DL (ref 8.5–10.1)
CHLORIDE BLD-SCNC: 104 MMOL/L (ref 94–109)
CHLORIDE BLD-SCNC: 106 MMOL/L (ref 94–109)
CHLORIDE BLD-SCNC: 108 MMOL/L (ref 94–109)
CHLORIDE BLD-SCNC: 109 MMOL/L (ref 94–109)
CHLORIDE BLD-SCNC: 109 MMOL/L (ref 94–109)
CHLORIDE BLD-SCNC: 110 MMOL/L (ref 94–109)
CHOLEST SERPL-MCNC: 144 MG/DL
CO2 SERPL-SCNC: 17 MMOL/L (ref 20–32)
CO2 SERPL-SCNC: 19 MMOL/L (ref 20–32)
CO2 SERPL-SCNC: 21 MMOL/L (ref 20–32)
CO2 SERPL-SCNC: 23 MMOL/L (ref 20–32)
CO2 SERPL-SCNC: 26 MMOL/L (ref 20–32)
CO2 SERPL-SCNC: 26 MMOL/L (ref 20–32)
COLOR UR AUTO: ABNORMAL
CREAT SERPL-MCNC: 1.47 MG/DL (ref 0.66–1.25)
CREAT SERPL-MCNC: 1.75 MG/DL (ref 0.66–1.25)
CREAT SERPL-MCNC: 1.76 MG/DL (ref 0.66–1.25)
CREAT SERPL-MCNC: 1.79 MG/DL (ref 0.66–1.25)
CREAT SERPL-MCNC: 1.87 MG/DL (ref 0.66–1.25)
CREAT SERPL-MCNC: 1.98 MG/DL (ref 0.66–1.25)
CREAT SERPL-MCNC: 2.29 MG/DL (ref 0.66–1.25)
CREAT UR-MCNC: 121 MG/DL
DIASTOLIC BLOOD PRESSURE - MUSE: NORMAL MMHG
DIGOXIN SERPL-MCNC: 0.8 UG/L
DIGOXIN SERPL-MCNC: 1.2 UG/L
EOSINOPHIL # BLD AUTO: 0.2 10E3/UL (ref 0–0.7)
EOSINOPHIL # BLD AUTO: 0.4 10E3/UL (ref 0–0.7)
EOSINOPHIL # BLD AUTO: 0.4 10E3/UL (ref 0–0.7)
EOSINOPHIL NFR BLD AUTO: 2 %
EOSINOPHIL NFR BLD AUTO: 5 %
EOSINOPHIL NFR BLD AUTO: 6 %
ERYTHROCYTE [DISTWIDTH] IN BLOOD BY AUTOMATED COUNT: 12.8 % (ref 10–15)
ERYTHROCYTE [DISTWIDTH] IN BLOOD BY AUTOMATED COUNT: 12.9 % (ref 10–15)
ERYTHROCYTE [DISTWIDTH] IN BLOOD BY AUTOMATED COUNT: 12.9 % (ref 10–15)
FASTING STATUS PATIENT QL REPORTED: YES
GFR SERPL CREATININE-BSD FRML MDRD: 26 ML/MIN/1.73M2
GFR SERPL CREATININE-BSD FRML MDRD: 28 ML/MIN/1.73M2
GFR SERPL CREATININE-BSD FRML MDRD: 30 ML/MIN/1.73M2
GFR SERPL CREATININE-BSD FRML MDRD: 32 ML/MIN/1.73M2
GFR SERPL CREATININE-BSD FRML MDRD: 33 ML/MIN/1.73M2
GFR SERPL CREATININE-BSD FRML MDRD: 33 ML/MIN/1.73M2
GFR SERPL CREATININE-BSD FRML MDRD: 41 ML/MIN/1.73M2
GLUCOSE BLD-MCNC: 112 MG/DL (ref 70–99)
GLUCOSE BLD-MCNC: 119 MG/DL (ref 70–99)
GLUCOSE BLD-MCNC: 120 MG/DL (ref 70–99)
GLUCOSE BLD-MCNC: 123 MG/DL (ref 70–99)
GLUCOSE BLD-MCNC: 131 MG/DL (ref 70–99)
GLUCOSE BLD-MCNC: 89 MG/DL (ref 70–99)
GLUCOSE BLDC GLUCOMTR-MCNC: 101 MG/DL (ref 70–99)
GLUCOSE BLDC GLUCOMTR-MCNC: 147 MG/DL (ref 70–99)
GLUCOSE UR STRIP-MCNC: NEGATIVE MG/DL
HBA1C MFR BLD: 6.7 % (ref 0–5.6)
HCT VFR BLD AUTO: 39.1 % (ref 40–53)
HCT VFR BLD AUTO: 42.1 % (ref 40–53)
HCT VFR BLD AUTO: 43 % (ref 40–53)
HDLC SERPL-MCNC: 55 MG/DL
HGB BLD-MCNC: 12.9 G/DL (ref 13.3–17.7)
HGB BLD-MCNC: 14 G/DL (ref 13.3–17.7)
HGB BLD-MCNC: 14.2 G/DL (ref 13.3–17.7)
HGB UR QL STRIP: ABNORMAL
HOLD SPECIMEN: NORMAL
IMM GRANULOCYTES # BLD: 0 10E3/UL
IMM GRANULOCYTES # BLD: 0 10E3/UL
IMM GRANULOCYTES # BLD: 0.1 10E3/UL
IMM GRANULOCYTES NFR BLD: 0 %
IMM GRANULOCYTES NFR BLD: 1 %
IMM GRANULOCYTES NFR BLD: 1 %
INTERPRETATION ECG - MUSE: NORMAL
KETONES UR STRIP-MCNC: NEGATIVE MG/DL
LDLC SERPL CALC-MCNC: 72 MG/DL
LEUKOCYTE ESTERASE UR QL STRIP: ABNORMAL
LYMPHOCYTES # BLD AUTO: 0.9 10E3/UL (ref 0.8–5.3)
LYMPHOCYTES # BLD AUTO: 1.2 10E3/UL (ref 0.8–5.3)
LYMPHOCYTES # BLD AUTO: 1.2 10E3/UL (ref 0.8–5.3)
LYMPHOCYTES NFR BLD AUTO: 11 %
LYMPHOCYTES NFR BLD AUTO: 12 %
LYMPHOCYTES NFR BLD AUTO: 18 %
MCH RBC QN AUTO: 32.1 PG (ref 26.5–33)
MCH RBC QN AUTO: 32.2 PG (ref 26.5–33)
MCH RBC QN AUTO: 32.8 PG (ref 26.5–33)
MCHC RBC AUTO-ENTMCNC: 33 G/DL (ref 31.5–36.5)
MCHC RBC AUTO-ENTMCNC: 33 G/DL (ref 31.5–36.5)
MCHC RBC AUTO-ENTMCNC: 33.3 G/DL (ref 31.5–36.5)
MCV RBC AUTO: 97 FL (ref 78–100)
MCV RBC AUTO: 98 FL (ref 78–100)
MCV RBC AUTO: 99 FL (ref 78–100)
MICROALBUMIN UR-MCNC: 163 MG/L
MICROALBUMIN/CREAT UR: 134.71 MG/G CR (ref 0–17)
MONOCYTES # BLD AUTO: 0.7 10E3/UL (ref 0–1.3)
MONOCYTES # BLD AUTO: 0.7 10E3/UL (ref 0–1.3)
MONOCYTES # BLD AUTO: 1 10E3/UL (ref 0–1.3)
MONOCYTES NFR BLD AUTO: 11 %
MONOCYTES NFR BLD AUTO: 8 %
MONOCYTES NFR BLD AUTO: 9 %
MUCOUS THREADS #/AREA URNS LPF: PRESENT /LPF
NEUTROPHILS # BLD AUTO: 4.4 10E3/UL (ref 1.6–8.3)
NEUTROPHILS # BLD AUTO: 5.7 10E3/UL (ref 1.6–8.3)
NEUTROPHILS # BLD AUTO: 8.8 10E3/UL (ref 1.6–8.3)
NEUTROPHILS NFR BLD AUTO: 65 %
NEUTROPHILS NFR BLD AUTO: 73 %
NEUTROPHILS NFR BLD AUTO: 78 %
NITRATE UR QL: POSITIVE
NONHDLC SERPL-MCNC: 89 MG/DL
NRBC # BLD AUTO: 0 10E3/UL
NRBC BLD AUTO-RTO: 0 /100
P AXIS - MUSE: NORMAL DEGREES
PH UR STRIP: 8 [PH] (ref 5–7)
PLATELET # BLD AUTO: 118 10E3/UL (ref 150–450)
PLATELET # BLD AUTO: 134 10E3/UL (ref 150–450)
PLATELET # BLD AUTO: 150 10E3/UL (ref 150–450)
POTASSIUM BLD-SCNC: 3.9 MMOL/L (ref 3.4–5.3)
POTASSIUM BLD-SCNC: 4.3 MMOL/L (ref 3.4–5.3)
POTASSIUM BLD-SCNC: 4.4 MMOL/L (ref 3.4–5.3)
POTASSIUM BLD-SCNC: 4.4 MMOL/L (ref 3.4–5.3)
POTASSIUM BLD-SCNC: 4.6 MMOL/L (ref 3.4–5.3)
POTASSIUM BLD-SCNC: 4.7 MMOL/L (ref 3.4–5.3)
PR INTERVAL - MUSE: NORMAL MS
PROCALCITONIN SERPL-MCNC: <0.05 NG/ML
PROT SERPL-MCNC: 7.2 G/DL (ref 6.8–8.8)
QRS DURATION - MUSE: 86 MS
QT - MUSE: 408 MS
QTC - MUSE: 446 MS
R AXIS - MUSE: 49 DEGREES
RBC # BLD AUTO: 4.02 10E6/UL (ref 4.4–5.9)
RBC # BLD AUTO: 4.27 10E6/UL (ref 4.4–5.9)
RBC # BLD AUTO: 4.41 10E6/UL (ref 4.4–5.9)
RBC URINE: >182 /HPF
SARS-COV-2 RNA RESP QL NAA+PROBE: NEGATIVE
SARS-COV-2 RNA RESP QL NAA+PROBE: NEGATIVE
SODIUM SERPL-SCNC: 134 MMOL/L (ref 133–144)
SODIUM SERPL-SCNC: 136 MMOL/L (ref 133–144)
SODIUM SERPL-SCNC: 136 MMOL/L (ref 133–144)
SODIUM SERPL-SCNC: 137 MMOL/L (ref 133–144)
SODIUM SERPL-SCNC: 137 MMOL/L (ref 133–144)
SODIUM SERPL-SCNC: 138 MMOL/L (ref 133–144)
SP GR UR STRIP: 1.02 (ref 1–1.03)
SYSTOLIC BLOOD PRESSURE - MUSE: NORMAL MMHG
T AXIS - MUSE: -25 DEGREES
TRI-PHOS CRY #/AREA URNS HPF: ABNORMAL /HPF
TRIGL SERPL-MCNC: 83 MG/DL
TROPONIN I SERPL-MCNC: 0.02 UG/L (ref 0–0.04)
TSH SERPL DL<=0.005 MIU/L-ACNC: 3.32 MU/L (ref 0.4–4)
UROBILINOGEN UR STRIP-MCNC: NORMAL MG/DL
VENTRICULAR RATE- MUSE: 72 BPM
WBC # BLD AUTO: 11.3 10E3/UL (ref 4–11)
WBC # BLD AUTO: 6.8 10E3/UL (ref 4–11)
WBC # BLD AUTO: 7.8 10E3/UL (ref 4–11)
WBC CLUMPS #/AREA URNS HPF: PRESENT /HPF
WBC URINE: >182 /HPF

## 2021-01-01 PROCEDURE — 258N000003 HC RX IP 258 OP 636: Performed by: PHYSICIAN ASSISTANT

## 2021-01-01 PROCEDURE — C9803 HOPD COVID-19 SPEC COLLECT: HCPCS

## 2021-01-01 PROCEDURE — 250N000013 HC RX MED GY IP 250 OP 250 PS 637: Performed by: PHYSICIAN ASSISTANT

## 2021-01-01 PROCEDURE — 80053 COMPREHEN METABOLIC PANEL: CPT | Performed by: EMERGENCY MEDICINE

## 2021-01-01 PROCEDURE — 36415 COLL VENOUS BLD VENIPUNCTURE: CPT | Performed by: PHYSICIAN ASSISTANT

## 2021-01-01 PROCEDURE — 120N000004 HC R&B MS OVERFLOW

## 2021-01-01 PROCEDURE — 99285 EMERGENCY DEPT VISIT HI MDM: CPT | Mod: 25

## 2021-01-01 PROCEDURE — 36415 COLL VENOUS BLD VENIPUNCTURE: CPT | Performed by: EMERGENCY MEDICINE

## 2021-01-01 PROCEDURE — 85004 AUTOMATED DIFF WBC COUNT: CPT | Performed by: PHYSICIAN ASSISTANT

## 2021-01-01 PROCEDURE — 28470 CLTX METATARSAL FX WO MNP EA: CPT | Mod: RT

## 2021-01-01 PROCEDURE — 97530 THERAPEUTIC ACTIVITIES: CPT | Mod: GP | Performed by: PHYSICAL THERAPIST

## 2021-01-01 PROCEDURE — 99232 SBSQ HOSP IP/OBS MODERATE 35: CPT | Performed by: HOSPITALIST

## 2021-01-01 PROCEDURE — 80162 ASSAY OF DIGOXIN TOTAL: CPT | Mod: ORL | Performed by: INTERNAL MEDICINE

## 2021-01-01 PROCEDURE — 99232 SBSQ HOSP IP/OBS MODERATE 35: CPT | Performed by: INTERNAL MEDICINE

## 2021-01-01 PROCEDURE — 99284 EMERGENCY DEPT VISIT MOD MDM: CPT | Mod: 25

## 2021-01-01 PROCEDURE — G0378 HOSPITAL OBSERVATION PER HR: HCPCS

## 2021-01-01 PROCEDURE — 250N000013 HC RX MED GY IP 250 OP 250 PS 637: Performed by: HOSPITALIST

## 2021-01-01 PROCEDURE — 82043 UR ALBUMIN QUANTITATIVE: CPT | Mod: ORL | Performed by: INTERNAL MEDICINE

## 2021-01-01 PROCEDURE — 99220 PR INITIAL OBSERVATION CARE,LEVEL III: CPT | Performed by: INTERNAL MEDICINE

## 2021-01-01 PROCEDURE — 84484 ASSAY OF TROPONIN QUANT: CPT | Performed by: EMERGENCY MEDICINE

## 2021-01-01 PROCEDURE — 87040 BLOOD CULTURE FOR BACTERIA: CPT | Performed by: EMERGENCY MEDICINE

## 2021-01-01 PROCEDURE — 250N000013 HC RX MED GY IP 250 OP 250 PS 637: Performed by: INTERNAL MEDICINE

## 2021-01-01 PROCEDURE — 97161 PT EVAL LOW COMPLEX 20 MIN: CPT | Mod: GP | Performed by: PHYSICAL THERAPIST

## 2021-01-01 PROCEDURE — 97165 OT EVAL LOW COMPLEX 30 MIN: CPT | Mod: GO

## 2021-01-01 PROCEDURE — 99203 OFFICE O/P NEW LOW 30 MIN: CPT | Performed by: SURGERY

## 2021-01-01 PROCEDURE — 80048 BASIC METABOLIC PNL TOTAL CA: CPT | Performed by: EMERGENCY MEDICINE

## 2021-01-01 PROCEDURE — 80061 LIPID PANEL: CPT | Mod: ORL | Performed by: INTERNAL MEDICINE

## 2021-01-01 PROCEDURE — 80048 BASIC METABOLIC PNL TOTAL CA: CPT | Performed by: HOSPITALIST

## 2021-01-01 PROCEDURE — 99443 PR PHYSICIAN TELEPHONE EVALUATION 21-30 MIN: CPT | Mod: 95 | Performed by: INTERNAL MEDICINE

## 2021-01-01 PROCEDURE — 99214 OFFICE O/P EST MOD 30 MIN: CPT | Performed by: FAMILY MEDICINE

## 2021-01-01 PROCEDURE — 85025 COMPLETE CBC W/AUTO DIFF WBC: CPT | Performed by: EMERGENCY MEDICINE

## 2021-01-01 PROCEDURE — 84443 ASSAY THYROID STIM HORMONE: CPT | Mod: ORL | Performed by: INTERNAL MEDICINE

## 2021-01-01 PROCEDURE — 96361 HYDRATE IV INFUSION ADD-ON: CPT

## 2021-01-01 PROCEDURE — 82565 ASSAY OF CREATININE: CPT | Performed by: INTERNAL MEDICINE

## 2021-01-01 PROCEDURE — 36415 COLL VENOUS BLD VENIPUNCTURE: CPT | Performed by: HOSPITALIST

## 2021-01-01 PROCEDURE — 99203 OFFICE O/P NEW LOW 30 MIN: CPT | Performed by: PODIATRIST

## 2021-01-01 PROCEDURE — 93922 UPR/L XTREMITY ART 2 LEVELS: CPT

## 2021-01-01 PROCEDURE — 250N000011 HC RX IP 250 OP 636: Performed by: PHYSICIAN ASSISTANT

## 2021-01-01 PROCEDURE — 80162 ASSAY OF DIGOXIN TOTAL: CPT | Performed by: INTERNAL MEDICINE

## 2021-01-01 PROCEDURE — 97530 THERAPEUTIC ACTIVITIES: CPT | Mod: GO | Performed by: OCCUPATIONAL THERAPIST

## 2021-01-01 PROCEDURE — 93971 EXTREMITY STUDY: CPT | Mod: RT

## 2021-01-01 PROCEDURE — 97530 THERAPEUTIC ACTIVITIES: CPT | Mod: GO

## 2021-01-01 PROCEDURE — 97535 SELF CARE MNGMENT TRAINING: CPT | Mod: GO | Performed by: OCCUPATIONAL THERAPIST

## 2021-01-01 PROCEDURE — 87635 SARS-COV-2 COVID-19 AMP PRB: CPT | Performed by: HOSPITALIST

## 2021-01-01 PROCEDURE — 97164 PT RE-EVAL EST PLAN CARE: CPT | Mod: GP | Performed by: PHYSICAL THERAPIST

## 2021-01-01 PROCEDURE — 99207 PR CDG-CODE CATEGORY CHANGED: CPT | Performed by: HOSPITALIST

## 2021-01-01 PROCEDURE — 93005 ELECTROCARDIOGRAM TRACING: CPT

## 2021-01-01 PROCEDURE — 96366 THER/PROPH/DIAG IV INF ADDON: CPT

## 2021-01-01 PROCEDURE — 73630 X-RAY EXAM OF FOOT: CPT | Mod: RT | Performed by: RADIOLOGY

## 2021-01-01 PROCEDURE — 96365 THER/PROPH/DIAG IV INF INIT: CPT

## 2021-01-01 PROCEDURE — 70450 CT HEAD/BRAIN W/O DYE: CPT | Mod: MG

## 2021-01-01 PROCEDURE — 81001 URINALYSIS AUTO W/SCOPE: CPT | Performed by: EMERGENCY MEDICINE

## 2021-01-01 PROCEDURE — 99207 PR CDG-MDM COMPONENT: MEETS MODERATE - UP CODED: CPT | Performed by: INTERNAL MEDICINE

## 2021-01-01 PROCEDURE — 82374 ASSAY BLOOD CARBON DIOXIDE: CPT | Performed by: PHYSICIAN ASSISTANT

## 2021-01-01 PROCEDURE — 80048 BASIC METABOLIC PNL TOTAL CA: CPT | Mod: ORL | Performed by: INTERNAL MEDICINE

## 2021-01-01 PROCEDURE — 80048 BASIC METABOLIC PNL TOTAL CA: CPT | Performed by: PHYSICIAN ASSISTANT

## 2021-01-01 PROCEDURE — 87086 URINE CULTURE/COLONY COUNT: CPT | Performed by: EMERGENCY MEDICINE

## 2021-01-01 PROCEDURE — 73630 X-RAY EXAM OF FOOT: CPT | Mod: RT

## 2021-01-01 PROCEDURE — 97535 SELF CARE MNGMENT TRAINING: CPT | Mod: GO

## 2021-01-01 PROCEDURE — G0180 MD CERTIFICATION HHA PATIENT: HCPCS | Performed by: INTERNAL MEDICINE

## 2021-01-01 PROCEDURE — 250N000011 HC RX IP 250 OP 636: Performed by: EMERGENCY MEDICINE

## 2021-01-01 PROCEDURE — 83036 HEMOGLOBIN GLYCOSYLATED A1C: CPT | Mod: ORL | Performed by: INTERNAL MEDICINE

## 2021-01-01 PROCEDURE — G0463 HOSPITAL OUTPT CLINIC VISIT: HCPCS

## 2021-01-01 PROCEDURE — 84145 PROCALCITONIN (PCT): CPT | Performed by: EMERGENCY MEDICINE

## 2021-01-01 PROCEDURE — 99207 PR MOONLIGHTING INDICATOR: CPT | Performed by: INTERNAL MEDICINE

## 2021-01-01 PROCEDURE — 99233 SBSQ HOSP IP/OBS HIGH 50: CPT | Performed by: HOSPITALIST

## 2021-01-01 PROCEDURE — 99239 HOSP IP/OBS DSCHRG MGMT >30: CPT | Performed by: INTERNAL MEDICINE

## 2021-01-01 PROCEDURE — 97116 GAIT TRAINING THERAPY: CPT | Mod: GP | Performed by: PHYSICAL THERAPIST

## 2021-01-01 PROCEDURE — 99213 OFFICE O/P EST LOW 20 MIN: CPT | Performed by: INTERNAL MEDICINE

## 2021-01-01 PROCEDURE — 36415 COLL VENOUS BLD VENIPUNCTURE: CPT | Performed by: INTERNAL MEDICINE

## 2021-01-01 PROCEDURE — 99207 PR CDG-MDM COMPONENT: MEETS MODERATE - UP CODED: CPT | Performed by: HOSPITALIST

## 2021-01-01 PROCEDURE — 87635 SARS-COV-2 COVID-19 AMP PRB: CPT | Performed by: EMERGENCY MEDICINE

## 2021-01-01 RX ORDER — METOPROLOL SUCCINATE 50 MG/1
50 TABLET, EXTENDED RELEASE ORAL EVERY MORNING
Status: DISCONTINUED | OUTPATIENT
Start: 2021-01-01 | End: 2021-01-01

## 2021-01-01 RX ORDER — HYDRALAZINE HYDROCHLORIDE 25 MG/1
25 TABLET, FILM COATED ORAL EVERY 6 HOURS PRN
Status: DISCONTINUED | OUTPATIENT
Start: 2021-01-01 | End: 2021-01-01

## 2021-01-01 RX ORDER — AMLODIPINE BESYLATE 5 MG/1
5 TABLET ORAL DAILY
DISCHARGE
Start: 2021-01-01 | End: 2021-01-01 | Stop reason: ALTCHOICE

## 2021-01-01 RX ORDER — AMOXICILLIN 250 MG
2 CAPSULE ORAL 2 TIMES DAILY PRN
Status: DISCONTINUED | OUTPATIENT
Start: 2021-01-01 | End: 2021-01-01 | Stop reason: HOSPADM

## 2021-01-01 RX ORDER — METOPROLOL SUCCINATE 50 MG/1
50 TABLET, EXTENDED RELEASE ORAL 2 TIMES DAILY
Status: DISCONTINUED | OUTPATIENT
Start: 2021-01-01 | End: 2021-01-01 | Stop reason: HOSPADM

## 2021-01-01 RX ORDER — NALOXONE HYDROCHLORIDE 0.4 MG/ML
0.2 INJECTION, SOLUTION INTRAMUSCULAR; INTRAVENOUS; SUBCUTANEOUS
Status: DISCONTINUED | OUTPATIENT
Start: 2021-01-01 | End: 2021-01-01 | Stop reason: HOSPADM

## 2021-01-01 RX ORDER — CEFTRIAXONE 1 G/1
1 INJECTION, POWDER, FOR SOLUTION INTRAMUSCULAR; INTRAVENOUS ONCE
Status: COMPLETED | OUTPATIENT
Start: 2021-01-01 | End: 2021-01-01

## 2021-01-01 RX ORDER — SIMVASTATIN 20 MG
20 TABLET ORAL AT BEDTIME
Status: DISCONTINUED | OUTPATIENT
Start: 2021-01-01 | End: 2021-01-01 | Stop reason: HOSPADM

## 2021-01-01 RX ORDER — NALOXONE HYDROCHLORIDE 0.4 MG/ML
0.4 INJECTION, SOLUTION INTRAMUSCULAR; INTRAVENOUS; SUBCUTANEOUS
Status: DISCONTINUED | OUTPATIENT
Start: 2021-01-01 | End: 2021-01-01 | Stop reason: HOSPADM

## 2021-01-01 RX ORDER — CEPHALEXIN 500 MG/1
500 CAPSULE ORAL 4 TIMES DAILY
Qty: 28 CAPSULE | Refills: 0 | Status: SHIPPED | OUTPATIENT
Start: 2021-01-01 | End: 2021-01-01

## 2021-01-01 RX ORDER — OLANZAPINE 5 MG/1
5 TABLET, ORALLY DISINTEGRATING ORAL ONCE
Status: COMPLETED | OUTPATIENT
Start: 2021-01-01 | End: 2021-01-01

## 2021-01-01 RX ORDER — TRAMADOL HYDROCHLORIDE 50 MG/1
50 TABLET ORAL EVERY 6 HOURS PRN
Status: DISCONTINUED | OUTPATIENT
Start: 2021-01-01 | End: 2021-01-01 | Stop reason: HOSPADM

## 2021-01-01 RX ORDER — METOPROLOL SUCCINATE 50 MG/1
50 TABLET, EXTENDED RELEASE ORAL EVERY EVENING
Status: DISCONTINUED | OUTPATIENT
Start: 2021-01-01 | End: 2021-01-01

## 2021-01-01 RX ORDER — ONDANSETRON 4 MG/1
4 TABLET, ORALLY DISINTEGRATING ORAL EVERY 6 HOURS PRN
Status: DISCONTINUED | OUTPATIENT
Start: 2021-01-01 | End: 2021-01-01 | Stop reason: HOSPADM

## 2021-01-01 RX ORDER — METOPROLOL SUCCINATE 25 MG/1
TABLET, EXTENDED RELEASE ORAL
Qty: 270 TABLET | Refills: 3 | Status: ON HOLD | OUTPATIENT
Start: 2021-01-01 | End: 2022-01-01

## 2021-01-01 RX ORDER — AMOXICILLIN 250 MG
1 CAPSULE ORAL 2 TIMES DAILY PRN
Status: DISCONTINUED | OUTPATIENT
Start: 2021-01-01 | End: 2021-01-01 | Stop reason: HOSPADM

## 2021-01-01 RX ORDER — AMLODIPINE BESYLATE 5 MG/1
5 TABLET ORAL DAILY
Status: DISCONTINUED | OUTPATIENT
Start: 2021-01-01 | End: 2021-01-01

## 2021-01-01 RX ORDER — ACETAMINOPHEN 325 MG/1
975 TABLET ORAL EVERY 8 HOURS
Status: DISCONTINUED | OUTPATIENT
Start: 2021-01-01 | End: 2021-01-01 | Stop reason: HOSPADM

## 2021-01-01 RX ORDER — DIGOXIN 125 MCG
62.5 TABLET ORAL DAILY
Qty: 45 TABLET | Refills: 3 | Status: SHIPPED | OUTPATIENT
Start: 2021-01-01 | End: 2022-01-01 | Stop reason: ALTCHOICE

## 2021-01-01 RX ORDER — CEFTRIAXONE 1 G/1
1 INJECTION, POWDER, FOR SOLUTION INTRAMUSCULAR; INTRAVENOUS EVERY 24 HOURS
Status: DISCONTINUED | OUTPATIENT
Start: 2021-01-01 | End: 2021-01-01

## 2021-01-01 RX ORDER — CEFDINIR 300 MG/1
300 CAPSULE ORAL DAILY
Status: DISCONTINUED | OUTPATIENT
Start: 2021-01-01 | End: 2021-01-01

## 2021-01-01 RX ORDER — SIMVASTATIN 20 MG
20 TABLET ORAL AT BEDTIME
Qty: 90 TABLET | Refills: 3 | Status: SHIPPED | OUTPATIENT
Start: 2021-01-01

## 2021-01-01 RX ORDER — SODIUM CHLORIDE 9 MG/ML
INJECTION, SOLUTION INTRAVENOUS CONTINUOUS
Status: ACTIVE | OUTPATIENT
Start: 2021-01-01 | End: 2021-01-01

## 2021-01-01 RX ORDER — LIDOCAINE 40 MG/G
CREAM TOPICAL
Status: DISCONTINUED | OUTPATIENT
Start: 2021-01-01 | End: 2021-01-01 | Stop reason: HOSPADM

## 2021-01-01 RX ORDER — DIPHENHYDRAMINE HCL 25 MG
25 CAPSULE ORAL EVERY 6 HOURS PRN
Status: DISCONTINUED | OUTPATIENT
Start: 2021-01-01 | End: 2021-01-01 | Stop reason: HOSPADM

## 2021-01-01 RX ORDER — ONDANSETRON 2 MG/ML
4 INJECTION INTRAMUSCULAR; INTRAVENOUS EVERY 6 HOURS PRN
Status: DISCONTINUED | OUTPATIENT
Start: 2021-01-01 | End: 2021-01-01 | Stop reason: HOSPADM

## 2021-01-01 RX ORDER — AMLODIPINE BESYLATE 5 MG/1
5 TABLET ORAL DAILY
Status: DISCONTINUED | OUTPATIENT
Start: 2021-01-01 | End: 2021-01-01 | Stop reason: HOSPADM

## 2021-01-01 RX ORDER — HYDRALAZINE HYDROCHLORIDE 10 MG/1
10 TABLET, FILM COATED ORAL 4 TIMES DAILY
Status: DISCONTINUED | OUTPATIENT
Start: 2021-01-01 | End: 2021-01-01

## 2021-01-01 RX ORDER — HYDRALAZINE HYDROCHLORIDE 10 MG/1
10 TABLET, FILM COATED ORAL 4 TIMES DAILY PRN
Status: DISCONTINUED | OUTPATIENT
Start: 2021-01-01 | End: 2021-01-01 | Stop reason: HOSPADM

## 2021-01-01 RX ORDER — LISINOPRIL 10 MG/1
10 TABLET ORAL EVERY EVENING
Qty: 90 TABLET | Refills: 1 | Status: SHIPPED | OUTPATIENT
Start: 2021-01-01 | End: 2022-01-01

## 2021-01-01 RX ORDER — METOPROLOL SUCCINATE 25 MG/1
25 TABLET, EXTENDED RELEASE ORAL SEE ADMIN INSTRUCTIONS
Status: DISCONTINUED | OUTPATIENT
Start: 2021-01-01 | End: 2021-01-01

## 2021-01-01 RX ORDER — SODIUM CHLORIDE 9 MG/ML
INJECTION, SOLUTION INTRAVENOUS CONTINUOUS
Status: DISCONTINUED | OUTPATIENT
Start: 2021-01-01 | End: 2021-01-01

## 2021-01-01 RX ORDER — METOPROLOL SUCCINATE 25 MG/1
25 TABLET, EXTENDED RELEASE ORAL EVERY EVENING
Status: DISCONTINUED | OUTPATIENT
Start: 2021-01-01 | End: 2021-01-01

## 2021-01-01 RX ADMIN — METOPROLOL SUCCINATE 50 MG: 50 TABLET, EXTENDED RELEASE ORAL at 08:05

## 2021-01-01 RX ADMIN — Medication 62.5 MCG: at 07:36

## 2021-01-01 RX ADMIN — ACETAMINOPHEN 975 MG: 325 TABLET, FILM COATED ORAL at 03:28

## 2021-01-01 RX ADMIN — MICONAZOLE NITRATE: 20 POWDER TOPICAL at 09:00

## 2021-01-01 RX ADMIN — MICONAZOLE NITRATE: 20 POWDER TOPICAL at 10:09

## 2021-01-01 RX ADMIN — CEFDINIR 300 MG: 300 CAPSULE ORAL at 10:24

## 2021-01-01 RX ADMIN — MICONAZOLE NITRATE: 20 POWDER TOPICAL at 08:27

## 2021-01-01 RX ADMIN — MICONAZOLE NITRATE: 20 POWDER TOPICAL at 08:01

## 2021-01-01 RX ADMIN — HYDRALAZINE HYDROCHLORIDE 10 MG: 10 TABLET ORAL at 16:34

## 2021-01-01 RX ADMIN — ACETAMINOPHEN 975 MG: 325 TABLET, FILM COATED ORAL at 04:15

## 2021-01-01 RX ADMIN — Medication 1 MG: at 21:37

## 2021-01-01 RX ADMIN — CEFDINIR 300 MG: 300 CAPSULE ORAL at 08:50

## 2021-01-01 RX ADMIN — METOPROLOL SUCCINATE 50 MG: 50 TABLET, EXTENDED RELEASE ORAL at 10:08

## 2021-01-01 RX ADMIN — MICONAZOLE NITRATE: 20 POWDER TOPICAL at 20:15

## 2021-01-01 RX ADMIN — METOPROLOL SUCCINATE 50 MG: 50 TABLET, EXTENDED RELEASE ORAL at 07:36

## 2021-01-01 RX ADMIN — AMLODIPINE BESYLATE 5 MG: 5 TABLET ORAL at 10:08

## 2021-01-01 RX ADMIN — Medication 62.5 MCG: at 08:26

## 2021-01-01 RX ADMIN — ACETAMINOPHEN 975 MG: 325 TABLET, FILM COATED ORAL at 18:41

## 2021-01-01 RX ADMIN — MICONAZOLE NITRATE: 20 POWDER TOPICAL at 09:32

## 2021-01-01 RX ADMIN — METOPROLOL SUCCINATE 50 MG: 50 TABLET, EXTENDED RELEASE ORAL at 08:59

## 2021-01-01 RX ADMIN — AMLODIPINE BESYLATE 5 MG: 5 TABLET ORAL at 09:30

## 2021-01-01 RX ADMIN — CEFDINIR 300 MG: 300 CAPSULE ORAL at 07:41

## 2021-01-01 RX ADMIN — MICONAZOLE NITRATE: 20 POWDER TOPICAL at 08:05

## 2021-01-01 RX ADMIN — TRAMADOL HYDROCHLORIDE 50 MG: 50 TABLET, FILM COATED ORAL at 02:58

## 2021-01-01 RX ADMIN — Medication 62.5 MCG: at 07:56

## 2021-01-01 RX ADMIN — ACETAMINOPHEN 975 MG: 325 TABLET, FILM COATED ORAL at 18:54

## 2021-01-01 RX ADMIN — METOPROLOL SUCCINATE 50 MG: 50 TABLET, EXTENDED RELEASE ORAL at 08:01

## 2021-01-01 RX ADMIN — METOPROLOL SUCCINATE 50 MG: 50 TABLET, EXTENDED RELEASE ORAL at 07:40

## 2021-01-01 RX ADMIN — ACETAMINOPHEN 975 MG: 325 TABLET, FILM COATED ORAL at 00:28

## 2021-01-01 RX ADMIN — METOPROLOL SUCCINATE 25 MG: 25 TABLET, EXTENDED RELEASE ORAL at 21:37

## 2021-01-01 RX ADMIN — MICONAZOLE NITRATE: 20 POWDER TOPICAL at 20:05

## 2021-01-01 RX ADMIN — METOPROLOL SUCCINATE 50 MG: 50 TABLET, EXTENDED RELEASE ORAL at 07:44

## 2021-01-01 RX ADMIN — MICONAZOLE NITRATE: 20 POWDER TOPICAL at 08:50

## 2021-01-01 RX ADMIN — ACETAMINOPHEN 975 MG: 325 TABLET, FILM COATED ORAL at 10:24

## 2021-01-01 RX ADMIN — CEFTRIAXONE SODIUM 1 G: 1 INJECTION, POWDER, FOR SOLUTION INTRAMUSCULAR; INTRAVENOUS at 16:02

## 2021-01-01 RX ADMIN — MICONAZOLE NITRATE: 20 POWDER TOPICAL at 21:00

## 2021-01-01 RX ADMIN — METOPROLOL SUCCINATE 50 MG: 50 TABLET, EXTENDED RELEASE ORAL at 19:37

## 2021-01-01 RX ADMIN — METOPROLOL SUCCINATE 25 MG: 25 TABLET, EXTENDED RELEASE ORAL at 20:41

## 2021-01-01 RX ADMIN — METOPROLOL SUCCINATE 50 MG: 50 TABLET, EXTENDED RELEASE ORAL at 07:52

## 2021-01-01 RX ADMIN — AMLODIPINE BESYLATE 5 MG: 5 TABLET ORAL at 08:59

## 2021-01-01 RX ADMIN — CEFDINIR 300 MG: 300 CAPSULE ORAL at 07:36

## 2021-01-01 RX ADMIN — MICONAZOLE NITRATE: 20 POWDER TOPICAL at 07:54

## 2021-01-01 RX ADMIN — MICONAZOLE NITRATE: 20 POWDER TOPICAL at 20:44

## 2021-01-01 RX ADMIN — METOPROLOL SUCCINATE 25 MG: 25 TABLET, EXTENDED RELEASE ORAL at 20:44

## 2021-01-01 RX ADMIN — MICONAZOLE NITRATE: 20 POWDER TOPICAL at 20:48

## 2021-01-01 RX ADMIN — CEFDINIR 300 MG: 300 CAPSULE ORAL at 08:10

## 2021-01-01 RX ADMIN — MICONAZOLE NITRATE: 20 POWDER TOPICAL at 08:00

## 2021-01-01 RX ADMIN — ACETAMINOPHEN 975 MG: 325 TABLET, FILM COATED ORAL at 04:23

## 2021-01-01 RX ADMIN — METOPROLOL SUCCINATE 25 MG: 25 TABLET, EXTENDED RELEASE ORAL at 19:27

## 2021-01-01 RX ADMIN — MICONAZOLE NITRATE: 20 POWDER TOPICAL at 20:47

## 2021-01-01 RX ADMIN — MICONAZOLE NITRATE: 20 POWDER TOPICAL at 20:55

## 2021-01-01 RX ADMIN — METOPROLOL SUCCINATE 50 MG: 50 TABLET, EXTENDED RELEASE ORAL at 07:59

## 2021-01-01 RX ADMIN — SIMVASTATIN 20 MG: 20 TABLET, FILM COATED ORAL at 21:39

## 2021-01-01 RX ADMIN — HYDRALAZINE HYDROCHLORIDE 10 MG: 10 TABLET ORAL at 07:41

## 2021-01-01 RX ADMIN — MICONAZOLE NITRATE: 20 POWDER TOPICAL at 08:59

## 2021-01-01 RX ADMIN — METOPROLOL SUCCINATE 50 MG: 50 TABLET, EXTENDED RELEASE ORAL at 20:59

## 2021-01-01 RX ADMIN — Medication 62.5 MCG: at 07:59

## 2021-01-01 RX ADMIN — Medication 62.5 MCG: at 10:08

## 2021-01-01 RX ADMIN — METOPROLOL SUCCINATE 25 MG: 25 TABLET, EXTENDED RELEASE ORAL at 19:09

## 2021-01-01 RX ADMIN — METOPROLOL SUCCINATE 50 MG: 50 TABLET, EXTENDED RELEASE ORAL at 19:51

## 2021-01-01 RX ADMIN — ACETAMINOPHEN 975 MG: 325 TABLET, FILM COATED ORAL at 11:18

## 2021-01-01 RX ADMIN — MICONAZOLE NITRATE: 20 POWDER TOPICAL at 07:57

## 2021-01-01 RX ADMIN — METOPROLOL SUCCINATE 50 MG: 50 TABLET, EXTENDED RELEASE ORAL at 20:15

## 2021-01-01 RX ADMIN — SIMVASTATIN 20 MG: 20 TABLET, FILM COATED ORAL at 21:12

## 2021-01-01 RX ADMIN — MICONAZOLE NITRATE: 20 POWDER TOPICAL at 19:28

## 2021-01-01 RX ADMIN — AMLODIPINE BESYLATE 5 MG: 5 TABLET ORAL at 13:51

## 2021-01-01 RX ADMIN — HYDRALAZINE HYDROCHLORIDE 10 MG: 10 TABLET ORAL at 20:44

## 2021-01-01 RX ADMIN — SODIUM CHLORIDE: 9 INJECTION, SOLUTION INTRAVENOUS at 15:43

## 2021-01-01 RX ADMIN — MICONAZOLE NITRATE: 20 POWDER TOPICAL at 07:46

## 2021-01-01 RX ADMIN — Medication 62.5 MCG: at 09:30

## 2021-01-01 RX ADMIN — TRAMADOL HYDROCHLORIDE 50 MG: 50 TABLET, FILM COATED ORAL at 21:12

## 2021-01-01 RX ADMIN — METOPROLOL SUCCINATE 50 MG: 50 TABLET, EXTENDED RELEASE ORAL at 07:56

## 2021-01-01 RX ADMIN — OLANZAPINE 5 MG: 5 TABLET, ORALLY DISINTEGRATING ORAL at 20:41

## 2021-01-01 RX ADMIN — METOPROLOL SUCCINATE 25 MG: 25 TABLET, EXTENDED RELEASE ORAL at 19:55

## 2021-01-01 RX ADMIN — SODIUM CHLORIDE: 9 INJECTION, SOLUTION INTRAVENOUS at 19:03

## 2021-01-01 RX ADMIN — MICONAZOLE NITRATE: 20 POWDER TOPICAL at 07:36

## 2021-01-01 RX ADMIN — ACETAMINOPHEN 975 MG: 325 TABLET, FILM COATED ORAL at 19:51

## 2021-01-01 RX ADMIN — METOPROLOL SUCCINATE 50 MG: 50 TABLET, EXTENDED RELEASE ORAL at 19:52

## 2021-01-01 RX ADMIN — AMLODIPINE BESYLATE 5 MG: 5 TABLET ORAL at 07:52

## 2021-01-01 RX ADMIN — METOPROLOL SUCCINATE 50 MG: 50 TABLET, EXTENDED RELEASE ORAL at 08:43

## 2021-01-01 RX ADMIN — Medication 62.5 MCG: at 08:10

## 2021-01-01 RX ADMIN — METOPROLOL SUCCINATE 25 MG: 25 TABLET, EXTENDED RELEASE ORAL at 21:11

## 2021-01-01 RX ADMIN — MICONAZOLE NITRATE: 20 POWDER TOPICAL at 20:31

## 2021-01-01 RX ADMIN — MICONAZOLE NITRATE: 20 POWDER TOPICAL at 19:51

## 2021-01-01 RX ADMIN — HYDRALAZINE HYDROCHLORIDE 10 MG: 10 TABLET ORAL at 19:27

## 2021-01-01 RX ADMIN — METOPROLOL SUCCINATE 50 MG: 50 TABLET, EXTENDED RELEASE ORAL at 20:37

## 2021-01-01 RX ADMIN — METOPROLOL SUCCINATE 50 MG: 50 TABLET, EXTENDED RELEASE ORAL at 09:30

## 2021-01-01 RX ADMIN — MICONAZOLE NITRATE: 20 POWDER TOPICAL at 19:53

## 2021-01-01 RX ADMIN — ACETAMINOPHEN 975 MG: 325 TABLET, FILM COATED ORAL at 04:29

## 2021-01-01 RX ADMIN — CEFDINIR 300 MG: 300 CAPSULE ORAL at 08:05

## 2021-01-01 RX ADMIN — METOPROLOL SUCCINATE 50 MG: 50 TABLET, EXTENDED RELEASE ORAL at 20:09

## 2021-01-01 RX ADMIN — AMLODIPINE BESYLATE 5 MG: 5 TABLET ORAL at 07:57

## 2021-01-01 RX ADMIN — Medication 62.5 MCG: at 07:41

## 2021-01-01 RX ADMIN — MICONAZOLE NITRATE: 20 POWDER TOPICAL at 07:42

## 2021-01-01 RX ADMIN — MICONAZOLE NITRATE: 20 POWDER TOPICAL at 19:43

## 2021-01-01 RX ADMIN — Medication 62.5 MCG: at 08:43

## 2021-01-01 RX ADMIN — AMLODIPINE BESYLATE 5 MG: 5 TABLET ORAL at 08:26

## 2021-01-01 RX ADMIN — Medication 62.5 MCG: at 08:59

## 2021-01-01 RX ADMIN — MICONAZOLE NITRATE: 20 POWDER TOPICAL at 08:44

## 2021-01-01 RX ADMIN — ACETAMINOPHEN 975 MG: 325 TABLET, FILM COATED ORAL at 19:08

## 2021-01-01 RX ADMIN — HYDRALAZINE HYDROCHLORIDE 10 MG: 10 TABLET ORAL at 08:43

## 2021-01-01 RX ADMIN — Medication 62.5 MCG: at 08:01

## 2021-01-01 RX ADMIN — MICONAZOLE NITRATE: 20 POWDER TOPICAL at 19:10

## 2021-01-01 RX ADMIN — Medication 62.5 MCG: at 07:51

## 2021-01-01 RX ADMIN — ACETAMINOPHEN 975 MG: 325 TABLET, FILM COATED ORAL at 18:51

## 2021-01-01 RX ADMIN — METOPROLOL SUCCINATE 50 MG: 50 TABLET, EXTENDED RELEASE ORAL at 20:31

## 2021-01-01 RX ADMIN — MICONAZOLE NITRATE: 20 POWDER TOPICAL at 01:23

## 2021-01-01 RX ADMIN — OLANZAPINE 5 MG: 5 TABLET, ORALLY DISINTEGRATING ORAL at 18:49

## 2021-01-01 RX ADMIN — MICONAZOLE NITRATE: 20 POWDER TOPICAL at 08:10

## 2021-01-01 RX ADMIN — HYDRALAZINE HYDROCHLORIDE 10 MG: 10 TABLET ORAL at 16:43

## 2021-01-01 RX ADMIN — Medication 62.5 MCG: at 08:50

## 2021-01-01 RX ADMIN — ACETAMINOPHEN 975 MG: 325 TABLET, FILM COATED ORAL at 11:39

## 2021-01-01 RX ADMIN — ACETAMINOPHEN 975 MG: 325 TABLET, FILM COATED ORAL at 10:47

## 2021-01-01 RX ADMIN — ACETAMINOPHEN 975 MG: 325 TABLET, FILM COATED ORAL at 19:07

## 2021-01-01 RX ADMIN — ACETAMINOPHEN 975 MG: 325 TABLET, FILM COATED ORAL at 11:24

## 2021-01-01 RX ADMIN — METOPROLOL SUCCINATE 50 MG: 50 TABLET, EXTENDED RELEASE ORAL at 08:10

## 2021-01-01 RX ADMIN — Medication 62.5 MCG: at 07:45

## 2021-01-01 RX ADMIN — MICONAZOLE NITRATE: 20 POWDER TOPICAL at 21:45

## 2021-01-01 RX ADMIN — METOPROLOL SUCCINATE 25 MG: 25 TABLET, EXTENDED RELEASE ORAL at 19:08

## 2021-01-01 RX ADMIN — Medication 62.5 MCG: at 08:05

## 2021-01-01 RX ADMIN — METOPROLOL SUCCINATE 50 MG: 50 TABLET, EXTENDED RELEASE ORAL at 08:51

## 2021-01-01 RX ADMIN — AMLODIPINE BESYLATE 5 MG: 5 TABLET ORAL at 07:45

## 2021-01-01 RX ADMIN — CEFTRIAXONE SODIUM 1 G: 1 INJECTION, POWDER, FOR SOLUTION INTRAMUSCULAR; INTRAVENOUS at 11:24

## 2021-01-01 RX ADMIN — METOPROLOL SUCCINATE 50 MG: 50 TABLET, EXTENDED RELEASE ORAL at 08:26

## 2021-01-01 RX ADMIN — HYDRALAZINE HYDROCHLORIDE 25 MG: 25 TABLET, FILM COATED ORAL at 19:21

## 2021-01-01 ASSESSMENT — ACTIVITIES OF DAILY LIVING (ADL)
ADLS_ACUITY_SCORE: 22
ADLS_ACUITY_SCORE: 23
ADLS_ACUITY_SCORE: 22
ADLS_ACUITY_SCORE: 22
ADLS_ACUITY_SCORE: 23
ADLS_ACUITY_SCORE: 23
ADLS_ACUITY_SCORE: 21
ADLS_ACUITY_SCORE: 20
ADLS_ACUITY_SCORE: 23
DIFFICULTY_COMMUNICATING: NO
ADLS_ACUITY_SCORE: 23
ADLS_ACUITY_SCORE: 25
ADLS_ACUITY_SCORE: 22
ADLS_ACUITY_SCORE: 23
ADLS_ACUITY_SCORE: 22
ADLS_ACUITY_SCORE: 22
ADLS_ACUITY_SCORE: 23
ADLS_ACUITY_SCORE: 23
TOILETING_ISSUES: YES
ADLS_ACUITY_SCORE: 22
ADLS_ACUITY_SCORE: 22
ADLS_ACUITY_SCORE: 25
ADLS_ACUITY_SCORE: 22
ADLS_ACUITY_SCORE: 24
ADLS_ACUITY_SCORE: 22
ADLS_ACUITY_SCORE: 25
ADLS_ACUITY_SCORE: 23
ADLS_ACUITY_SCORE: 21
ADLS_ACUITY_SCORE: 22
ADLS_ACUITY_SCORE: 22
HEARING_DIFFICULTY_OR_DEAF: NO
ADLS_ACUITY_SCORE: 21
ADLS_ACUITY_SCORE: 22
CONCENTRATING,_REMEMBERING_OR_MAKING_DECISIONS_DIFFICULTY: YES
ADLS_ACUITY_SCORE: 21
ADLS_ACUITY_SCORE: 22
ADLS_ACUITY_SCORE: 22
ADLS_ACUITY_SCORE: 23
ADLS_ACUITY_SCORE: 25
ADLS_ACUITY_SCORE: 23
ADLS_ACUITY_SCORE: 24
ADLS_ACUITY_SCORE: 23
ADLS_ACUITY_SCORE: 20
ADLS_ACUITY_SCORE: 21
ADLS_ACUITY_SCORE: 21
ADLS_ACUITY_SCORE: 25
ADLS_ACUITY_SCORE: 23
ADLS_ACUITY_SCORE: 22
ADLS_ACUITY_SCORE: 23
ADLS_ACUITY_SCORE: 21
NUMBER_OF_TIMES_PATIENT_HAS_FALLEN_WITHIN_LAST_SIX_MONTHS: 10
ADLS_ACUITY_SCORE: 21
ADLS_ACUITY_SCORE: 20
ADLS_ACUITY_SCORE: 23
ADLS_ACUITY_SCORE: 24
DRESSING/BATHING_DIFFICULTY: YES
ADLS_ACUITY_SCORE: 22
ADLS_ACUITY_SCORE: 23
ADLS_ACUITY_SCORE: 22
ADLS_ACUITY_SCORE: 20
ADLS_ACUITY_SCORE: 23
ADLS_ACUITY_SCORE: 25
ADLS_ACUITY_SCORE: 22
DIFFICULTY_EATING/SWALLOWING: NO
ADLS_ACUITY_SCORE: 21
ADLS_ACUITY_SCORE: 23
ADLS_ACUITY_SCORE: 22
ADLS_ACUITY_SCORE: 24
ADLS_ACUITY_SCORE: 21
ADLS_ACUITY_SCORE: 21
ADLS_ACUITY_SCORE: 22
ADLS_ACUITY_SCORE: 25
ADLS_ACUITY_SCORE: 22
WALKING_OR_CLIMBING_STAIRS_DIFFICULTY: YES
ADLS_ACUITY_SCORE: 22
ADLS_ACUITY_SCORE: 25
ADLS_ACUITY_SCORE: 20
ADLS_ACUITY_SCORE: 23
ADLS_ACUITY_SCORE: 21
ADLS_ACUITY_SCORE: 22
DRESSING/BATHING: BATHING DIFFICULTY, ASSISTANCE 1 PERSON
ADLS_ACUITY_SCORE: 22
ADLS_ACUITY_SCORE: 23
WEAR_GLASSES_OR_BLIND: NO
ADLS_ACUITY_SCORE: 23
ADLS_ACUITY_SCORE: 25
WALKING_OR_CLIMBING_STAIRS: AMBULATION DIFFICULTY, REQUIRES EQUIPMENT;AMBULATION DIFFICULTY, ASSISTANCE 1 PERSON
ADLS_ACUITY_SCORE: 22
ADLS_ACUITY_SCORE: 22

## 2021-01-01 ASSESSMENT — MIFFLIN-ST. JEOR
SCORE: 1320.31
SCORE: 1331.19
SCORE: 1342.25
SCORE: 1360.25
SCORE: 1317.58
SCORE: 1352.25
SCORE: 1336.25
SCORE: 1354.25
SCORE: 1351.6

## 2021-01-01 ASSESSMENT — ENCOUNTER SYMPTOMS
VOMITING: 0
NAUSEA: 0
FEVER: 0
CHILLS: 0
DIARRHEA: 0

## 2021-01-06 ENCOUNTER — VIRTUAL VISIT (OUTPATIENT)
Dept: ENDOCRINOLOGY | Facility: CLINIC | Age: 86
End: 2021-01-06
Payer: MEDICARE

## 2021-01-06 DIAGNOSIS — E05.90 HYPERTHYROIDISM: Primary | ICD-10-CM

## 2021-01-06 DIAGNOSIS — I48.91 ATRIAL FIBRILLATION, UNSPECIFIED TYPE (H): ICD-10-CM

## 2021-01-06 PROCEDURE — 99443 PR PHYSICIAN TELEPHONE EVALUATION 21-30 MIN: CPT | Performed by: INTERNAL MEDICINE

## 2021-01-06 NOTE — PROGRESS NOTES
"Jose Gomez is a 89 year old male who is being evaluated via a billable telephone visit.      The patient has been notified of following:      \"This telephone visit will be conducted via a call between you and your physician/provider. We have found that certain health care needs can be provided without the need for a physical exam.  This service lets us provide the care you need with a short phone conversation.      Telephone visits are billed at different rates depending on your insurance coverage. During this emergency period, for some insurers they may be billed the same as an in-person visit.  Please reach out to your insurance provider with any questions.     If during the course of the call the physician/provider feels a telephone visit is not appropriate, you will not be charged for this service.\"     Patient has given verbal consent for Telephone visit?  Yes     What phone number would you like to be contacted at? [phone]     How would you like to obtain your AVS? Reviewed verbally      Recent issues:  Thyroid follow-up evaluation, with his daughter Armida  Not taking the methimazole medication per plan  He reports feeling pretty well overall, denies heart racing, tremors, diarrhea           1/2020. Hospitalization for tachycardia 1/26-1/31/20.  Diagnosed with atrial fibrillation with HR in the 's, also hyperthyroidism              Cardiology evaluation and procedures              ECHO showed LVEF 55-60% mildly dilated RV, moderate to severely dilated right atrium, moderately dilated left atrium, 1+ MR, and 1+ TR.              Treatment with IV diltiazem, then discontinued and digoxin started, metoprololXL continued              F/u cardiology appt plan with MARIANO Baez PAC 2/14/20  Hyperthyroidism treatment with methimazole medication 10 mg BID     Previous [facility] thyroid tests include:                  Lab Test 01/27/20  0626 01/26/20  1853 01/26/20  1501 08/26/19  1054   TSH <0.01*  --  <0.01* 1.70 "   T4 4.04*  --  5.30*  --    TPO  --  194*  --   --       Patient not aware of prior thyroid gland problems   No family history information available from patient      2/12/20. Initial thyroid evaluation with me at [place]  Reviewed health history and hyperthyroidism issues  Started methimazole medication, had taken methimazole 10 mg as 2-tabs BID   Subsequent thyroid tests showed low thyroid levels  5/2020. Discontinued methimazole medication use     Recent [facility] labs include:  Lab Results   Component Value Date    TSH 4.15 (H) 10/05/2020    T4 1.05 10/05/2020    FT3 2.2 (L) 10/05/2020     (H) 01/26/2020             Previously lived lived in a single family house with daughter Nickie and son Tye, per records  Sees Dr. Gabriel Carroll/[facility] for general medicine evaluations.           ROS: 10 point ROS neg other than the symptoms noted above in the HPI              Limited ROS information available from patient, and no other historians available today.     GENERAL: mild fatigue, wt loss denies fevers, chills, night sweats.   HEENT: no dysphagia, odonophagia, diplopia, neck pain  THYROID:  no apparent hyper or hypothyroid symptoms  CV: no recent chest pain, pressure, palpitations  LUNGS: SALVADOR; no SOB, cough, wheezing   ABDOMEN: no diarrhea, constipation, abdominal pain  EXTREMITIES: no rashes, ulcers, edema  NEUROLOGY: forgetfulness; no headaches, denies changes in vision, tingling, extremitiy numbness   MSK: no muscle aches or pains, weakness  SKIN: no rashes or lesions  : some nocturia  PSYCH:  stable mood, no significant anxiety or depression  ENDOCRINE: no heat or cold intolerance        A/P:          Encounter Diagnoses   Name       Hyperthyroidism       History of atrial fibrillation, unspecified type (H)           Comments:  Reviewed health history and the hyperthyroidism issues.  No recent hyperthyroid symptoms since stopping the methimazole medication months  ago  Reviewed and interpreted tests that I previously ordered. Ordered appropriate tests for the endocrinology disease management.   Management options discussed and implemented after shared medical decision making with the patient.        Plan:  Would not resume methimazole at this time, though need repeat thyroid lab tests  Monitor for symptom changes  Plan lab appointment soon   Check TSH, FT4, FT3, ALT   Lab orders placed    If significant rise in thyroid levels, would then restart methimazole and dose titration to normalize thyroid levels  No thyroid imaging needed at this time  Continue use of beta blocker medication for HR control  Since patient a poor historian, it is important to have a family member or friend present for followup evaluations  Will call patient (or daughter Armida) with results, also send lab letter     Addressed patient questions today     Total time spent in evaluation with patient, review of pertinent lab tests and chart notes, and documentation:  22 min    Follow-up:  7/2021      CORRINE Reyes MD, MS  Endocrinology  [facility]    CC:  BLAISE Carroll and LISANDRA Baez

## 2021-01-06 NOTE — LETTER
"    1/6/2021         RE: Jose Gomez  [address] [address] [facility] [address]        Dear Colleague,    Thank you for referring your patient, Jose Gomez, to the [facility]. Please see a copy of my visit note below.    Jose Gomez is a 89 year old male who is being evaluated via a billable telephone visit.      The patient has been notified of following:      \"This telephone visit will be conducted via a call between you and your physician/provider. We have found that certain health care needs can be provided without the need for a physical exam.  This service lets us provide the care you need with a short phone conversation.      Telephone visits are billed at different rates depending on your insurance coverage. During this emergency period, for some insurers they may be billed the same as an in-person visit.  Please reach out to your insurance provider with any questions.     If during the course of the call the physician/provider feels a telephone visit is not appropriate, you will not be charged for this service.\"     Patient has given verbal consent for Telephone visit?  Yes     What phone number would you like to be contacted at? [phone]     How would you like to obtain your AVS? Reviewed verbally      Recent issues:  Thyroid follow-up evaluation, with his daughter Armida  Not taking the methimazole medication per plan  He reports feeling pretty well overall, denies heart racing, tremors, diarrhea           1/2020. Hospitalization for tachycardia 1/26-1/31/20.  Diagnosed with atrial fibrillation with HR in the 's, also hyperthyroidism              Cardiology evaluation and procedures              ECHO showed LVEF 55-60% mildly dilated RV, moderate to severely dilated right atrium, moderately dilated left atrium, 1+ MR, and 1+ TR.              Treatment with IV diltiazem, then discontinued and digoxin started, metoprololXL continued              F/u " cardiology appt plan with MARIANO Baez PAC 2/14/20  Hyperthyroidism treatment with methimazole medication 10 mg BID     Previous [facility] thyroid tests include:                  Lab Test 01/27/20  0626 01/26/20  1853 01/26/20  1501 08/26/19  1054   TSH <0.01*  --  <0.01* 1.70   T4 4.04*  --  5.30*  --    TPO  --  194*  --   --       Patient not aware of prior thyroid gland problems   No family history information available from patient      2/12/20. Initial thyroid evaluation with me at [place]  Reviewed health history and hyperthyroidism issues  Started methimazole medication, had taken methimazole 10 mg as 2-tabs BID   Subsequent thyroid tests showed low thyroid levels  5/2020. Discontinued methimazole medication use     Recent [facility] labs include:  Lab Results   Component Value Date    TSH 4.15 (H) 10/05/2020    T4 1.05 10/05/2020    FT3 2.2 (L) 10/05/2020     (H) 01/26/2020             Previously lived lived in a single family house with daughter Nickie and son Tye, per records  Sees Dr. Gabriel Carroll/[facility] for general medicine evaluations.           ROS: 10 point ROS neg other than the symptoms noted above in the HPI              Limited ROS information available from patient, and no other historians available today.     GENERAL: mild fatigue, wt loss denies fevers, chills, night sweats.   HEENT: no dysphagia, odonophagia, diplopia, neck pain  THYROID:  no apparent hyper or hypothyroid symptoms  CV: no recent chest pain, pressure, palpitations  LUNGS: SALVADOR; no SOB, cough, wheezing   ABDOMEN: no diarrhea, constipation, abdominal pain  EXTREMITIES: no rashes, ulcers, edema  NEUROLOGY: forgetfulness; no headaches, denies changes in vision, tingling, extremitiy numbness   MSK: no muscle aches or pains, weakness  SKIN: no rashes or lesions  : some nocturia  PSYCH:  stable mood, no significant anxiety or depression  ENDOCRINE: no heat or cold intolerance        A/P:          Encounter  Diagnoses   Name       Hyperthyroidism       History of atrial fibrillation, unspecified type (H)           Comments:  Reviewed health history and the hyperthyroidism issues.  No recent hyperthyroid symptoms since stopping the methimazole medication months ago  Reviewed and interpreted tests that I previously ordered. Ordered appropriate tests for the endocrinology disease management.   Management options discussed and implemented after shared medical decision making with the patient.        Plan:  Would not resume methimazole at this time, though need repeat thyroid lab tests  Monitor for symptom changes  Plan lab appointment soon   Check TSH, FT4, FT3, ALT   Lab orders placed    If significant rise in thyroid levels, would then restart methimazole and dose titration to normalize thyroid levels  No thyroid imaging needed at this time  Continue use of beta blocker medication for HR control  Since patient a poor historian, it is important to have a family member or friend present for followup evaluations  Will call patient (or daughter Armida) with results, also send lab letter     Addressed patient questions today     Total time spent in evaluation with patient, review of pertinent lab tests and chart notes, and documentation:  22 min    Follow-up:  7/2021      CORRINE Reyes MD, MS  Endocrinology  [facility]    CC:  BLAISE Carroll and LISANDRA Baez            Again, thank you for allowing me to participate in the care of your patient.        Sincerely,        Medhat Reyes MD

## 2021-01-07 DIAGNOSIS — E78.5 HYPERLIPIDEMIA LDL GOAL <100: ICD-10-CM

## 2021-01-07 NOTE — LETTER
January 8, 2021    Jose Gomez  [address] [facility] [address]        Dear Jose,    While refilling your prescription today, we noticed that you are due to have LABS DRAWN.  We will refill your prescription for 90 days, but that appointment must be made before any additional refills can be approved.     Taking care of your health is important to us and we look forward to seeing you in the near future.  Please call us at [phone] or [phone] (or use [employer]) to schedule.  Please disregard this notice if you have already made an appointment.        Sincerely,          [facility] Team

## 2021-01-08 RX ORDER — SIMVASTATIN 20 MG
20 TABLET ORAL AT BEDTIME
Qty: 90 TABLET | Refills: 0 | Status: SHIPPED | OUTPATIENT
Start: 2021-01-08 | End: 2021-04-08

## 2021-04-07 DIAGNOSIS — E78.5 HYPERLIPIDEMIA LDL GOAL <100: ICD-10-CM

## 2021-04-07 DIAGNOSIS — I48.91 ATRIAL FIBRILLATION WITH RVR (H): ICD-10-CM

## 2021-04-07 RX ORDER — DIGOXIN 125 MCG
62.5 TABLET ORAL DAILY
Qty: 45 TABLET | Refills: 3 | Status: SHIPPED | OUTPATIENT
Start: 2021-04-07 | End: 2021-07-01

## 2021-04-07 NOTE — LETTER
April 9, 2021    Jose Gomez  [address] [address] [facility] [address]        Dear Jose,    While refilling your prescription today, we noticed that you are due to have LABS DRAWN and an IN PERSON visit with your Provider.  We will refill your prescription for 90 days, but that appointment must be made before any additional refills can be approved.     Taking care of your health is important to us and we look forward to seeing you in the near future.  Please call us at [phone] or [phone] (or use [employer]) to schedule.  Please disregard this notice if you have already made an appointment.        Sincerely,          [facility] Team

## 2021-04-08 RX ORDER — SIMVASTATIN 20 MG
TABLET ORAL
Qty: 90 TABLET | Refills: 0 | Status: SHIPPED | OUTPATIENT
Start: 2021-04-08 | End: 2021-07-01

## 2021-07-01 DIAGNOSIS — I48.0 PAROXYSMAL ATRIAL FIBRILLATION (H): ICD-10-CM

## 2021-07-01 DIAGNOSIS — I48.91 ATRIAL FIBRILLATION WITH RVR (H): ICD-10-CM

## 2021-07-01 DIAGNOSIS — E78.5 HYPERLIPIDEMIA LDL GOAL <100: ICD-10-CM

## 2021-07-01 RX ORDER — DIGOXIN 125 MCG
62.5 TABLET ORAL DAILY
Qty: 45 TABLET | Refills: 0 | Status: SHIPPED | OUTPATIENT
Start: 2021-07-01 | End: 2021-01-01

## 2021-07-01 RX ORDER — METOPROLOL SUCCINATE 25 MG/1
TABLET, EXTENDED RELEASE ORAL
Qty: 270 TABLET | Refills: 0 | Status: SHIPPED | OUTPATIENT
Start: 2021-07-01 | End: 2021-01-01

## 2021-07-01 RX ORDER — SIMVASTATIN 20 MG
20 TABLET ORAL AT BEDTIME
Qty: 90 TABLET | Refills: 0 | Status: SHIPPED | OUTPATIENT
Start: 2021-07-01 | End: 2021-01-01

## 2021-07-01 NOTE — TELEPHONE ENCOUNTER
Simvastatin  Routing refill request to provider for review/approval because:  Labs not current:  LDL      Digoxin   Routing refill request to provider for review/approval because:  Labs not current:  Digoxin, CR  Patient needs to be seen because it has been more than 6 months since last office visit.          Metoprolol  Routing refill request to provider for review/approval because:  BP not at goal

## 2021-07-19 ENCOUNTER — LAB (OUTPATIENT)
Dept: LAB | Facility: CLINIC | Age: 86
End: 2021-07-19
Payer: MEDICARE

## 2021-07-19 DIAGNOSIS — I48.19 PERSISTENT ATRIAL FIBRILLATION (H): ICD-10-CM

## 2021-07-19 PROCEDURE — 80048 BASIC METABOLIC PNL TOTAL CA: CPT

## 2021-07-19 PROCEDURE — 36415 COLL VENOUS BLD VENIPUNCTURE: CPT

## 2021-07-20 ENCOUNTER — DOCUMENTATION ONLY (OUTPATIENT)
Dept: CARDIOLOGY | Facility: CLINIC | Age: 86
End: 2021-07-20

## 2021-07-20 LAB
ANION GAP SERPL CALCULATED.3IONS-SCNC: 8 MMOL/L (ref 3–14)
BUN SERPL-MCNC: 30 MG/DL (ref 7–30)
CALCIUM SERPL-MCNC: 9.2 MG/DL (ref 8.5–10.1)
CHLORIDE BLD-SCNC: 107 MMOL/L (ref 94–109)
CO2 SERPL-SCNC: 21 MMOL/L (ref 20–32)
CREAT SERPL-MCNC: 1.64 MG/DL (ref 0.66–1.25)
GFR SERPL CREATININE-BSD FRML MDRD: 36 ML/MIN/1.73M2
GLUCOSE BLD-MCNC: 129 MG/DL (ref 70–99)
POTASSIUM BLD-SCNC: 4.1 MMOL/L (ref 3.4–5.3)
SODIUM SERPL-SCNC: 136 MMOL/L (ref 133–144)

## 2021-07-20 NOTE — PROGRESS NOTES
Pls let daughter Armida know that Vincent's BMP looks fine - Cr is still high but improved from previous.    He was to have an appt with me 7/23 but it was cancelled - do they want to reschedule? If he's doing well without CV issues, can postpone for 3-6 months from my perspective.    Papo Flores

## 2021-07-20 NOTE — PROGRESS NOTES
7/20/21 Msg recd from Sandra MARADIAGA    Pls let daughter Armida know that Vincent's BMP looks fine - Cr is still high but improved from previous.     He was to have an appt with me 7/23 but it was cancelled - do they want to reschedule? If he's doing well without CV issues, can postpone for 3-6 months from my perspective.    Spoke w daughter Armida and explained results and recommendations . She states she had to cancel d/t her work schedule. She states she thinks her dad is doing fine and does not need to be seen right now. Given scheduling phone # to call and re-schedule in a few months or call sooner if problems develop.  She voiced understanding and agreement with plan.   Anish 925 am
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
## 2021-08-20 NOTE — ED TRIAGE NOTES
"Per daughter, patient \"stubbed right foot\" last week and now has severe pain, swelling and redness at site. Sent from urgent care d/t concern for infection.  "

## 2021-08-23 NOTE — TELEPHONE ENCOUNTER
What type of discharge? Emergency Department  Risk of Hospital admission or ED visit: 60%  Is a TCM episode required? No  When should the patient follow up with PCP? within 30 days of discharge.    Marilee Marrero RN  [facility]

## 2021-08-24 NOTE — TELEPHONE ENCOUNTER
Patient's daughter called clinic to let us know that she was able to make an appointment with a different podiatrist on Sept 17th.    Also encouraged daughter to schedule f/u with PCP to check on cellulitis.  Currently patient is on Keflex.  Foot is still red.  Foot is warm, nailbeds pink, edema is slightly improved but still present.  Afebrile.      Scheduled PCP follow-up for Sept 1st with Dr Swan.      Advised daughter to call sooner or go to UC/Ed if patient develops fever, foot becomes increasingly red, more swollen, nailbeds abnormal or foot not warm.      Patricia Villanueva, MSN, RN   [facility] Triage

## 2021-08-24 NOTE — PROGRESS NOTES
SUBJECTIVE: Jose Gomez is a 89 year old male presenting with a chief complaint of foot redness pain.  Onset of symptoms was day(s) ago.  Past Medical History:   Diagnosis Date     CKD (chronic kidney disease) stage 3, GFR 30-59 ml/min      Esophageal reflux     very mild     Essential hypertension, benign      Hypertensive emergency 2018     Hyperthyroidism      Mixed hyperlipidemia      Paroxysmal atrial fibrillation (H) 2018     Pernicious anemia 3/19/2008     Type 2 diabetes, HbA1c goal < 7% (H)      Unspecified internal derangement of knee     LEFT     Allergies   Allergen Reactions     No Known Drug Allergies      Social History     Tobacco Use     Smoking status: Former Smoker     Types: Cigars     Quit date: 5/3/1984     Years since quittin.3     Smokeless tobacco: Never Used   Substance Use Topics     Alcohol use: Yes     Comment: 1-2x per month       ROS:  SKIN: no rash  GI: no vomiting    OBJECTIVE:  BP (!) 134/95   Pulse 63   Resp 20   SpO2 98% GENERAL APPEARANCE: healthy, alert and no distress  NEURO: Normal strength and tone, sensory exam grossly normal,  normal speech and mentation  SKIN: foot redness      ICD-10-CM    1. Cellulitis of right lower extremity  L03.115    2. Type 2 diabetes mellitus with diabetic polyneuropathy, without long-term current use of insulin (H)  E11.42        Fluids/Rest, f/u if worse/not any better

## 2021-08-24 NOTE — TELEPHONE ENCOUNTER
"  ED for acute condition Discharge Protocol    \"Hi, my name is Akira Samano RN, a registered nurse, and I am calling from [facility].  I am calling to follow up and see how things are going for you after your recent emergency visit.\"    Tell me how you are doing now that you are home?\" feeling okay, no pain       Discharge Instructions    \"Let's review your discharge instructions.  What is/are the follow-up recommendations?  Pt. Response: pt to schedule Podiatry     \"Has an appointment with your primary care provider been scheduled?\"  No will call back to schedule      Medications    \"Tell me what changed about your medicines when you discharged?\"    Started an antibiotic     \"What questions do you have about your medications?\"   None        Call Summary    \"What questions or concerns do you have about your recent visit and your follow-up care?\"     Patient educated on elevating foot and keeping splint on if ambulating     \"If you have questions or things don't continue to improve, we encourage you contact us through the main clinic number (give number).  Even if the clinic is not open, triage nurses are available 24/7 to help you.     We would like you to know that our clinic has extended hours (provide information).  We also have urgent care (provide details on closest location and hours/contact info)\"    \"Thank you for your time and take care!\"      "

## 2021-08-24 NOTE — TELEPHONE ENCOUNTER
"Received the following message inbasket:    \"The attached patient's daughter (Armida) scheduled an appointment for her father for the 1st available with Dr. Ledezma on 9/17.  Daughter is requesting an earlier appointment if possible.  Armida can be reached before 1:30 pm each day at [phone] (land line).  Please do not leave message at this number.       Thanks!     Consuelo Will\"    Called patient's daughter Armida to offer opening with  next Monday 8/30/21.  Armida stated she has to work that day so she could not bring her father in. She wants to keep her already scheduled 9/17/21 appointment.    Encouraged Armida to follow up with father's PCP regarding cellulitis. She states she spoke with an RN this morning and scheduled an appointment for her father with another provider at his PCP's clinic    Kaia Mayorga ATC        "
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
## 2021-09-01 NOTE — PROGRESS NOTES
Assessment & Plan     Encounter for examination following treatment at hospital  Closed nondisplaced fracture of fifth metatarsal bone of right foot with routine healing, subsequent encounter  Still some obvious redness on his foot. Procal and WBC negative in ER but sounds like clinical exam led them to treat for cellulitis. Reports some improvement with the antibiotics though hard to say if that was just healing from fracture or if it was due to antibiotics. Asked them to oniel the redness daily to ensure it isn't spreading, and if it is, then to call in as he may need another round of antibiotics at that point. Given report that his redness and swelling have continued to improve during the 4 days he's been off antibiotics, will hold off on any further antibiotics for now. Encouraged to keep podiatry appt.    Stage 3b chronic kidney disease  Known issue that I take into account for their medical decisions, no current exacerbations or new concerns.    F/u with podiatry as scheduled    Mckinley Osei MD  [facility]    Kristi Garcia is a 89 year old who presents alongside his daughter for ER f/u. Presented to ER Aug 20, found to have fracture foot. Placed on Keflex for cellulitis. Reports he took the whole course of Keflex with some improvement in his redness. They feel his redness has largely continued to improve in the 4 days he's been off Keflex. Stopped using the splint due to preference. Has podiatry f/u in a couple of weeks.    Review of Systems   Constitutional, MSK, derm systems are negative, except as otherwise noted.      Objective    BP (!) 130/90   Pulse 60   Temp 97  F (36.1  C) (Tympanic)   Wt 69.7 kg (153 lb 9.6 oz)   SpO2 95%   BMI 22.04 kg/m    Body mass index is 22.04 kg/m .     Physical Exam   GENERAL: alert and in no distress.  EYES: conjunctivae/corneas clear. EOMs grossly intact  HENT: NC/AT, facies symmetric.  RESP: No iWOB.  MSK: Uses walker  SKIN:  Purple discoloration of R dorsal foot from midfoot up to mid ankle. 1+ edema in that area. Not TTP.  NEURO: Alert. Oriented.

## 2021-09-17 NOTE — PATIENT INSTRUCTIONS
Thank you for choosing [facility] Podiatry / Foot & Ankle Surgery!    DR. MEYER'S CLINIC LOCATIONS     [facility] SCHEDULE SURGERY: [phone]   [address] [place] [address] APPOINTMENTS: [phone]   [place] [address] BILLING QUESTIONS: [phone]  [phone] RADIOLOGY: [phone]       [place]    [id number]    [place] [address] [phone]  [phone]      Follow up: 4 weeks    [facility] RADIOLOGY SCHEDULING  They should be calling you within 24 hours to schedule your scan.  If not, please call the location discussed at your appointment.    1) [facility]:       [phone]      [address] [place] [address]    2) [facility]:      [phone]      [address] [address]    3) [facility]:       [phone]      [address] [place] [address]    * We will call and/or mychart you with the results. Please allow 24-48 hours for the results to be read and final.        ROUTINE FOOT CARE (NAIL TRIMMING / CALLUSES)    Go to afcna.org ([employer]) and search for providers near you.  Otherwise, this is a list we have gathered of  recommended locations/providers in [place].    [facility]   [phone]   [facility]  [phone]  [email or web address]   [facility]  [phone]  [place] + [place] mile [place] [facility]  [phone]  [facility].   Joanna Fontaine, DPM  [address] [place] [address]  [phone] Natanael Sethi, CARLOS  [address] [place] [address] [phone]   [facility]  [phone] [address] [place] [address]  [facility]  Dr. Medhat Cage  [phone]  [facility]  [phone]  [facility]  (does not take BCBS) FYI:  *Some providers accept insurance while others are out of pocket. Please contact them for details*      Wound Care Recommendations:    1)  Keep the wound covered by a bandage when bathing.    2)  Gently clean the wound with soap water, separate from bath/shower water.      3)  Each day, apply a topical antibiotic ointment to the wound (Neosporin, Triple antibiotic, Bacitracin).   Cover with large band-aid or gauze.      5)  Please seek immediate medical attention if any increasing redness, drainage, smell, or pain related to the wound.     6)  Please return to clinic in the period of time requested by Dr. Ledezma.

## 2021-09-17 NOTE — LETTER
"    9/17/2021         RE: Jose Gomez  [address] [address] [facility] [address]        Dear Colleague,    Thank you for referring your patient, Jose Gomez, to the [facility]. Please see a copy of my visit note below.    XR fifth met fracture right  No pain  5 weeks out  No indication for a boot  Nails trimmed and foot care list provided  Dry scab or wound lateral right fifth metatarsal head  Follow-up in 1 month for check of wound  Nonpalpable pedal pulses  Noninvasive vascular studies    ASSESSMENT:  Encounter Diagnoses   Name Primary?     Nondisplaced fracture of fifth metatarsal bone, right foot, initial encounter for closed fracture Yes     Ulcer of right foot, unspecified ulcer stage (H)      PAD (peripheral artery disease) (H)      Type 2 diabetes mellitus with diabetic polyneuropathy, without long-term current use of insulin (H)      MEDICAL DECISION MAKING:  Low the x-rays are unchanged, Jose is having no pain.  He did not tolerate the boot and does not see a reason to wear it at this point.  I agree.  The injury was 5 weeks ago.    I was not able to readily palpate his pedal pulses and I do think noninvasive vascular studies are indicated.  Nonetheless, he denies rest pain.  He denies calf pain when walking.    SOTERO/ultrasound ordered    Due to the wound on the right foot, I have asked him to return in a month for me to check.    Using a nail nippers, all 10 nails were debrided today.  There is no bleeding.     Disclaimer: This note consists of symbols derived from keyboarding, dictation and/or voice recognition software. As a result, there may be errors in the script that have gone undetected. Please consider this when interpreting information found in this chart.    Gerson Ledezma DPM, FACFAS, MS    [facility]/Foot & Ankle Surgery      ____________________________________________________________________    HPI:         Chief Complaint: \"a " "fractured toe\"  He is accompanied by his daughter.  He injured the foot 5 weeks ago.  Presented to urgent care a week later.  He was provided an Aircast and ultimately stopped wearing it.  He is not currently having pain.  They seek advice regarding toenail care.    Type 2 diabetes  Peripheral neuropathy  Last hemoglobin A1c seen in epic is 6.5  *  Past Medical History:   Diagnosis Date     CKD (chronic kidney disease) stage 3, GFR 30-59 ml/min      Esophageal reflux     very mild     Essential hypertension, benign      Hypertensive emergency 4/26/2018     Hyperthyroidism      Mixed hyperlipidemia      Paroxysmal atrial fibrillation (H) 05/28/2018     Pernicious anemia 3/19/2008     Type 2 diabetes, HbA1c goal < 7% (H)      Unspecified internal derangement of knee     LEFT   *  *  Past Surgical History:   Procedure Laterality Date     COLONOSCOPY       NO HISTORY OF SURGERY       PHACOEMULSIFICATION CLEAR CORNEA WITH STANDARD INTRAOCULAR LENS IMPLANT  9/23/2013    Procedure: PHACOEMULSIFICATION CLEAR CORNEA WITH STANDARD INTRAOCULAR LENS IMPLANT;  RIGHT PHACOEMULSIFICATION CLEAR CORNEA WITH STANDARD INTRAOCULAR LENS IMPLANT ;  Surgeon: Hieu Jaimes MD;  Location: [facility]     PHACOEMULSIFICATION CLEAR CORNEA WITH STANDARD INTRAOCULAR LENS IMPLANT  10/14/2013    Procedure: PHACOEMULSIFICATION CLEAR CORNEA WITH STANDARD INTRAOCULAR LENS IMPLANT;  LEFT PHACOEMULSIFICATION CLEAR CORNEA WITH STANDARD INTRAOCULAR LENS IMPLANT ;  Surgeon: Hieu Jaimes MD;  Location: [facility]   *  *  Current Outpatient Medications   Medication Sig Dispense Refill     Acetaminophen (TYLENOL PO) Take 1,000 mg by mouth every 6 hours as needed        Apoaequorin (PREVAGEN PO) Take 1 tablet by mouth daily       digoxin (LANOXIN) 125 MCG tablet Take 0.5 tablets (62.5 mcg) by mouth daily 45 tablet 0     metoprolol succinate ER (TOPROL-XL) 25 MG 24 hr tablet Take 2 tabs (50 mg) in AM and 1 tab (25 mg) in  tablet 0     " simvastatin (ZOCOR) 20 MG tablet Take 1 tablet (20 mg) by mouth At Bedtime 90 tablet 0     vitamin B-12 500 MCG PO tablet            EXAM:    Vitals: /80   Wt 69.4 kg (153 lb)   BMI 21.95 kg/m    BMI: Body mass index is 21.95 kg/m .    Constitutional:  Jose Gomez is in no apparent distress, appears well-nourished.  Cooperative with history and physical exam.    Diabetic Foot Exam (details below):  normal DP and PT pulses, no trophic changes or ulcerative lesions and normal sensory exam    Vascular:  Pedal pulses are not readily palpable for both the DP and PT arteries.  CFT < 3 sec.  No edema.      Neuro: Light touch sensation is intact to the L4, L5, S1 distributions  No evidence of weakness, spasticity, or contracture in the lower extremities.     Derm: Normal texture and turgor.  No erythema, ecchymosis, or cyanosis.  No open lesions.     Toenails are elongated, some deformed and dystrophic.  A couple nails are curling downwards to the point they are digging into the tip of the toe.    There is a 1.5 cm in length by 0.5 cm with elevated dry scab overlying the lateral aspect of the right fifth metatarsal head.  This appears more consistent with injury than an ischemic wound.  No associated clinical signs of infection.        Musculoskeletal:    Lower extremity muscle strength is normal.  Flatfoot structure.  No pain on palpation over the right fifth metatarsal.  No pain with eversion of the right foot against resistance.    X-Ray Findings:   FOOT RIGHT THREE OR MORE VIEWS    9/17/2021 3:36 PM      HISTORY: Nondisplaced fracture of fifth metatarsal bone, right foot,  initial encounter for closed fracture.     COMPARISON: 8/20/2021.                                                                      IMPRESSION: No significant change in position or alignment of  obliquely oriented fracture proximal fifth metatarsal metaphysis. Pes  planus and midfoot degenerative changes. Osteopenia.     JIL AMAYA,  MD           Again, thank you for allowing me to participate in the care of your patient.        Sincerely,        Gerson Ledezma DPM

## 2021-09-17 NOTE — PROGRESS NOTES
"ASSESSMENT:  Encounter Diagnoses   Name Primary?     Nondisplaced fracture of fifth metatarsal bone, right foot, initial encounter for closed fracture Yes     Ulcer of right foot, unspecified ulcer stage (H)      PAD (peripheral artery disease) (H)      Type 2 diabetes mellitus with diabetic polyneuropathy, without long-term current use of insulin (H)      MEDICAL DECISION MAKING:  Low the x-rays are unchanged, Jose is having no pain.  He did not tolerate the boot and does not see a reason to wear it at this point.  I agree.  The injury was 5 weeks ago.    I was not able to readily palpate his pedal pulses and I do think noninvasive vascular studies are indicated.  Nonetheless, he denies rest pain.  He denies calf pain when walking.    SOTERO/ultrasound ordered    Due to the wound on the right foot, I have asked him to return in a month for me to check.    Using a nail nippers, all 10 nails were debrided today.  There is no bleeding.     Disclaimer: This note consists of symbols derived from keyboarding, dictation and/or voice recognition software. As a result, there may be errors in the script that have gone undetected. Please consider this when interpreting information found in this chart.    Gerson Ledezma DPM, FACFAS, MS    [facility]      ____________________________________________________________________    HPI:         Chief Complaint: \"a fractured toe\"  He is accompanied by his daughter.  He injured the foot 5 weeks ago.  Presented to urgent care a week later.  He was provided an Aircast and ultimately stopped wearing it.  He is not currently having pain.  They seek advice regarding toenail care.    Type 2 diabetes  Peripheral neuropathy  Last hemoglobin A1c seen in epic is 6.5  *  Past Medical History:   Diagnosis Date     CKD (chronic kidney disease) stage 3, GFR 30-59 ml/min      Esophageal reflux     very mild     Essential hypertension, benign      Hypertensive " emergency 4/26/2018     Hyperthyroidism      Mixed hyperlipidemia      Paroxysmal atrial fibrillation (H) 05/28/2018     Pernicious anemia 3/19/2008     Type 2 diabetes, HbA1c goal < 7% (H)      Unspecified internal derangement of knee     LEFT   *  *  Past Surgical History:   Procedure Laterality Date     COLONOSCOPY       NO HISTORY OF SURGERY       PHACOEMULSIFICATION CLEAR CORNEA WITH STANDARD INTRAOCULAR LENS IMPLANT  9/23/2013    Procedure: PHACOEMULSIFICATION CLEAR CORNEA WITH STANDARD INTRAOCULAR LENS IMPLANT;  RIGHT PHACOEMULSIFICATION CLEAR CORNEA WITH STANDARD INTRAOCULAR LENS IMPLANT ;  Surgeon: Hieu Jaimes MD;  Location: [facility]     PHACOEMULSIFICATION CLEAR CORNEA WITH STANDARD INTRAOCULAR LENS IMPLANT  10/14/2013    Procedure: PHACOEMULSIFICATION CLEAR CORNEA WITH STANDARD INTRAOCULAR LENS IMPLANT;  LEFT PHACOEMULSIFICATION CLEAR CORNEA WITH STANDARD INTRAOCULAR LENS IMPLANT ;  Surgeon: Hieu Jaimes MD;  Location: [facility]   *  *  Current Outpatient Medications   Medication Sig Dispense Refill     Acetaminophen (TYLENOL PO) Take 1,000 mg by mouth every 6 hours as needed        Apoaequorin (PREVAGEN PO) Take 1 tablet by mouth daily       digoxin (LANOXIN) 125 MCG tablet Take 0.5 tablets (62.5 mcg) by mouth daily 45 tablet 0     metoprolol succinate ER (TOPROL-XL) 25 MG 24 hr tablet Take 2 tabs (50 mg) in AM and 1 tab (25 mg) in  tablet 0     simvastatin (ZOCOR) 20 MG tablet Take 1 tablet (20 mg) by mouth At Bedtime 90 tablet 0     vitamin B-12 500 MCG PO tablet            EXAM:    Vitals: /80   Wt 69.4 kg (153 lb)   BMI 21.95 kg/m    BMI: Body mass index is 21.95 kg/m .    Constitutional:  Jose Gomez is in no apparent distress, appears well-nourished.  Cooperative with history and physical exam.    Diabetic Foot Exam (details below):  normal DP and PT pulses, no trophic changes or ulcerative lesions and normal sensory exam    Vascular:  Pedal pulses are not readily  palpable for both the DP and PT arteries.  CFT < 3 sec.  No edema.      Neuro: Light touch sensation is intact to the L4, L5, S1 distributions  No evidence of weakness, spasticity, or contracture in the lower extremities.     Derm: Normal texture and turgor.  No erythema, ecchymosis, or cyanosis.  No open lesions.     Toenails are elongated, some deformed and dystrophic.  A couple nails are curling downwards to the point they are digging into the tip of the toe.    There is a 1.5 cm in length by 0.5 cm with elevated dry scab overlying the lateral aspect of the right fifth metatarsal head.  This appears more consistent with injury than an ischemic wound.  No associated clinical signs of infection.        Musculoskeletal:    Lower extremity muscle strength is normal.  Flatfoot structure.  No pain on palpation over the right fifth metatarsal.  No pain with eversion of the right foot against resistance.    X-Ray Findings:   FOOT RIGHT THREE OR MORE VIEWS    9/17/2021 3:36 PM      HISTORY: Nondisplaced fracture of fifth metatarsal bone, right foot,  initial encounter for closed fracture.     COMPARISON: 8/20/2021.                                                                      IMPRESSION: No significant change in position or alignment of  obliquely oriented fracture proximal fifth metatarsal metaphysis. Pes  planus and midfoot degenerative changes. Osteopenia.     JIL AMAYA MD

## 2021-09-26 PROBLEM — R29.6 RECURRENT FALLS: Status: ACTIVE | Noted: 2021-01-01

## 2021-09-26 PROBLEM — N39.0 URINARY TRACT INFECTION WITHOUT HEMATURIA, SITE UNSPECIFIED: Status: ACTIVE | Noted: 2021-01-01

## 2021-09-26 PROBLEM — F03.90 DEMENTIA WITHOUT BEHAVIORAL DISTURBANCE, UNSPECIFIED DEMENTIA TYPE: Status: ACTIVE | Noted: 2021-01-01

## 2021-09-26 NOTE — ED TRIAGE NOTES
Per daughter; pt has been having some falls.  Pt has also been using the bathroom more.  Daughter concerned for UTI.

## 2021-09-26 NOTE — ED NOTES
[facility]  ED Nurse Handoff Report    ED Chief complaint: Fall      ED Diagnosis:   Final diagnoses:   Urinary tract infection without hematuria, site unspecified   Recurrent falls   Dementia without behavioral disturbance, unspecified dementia type (H)       Code Status: Hospital MD to address     Allergies:   Allergies   Allergen Reactions     No Known Drug Allergies        Patient Story: Pt presents from home where he lives with his daughter. Pt reports has some memory impairment and she states yesterday patient fell multiple times. She states she went to check on him and each time he would be laying on the ground. Pt denies falling and denies injuries. Pt does have a small bruise to the L forehead- Head CT performed and negative. Pt very foul odor of urine and when RN went to do cath noticed his groin is very excoriated and looks like he is incontinent often. Pt also had stool on his bottom which appeared old. Cath urine showing UTI- given antibiotics. Daughter states he is supposed to use a walker but doesn't always  Focused Assessment:  Orientated to self, otherwise confused    Treatments and/or interventions provided: antibiotics, cath urine, blood work   Patient's response to treatments and/or interventions: tolerating well     To be done/followed up on inpatient unit:  inpt orders    Does this patient have any cognitive concerns?: none    Activity level - Baseline/Home:  Stand with Assist, Cane and Walker  Activity Level - Current:   Stand with assist x2 and Total Care    Patient's Preferred language: English   Needed?: No    Isolation: None  Infection: Not Applicable  Patient tested for COVID 19 prior to admission: YES  Bariatric?: No    Vital Signs:   Vitals:    09/26/21 1510 09/26/21 1615 09/26/21 1630 09/26/21 1645   BP:       Pulse: 66      Resp:       Temp:       TempSrc:       SpO2: 96% 97% 96% 96%   Weight:           Cardiac Rhythm:     Was the PSS-3 completed:    Yes  What interventions are required if any?               Family Comments: daughter at bedside  OBS brochure/video discussed/provided to patient/family: N/A              Name of person given brochure if not patient: n/a              Relationship to patient: n/a    For the majority of the shift this patient's behavior was Green.   Behavioral interventions performed were none.    ED NURSE PHONE NUMBER: *[id number]

## 2021-09-26 NOTE — ED NOTES
RN performed straight cath for urine and when pants are pulled down his groin is very excoriated and raw. Pt denies pain. Also dried stool present to inner thighs and buttock and this was cleaned by the RN. Daughter, whom the patient lives with, states the pt does not let her change him so she was unaware of the skin breakdown.

## 2021-09-26 NOTE — TELEPHONE ENCOUNTER
Triage call  Daughter called to report fell yesterday 3 times he is having frequency sitting on the toilet most of the day denies constipation. Denies  Fever.  Daughter states he  has dementia.    Per protocol  See PCP in 24 hours or 2nd level triage.  Care advice given.  Verbalizes understanding and agrees with plan.  Pageerasmo Osei called back and said to send patient to urgent care.  Called daughter back and she has to get a friend to help and will bring him in as soon as her friend can help. She will be bringing him to the [facility] urgent care     Lizzie Atkinson RN   [facility] Nurse Advisor  8:44 AM 9/26/2021      Reason for Disposition    [1] Can't control passage of urine (i.e., urinary incontinence) AND [2] new onset (< 2 weeks) or worsening    Additional Information    Negative: Shock suspected (e.g., cold/pale/clammy skin, too weak to stand, low BP, rapid pulse)    Negative: Sounds like a life-threatening emergency to the triager    Negative: [1] Unable to urinate (or only a few drops) > 4 hours AND     [2] bladder feels very full (e.g., palpable bladder or strong urge to urinate)    Negative: Followed a genital area injury    Negative: Followed a genital area injury (penis, scrotum)    Negative: Vaginal discharge    Negative: Pus (white, yellow) or bloody discharge from end of penis    Negative: [1] Taking antibiotic for urinary tract infection (UTI) AND [2] female    Negative: [1] Taking antibiotic for urinary tract infection (UTI) AND [2] male    Negative: [1] Discomfort (pain, burning or stinging) when passing urine AND [2] pregnant    Negative: [1] Discomfort (pain, burning or stinging) when passing urine AND [2] postpartum (from 0 to 6 weeks after delivery)    Negative: [1] Discomfort (pain, burning or stinging) when passing urine AND [2] female    Negative: [1] Discomfort (pain, burning or stinging) when passing urine AND [2] male    Negative: Pain or itching in the vulvar  area    Negative: Pain in scrotum is main symptom    Negative: Blood in the urine is main symptom    Negative: Symptoms arising from use of a urinary catheter (Barry or Coude)    Negative: [1] Decreased urination and [2] drinking very little AND [2] dehydration suspected (e.g., dark urine, no urine > 12 hours, very dry mouth, very lightheaded)    Negative: Patient sounds very sick or weak to the triager    Negative: Fever > 100.4 F (38.0 C)    Negative: Side (flank) or lower back pain present    Protocols used: URINARY SYMPTOMS-A-AH
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
## 2021-09-26 NOTE — PHARMACY-ADMISSION MEDICATION HISTORY
Pharmacy Medication History  Admission medication history interview status for the 9/26/2021  admission is complete. See EPIC admission navigator for prior to admission medications     Location of Interview: Patient room  Medication history sources: Patient's family/friend (daughter)    Significant changes made to the medication list:      In the past week, patient estimated taking medication this percent of the time: greater than 90%    Additional medication history information:       Medication reconciliation completed by provider prior to medication history? No    Time spent in this activity: 15min    Prior to Admission medications    Medication Sig Last Dose Taking? Auth Provider   Acetaminophen (TYLENOL PO) Take 1,000 mg by mouth every 6 hours as needed  prn at prn Yes Reported, Patient   Apoaequorin (PREVAGEN PO) Take 1 tablet by mouth daily 9/26/2021 at Unknown time Yes Reported, Patient   digoxin (LANOXIN) 125 MCG tablet Take 0.5 tablets (62.5 mcg) by mouth daily 9/26/2021 at Unknown time Yes Gabriel Carroll MD   metoprolol succinate ER (TOPROL-XL) 25 MG 24 hr tablet Take 2 tabs (50 mg) in AM and 1 tab (25 mg) in PM 9/26/2021 at AM Yes Gabriel Carroll MD   simvastatin (ZOCOR) 20 MG tablet Take 1 tablet (20 mg) by mouth At Bedtime 9/25/2021 at Unknown time Yes Gabriel Carroll MD   vitamin B-12 500 MCG PO tablet Take 1 tablet by mouth daily  9/26/2021 at Unknown time Yes Reported, Patient       The information provided in this note is only as accurate as the sources available at the time of update(s)

## 2021-09-26 NOTE — H&P
[facility]    History and Physical - Hospitalist Service       Date of Admission:  9/26/2021  PRIMARY CARE PROVIDER:    Gabriel Carroll    Assessment & Plan   Jose Gomez is a 89 year old male admitted on 9/26/2021.    Past medical history significant for Dementia, CKD stage III, HTN, HLP, Persistent atrial fib, Hyperthyroidism, DM2, known right 5th metatarsal closed fracture who was registered to observation due to UTI with frequent falls.      Patient resides with his daughter and was brought into the ED due to frequent falls and concern for possible UTI.  The daughter reported that within the last 48 hours she has found the patient on the floor of his bedroom about 8 times.  She also indicated that he has had increased urinary frequency and incontinence.      Work-up in the ED included a CBC with differential that revealed a WBC of 11.3, HGB of 14.2, PLT of 134, abs immature granulocytes of 0.1 and abs neutrophils of 8.8 otherwise within normal limits.  A CMP revealed a creatinine of 1.87, GFR of 30, BUN of 37, CO2 of 19, albumin of 3.3, total protein of 2.4 and glucose of 119 otherwise within normal limits.  Troponin I was 0.023.  UA was orange colored and cloudy with a pH of 8.0, protein albumin of 300, positive nitrite, large amount of blood and large leukocyte esterase with a WBC and RBC > 182 and noted bacteria present with WBC clumps.  COVID-19 screening was negative.  Blood cultures were taken and in process.  EKG with atrial fib.  Head CT was negative for acute changes and unchanged from previous study with noted brain atrophy and presumed chronic microvascular ischemic changes.  Patient received 1 g of ceftriaxone.  Urine culture was ordered in the ED.      UTI  Frequent falls  - Continue IV Ceftriaxone.    - Follow blood and urine culture results.    - PT consult requested.    - Fall precautions.    - Trend fever and WBC.      Dementia  Per patient's daughter he has been  at his baseline mentation.    - Hold PTA Prevagen daily.  Resume at discharge.      CKD stage III  Baseline creatinine around 1.65-1.75 with GFR in the 30's.   - Monitor.     - IVF with NS at 75 ml/hr for 12 hours.    - Encourage PO Fluids.      HTN  - Resumed on PTA metoprolol as below.      HLP  - Hold PTA Zocor 20 mg/d and resume at discharge.      Persistent atrial fib  - Resumed on PTA Digoxin 62.5 mcg/d and Toprol-XL 50 mg every morning and 25 mg every night.  Hold parameters in place.    *Patient is not on anticoagulation therapy.      Hyperthyroidism  Noted history of this but does not appear to be on any medications.  Previously on methimazole but stopped several months ago.      DM2  Suspect diet controlled as patient is not on any medications.      Known right 5th metatarsal closed fracture  - Continue outpatient follow-up with Podiatry (Dr. Ledezma).      COVID-19 screening  Negative 9/26/2021.      Clinically Significant Risk Factors Present on Admission                        Diet: Regular diet  DVT Prophylaxis: Ambulate QID  Barry Catheter: Not present  Code Status: FULL CODE discussed with patient's daughter.         Disposition Plan   Expected discharge: 09/27/2021 recommended to prior living arrangement once antibiotic plan established and safe disposition plan/ TCU bed available.  Entered: Law Gibson PA-C 09/26/2021, 4:58 PM     The patient's care was discussed with the Patient and Patient's Family.    The patient has been discussed with Dr. George, who agrees with the assessment and plan at this time.    Law Gibson PA-C  [facility]  Securely message with the Vocera Web Console (learn more here)  Text page via [employer] Paging/Directory    ______________________________________________________________________    Chief Complaint   Frequent falls and concern for UTI.      History is obtained from the patient's daughter and EMR.      History of Present  Illness   Jose Gomez is a 89 year old male with a past medical history significant for Dementia, CKD stage III, HTN, HLP, Paroxysmal atrial fib, Hyperthyroidism, DM2, known right 5th metatarsal closed fracture who was registered to observation due to UTI with frequent falls.      Patient resides with his daughter and was brought into the ED due to frequent falls and concern for possible UTI.  The daughter reported that within the last 48 hours she has found the patient on the floor of his bedroom about 8 times.  She also indicated that he has had increased urinary frequency and incontinence.      Work-up in the ED included a CBC with differential that revealed a WBC of 11.3, HGB of 14.2, PLT of 134, abs immature granulocytes of 0.1 and abs neutrophils of 8.8 otherwise within normal limits.  A CMP revealed a creatinine of 1.87, GFR of 30, BUN of 37, CO2 of 19, albumin of 3.3, total protein of 2.4 and glucose of 119 otherwise within normal limits.  Troponin I was 0.023.  UA was orange colored and cloudy with a pH of 8.0, protein albumin of 300, positive nitrite, large amount of blood and large leukocyte esterase with a WBC and RBC > 182 and noted bacteria present with WBC clumps.  COVID-19 screening was negative.  Blood cultures were taken and in process.  EKG with atrial fib.  Head CT was negative for acute changes and unchanged from previous study with noted brain atrophy and presumed chronic microvascular ischemic changes.  Patient received 1 g of ceftriaxone.  Urine culture was ordered in the ED.      Patient was seen in the ED.  His family was present at the bedside and he was asleep on the gurney.  Patient remained asleep throughout the discussion and physical exam.      We reviewed his medical and surgical history.  [facility] prior to coming into the ED.  We discussed his left foot fracture and plans to follow-up with Dr. Ledezma in October.  We also  reviewed his home medications.      Information was obtained from the patient's daughter.  The patient is normally cold and uses a space heater, extra blankets and layers to keep warm.  He might be slightly more confused than normal but not by much.  He did not remember falling and told his daughter that she was imaging things and/or overreacting.  Patient is very unstable when ambulating and does have a cane and a walker but does not use them.  When walking he holds onto the wall and furniture in order to keep his balance.  The patient was found on the floor at least 8 separate times in the last 48 hours.  Otherwise, he does not complain about anything.  Patient's daughter did notice more urinary incontinence and     Review of Systems    The 10 point Review of Systems is negative other than noted in the HPI.      Past Medical History    I have reviewed this patient's medical history and updated it with pertinent information if needed.   Past Medical History:   Diagnosis Date     CKD (chronic kidney disease) stage 3, GFR 30-59 ml/min      Esophageal reflux     very mild     Essential hypertension, benign      Hypertensive emergency 4/26/2018     Hyperthyroidism      Mixed hyperlipidemia      Paroxysmal atrial fibrillation (H) 05/28/2018     Pernicious anemia 3/19/2008     Type 2 diabetes, HbA1c goal < 7% (H)      Unspecified internal derangement of knee     LEFT   Dementia, CKD stage III, HTN, HLP, Paroxysmal atrial fib, Hyperthyroidism, DM2, known right 5th metatarsal closed fracture    Past Surgical History   I have reviewed this patient's surgical history and updated it with pertinent information if needed.  Past Surgical History:   Procedure Laterality Date     COLONOSCOPY       NO HISTORY OF SURGERY       PHACOEMULSIFICATION CLEAR CORNEA WITH STANDARD INTRAOCULAR LENS IMPLANT  9/23/2013    Procedure: PHACOEMULSIFICATION CLEAR CORNEA WITH STANDARD INTRAOCULAR LENS IMPLANT;  RIGHT PHACOEMULSIFICATION CLEAR CORNEA  WITH STANDARD INTRAOCULAR LENS IMPLANT ;  Surgeon: Hieu Jaimes MD;  Location: [facility]     PHACOEMULSIFICATION CLEAR CORNEA WITH STANDARD INTRAOCULAR LENS IMPLANT  10/14/2013    Procedure: PHACOEMULSIFICATION CLEAR CORNEA WITH STANDARD INTRAOCULAR LENS IMPLANT;  LEFT PHACOEMULSIFICATION CLEAR CORNEA WITH STANDARD INTRAOCULAR LENS IMPLANT ;  Surgeon: Hieu Jaimes MD;  Location: [facility]   Knee surgery as a teenager, bilateral cataract.      Social History   I have reviewed this patient's social history and updated it with pertinent information if needed.  Patient resides in a house with his daughter and son.  He is former smoker who quit in  (cigars and pipe).  He does consume alcohol 1-2 times a month.  He does not use illicit drugs.  He has a cane and a walker but does not use them all the time.    Social History     Tobacco Use     Smoking status: Former Smoker     Types: Cigars     Quit date: 5/3/1984     Years since quittin.4     Smokeless tobacco: Never Used   Substance Use Topics     Alcohol use: Yes     Comment: 1-2x per month     Drug use: No        Family History   I have reviewed this patient's family history and updated it with pertinent information if needed.   Family History   Problem Relation Age of Onset     Heart Disease Father         enlarged heart  at age 66     Family History Negative Mother          at age 88     Cancer Sister          at age 69     Cerebrovascular Disease Brother          at age 81     Cerebrovascular Disease Brother          at age 78     Cerebrovascular Disease Brother          at age 88     Cerebrovascular Disease Sister         b, 1930       Prior to Admission Medications   Prior to Admission Medications   Prescriptions Last Dose Informant Patient Reported? Taking?   Acetaminophen (TYLENOL PO) prn at prn Daughter Yes Yes   Sig: Take 1,000 mg by mouth every 6 hours as needed    Apoaequorin (PREVAGEN PO) 2021 at Unknown  time Daughter Yes Yes   Sig: Take 1 tablet by mouth daily   digoxin (LANOXIN) 125 MCG tablet 9/26/2021 at Unknown time Daughter No Yes   Sig: Take 0.5 tablets (62.5 mcg) by mouth daily   metoprolol succinate ER (TOPROL-XL) 25 MG 24 hr tablet 9/26/2021 at AM Daughter No Yes   Sig: Take 2 tabs (50 mg) in AM and 1 tab (25 mg) in PM   simvastatin (ZOCOR) 20 MG tablet 9/25/2021 at Unknown time Daughter No Yes   Sig: Take 1 tablet (20 mg) by mouth At Bedtime   vitamin B-12 500 MCG PO tablet 9/26/2021 at Unknown time Daughter Yes Yes   Sig: Take 1 tablet by mouth daily       Facility-Administered Medications: None     Allergies   Allergies   Allergen Reactions     No Known Drug Allergies        Physical Exam   Vital Signs: Temp: 98.7  F (37.1  C) Temp src: Temporal BP: (!) 142/85 Pulse: 66   Resp: 18 SpO2: 96 % O2 Device: None (Room air)    Weight: 153 lbs 0 oz    Constitutional: Asleep throughout the entire encounter.    ENT: Normocephalic, without obvious abnormality, atraumatic.  Deferred as patient was asleep.     Neck: Supple, symmetrical, trachea midline, no adenopathy.  Pulmonary: No increased work of breathing, good air exchange, clear to auscultation bilaterally, no crackles or wheezing.  Cardiovascular: Irreg Irreg, normal S1 and S2, no S3 or S4, and no murmur noted.  GI: Normal bowel sounds, soft, non-distended, non-tender.    Skin/Integumen: Exposed skin appeared clear.  Some erythema at the right ankle.  Neuro: Deferred as patient was asleep.    Psych:  Deferred as patient was asleep.    Extremities: Slight lower extremity edema noted bilaterally, and calves are non-tender to palpation bilaterally.     Data   Data reviewed today: I reviewed all medications, new labs and imaging results over the last 24 hours. I personally reviewed the EKG tracing showing atrial fib with controlled rate. .    Recent Labs   Lab 09/26/21  1456 09/26/21  1452 09/26/21  1451   WBC  --   --  11.3*   HGB  --   --  14.2   MCV  --   --   98   PLT  --   --  134*   NA  --  134  --    POTASSIUM  --  4.4  --    CHLORIDE  --  104  --    CO2  --  19*  --    BUN  --  37*  --    CR  --  1.87*  --    ANIONGAP  --  11  --    CAMILA  --  8.7  --    * 119*  --    ALBUMIN  --  3.3*  --    PROTTOTAL  --  7.2  --    BILITOTAL  --  2.4*  --    ALKPHOS  --  88  --    ALT  --  20  --    AST  --  28  --    TROPONIN  --  0.023  --      Recent Results (from the past 24 hour(s))   CT Head w/o Contrast    Narrative    EXAM: CT HEAD W/O CONTRAST  LOCATION: [facility]  DATE/TIME: 9/26/2021 3:39 PM    INDICATION: Falls frequently, dementia, nonfocal neuro exam.  COMPARISON: 10/13/2020.  TECHNIQUE: Routine CT Head without IV contrast. Multiplanar reformats. Dose reduction techniques were used.    FINDINGS:  INTRACRANIAL CONTENTS: No intracranial hemorrhage, extraaxial collection, or mass effect.  No CT evidence of acute infarct. Moderate presumed chronic small vessel ischemic changes. Mild to moderate generalized volume loss. No hydrocephalus. Caliber of   the lateral and third ventricles is stable from prior study 10/13/2020 with no specific evidence for normal pressure hydrocephalus morphology. Position of the cerebellar tonsils is satisfactory. Sella shows no acute abnormality. Corpus callosum is   normal. Vascular calcification.     VISUALIZED ORBITS/SINUSES/MASTOIDS: Prior bilateral cataract surgery. Visualized portions of the orbits are otherwise unremarkable. Mucosal thickening primarily involving the ethmoid air cells. No air-fluid levels. No middle ear or mastoid effusion.    BONES/SOFT TISSUES: No acute fracture of the calvarium or skull base. Degenerative changes involve the TMJs. Satisfactory mineralization. No significant swelling of the facial or scalp tissues is apparent.      Impression    IMPRESSION:  1.  No CT evidence for acute intracranial process.  2.  Brain atrophy and presumed chronic microvascular ischemic changes as  above.  3.  No acute process and no change from 10/13/2020 as above.

## 2021-09-26 NOTE — PROGRESS NOTES
RECEIVING UNIT ED HANDOFF REVIEW    ED Nurse Handoff Report was reviewed by: Salvador Triplett RN on September 26, 2021 at 6:01 PM

## 2021-09-27 NOTE — PLAN OF CARE
[id number] Patient is alert to self and time. VSS on room air. Infusing NS 75 ml/hr. Regular diet. Ax 1-2 gait belt and walker. Incont Bladder. Noted Right LE edema, for Ultra sound. Miconazole powder applied to groin area. For PT/SW consult. Plan to discharge once antibiotic plan established. Continue to monitor.

## 2021-09-27 NOTE — PLAN OF CARE
Outpatient/Observation Goals To Be Met Before Discharge     1. Diagnostic tests and consults completed and resulted: Not Met   2. Vital Signs normal or at baseline: Met   3. Tolerating oral intake to maintain hydration: Met   4. Adequate pain control on oral analgesics: Met. Pain free   5. Returns to baseline functional status: Not Met   6. Infection is improving: Not Met    Discharge Planner Nurse   Safe discharge environment identified: No    Barriers to discharge: Yes   Please review provider order for any additional goals.   Nurse to notify provider when observation goals have been met and patient is ready for discharge.      A&Ox2. VSS on RA. Denies pain. Incontinent of urine and stool.  Ax1-2 pivot to commode. Reg diet. PIV infusing NS at 75 ml/hr. Loose stools x3. RLE edema 4+. Ultrasound completed- no abnormal findings. Redness in groin area, miconazole powder applied. Mepilex on sacrum/coccyx for blanchable redness. PT/SW consult.

## 2021-09-27 NOTE — PROGRESS NOTES
[facility]    Medicine Progress Note - Hospitalist Service       Date of Admission:  9/26/2021    Assessment & Plan         Jose Gomez is a 89 year old male with a past medical history significant for Dementia, CKD stage III, HTN, HLP, Persistent atrial fib, Hyperthyroidism, DM2, known right 5th metatarsal closed fracture who was registered to observation due to UTI with frequent falls.      UTI, Proteus  Frequent falls  Patient resides with his daughter and was brought into the ED due to frequent falls and concern for possible UTI.  The daughter reported that within the last 48 hours she has found the patient on the floor of his bedroom about 8 times.  She also indicated that he has had increased urinary frequency and incontinence. UA grossly abnormal with concern for infection. No concern of sepsis on admission.  - Continue IV Ceftriaxone.    - UCx positive for proteus, sensitivity pending.  - PT consult requested, TCU versus home with 24h assist    - CC/SW assisting with discharge planning.  - Fall precautions.    - Trend fever and WBC.      Known right 5th metatarsal closed fracture  RLE edema, R foot erythema  Pt sustained right 5th metatarsal fx in 08/2021, was intolerant of CAM boot and treated nonsurgically. Followed in clinic with Dr. Ledezma of podiatry. Recent visit noted ongoing fracture on XR, pt asymptomatic. Notes also indicate wound to lateral foot at metatarsal joint of 5th digit which has since healed. Exam is with mild persistent erythema, no clear edema appreciated, no warmth.  - Continue outpatient follow-up with Podiatry (Dr. Ledezma).    - Monitor clinically for worsening s/s of cellulitis    Acute on chronic renal insufficiency stage III  Baseline creatinine around 1.65-1.75 with GFR in the 30's. Admission Cr 1.87--1.98.  - Monitor.     - IVF with NS at 75 ml/hr for 12 hours.    - Encourage PO Fluids.    - BMP in AM    Dementia  Per patient's daughter he has been at  his baseline mentation.    - Hold PTA Prevagen daily.  Resume at discharge.      HTN  - Resumed on PTA metoprolol as below.      HLP  - Hold PTA Zocor 20 mg/d and resume at discharge.      Persistent atrial fib  - Resumed on PTA Digoxin 62.5 mcg/d and Toprol-XL 50 mg every morning and 25 mg every night.  Hold parameters in place.    *Patient is not on anticoagulation therapy.      Hyperthyroidism  Noted history of this but does not appear to be on any medications.  Previously on methimazole but stopped several months ago.      DM2  Suspect diet controlled as patient is not on any medications.      COVID-19 screening  Negative 9/26/2021.       Diet: Regular Diet Adult    DVT Prophylaxis: Pneumatic Compression Devices and Ambulate every shift  Barry Catheter: Not present  Central Lines: None  Code Status: Full Code      Disposition Plan   Expected discharge: 09/27/2021   recommended to tbd once safe disposition plan/ TCU bed available.     The patient's care was discussed with the Attending Physician, Dr. Goss, Bedside Nurse and Patient.    Rob Rodriguez PA-C  Hospitalist Service  [facility]  Securely message with the Vocera Web Console (learn more here)  Text page via [employer] Paging/Directory      Clinically Significant Risk Factors Present on Admission          # Hypocalcemia: Ca = 8.2 mg/dL (Ref range: 8.5 - 10.1 mg/dL) and/or iCa = N/A on admission, will replace as needed     # Thrombocytopenia: Plts = 118 10e3/uL (Ref range: 150 - 450 10e3/uL) on admission, will monitor for bleeding      ______________________________________________________________________    Interval History   Pt seen & evaluated, resting in bed. Denies pain. Afebrile. Pleasantly confused.    Data reviewed today: I reviewed all medications, new labs and imaging results over the last 24 hours. I personally reviewed no images or EKG's today.    Physical Exam   Vital Signs: Temp: 97.4  F (36.3  C) Temp src: Oral BP: (!)  144/79 Pulse: 65   Resp: 18 SpO2: 98 % O2 Device: None (Room air)    Weight: 154 lbs 6.4 oz  GEN: well-developed, well-nourished, appears comfortable  HEENT: NCAT, EOM intact bilaterally, sclera clear, conjunctiva normal, nose & mouth patent, mucous membranes moist  CHEST: lungs CTA bilaterally, no increased work of breathing, no wheeze, crackles, rhonchi  HEART: RRR, S1 & S2, no murmur  ABD: soft, nontender, nondistended, no guarding or rigidity, +BS in all 4 quadrants  MSK: AROM bilateral UE/LE  NEURO: awake, alert. CN II-XII grossly intact.   SKIN: warm & dry without rash, no pedal edema; right foot slightly erythematous to dorsal aspect, nontender, no streaking, no warmth, no edema    Data   Recent Labs   Lab 09/27/21  0636 09/26/21  1456 09/26/21  1452 09/26/21  1451   WBC 7.8  --   --  11.3*   HGB 12.9*  --   --  14.2   MCV 97  --   --  98   *  --   --  134*     --  134  --    POTASSIUM 3.9  --  4.4  --    CHLORIDE 106  --  104  --    CO2 21  --  19*  --    BUN 40*  --  37*  --    CR 1.98*  --  1.87*  --    ANIONGAP 9  --  11  --    CAMILA 8.2*  --  8.7  --    GLC 89 101* 119*  --    ALBUMIN  --   --  3.3*  --    PROTTOTAL  --   --  7.2  --    BILITOTAL  --   --  2.4*  --    ALKPHOS  --   --  88  --    ALT  --   --  20  --    AST  --   --  28  --    TROPONIN  --   --  0.023  --      Recent Results (from the past 24 hour(s))   CT Head w/o Contrast    Narrative    EXAM: CT HEAD W/O CONTRAST  LOCATION: [facility]  DATE/TIME: 9/26/2021 3:39 PM    INDICATION: Falls frequently, dementia, nonfocal neuro exam.  COMPARISON: 10/13/2020.  TECHNIQUE: Routine CT Head without IV contrast. Multiplanar reformats. Dose reduction techniques were used.    FINDINGS:  INTRACRANIAL CONTENTS: No intracranial hemorrhage, extraaxial collection, or mass effect.  No CT evidence of acute infarct. Moderate presumed chronic small vessel ischemic changes. Mild to moderate generalized volume loss. No  hydrocephalus. Caliber of   the lateral and third ventricles is stable from prior study 10/13/2020 with no specific evidence for normal pressure hydrocephalus morphology. Position of the cerebellar tonsils is satisfactory. Sella shows no acute abnormality. Corpus callosum is   normal. Vascular calcification.     VISUALIZED ORBITS/SINUSES/MASTOIDS: Prior bilateral cataract surgery. Visualized portions of the orbits are otherwise unremarkable. Mucosal thickening primarily involving the ethmoid air cells. No air-fluid levels. No middle ear or mastoid effusion.    BONES/SOFT TISSUES: No acute fracture of the calvarium or skull base. Degenerative changes involve the TMJs. Satisfactory mineralization. No significant swelling of the facial or scalp tissues is apparent.      Impression    IMPRESSION:  1.  No CT evidence for acute intracranial process.  2.  Brain atrophy and presumed chronic microvascular ischemic changes as above.  3.  No acute process and no change from 10/13/2020 as above.   US Lower Extremity Venous Duplex Right    Narrative    EXAM: US LOWER EXTREMITY VENOUS DUPLEX RIGHT  LOCATION: [facility]  DATE/TIME: 9/27/2021 5:16 AM    INDICATION: right LE edema  COMPARISON: None.  TECHNIQUE: Venous Duplex ultrasound of the right lower extremity with and without compression, augmentation and duplex. Color flow and spectral Doppler with waveform analysis performed.    FINDINGS: Exam includes the common femoral, femoral, popliteal, and contralateral common femoral veins as well as segmentally visualized deep calf veins and greater saphenous vein.     RIGHT: No deep vein thrombosis. No superficial thrombophlebitis. No popliteal cyst.      Impression    IMPRESSION:  1.  No deep venous thrombosis in the right lower extremity.     Medications     sodium chloride         acetaminophen  975 mg Oral Q8H     cefTRIAXone  1 g Intravenous Q24H     digoxin  62.5 mcg Oral Daily     metoprolol succinate  ER  25 mg Oral QPM     metoprolol succinate ER  50 mg Oral QAM     miconazole   Topical BID     sodium chloride (PF)  3 mL Intracatheter Q8H

## 2021-09-27 NOTE — PROGRESS NOTES
Observation goals PRIOR TO DISCHARGE    Comments:   -diagnostic tests and consults completed and resulted: Not met    -vital signs normal or at patient baseline : Met    -tolerating oral antibiotics or has plans for home infusion setup : Not met, still on IV abx.    -infection is improving: Not met, still on IV abx    -safe disposition plan has been identified: Not met    Nurse to notify provider when observation goals have been met and patient is ready for discharge.

## 2021-09-27 NOTE — PROGRESS NOTES
Outpatient/Observation Goals To Be Met Before Discharge     1. Diagnostic tests and consults completed and resulted: Not Met   2. Vital Signs normal or at baseline: Met   3. Tolerating oral intake to maintain hydration: Met   4. Adequate pain control on oral analgesics: Met. Pain free   5. Returns to baseline functional status: Not Met   6. Infection is improving: Not Met    Discharge Planner Nurse   Safe discharge environment identified: No    Barriers to discharge: Yes   Please review provider order for any additional goals.   Nurse to notify provider when observation goals have been met and patient is ready for discharge.

## 2021-09-27 NOTE — CONSULTS
Care Management Initial Consult    General Information  Assessment completed with: Children, Family (shelli Huffman by phone [phone]), Armida  Type of CM/SW Visit: CM Role Introduction    Primary Care Provider verified and updated as needed: Yes   Readmission within the last 30 days: no previous admission in last 30 days      Reason for Consult: discharge planning  Advance Care Planning:            Communication Assessment  Patient's communication style:  (consult with shelli Huffman)    Hearing Difficulty or Deaf: no   Wear Glasses or Blind: no    Cognitive  Cognitive/Neuro/Behavioral: .WDL except  Level of Consciousness: alert  Arousal Level: opens eyes spontaneously  Orientation: disoriented to, situation     Best Language: 0 - No aphasia  Speech: slow    Living Environment:   People in home: child(katarzyna), adult (shelli Huffman and son Tye are primary caregivers)  Armida  Current living Arrangements: house      Able to return to prior arrangements: other (see comments) (awaiting PT recommendations)       Family/Social Support:  Care provided by: child(katarzyna)  Provides care for: no one, unable/limited ability to care for self  Marital Status: Single  Children          Description of Support System: Supportive, Involved    Support Assessment: Adequate family and caregiver support, Adequate social supports    Current Resources:   Patient receiving home care services: No     Community Resources:    Equipment currently used at home:    Supplies currently used at home:      Employment/Financial:  Employment Status: retired        Financial Concerns: insurance inadequate (no supplemental insurance)   Referral to Financial Counselor: No       Lifestyle & Psychosocial Needs:  Social Determinants of Health     Tobacco Use: Medium Risk     Smoking Tobacco Use: Former Smoker     Smokeless Tobacco Use: Never Used   Alcohol Use:      Frequency of Alcohol Consumption:      Average Number of Drinks:      Frequency of Binge Drinking:     Financial Resource Strain:      Difficulty of Paying Living Expenses:    Food Insecurity:      Worried About Running Out of Food in the Last Year:      Ran Out of Food in the Last Year:    Transportation Needs:      Lack of Transportation (Medical):      Lack of Transportation (Non-Medical):    Physical Activity:      Days of Exercise per Week:      Minutes of Exercise per Session:    Stress:      Feeling of Stress :    Social Connections:      Frequency of Communication with Friends and Family:      Frequency of Social Gatherings with Friends and Family:      Attends Buddhist Services:      Active Member of Clubs or Organizations:      Attends Club or Organization Meetings:      Marital Status:    Intimate Partner Violence:      Fear of Current or Ex-Partner:      Emotionally Abused:      Physically Abused:      Sexually Abused:    Depression: Not at risk     PHQ-2 Score: 0   Housing Stability:      Unable to Pay for Housing in the Last Year:      Number of Places Lived in the Last Year:      Unstable Housing in the Last Year:        Functional Status:  Prior to admission patient needed assistance: per daughter Armida she resides at home with the patient and is one of his primary caregivers.  Per Armida her brother Tye also assists in the home. Patient is A1 baseline                  Additional Information:  Writer called patient's daughter Armida phone [phone].  Writer explained role and scope of safe discharge planning.  Patient is still observation and MOON information was given over the phone.  The patient does NOT have a Medicare supplemental product so writer explained Medicare coinsurance.  Per Armida she and her brother Tye help the patient in the home and that she was a Nursing Assistant in the past .  Armida is leaving for work in the next couple of hours so Tye will have their phone to answer at [phone].  Armida was inquiring about discharge. Writer explained that we are still awaiting a plan on  "antibiotics, PT consult etc.  Armida stated that her father was at [facility] in the past and if TCU was recommended this would be her first choice.  Writer did explain that TCU's are full so if TCU is recommended we would most likely need to get additional choices.  Writer explained that if Homecare was recommended we could send a referral for discharge.  Per Armida her father had [facility] in the past and both she and her brother would want to \"think about it.\" Writer did stress that it is easier to have homecare orders up front so that we can get an agency identified, so a Heads up was submitted to [facility] if this is the case.  Writer confirmed the PCP, address and numbers in Epic are correct as listed. Armida stated that she or her brother would need to figure out transportation and picking up the patient when discharge is determined.  She was hopeful that her father would be through 9/28 however writer explained that discharge could be as soon as today.  Writer will continue to follow for further discharge planning needs as identified.    Soledad Schroeder RN        "

## 2021-09-27 NOTE — PROGRESS NOTES
Pt arrived from ED at around 1830hrs. Pain managed w/ Tylenol. Groin, perineum and coccyx, red and excoriated. RLE edema, foot is red and warm to touch, MD aware.

## 2021-09-27 NOTE — PLAN OF CARE
Five Times Sit to Stand Test: (FTSTST): The FTSTST measures functional LE strength and provides information about postural control during transitions to/from standing.     Overall findings: Demonstrates increased fall risk.  Patient Score: 17.5 seconds    Performance is considered within normal limits for times:  <10 seconds for people aged < 60 years with balance and vestibular disorders  <14.2 seconds for people aged > 60 years with balance and vestibular disorders  >15 seconds for community-dwelling elderly individuals in their 80s has been correlated with an increased likelihood of a prior history of falls.     Minimal Detectable Change = 2.5 sec  Minimal Detectable Change = 8.4 sec (sit <> stand x 10 rep) (hemodialysis)    according to Brandon Johnson & Vanessa 2009    Assessment (rationale for performing, application to patient s function & care plan): Assist with discharge planning and recommendation for continued use of AD.

## 2021-09-27 NOTE — UTILIZATION REVIEW
[facility] Utilization Review  Admission Status; Secondary Review Determination     Admission Date: 9/26/2021  2:32 PM      Under the authority of the Utilization Management Committee, the utilization review process indicated a secondary review on the above patient.  The review outcome is based on review of the medical records, discussions with staff, and applying clinical experience noted on the date of the review.        (X)      Inpatient Status Appropriate - This patient's medical care is consistent with medical management for inpatient care and reasonable inpatient medical practice.          RATIONALE FOR DETERMINATION   89-year-old male with history of dementia, chronic kidney disease stage III, hypertension, hyperlipidemia, persistent atrial fibrillation, hypothyroidism, diabetes mellitus, known right fifth metatarsal closed fracture, admitted with frequent falls and urinary tract infection.  Patient was found on the floor of the bedroom, with increased urinary frequency and incontinence.  Patient found to have leukocytosis, urinalysis showed large blood with pyuria, leukocyte esterase, with WBC clumps.  Blood cultures pending, urine cultures growing gram-negative bacilli.  head CT unremarkable.  Patient started on IV ceftriaxone, IV fluid hydration, right lower extremity negative for DVT.  Patient also has worsening creatinine, complex patient who is admitted with falls, acute urinary tract infection with worsening creatinine, lower extremity cellulitis, needs close monitoring in the hospital with ongoing interventions, and is performing 2 midnight hospital stay, recommend change to inpatient status, communicated Dr. Goss      The severity of illness, intensity of service provided, expected LOS and risk for adverse outcome make the care complex, high risk and appropriate for hospital admission.The patient requires hospital based medical care which is anticipated to require a stay of 2 or more  midnights; according to CMS guidelines the patient should be admitted as inpatient        The information on this document is developed by the utilization review team in order for the business office to ensure compliance.  This only denotes the appropriateness of proper admission status and does not reflect the quality of care rendered.         The definitions of Inpatient Status and Observation Status used in making the determination above are those provided in the CMS Coverage Manual, Chapter 1 and Chapter 6, section 70.4.      Sincerely,       Shlomo Yoon MD  Physician Advisor  Utilization Review-[place]    Phone: [phone]

## 2021-09-27 NOTE — PROGRESS NOTES
09/27/21 1152   Quick Adds   Type of Visit Initial PT Evaluation   Living Environment   People in home child(katarzyna), adult   Current Living Arrangements house   Transportation Anticipated family or friend will provide   Living Environment Comments pt unable to provide access description   Self-Care   Usual Activity Tolerance good   Current Activity Tolerance fair   Regular Exercise No   Equipment Currently Used at Home walker, rolling   Disability/Function   Hearing Difficulty or Deaf no   Wear Glasses or Blind no   Concentrating, Remembering or Making Decisions Difficulty yes   Concentration Management cognitive impairment   Walking or Climbing Stairs Difficulty yes   Walking or Climbing Stairs ambulation difficulty, requires equipment   Fall history within last six months yes   Number of times patient has fallen within last six months 5   Change in Functional Status Since Onset of Current Illness/Injury yes   General Information   Onset of Illness/Injury or Date of Surgery 09/26/21   Referring Physician Law Gibson PA-C   Pertinent History of Current Problem (include personal factors and/or comorbidities that impact the POC) 89 y/o M with dementia brought in by daughter due to frequent falls in the last 24-48hours. Associated urinary frequency.  Patient is unable to provide hx due to his dementia.  No reported fever or change in mental status.    Cognition   Orientation Status (Cognition) oriented to;person   Affect/Mental Status (Cognition) confused   Follows Commands (Cognition) follows one-step commands;50-74% accuracy;physical/tactile prompts required;verbal cues/prompting required   Safety Deficit (Cognition) moderate deficit;at risk behavior observed;impulsivity;insight into deficits/self-awareness;safety precautions awareness;safety precautions follow-through/compliance   Cognitive Status Comments pt with hx of dementia   Posture    Posture Forward head position;Protracted shoulders   Range  of Motion (ROM)   ROM Quick Adds ROM WNL   Strength   Strength Comments demonstrates functional weakness, A for sit to stand transfers   Bed Mobility   Comment (Bed Mobility) SBA   Transfers   Transfer Safety Comments CGA/min A with WW dep on seat heigth, relies on UE to push or pull to standing   Gait/Stairs (Locomotion)   Comment (Gait/Stairs) CGA with WW, dec step length average pace, mod pathway deviation, poor WW management with direction changes pt lifts WW to make turns   Balance   Balance Comments fall risk and history, fair in static standing braces LE against bed, seeking UE support, unable to SLS or spilt stance due to cognition, FTSTS 17.5 s   Clinical Impression   Criteria for Skilled Therapeutic Intervention evaluation only   PT Diagnosis (PT) Difficulty walking   Influenced by the following impairments impaired gait, balance, dec strenght   Functional limitations due to impairments impaired moblity   Clinical Presentation Stable/Uncomplicated   Clinical Presentation Rationale PMH and clinical assessment   Clinical Decision Making (Complexity) low complexity   Therapy Frequency (PT) Evaluation only   Planned Therapy Interventions (PT) bed mobility training;gait training;strengthening   Anticipated Equipment Needs at Discharge (PT) walker, rolling   Risk & Benefits of therapy have been explained evaluation/treatment results reviewed;care plan/treatment goals reviewed;patient   PT Discharge Planning    PT Discharge Recommendation (DC Rec) Transitional Care Facility;home with assist;home with home care physical therapy   PT Rationale for DC Rec Pt with hx of dementia, PLOF gleened from chart review, pt unable to provide access description of home or his ability with ADLS, per chart pt is A x 1 for mobility, pt currently req A x 1 for transfers and gait, he is unsteady and requires hands on for walking, will benefit from cont PT at next level of care, recommend 24/7 at home including overnights and home  PT, if family unable to  provide pt would need TCU placement   Therapy Certification   Start of care date 09/26/21   Certification date from 09/27/21   Certification date to 09/29/21   Medical Diagnosis UTI   Total Evaluation Time   Total Evaluation Time (Minutes) 15

## 2021-09-27 NOTE — PROGRESS NOTES
Observation goals PRIOR TO DISCHARGE    Comments:   -diagnostic tests and consults completed and resulted:Not met    -vital signs normal or at patient baseline :Parially met, slightly elevated BP.     -tolerating oral antibiotics or has plans for home infusion setup : Not met, still on IV abx.    -infection is improving: Not met, still on IV abx    -safe disposition plan has been identified: Not met    Nurse to notify provider when observation goals have been met and patient is ready for discharge.

## 2021-09-28 NOTE — PROGRESS NOTES
MD Notification    Notified Person: MD    Notified Person Name: Dr. Poonam Knight    Notification Date/Time: 9/28/21@ 1826hrs.     Notification Interaction: Pager    Purpose of Notification: Pt is restless, agitated,combative wanting to hit staff members.     Orders Received: MD ordered Zyprexa x1.     Comments:

## 2021-09-28 NOTE — PLAN OF CARE
Oriented to self. VSS on RA, high BP, long arm attendant, needs close monitoring. Pull IV line twice, no PIV access. Next dose IV abx for UTI at 1200 today.   Discharge PT rec TCU. SW following for discharge.

## 2021-09-28 NOTE — PROGRESS NOTES
Disoriented to place and situation. Restless, combative at the end of the shift, MD ordered Zyprexa x1,given, monitor. Slightly elevated BP, otherVSS on RA. Sets off bed alarm frequently, needs close monitoring. IV abx for UTI changed to PO abx today. PT rec TCU. SW following for discharge.

## 2021-09-28 NOTE — PROGRESS NOTES
Disoriented to place and situation. VSS on RA. Sets off bed alarm frequently, needs close monitoring. Continues on IV abx for UTI. PT rec TCU. SW following for discharge.

## 2021-09-28 NOTE — PROGRESS NOTES
Clinical Nutrition Brief Note    Received positive admission nutrition MST, score 2  - Have you recently lost weight without trying?: unsure (2)   - Have you been eating poorly because of a decreased appetite?: no (0)     Chart reviewed  Patient resides with daughter at home and has a h/o dementia, CKD III, HTN, DM2 (diet controlled), HLP, Persistent atrial fib, Hyperthyroidism, admitted for UTI and frequent falls     Wt: 68 kg  Wt Readings from Last 10 Encounters:   09/28/21 68 kg (149 lb 14.4 oz)   09/17/21 69.4 kg (153 lb)   09/01/21 69.7 kg (153 lb 9.6 oz)   10/13/20 68.5 kg (151 lb)   10/13/20 68.6 kg (151 lb 3.2 oz)   03/13/20 66.7 kg (147 lb)   02/28/20 66.7 kg (147 lb)   02/21/20 69.1 kg (152 lb 4.8 oz)   02/12/20 63.5 kg (140 lb)   02/11/20 63.5 kg (140 lb)     Appears patient is down ~4 lbs in the past 3-4 weeks (2% body wt) which is not clinically signifincant  He consumed 75% of 3 adequate meals yesterday and 100% of breakfast this AM    Patient does not meet Malnutrition Screening Tool criteria for a nutrition assessment at this time.  Will check back at hospital length of stay, unless otherwise re-consulted, thanks    Dodie Del Valle, RD, LD  Clinical Dietitian

## 2021-09-28 NOTE — PROGRESS NOTES
Care Management Follow Up    Length of Stay (days): 1    Expected Discharge Date: 09/28/2021     Concerns to be Addressed: discharge planning     Patient plan of care discussed at interdisciplinary rounds: Yes    Anticipated Discharge Disposition: Home, Home Care, Skilled Nursing Facilty     Anticipated Discharge Services: None  Anticipated Discharge DME: None    Patient/family educated on Medicare website which has current facility and service quality ratings: yes  Education Provided on the Discharge Plan:    Patient/Family in Agreement with the Plan: yes    Referrals Placed by CM/SW: Homecare  Private pay costs discussed: Not applicable    Additional Information:  SW sent TCU referrals per request.       Alyssia Jesus, LICSW

## 2021-09-28 NOTE — PROGRESS NOTES
Care Management Follow Up    Length of Stay (days): 1    Expected Discharge Date: 09/28/2021     Concerns to be Addressed: discharge planning     Patient plan of care discussed at interdisciplinary rounds: Yes    Anticipated Discharge Disposition: Home, Home Care, Skilled Nursing Facilty     Anticipated Discharge Services: None  Anticipated Discharge DME: None    Patient/family educated on Medicare website which has current facility and service quality ratings: yes  Education Provided on the Discharge Plan:    Patient/Family in Agreement with the Plan: yes    Referrals Placed by CM/SW: Homecare  Private pay costs discussed: Not applicable    Additional Information:  SW spoke to daughter Armida and explained that pt has been declined from TCU's we submitted to so far, due to memory care needs.  Discussed the memory care TCU's, and daughter in agreement to send referrals to additional locations.  Referral sent to [facility] since family is familiar with this TCU, even though it is not technically a memory care, as well as [facility].  Daughter Armida takes care of pt at home, and states that pt is more anxious in the hospital than at home.       Alyssia Jesus, RITOSW

## 2021-09-28 NOTE — TELEPHONE ENCOUNTER
----- Message from Gerson Ledezma DPM sent at 9/28/2021  1:28 PM CDT -----  SR Chahal,    I tried calling Jose's daughter, Armida.  There was no answer and it did not switch over to voicemail.  He has abnormal noninvasive vascular studies.  I went to let her know this and see if they are interested in a referral [facility].  Please try reaching out again and consider sending a letter if needed.  Thank you    Dr. Ledezma

## 2021-09-28 NOTE — PROGRESS NOTES
[facility]    Medicine Progress Note - Hospitalist Service       Date of Admission:  9/26/2021    Assessment & Plan            Jose Gomez is a 89 year old male with a past medical history significant for Dementia, CKD stage III, HTN, HLP, Persistent atrial fib, Hyperthyroidism, DM2, known right 5th metatarsal closed fracture who was registered to observation due to UTI with frequent falls.      UTI, Proteus  Frequent falls  Patient resides with his daughter and was brought into the ED due to frequent falls and concern for possible UTI.  The daughter reported that within the last 48 hours she has found the patient on the floor of his bedroom about 8 times.  She also indicated that he has had increased urinary frequency and incontinence. UA grossly abnormal with concern for infection. No concern of sepsis on admission. UCx with pan-sensitive Proteus.  - Transitioned IV Ceftriaxone to PO Cefdinir for planned 7-day course of treatment  - PT consult requested, TCU versus home with 24h assist; family requesting TCU    - CC/SW assisting with discharge planning.  - Fall precautions.      Known right 5th metatarsal closed fracture  RLE edema, R foot erythema  Pt sustained right 5th metatarsal fx in 08/2021, was intolerant of CAM boot and treated nonsurgically. Followed in clinic with Dr. Ledezma of podiatry. Recent visit noted ongoing fracture on XR, pt asymptomatic. Notes also indicate wound to lateral foot at metatarsal joint of 5th digit which has since healed. Exam is with mild persistent erythema, pitting edema to anterior tibial aspect of right foot likely related to prior injury, no warmth.  - Continue outpatient follow-up with Podiatry (Dr. Ledezma).    - Monitor clinically for worsening s/s of cellulitis    Acute on chronic renal insufficiency stage III  Baseline creatinine around 1.65-1.75 with GFR in the 30's. Cr 1.87--1.98--1.75.  - Monitor.     - Encourage PO Fluids.    - BMP in  AM    Dementia  Per patient's daughter he has been at his baseline mentation.    - Hold PTA Prevagen daily.  Resume at discharge.      HTN  - Resumed on PTA metoprolol as below.      HLP  - Hold PTA Zocor 20 mg/d and resume at discharge.      Persistent atrial fib  - Resumed on PTA Digoxin 62.5 mcg/d and Toprol-XL 50 mg every morning and 25 mg every night.  Hold parameters in place.    *Patient is not on anticoagulation therapy.      Hyperthyroidism  Noted history of this but does not appear to be on any medications.  Previously on methimazole but stopped several months ago.      DM2  Suspect diet controlled as patient is not on any medications.      COVID-19 screening  Negative 9/26/2021.         Diet: Regular Diet Adult    DVT Prophylaxis: Pneumatic Compression Devices and Ambulate every shift  Barry Catheter: Not present  Central Lines: None  Code Status: Full Code      Disposition Plan   Expected discharge: 09/28/2021   recommended to transitional care unit once safe disposition plan/ TCU bed available.     The patient's care was discussed with the Attending Physician, Dr. Goss, Bedside Nurse, Care Coordinator/ and Patient.    Rob Rodriguez PA-C  Hospitalist Service  [facility]  Securely message with the Vocera Web Console (learn more here)  Text page via [employer] Paging/Directory      Clinically Significant Risk Factors Present on Admission          # Hypocalcemia: Ca = 8.2 mg/dL (Ref range: 8.5 - 10.1 mg/dL) and/or iCa = N/A on admission, will replace as needed     # Thrombocytopenia: Plts = 118 10e3/uL (Ref range: 150 - 450 10e3/uL) on admission, will monitor for bleeding      ______________________________________________________________________    Interval History   Pt seen & evaluated. Resting in chair at bedside, denies concerns. No pain. Afebrile. Hopeful to discharge today.    Data reviewed today: I reviewed all medications, new labs and imaging results over the  last 24 hours. I personally reviewed no images or EKG's today.    Physical Exam   Vital Signs: Temp: 97  F (36.1  C) Temp src: Oral BP: (!) 158/64 Pulse: 62   Resp: 18 SpO2: 96 % O2 Device: None (Room air)    Weight: 149 lbs 14.4 oz  GEN: well-developed, well-nourished, appears comfortable  HEENT: NCAT, EOM intact bilaterally, sclera clear, conjunctiva normal, nose & mouth patent, mucous membranes moist  CHEST: lungs CTA bilaterally, no increased work of breathing, no wheeze, crackles, rhonchi  HEART: RRR, S1 & S2  ABD: soft, nontender, nondistended, no guarding or rigidity, +BS in all 4 quadrants  MSK: AROM bilateral UE/LE, pedal & radial pulses 2+ bilaterally  NEURO: awake, alert, oriented to name. CN II-XII grossly intact.   SKIN: warm & dry without rash, 1-2+ pitting anterior tibial pedal edema; R foot without wound, mild erythema in comparison to left, no lymphatic streaking, no warmth    Data   Recent Labs   Lab 09/28/21  0605 09/27/21  0636 09/26/21  1456 09/26/21  1452 09/26/21  1451   WBC  --  7.8  --   --  11.3*   HGB  --  12.9*  --   --  14.2   MCV  --  97  --   --  98   PLT  --  118*  --   --  134*    136  --  134  --    POTASSIUM 4.3 3.9  --  4.4  --    CHLORIDE 109 106  --  104  --    CO2 17* 21  --  19*  --    BUN 37* 40*  --  37*  --    CR 1.75* 1.98*  --  1.87*  --    ANIONGAP 10 9  --  11  --    CAMILA 8.2* 8.2*  --  8.7  --    * 89 101* 119*  --    ALBUMIN  --   --   --  3.3*  --    PROTTOTAL  --   --   --  7.2  --    BILITOTAL  --   --   --  2.4*  --    ALKPHOS  --   --   --  88  --    ALT  --   --   --  20  --    AST  --   --   --  28  --    TROPONIN  --   --   --  0.023  --      No results found for this or any previous visit (from the past 24 hour(s)).  Medications       acetaminophen  975 mg Oral Q8H     cefdinir  300 mg Oral Daily     digoxin  62.5 mcg Oral Daily     metoprolol succinate ER  25 mg Oral QPM     metoprolol succinate ER  50 mg Oral QAM     miconazole   Topical BID      sodium chloride (PF)  3 mL Intracatheter Q8H
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
## 2021-09-28 NOTE — CONSULTS
Care Management Follow Up    Length of Stay (days): 1    Expected Discharge Date: 09/28/2021     Concerns to be Addressed: discharge planning     Patient plan of care discussed at interdisciplinary rounds: Yes    Anticipated Discharge Disposition: Home, Home Care, Skilled Nursing Facilty     Anticipated Discharge Services: None  Anticipated Discharge DME: None    Patient/family educated on Medicare website which has current facility and service quality ratings: yes  Education Provided on the Discharge Plan:  yes  Patient/Family in Agreement with the Plan: yes    Referrals Placed by CM/SW: Homecare  Private pay costs discussed: transportation costs    Additional Information:  Writer called patients daughter Armida with PT recommendations. Armida would like the patient to go to TCU at this point.  Writer asked if the patient has had COVID vaccine to which daughter was unsure.  Per review of [facility] no vaccine noted informed Armida that this may be a barrier to TCU and that if a TCU accepts patient would most likely need to be in isolation with no visitors for a period of time.  Armida still stated that they would like TCU.  Writer did explain that if there continued to be barriers for TCU, we may need to come up with an alternative option such as homecare etc.  Per Armida she would like the following referrals sent for TCU.  1. [facility]  2. [facility]  3. [facility]    Writer asked about transportation Armida is not sure that she would have her friend available that usually assists with transport. Writer explained cost of [facility] wheelchair if indicated 85/base 5/mile or if patient is not appropriate for wheelchair then non emergent stretcher based on medical necessity.  Armida is aware that CTS team will reach out to her with TCU and discharge planning updates.  She can be reached at [phone].  SW aware of choices for TCU will update PA and bedside RN.      Soledad Schroeder, KAVON

## 2021-09-29 NOTE — PROGRESS NOTES
Pt. A&OX2, confused as to situation and time.  Cooperative this shift.  BP elevated, Metoprolol given.  Has not set off bed alarm this shift.  Long arm in place for pt.  Takes meds crushed in pudding.  On Intermittent oral Abx for UTI.  SW following for discharge.  Nursing to continue to monitor.   Patricia Hurt RN
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
## 2021-09-29 NOTE — PROGRESS NOTES
[facility]    Medicine Progress Note - Hospitalist Service       Date of Admission:  9/26/2021    Assessment & Plan            Jose Gomez is a 89 year old male with a past medical history significant for Dementia, CKD stage III, HTN, HLP, Persistent atrial fib, Hyperthyroidism, DM2, known right 5th metatarsal closed fracture who was registered to observation due to UTI with frequent falls.      UTI, Proteus  Frequent falls  Patient resides with his daughter and was brought into the ED due to frequent falls and concern for possible UTI.  The daughter reported that within the last 48 hours she has found the patient on the floor of his bedroom about 8 times.  She also indicated that he has had increased urinary frequency and incontinence. UA grossly abnormal with concern for infection. No concern of sepsis on admission. UCx with pan-sensitive Proteus.  - Transitioned IV Ceftriaxone to PO Cefdinir for planned 7-day course of treatment  - PT consult requested, TCU versus home with 24h assist; family requesting TCU    - CC/SW assisting with discharge planning.  - Fall precautions.      Known right 5th metatarsal closed fracture  RLE edema, R foot erythema  Pt sustained right 5th metatarsal fx in 08/2021, was intolerant of CAM boot and treated nonsurgically. Followed in clinic with Dr. Ledezma of podiatry. Recent visit noted ongoing fracture on XR, pt asymptomatic. Notes also indicate wound to lateral foot at metatarsal joint of 5th digit which has since healed. Exam is with mild persistent erythema, pitting edema to anterior tibial aspect of right foot likely related to prior injury, no warmth.  - Continue outpatient follow-up with Podiatry (Dr. Ledezma).    - Monitor clinically for worsening s/s of cellulitis    Acute on chronic renal insufficiency stage III  Anemia of chronic kidney disease  Baseline creatinine around 1.65-1.75 with GFR in the 30's. Cr 1.87--1.98--1.75.  - Monitor.     -  Encourage PO Fluids.      Dementia  Per patient's daughter he has been at his baseline mentation.    - Hold PTA Prevagen daily.  Resume at discharge.      HTN  - Resumed on PTA metoprolol as below.      HLP  - Hold PTA Zocor 20 mg/d and resume at discharge.      Persistent atrial fib  - Resumed on PTA Digoxin 62.5 mcg/d and Toprol-XL 50 mg every morning and 25 mg every night.  Hold parameters in place.    *Patient is not on anticoagulation therapy.      Hyperthyroidism  Noted history of this but does not appear to be on any medications.  Previously on methimazole but stopped several months ago.      DM2  Suspect diet controlled as patient is not on any medications.      COVID-19 screening  Negative 9/26/2021.         Diet: Regular Diet Adult    DVT Prophylaxis: Pneumatic Compression Devices and Ambulate every shift  Barry Catheter: Not present  Central Lines: None  Code Status: Full Code      Disposition Plan   Expected discharge: 09/29/2021   recommended to transitional care unit once safe disposition plan/ TCU bed available.     Nusrat Goss MD  Hospitalist Service  [facility]  Securely message with the [employer]oc[employer] Web Console (learn more here)  Text page via [employer] Paging/Directory      Clinically Significant Risk Factors Present on Admission                ______________________________________________________________________    Interval History   Pt seen & evaluated.  Laying in bed, comfortable.  Hard to communicate with him as he is quite [place].  He denied any pain.  Seemed annoyed that I woke him up from his sleep.    Data reviewed today: I reviewed all medications, new labs and imaging results over the last 24 hours. I personally reviewed no images or EKG's today.    Physical Exam   Vital Signs: Temp: 97.4  F (36.3  C) Temp src: Oral BP: (!) 153/69 Pulse: 61   Resp: 18 SpO2: 97 % O2 Device: None (Room air)    Weight: 156 lbs 4.9 oz  GEN: well-developed, well-nourished, appears  comfortable  CHEST: Nonlabored breathing, clear anteriorly  HEART: RRR, S1 & S2  ABD: soft, nontender, nondistended  NEURO: awake, alert, oriented to name.  Moving all extremities  SKIN: warm & dry without rash, 1-2+ pitting anterior tibial pedal edema; R foot with healed lateral forefoot wound scar, mild erythema in comparison to left, no lymphatic streaking, no warmth    Data   Recent Labs   Lab 09/28/21  0605 09/27/21  0636 09/26/21  1456 09/26/21  1452 09/26/21  1451   WBC  --  7.8  --   --  11.3*   HGB  --  12.9*  --   --  14.2   MCV  --  97  --   --  98   PLT  --  118*  --   --  134*    136  --  134  --    POTASSIUM 4.3 3.9  --  4.4  --    CHLORIDE 109 106  --  104  --    CO2 17* 21  --  19*  --    BUN 37* 40*  --  37*  --    CR 1.75* 1.98*  --  1.87*  --    ANIONGAP 10 9  --  11  --    CAMILA 8.2* 8.2*  --  8.7  --    * 89 101* 119*  --    ALBUMIN  --   --   --  3.3*  --    PROTTOTAL  --   --   --  7.2  --    BILITOTAL  --   --   --  2.4*  --    ALKPHOS  --   --   --  88  --    ALT  --   --   --  20  --    AST  --   --   --  28  --    TROPONIN  --   --   --  0.023  --      No results found for this or any previous visit (from the past 24 hour(s)).  Medications       acetaminophen  975 mg Oral Q8H     cefdinir  300 mg Oral Daily     digoxin  62.5 mcg Oral Daily     metoprolol succinate ER  25 mg Oral QPM     metoprolol succinate ER  50 mg Oral QAM     miconazole   Topical BID     sodium chloride (PF)  3 mL Intracatheter Q8H

## 2021-09-29 NOTE — PROGRESS NOTES
Disoriented to place and situation. Slightly elevated BP, other VSS on RA. Sets off bed alarm frequently, requiring close monitoring, MD ordered PRN Zyprexa at HS.Continues on po abx for UTI. PT rec TCU. SW following for discharge.

## 2021-09-29 NOTE — PLAN OF CARE
Inpatient. Patient is alert to self and situation. VSS on room air ex HTN. Regular diet. Ax1 gait belt and walker. Incont bladder. Noted Right LE edema. Takes meds crushed in pudding.  On Intermittent oral Antibiotics for UTI. Long arm in place. Scheduled tylenol given. SW following regarding placement. Continue to monitor.

## 2021-09-30 NOTE — TELEPHONE ENCOUNTER
Spoke with rAmida. Pt is admitted to [facility] since Sunday. They are interested in a referral but unsure if they can see him while he is admitted or if it has to be outpatient. He will be going to a memory care/TCU once discharged.    Routed to Dr. Ledezma to advise.    Sam Quiles CMA

## 2021-09-30 NOTE — PLAN OF CARE
HTN continues, Alert to self this a.m. Sleeping between meals. Incontinent of bowel and bladder. Plan to discharge to TCU.

## 2021-09-30 NOTE — PLAN OF CARE
Alert, disoriented to time & situation. Pt was attempting to get out of bed and was saying he was going to walk home. PRN zyprexa given due to restlessness and agitation, pt sleeping comfortably after. VSS on RA except HTN. BP was as high as 170s when restless/agitated. BP improved with scheduled metoprolol. Pt took meds with water, takes larger pills crushed in applesauce. Ax1 GB/Walker. Tolerating regular diet. Long arm sit maintained. Plan to discharge to TCU with memory care.

## 2021-09-30 NOTE — PROGRESS NOTES
[facility]    Hospitalist Progress Note    Interval History   - Alert and oriented to self only this morning  - Remains medically stable to discharge to TCU    Assessment & Plan   Summary: Jose Gomez is a 89 year old male with PMH  dementia, CKD stage III, HTN, HLP, Persistent atrial fib, Hyperthyroidism, DM2, known right 5th metatarsal closed fracture who was admitted on 9/26/2021 due to UTI and frequent falls.      Proteus UTI, improved  Frequent falls  Patient resides with his daughter and was brought into the ED due to frequent falls and concern for possible UTI.  The daughter reported that within the last 48 hours she has found the patient on the floor of his bedroom about 8 times. She also indicated that he has had increased urinary frequency and incontinence. UA grossly abnormal with concern for infection. No concern of sepsis on admission. UCx with pan-sensitive Proteus.  - Transitioned IV Ceftriaxone to PO Cefdinir for planned 7-day course of treatment  - PT consult requested, TCU versus home with 24h assist; family requesting TCU    - CC/SW assisting with discharge planning  - Fall precautions    Known right 5th metatarsal closed fracture  RLE edema, R foot erythema  Pt sustained right 5th metatarsal fx in 08/2021, was intolerant of CAM boot and treated nonsurgically. Followed in clinic with Dr. Ledezma of podiatry. Recent visit noted ongoing fracture on XR, pt asymptomatic. Notes also indicate wound to lateral foot at metatarsal joint of 5th digit which has since healed. Exam is with mild persistent erythema, pitting edema to anterior tibial aspect of right foot likely related to prior injury, no warmth.  - Continue outpatient follow-up with Podiatry (Dr. Ledezma).    - Monitor clinically for worsening s/s of cellulitis    Acute on chronic renal insufficiency stage III  Anemia of chronic kidney disease  Baseline creatinine around 1.65-1.75 with GFR in the 30's. Cr  1.87--1.98--1.75.  - Monitor  - Encourage PO Fluids.      Dementia  Per patient's daughter he has been at his baseline mentation.    - Hold PTA Prevagen daily.  Resume at discharge.      HTN:  Continue metoprolol  - Hydralazine PO PRN added for SBP > 180/100    HLP  - Hold PTA Zocor 20 mg/d and resume at discharge.      Persistent atrial fib  Patient is not on anticoagulation therapy.    - Continue on PTA Digoxin 62.5 mcg/d and Toprol-XL 50 mg every morning and 25 mg every night.  Hold parameters in place.      Hyperthyroidism  Noted history of this but does not appear to be on any medications.  Previously on methimazole but stopped several months ago.      DM2  Suspect diet controlled as patient is not on any medications.      COVID-19 screening Negative 9/26/2021.    DVT Prophylaxis: Pneumatic Compression Devices  Code Status: Full Code  PT/OT: ordered  Diet: Regular Diet Adult      Disposition: Expected discharge on to TCU when found    - I spent 25 minutes, with greater than 50% spent in counseling and coordination of care with the patient.    Colby Ornelas MD  Text Page  (7am to 6pm)  -Data reviewed today: I reviewed all new labs and imaging results over the last 24 hours.    Physical Exam   Temp: 97.6  F (36.4  C) Temp src: Axillary BP: (!) 175/104 Pulse: 57   Resp: 18 SpO2: 97 % O2 Device: None (Room air)    Vitals:    09/28/21 0451 09/29/21 0716 09/30/21 0510   Weight: 68 kg (149 lb 14.4 oz) 70.9 kg (156 lb 4.9 oz) 69.1 kg (152 lb 5.4 oz)     Vital Signs with Ranges  Temp:  [95.8  F (35.4  C)-98.3  F (36.8  C)] 97.6  F (36.4  C)  Pulse:  [57-68] 57  Resp:  [18] 18  BP: (144-175)/() 175/104  SpO2:  [95 %-98 %] 97 %  I/O last 3 completed shifts:  In: 690 [P.O.:690]  Out: -   O2 requirements: none    Constitutional: Elderly male in NAD, frail appearing  HEENT: Eyes nonicteric, oral mucosa moist  Cardiovascular: RRR, normal S1/2, no m/r/g  Respiratory: CTAB, no wheezing or crackles  Vascular: No LE  pitting edema  GI: Normoactive bowel sounds, nontender  Skin/Integumen: No rashes  Neuro/Psych: Appropriate affect and mood. A&Ox1, moves all extremities    Medications       acetaminophen  975 mg Oral Q8H     cefdinir  300 mg Oral Daily     digoxin  62.5 mcg Oral Daily     metoprolol succinate ER  25 mg Oral QPM     metoprolol succinate ER  50 mg Oral QAM     miconazole   Topical BID     sodium chloride (PF)  3 mL Intracatheter Q8H       Data   Recent Labs   Lab 09/28/21  0605 09/27/21  0636 09/26/21  1456 09/26/21  1452 09/26/21  1451   WBC  --  7.8  --   --  11.3*   HGB  --  12.9*  --   --  14.2   MCV  --  97  --   --  98   PLT  --  118*  --   --  134*    136  --  134  --    POTASSIUM 4.3 3.9  --  4.4  --    CHLORIDE 109 106  --  104  --    CO2 17* 21  --  19*  --    BUN 37* 40*  --  37*  --    CR 1.75* 1.98*  --  1.87*  --    ANIONGAP 10 9  --  11  --    CAMILA 8.2* 8.2*  --  8.7  --    * 89 101* 119*  --    ALBUMIN  --   --   --  3.3*  --    PROTTOTAL  --   --   --  7.2  --    BILITOTAL  --   --   --  2.4*  --    ALKPHOS  --   --   --  88  --    ALT  --   --   --  20  --    AST  --   --   --  28  --    TROPONIN  --   --   --  0.023  --        Imaging:   No results found for this or any previous visit (from the past 24 hour(s)).

## 2021-10-01 NOTE — PROGRESS NOTES
[facility]    Hospitalist Progress Note    Interval History   - No complaints this morning  - TCU discharge pending    Assessment & Plan   Summary: Jose Gomez is a 89 year old male with PMH  dementia, CKD stage III, HTN, HLP, Persistent atrial fib, Hyperthyroidism, DM2, known right 5th metatarsal closed fracture who was admitted on 9/26/2021 due to UTI and frequent falls.      Proteus UTI, improved  Frequent falls  Patient resides with his daughter and was brought into the ED due to frequent falls and concern for possible UTI.  The daughter reported that within the last 48 hours she has found the patient on the floor of his bedroom about 8 times. She also indicated that he has had increased urinary frequency and incontinence. UA grossly abnormal with concern for infection. No concern of sepsis on admission. UCx with pan-sensitive Proteus.  - Transitioned IV Ceftriaxone to PO Cefdinir for planned 7-day course of treatment  - PT consult requested, TCU versus home with 24h assist; family requesting TCU    - CC/SW assisting with discharge planning  - Fall precautions    Known right 5th metatarsal closed fracture  RLE edema, R foot erythema  Pt sustained right 5th metatarsal fx in 08/2021, was intolerant of CAM boot and treated nonsurgically. Followed in clinic with Dr. Ledezma of podiatry. Recent visit noted ongoing fracture on XR, pt asymptomatic. Notes also indicate wound to lateral foot at metatarsal joint of 5th digit which has since healed. Exam is with mild persistent erythema, pitting edema to anterior tibial aspect of right foot likely related to prior injury, no warmth.  - Continue outpatient follow-up with Podiatry (Dr. Ledezma).    - Monitor clinically for worsening s/s of cellulitis    Acute on chronic renal insufficiency stage III  Anemia of chronic kidney disease  Baseline creatinine around 1.65-1.75 with GFR in the 30's. Cr 1.87--1.98--1.75.  - Monitor  - Encourage PO Fluids.       Dementia  Per patient's daughter he has been at his baseline mentation.    - Hold PTA Prevagen daily.  Resume at discharge.      HTN:  Continue metoprolol  - Hydralazine PO PRN added for SBP > 180/100    HLP  - Hold PTA Zocor 20 mg/d and resume at discharge.      Persistent atrial fib  Patient is not on anticoagulation therapy.    - Continue on PTA Digoxin 62.5 mcg/d and Toprol-XL 50 mg every morning and 25 mg every night.  Hold parameters in place.      Hyperthyroidism  Noted history of this but does not appear to be on any medications.  Previously on methimazole but stopped several months ago.      DM2  Suspect diet controlled as patient is not on any medications.      COVID-19 screening Negative 9/26/2021.    DVT Prophylaxis: Pneumatic Compression Devices  Code Status: Full Code  PT/OT: ordered  Diet: Regular Diet Adult      Disposition: Expected discharge on to TCU when found    Cobly Ornelas MD  Text Page  (7am to 6pm)  -Data reviewed today: I reviewed all new labs and imaging results over the last 24 hours.    Physical Exam   Temp: 97.1  F (36.2  C) Temp src: Oral BP: 138/89 Pulse: 77   Resp: 16 SpO2: 97 % O2 Device: None (Room air)    Vitals:    09/28/21 0451 09/29/21 0716 09/30/21 0510   Weight: 68 kg (149 lb 14.4 oz) 70.9 kg (156 lb 4.9 oz) 69.1 kg (152 lb 5.4 oz)     Vital Signs with Ranges  Temp:  [97.1  F (36.2  C)-98.8  F (37.1  C)] 97.1  F (36.2  C)  Pulse:  [59-92] 77  Resp:  [15-16] 16  BP: (138-190)/(49-89) 138/89  SpO2:  [94 %-97 %] 97 %  I/O last 3 completed shifts:  In: 660 [P.O.:660]  Out: -   O2 requirements: none    Constitutional: Elderly male in NAD, frail appearing  HEENT: Eyes nonicteric, oral mucosa moist  Cardiovascular: RRR, normal S1/2, no m/r/g  Respiratory: CTAB, no wheezing or crackles  Vascular: No LE pitting edema  GI: Normoactive bowel sounds, nontender  Skin/Integumen: No rashes  Neuro/Psych: Appropriate affect and mood. A&Ox1, moves all extremities    Medications        acetaminophen  975 mg Oral Q8H     cefdinir  300 mg Oral Daily     digoxin  62.5 mcg Oral Daily     metoprolol succinate ER  25 mg Oral QPM     metoprolol succinate ER  50 mg Oral QAM     miconazole   Topical BID     sodium chloride (PF)  3 mL Intracatheter Q8H       Data   Recent Labs   Lab 09/28/21  0605 09/27/21  0636 09/26/21  1456 09/26/21  1452 09/26/21  1451   WBC  --  7.8  --   --  11.3*   HGB  --  12.9*  --   --  14.2   MCV  --  97  --   --  98   PLT  --  118*  --   --  134*    136  --  134  --    POTASSIUM 4.3 3.9  --  4.4  --    CHLORIDE 109 106  --  104  --    CO2 17* 21  --  19*  --    BUN 37* 40*  --  37*  --    CR 1.75* 1.98*  --  1.87*  --    ANIONGAP 10 9  --  11  --    CAMILA 8.2* 8.2*  --  8.7  --    * 89 101* 119*  --    ALBUMIN  --   --   --  3.3*  --    PROTTOTAL  --   --   --  7.2  --    BILITOTAL  --   --   --  2.4*  --    ALKPHOS  --   --   --  88  --    ALT  --   --   --  20  --    AST  --   --   --  28  --    TROPONIN  --   --   --  0.023  --        Imaging:   No results found for this or any previous visit (from the past 24 hour(s)).

## 2021-10-01 NOTE — PROGRESS NOTES
Care Management Follow Up    Length of Stay (days): 4    Expected Discharge Date: 10/2/21   Concerns to be Addressed: discharge planning     Patient plan of care discussed at interdisciplinary rounds: Yes    Anticipated Discharge Disposition: [facility] TCU     Anticipated Discharge Services: None  Anticipated Discharge DME: None    Patient/family educated on Medicare website which has current facility and service quality ratings: yes  Education Provided on the Discharge Plan:    Patient/Family in Agreement with the Plan: yes    Referrals Placed by CM/SW: Homecare  Private pay costs discussed: transportation costs and insurance costs out of pocket expenses and co-pays    Additional Information:   TCU referrals pending.    SW to continue to follow and assist with discharge planning.    JAMES Molina  Daytime (8:00am-4:30pm): [phone]  After-Hours SW Pager (4:30pm-11:30pm): [phone]           JAMES Medrano

## 2021-10-01 NOTE — PLAN OF CARE
A&Ox1.  Ax2/walker/GB. Turrn/repo. Incontinent of bowel/bladder. Mildly hypertensive. Denies pain. Sleeping, arousal to gentle shaking. Reg diet. Pt was slow to swallow applesauce with crushed meds and coughed x1. Discharge pending TCU (memory care) placement).

## 2021-10-01 NOTE — TELEPHONE ENCOUNTER
Called patient's daughter (Armdia) to schedule her dad's Consult appointment. Armida stated that her dad is in the hospital ([facility]) and she has no idea when he will be released. He will then be going to a care facility. Please try Armida back on Tuesday ( 10/05/21) if Jose has been released otherwise wait until he has been discharged to call her.

## 2021-10-01 NOTE — PLAN OF CARE
RN :  Patient A/O x1.  VSS on RA.  No complaints of pain.  Able to use call light and state most needs.  Tolerating regular diet.  Heart rate regular.  Lungs clear.  Up with SBA/GB/walker to the bathroom with unsteady gait.  Fall precautions maintained.  Incontinent of bowel and bladder.  No IV site.  Patient expressing desire to go home.  TCU recommended.  Referrals for memory care facilities have been sent.  Patient to discharge on oral abx for UTI at time of discharge.  Medically ready per MD.

## 2021-10-01 NOTE — TELEPHONE ENCOUNTER
"Pt referred to [facility] by Gerson Ledezma DPM for PAD, ulcer of right foot.  \"stable ulcer/ wound right foot; PAD suspected and abnromal SOTERO/US\"     Patient has imaging in Epic.     Pt needs to be scheduled for in-person consult with vascular surgery.  Will route to scheduling to coordinate an appointment next week..    Quynh JOSE, RN    [facility]  Office: [phone]  Fax: [phone]      "

## 2021-10-01 NOTE — TELEPHONE ENCOUNTER
[facility].     Please notify patient's daughter Armida, if needed.    Thank you.    Dr. Ledezma

## 2021-10-01 NOTE — PLAN OF CARE
Alert to self and place only. VSS on RA, except hypertension. PRN Hydralazine given x 1. Long arm sit. PRN Zyprexa available, non given throughout shift. Takes pills crushed in applesauce. Regular diet, needs help ordering. Incontinent of B&B. Repo. Fall precautions. A2 GB and walker. Encouraging fluids. CC/SW following for discharge. Will need memory care TCU. Continue to monitor.

## 2021-10-02 NOTE — PLAN OF CARE
Pt is disoriented to place and situation. VSS on RA. Up with A1, GB, and walker. Fall risk precautions maintained. Incontinent of B&B. Amb to BR, voiding. SW is following; pending placement.

## 2021-10-02 NOTE — PROGRESS NOTES
[facility]  Hospitalist Progress Note for 10/2/2021       Assessment and Plan:   Jose Gomez is a 89 year old male with PMH  dementia, CKD stage III, HTN, HLP, Persistent atrial fib, Hyperthyroidism, DM2, known right 5th metatarsal closed fracture who was admitted on 9/26/2021 due to UTI and frequent falls.       Proteus UTI, improved  Frequent falls  Patient resides with his daughter and was brought into the ED due to frequent falls and concern for possible UTI.  The daughter reported that within the last 48 hours she has found the patient on the floor of his bedroom about 8 times. She also indicated that he has had increased urinary frequency and incontinence. UA grossly abnormal with concern for infection. No concern of sepsis on admission. UCx with pan-sensitive Proteus.  - Transitioned IV Ceftriaxone to PO Cefdinir for planned 7-day course of treatment- stop after 10/2  - PT consult requested, TCU versus home with 24h assist; family requesting TCU    - CC/SW assisting with discharge planning  - Fall precautions     Known right 5th metatarsal closed fracture  RLE edema, R foot erythema  Pt sustained right 5th metatarsal fx in 08/2021, was intolerant of CAM boot and treated nonsurgically. Followed in clinic with Dr. Ledezma of podiatry. Recent visit noted ongoing fracture on XR, pt asymptomatic. Notes also indicate wound to lateral foot at metatarsal joint of 5th digit which has since healed. Exam is with mild persistent erythema, pitting edema to anterior tibial aspect of right foot likely related to prior injury, no warmth.  - Continue outpatient follow-up with Podiatry (Dr. Ledezma).    - Monitor clinically for worsening s/s of cellulitis     Acute on chronic renal insufficiency stage III  Anemia of chronic kidney disease  Baseline creatinine around 1.65-1.75 with GFR in the 30's. Cr 1.87--1.98--1.75.  - Monitor  - Encourage PO Fluids.       Dementia  Per patient's daughter he has been at his  "baseline mentation.    - Hold PTA Prevagen daily.  Resume at discharge.       HTN:  Continue metoprolol  - Hydralazine PO PRN added for SBP > 180/100     HLP  - Hold PTA Zocor 20 mg/d and resume at discharge.       Persistent atrial fib  Patient is not on anticoagulation therapy.    - Continue on PTA Digoxin 62.5 mcg/d and Toprol-XL 50 mg every morning and 25 mg every night.  Hold parameters in place.       Hyperthyroidism  Noted history of this but does not appear to be on any medications.  Previously on methimazole but stopped several months ago.       DM2  Suspect diet controlled as patient is not on any medications.       COVID-19 screening Negative 2021.     DVT Prophylaxis: Pneumatic Compression Devices  Code Status: Full Code  PT/OT: ordered  Diet: Regular Diet Adult       Disposition: Expected discharge on to TCU when found. SW following.    Dona Long MD.  Hospitalist [phone] (7am -6 pm)                 Interval History:   no new complaints              Medications:       acetaminophen  975 mg Oral Q8H     cefdinir  300 mg Oral Daily     digoxin  62.5 mcg Oral Daily     metoprolol succinate ER  25 mg Oral QPM     metoprolol succinate ER  50 mg Oral QAM     miconazole   Topical BID     sodium chloride (PF)  3 mL Intracatheter Q8H     hydrALAZINE, lidocaine 4%, lidocaine (buffered or not buffered), magnesium hydroxide, melatonin, OLANZapine zydis, ondansetron **OR** ondansetron, senna-docusate **OR** senna-docusate, sodium chloride (PF)               Physical Exam:   Blood pressure 135/53, pulse 57, temperature (!) 96.4  F (35.8  C), temperature source Oral, resp. rate 16, height 1.778 m (5' 10\"), weight 69.1 kg (152 lb 5.4 oz), SpO2 98 %.  Wt Readings from Last 4 Encounters:   21 69.1 kg (152 lb 5.4 oz)   21 69.4 kg (153 lb)   21 69.7 kg (153 lb 9.6 oz)   10/13/20 68.5 kg (151 lb)         Vital Sign Ranges  Temperature Temp  Av.4  F (35.8  C)  Min: 95.6  F (35.3  C)  " Max: 97.6  F (36.4  C)   Blood pressure Systolic (24hrs), Av , Min:135 , Max:150        Diastolic (24hrs), Av, Min:53, Max:74      Pulse Pulse  Av.6  Min: 57  Max: 77   Respirations Resp  Av  Min: 16  Max: 16   Pulse oximetry SpO2  Av.6 %  Min: 94 %  Max: 98 %       No intake or output data in the 24 hours ending 10/02/21 1517    Constitutional: Pt sitting up in chair eating his breakfast. Awake, alert, cooperative, no apparent distress   Lungs: Clear to auscultation bilaterally, no crackles or wheezing   Cardiovascular: Regular rate and rhythm, normal S1 and S2, and no murmur noted   Abdomen: Normal bowel sounds, soft, non-distended, non-tender   Skin: No rashes, no cyanosis, no edema               Data:   All laboratory data reviewed

## 2021-10-02 NOTE — PROVIDER NOTIFICATION
MD Notification    Notified Person: MD    Notified Person Name: Dr Long    Notification Date/Time: October 2, 2021 1613    Notification Interaction:web page    Purpose of Notification:/81, orders to give Hydralazine if BP >180/100. Ok to give?    Orders Received: dose changed to 10 mg Hydralazine for BP >160 QID    Comments:

## 2021-10-02 NOTE — PROGRESS NOTES
Care Management Follow Up    Length of Stay (days): 5    Expected Discharge Date: 10/02/21     Concerns to be Addressed: discharge planning     Patient plan of care discussed at interdisciplinary rounds: Yes    Anticipated Discharge Disposition:  TCU     Anticipated Discharge Services: None  Anticipated Discharge DME: None    Patient/family educated on Medicare website which has current facility and service quality ratings: yes  Education Provided on the Discharge Plan:    Patient/Family in Agreement with the Plan: yes    Referrals Placed by CM/SW: Homecare  Private pay costs discussed: insurance costs co-pays    Additional Information:   TCU referrals pending;    1-[facility]- No  TCU beds in foreseeable future  [facility]- pending- LM 10/2 @ 1121  3-[facility]- Referral resent. No weekend admission but will review for potential Mon adm.  4-[facility]- pending-  10/2 @ 1120  5-[facility]- declined  6-[facility]- declined  7-[facility]- declined  8-[facility]- declined- possible bed on Monday 10/4 -SW left VM on 10/2, informing that pt likely will not discharge over the weekend, therefore requested that they review for a bed on Mon. Referral resent 10/2.    [facility] units of;  9-[facility]- pending  10-[facility]-pending    SW updated dtr Armida re: TCU search/delay due to lack of bed availability    SW to continue to follow and assist with discharge planning.    JAMES Molina  Daytime (8:00am-4:30pm): [phone]  After-Hours SW Pager (4:30pm-11:30pm): [phone]               JAMES Medrano

## 2021-10-02 NOTE — PLAN OF CARE
Pt A/Ox2, disoriented to situation and place. VSS on room air ex elevated BP. Denies pain. Regular diet. Up with assist of 1 with GB and walker. Unsteady gait. Up in chair for meals. Incontinent of bowel and bladder. Plan to discharge to TCU, awaiting placement.

## 2021-10-02 NOTE — PLAN OF CARE
Pleasant but disoriented to place & situation. VSS on RA. Up w/A1, GB & walker. Fall risk precautions maintained. Up in chair most of shift. Requesting his street clothes so he may get dressed & go home. Incont of B&B. Amb to BR, BS stable, no c/o pain. SW is following; pending placement. Will continue to monitor.

## 2021-10-03 NOTE — PLAN OF CARE
"Pleasant but disoriented to place, time & situation. VSS on RA. Up w/A1, GB & walker. Fall risk precautions maintained. Up in chair most of shift. He found his clothes & got himself dressed & came out in the [place] looking for the exit to go home. Was not able to be redirected till writer got him on the phone w/his dtr Armida. Was then able to redirect back to room in chair. Refused to eat his lunch & is \"waiting\" for Armida to take him home. Sitter siting outside of room. Incont of B&B,  no c/o pain, pending placement. Will continue to monitor.  "

## 2021-10-03 NOTE — PLAN OF CARE
Aox2, disoriented to situation and place. Redirectable. Hypertensive + on RA. Regular diet, needs help ordering. 1 loose stool early AM. Incontinent of B&B. Unsteady gait A-1 GB/W. Encouraging fluids. Uneventful overnight. SW working on TCU placement. Continue to monitor. Creatinine treanding down, AM lab to recheck

## 2021-10-03 NOTE — PROGRESS NOTES
[facility]  Hospitalist Progress Note for 10/03/2021       Assessment and Plan:   Jose Gomez is a 89 year old male with PMH  dementia, CKD stage III, HTN, HLP, Persistent atrial fib, Hyperthyroidism, DM2, known right 5th metatarsal closed fracture who was admitted on 9/26/2021 due to UTI and frequent falls.       Proteus UTI, improved  Frequent falls  Patient resides with his daughter and was brought into the ED due to frequent falls and concern for possible UTI.  The daughter reported that within the last 48 hours she has found the patient on the floor of his bedroom about 8 times. She also indicated that he has had increased urinary frequency and incontinence. UA grossly abnormal with concern for infection. No concern of sepsis on admission. UCx with pan-sensitive Proteus.  - Transitioned IV Ceftriaxone to PO Cefdinir completed 7-day course of treatmen t on 10/0/2  - PT consult requested, TCU versus home with 24h assist; family requesting TCU    - CC/SW assisting with discharge planning  - Fall precautions     Known right 5th metatarsal closed fracture  RLE edema, R foot erythema  Pt sustained right 5th metatarsal fx in 08/2021, was intolerant of CAM boot and treated nonsurgically. Followed in clinic with Dr. Ledezma of podiatry. Recent visit noted ongoing fracture on XR, pt asymptomatic. Notes also indicate wound to lateral foot at metatarsal joint of 5th digit which has since healed. Exam is with mild persistent erythema, pitting edema to anterior tibial aspect of right foot likely related to prior injury, no warmth.  - Continue outpatient follow-up with Podiatry (Dr. eLdezma).    - Monitor clinically for worsening s/s of cellulitis     Acute on chronic renal insufficiency stage III  Anemia of chronic kidney disease  Baseline creatinine around 1.65-1.75 with GFR in the 30's. Cr 1.87--1.98--1.75.  - Monitor  - Encourage PO Fluids.       Dementia  Per patient's daughter he has been at his baseline  "mentation.    - Hold PTA Prevagen daily.  Resume at discharge.       HTN:  Continue metoprolol  - Hydralazine PO PRN added for SBP > 180/100     HLP  - Hold PTA Zocor 20 mg/d and resume at discharge.       Persistent atrial fib  Patient is not on anticoagulation therapy.    - Continue on PTA Digoxin 62.5 mcg/d and Toprol-XL 50 mg every morning and 25 mg every night.  Hold parameters in place.       Hyperthyroidism  Noted history of this but does not appear to be on any medications.  Previously on methimazole but stopped several months ago.       DM2  Suspect diet controlled as patient is not on any medications.       COVID-19 screening Negative 2021.     DVT Prophylaxis: Pneumatic Compression Devices  Code Status: Full Code  PT/OT: ordered  Diet: Regular Diet Adult       Disposition: Expected discharge on to TCU when bed found. SW following.    Dona Long MD.  Hospitalist [phone] (7am -6 pm)                 Interval History:   no new complaints              Medications:       acetaminophen  975 mg Oral Q8H     cefdinir  300 mg Oral Daily     digoxin  62.5 mcg Oral Daily     hydrALAZINE  10 mg Oral 4x Daily     metoprolol succinate ER  25 mg Oral QPM     metoprolol succinate ER  50 mg Oral QAM     miconazole   Topical BID     sodium chloride (PF)  3 mL Intracatheter Q8H     lidocaine 4%, lidocaine (buffered or not buffered), magnesium hydroxide, melatonin, OLANZapine zydis, ondansetron **OR** ondansetron, senna-docusate **OR** senna-docusate, sodium chloride (PF)               Physical Exam:   Blood pressure (!) 154/66, pulse 51, temperature 96.8  F (36  C), temperature source Oral, resp. rate 18, height 1.778 m (5' 10\"), weight 69.1 kg (152 lb 5.4 oz), SpO2 93 %.  Wt Readings from Last 4 Encounters:   21 69.1 kg (152 lb 5.4 oz)   21 69.4 kg (153 lb)   21 69.7 kg (153 lb 9.6 oz)   10/13/20 68.5 kg (151 lb)         Vital Sign Ranges  Temperature Temp  Av.4  F (35.8  C)  Min: 95.6 "  F (35.3  C)  Max: 97.6  F (36.4  C)   Blood pressure Systolic (24hrs), Av , Min:135 , Max:150        Diastolic (24hrs), Av, Min:53, Max:74      Pulse Pulse  Av.6  Min: 57  Max: 77   Respirations Resp  Av  Min: 16  Max: 16   Pulse oximetry SpO2  Av.6 %  Min: 94 %  Max: 98 %       No intake or output data in the 24 hours ending 10/02/21 1517    Constitutional: Pt lying in bed. Awake, alert, cooperative, no apparent distress   Lungs: Clear to auscultation bilaterally, no crackles or wheezing   Cardiovascular: Regular rate and rhythm, normal S1 and S2, and no murmur noted   Abdomen: Normal bowel sounds, soft, non-distended, non-tender   Skin: No rashes, no cyanosis, no edema               Data:   All laboratory data reviewed

## 2021-10-03 NOTE — PROGRESS NOTES
Care Management Follow Up    Length of Stay (days): 6    Expected Discharge Date: 10/03/2021     Concerns to be Addressed: discharge planning     Patient plan of care discussed at interdisciplinary rounds: Yes    Anticipated Discharge Disposition:  TCU     Anticipated Discharge Services: None  Anticipated Discharge DME: None    Patient/family educated on Medicare website which has current facility and service quality ratings: yes  Education Provided on the Discharge Plan:  yes  Patient/Family in Agreement with the Plan: yes    Referrals Placed by CM/SW:  TCU  Private pay costs discussed: transportation costs and insurance costs out of pocket expenses and co-pays    Additional Information:  SW following for discharge needs.   TCU referrals pending. SW left VM at [facility], as was told by adm's that the next bed would be avail Mon, 10/4. Return call pending.    SW to continue to follow and assist with discharge planning.    JAMES Molina  Daytime (8:00am-4:30pm): [phone]  After-Hours SW Pager (4:30pm-11:30pm): [phone]           JAMES Medrano

## 2021-10-03 NOTE — PLAN OF CARE
Pt A/Ox2,disoriented to situation and place. Redirectable. Denies pain. Elevate BP, MD aware. Hydralazine and Metoprolol given. Up with assist of 1 with GB and walker. Unsteady gait. Expressing the want to go home. Incontinent of bowel and bladder. Loose stool this shift. SW following, awaiting TCU placement.

## 2021-10-04 NOTE — PLAN OF CARE
Pt A/Ox2, disoriented to situation and place. Expressing desire discharge home. Agitated and frustrated at times, redirectable. Regular diet, pt eaten dinner this evening. VSS on RA ex elevated BP. Hydralazine and Metoprolol given. Fall risk precautions in place. Up with stand by assist. Long arm in place. TCU placement pending

## 2021-10-04 NOTE — PROGRESS NOTES
[facility]  Hospitalist progress note       Assessment and Plan:   Jose Gomez is a 89 year old male with PMH  dementia, CKD stage III, HTN, HLP, Persistent atrial fib, Hyperthyroidism, DM2, known right 5th metatarsal closed fracture who was admitted on 9/26/2021 due to UTI and frequent falls.       Proteus UTI, improved  Frequent falls  Patient resides with his daughter and was brought into the ED due to frequent falls and concern for possible UTI.  The daughter reported that within the prior 48 hours she has found the patient on the floor of his bedroom about 8 times. She also indicated that he has had increased urinary frequency and incontinence. UA grossly abnormal with concern for infection. No concern of sepsis on admission. UCx with pan-sensitive Proteus.  - Transitioned IV Ceftriaxone to PO Cefdinir completed 7-day course of treatment on 10/0/2  - PT consult requested, TCU versus home with 24h assist; family requesting TCU    - CC/SW assisting with discharge planning  - Fall precautions     Known right 5th metatarsal closed fracture  RLE edema, R foot erythema  Pt sustained right 5th metatarsal fx in 08/2021, was intolerant of CAM boot and treated nonsurgically. Followed in clinic with Dr. Ledezma of podiatry. Recent visit noted ongoing fracture on XR, pt asymptomatic. Notes also indicate wound to lateral foot at metatarsal joint of 5th digit which has since healed. Exam is with mild persistent erythema, pitting edema to anterior tibial aspect of right foot likely related to prior injury, no warmth.  - Continue outpatient follow-up with Podiatry (Dr. Ledezma).    - Monitor clinically for worsening s/s of cellulitis     Acute on chronic renal insufficiency stage III  Anemia of chronic kidney disease  Baseline creatinine around 1.65-1.75 with GFR in the 30's. Cr 1.87--1.98--1.75.  Currently stable at baseline.  - Monitor  - Encourage PO Fluids.       Dementia  Per patient's daughter he has been  at his baseline mentation.    - Hold PTA Prevagen daily.  Resume at discharge.    -As needed olanzapine available for agitation.  Last received a dose on 9/29.     HTN:  Increased metoprolol XL dosing to 50 mg twice daily.  Titrate further as needed.  Also added amlodipine 5 mg daily on 10/4.  - Hydralazine PO PRN added for SBP > 180/100     HLP  - Hold PTA Zocor 20 mg/d and resume at discharge.       Persistent atrial fib  Patient is not on anticoagulation therapy.    - Continue on PTA Digoxin 62.5 mcg/d and Toprol-XL 50 mg twice daily. Hold parameters in place.       Hyperthyroidism  Noted history of this but does not appear to be on any medications.  Previously on methimazole but stopped several months ago.       DM2  Suspect diet controlled as patient is not on any medications.       COVID-19 screening Negative 9/26/2021.     DVT Prophylaxis: Pneumatic Compression Devices  Code Status: Full Code  PT/OT: ordered  Diet: Regular Diet Adult       Disposition: Expected discharge on to TCU when bed found. SW following.  Medically stable to discharge.                   Interval History:   Patient was alert and laying in bed.  No acute events reported overnight.  This morning he was quite angry and upset and demanded to leave the hospital.  Told me that he would mel[place] me and wanted to talk with a .  He did not understand why he was still in the hospital.  Wanted to go home.  Was able to tell me that he lived in [place].  However could not really tell me where he was now.  States that he has been in the hospital only for a day.  When I tell him it has been a week, he gets angry and upset and insist that he has only been here a day.              Medications:       acetaminophen  975 mg Oral Q8H     amLODIPine  5 mg Oral Daily     digoxin  62.5 mcg Oral Daily     metoprolol succinate ER  50 mg Oral BID     miconazole   Topical BID     sodium chloride (PF)  3 mL Intracatheter Q8H     hydrALAZINE, lidocaine 4%,  "lidocaine (buffered or not buffered), magnesium hydroxide, melatonin, OLANZapine zydis, ondansetron **OR** ondansetron, senna-docusate **OR** senna-docusate, sodium chloride (PF)               Physical Exam:   Blood pressure (!) 166/94, pulse 74, temperature 97.4  F (36.3  C), temperature source Oral, resp. rate 18, height 1.778 m (5' 10\"), weight 69.1 kg (152 lb 5.4 oz), SpO2 97 %.  Wt Readings from Last 4 Encounters:   21 69.1 kg (152 lb 5.4 oz)   21 69.4 kg (153 lb)   21 69.7 kg (153 lb 9.6 oz)   10/13/20 68.5 kg (151 lb)         Vital Sign Ranges  Temperature Temp  Av.4  F (35.8  C)  Min: 95.6  F (35.3  C)  Max: 97.6  F (36.4  C)   Blood pressure Systolic (24hrs), Av , Min:135 , Max:150        Diastolic (24hrs), Av, Min:53, Max:74      Pulse Pulse  Av.6  Min: 57  Max: 77   Respirations Resp  Av  Min: 16  Max: 16   Pulse oximetry SpO2  Av.6 %  Min: 94 %  Max: 98 %       No intake or output data in the 24 hours ending 10/02/21 1517    Patient was angry, upset and poorly cooperative with exam    Constitutional: Pt lying in bed. Awake, alert, poorly cooperative, no apparent distress   Lungs:  Nonlabored breathing   Cardiovascular:  No edema   Abdomen:  Nondistended abdomen   Skin: No rashes, no cyanosis, no edema               Data:   All laboratory data reviewed    "

## 2021-10-04 NOTE — PLAN OF CARE
A/O x2, disoriented to place and situation. Denies pain. RLE edema. Reg diet. SBA. Long arm in place. TCU placement pending.

## 2021-10-04 NOTE — PROGRESS NOTES
Care Management Follow Up    Length of Stay (days): 7    Expected Discharge Date: 10/03/2021     Concerns to be Addressed: discharge planning     Patient plan of care discussed at interdisciplinary rounds: Yes    Anticipated Discharge Disposition: Home, Home Care, Skilled Nursing Facilty     Anticipated Discharge Services: None  Anticipated Discharge DME: None    Patient/family educated on Medicare website which has current facility and service quality ratings: yes  Education Provided on the Discharge Plan:    Patient/Family in Agreement with the Plan: yes    Referrals Placed by CM/SW: Homecare  Private pay costs discussed: Not applicable    Additional Information:  Follow up calls were placed to pending TCU's. Waiting for return calls and more information.    Will continue to follow.    JAMES Cadena

## 2021-10-05 NOTE — PROGRESS NOTES
Care Management Follow Up    Length of Stay (days): 8    Expected Discharge Date: 10/06/2021     Concerns to be Addressed: discharge planning     Patient plan of care discussed at interdisciplinary rounds: Yes    Anticipated Discharge Disposition: Home, Home Care, Skilled Nursing Facilty     Anticipated Discharge Services: None  Anticipated Discharge DME: None    Patient/family educated on Medicare website which has current facility and service quality ratings: yes  Education Provided on the Discharge Plan:    Patient/Family in Agreement with the Plan: yes    Referrals Placed by CM/SW: Homecare  Private pay costs discussed: Not applicable    Additional Information:  [facility] - Declined- pt needs Covid Vaccine    [facility]- Declined - pt needs Covid Vaccine    [facility]- Declined- no beds     [facility]- complex [facility] needs/ [facility] care    [facility]- Declined- no beds    [facility]- Declined- no beds    [facility]- Declined- memory care    [facility]- Declined- no beds & memory care.    [facility]- Declined- no beds    [facility]- Waiting for follow up call.     Addendum: Writer requested assistance from upper management on finding suitable TCU placement.    Will continue to follow.    JAMES Cadena

## 2021-10-05 NOTE — PROGRESS NOTES
Alert to self, hx dementia, long arm. A1, gb and walker. No IV access. Reg diet. VSS w soft bp and slightly low temp on RA. Incontinent. Pt refuses acetaminophen. R foot edema rt recent 5th metatarsal fracture. TCU pending placement. Continue to monitor.

## 2021-10-05 NOTE — PROGRESS NOTES
[facility]  Hospitalist progress note       Assessment and Plan:   Jose Gomez is a 89 year old male with PMH  dementia, CKD stage III, HTN, HLP, Persistent atrial fib, Hyperthyroidism, DM2, known right 5th metatarsal closed fracture who was admitted on 9/26/2021 due to UTI and frequent falls.       Proteus UTI, improved  Frequent falls  Patient resides with his daughter and was brought into the ED due to frequent falls and concern for possible UTI.  The daughter reported that within the prior 48 hours she has found the patient on the floor of his bedroom about 8 times. She also indicated that he has had increased urinary frequency and incontinence. UA grossly abnormal with concern for infection. No concern of sepsis on admission. UCx with pan-sensitive Proteus.  - Transitioned IV Ceftriaxone to PO Cefdinir completed 7-day course of treatment on 10/0/2  - PT consult requested, TCU versus home with 24h assist; family requesting TCU    - CC/SW assisting with discharge planning  - Fall precautions     Known right 5th metatarsal closed fracture  RLE edema, R foot erythema  Pt sustained right 5th metatarsal fx in 08/2021, was intolerant of CAM boot and treated nonsurgically. Followed in clinic with Dr. Ledezma of podiatry. Recent visit noted ongoing fracture on XR, pt asymptomatic. Notes also indicate wound to lateral foot at metatarsal joint of 5th digit which has since healed. Exam is with mild persistent erythema, pitting edema to anterior tibial aspect of right foot likely related to prior injury, no warmth.  - Continue outpatient follow-up with Podiatry (Dr. Ledezma).    - Monitor clinically for worsening s/s of cellulitis     Acute on chronic kidney disease stage 3/4  Anemia of chronic kidney disease  Baseline creatinine around 1.65-1.75 with GFR in the 30's. Cr 1.87--1.98--1.75.  Currently stable at baseline.  - Monitor  - Encourage PO Fluids.       Dementia  Per patient's daughter he has been at his  "baseline mentation.    - Hold PTA Prevagen daily.  Resume at discharge.    -As needed olanzapine available for agitation.  Last received a dose on 9/29.     HTN:  Increased metoprolol XL dosing to 50 mg twice daily.  Titrate further as needed.  Also added amlodipine 5 mg daily on 10/4.  - Hydralazine PO PRN added for SBP > 180/100     HLP  - Hold PTA Zocor 20 mg/d and resume at discharge.       Persistent atrial fib  Patient is not on anticoagulation therapy.    - Continue on PTA Digoxin 62.5 mcg/d and Toprol-XL 50 mg twice daily. Hold parameters in place.       Hyperthyroidism  Noted history of this but does not appear to be on any medications.  Previously on methimazole but stopped several months ago.       DM2  Suspect diet controlled as patient is not on any medications.       COVID-19 screening Negative 9/26/2021.     DVT Prophylaxis: Pneumatic Compression Devices  Code Status: Full Code  PT/OT: ordered  Diet: Regular Diet Adult       Disposition: Expected discharge on to TCU when bed found. SW following.  Medically stable to discharge.                   Interval History:   Patient was resting in bed when seen.  Did not wake him up.  No concerns per RN.  Noted that he was restless through the day yesterday wanting to leave.              Medications:       acetaminophen  975 mg Oral Q8H     amLODIPine  5 mg Oral Daily     digoxin  62.5 mcg Oral Daily     metoprolol succinate ER  50 mg Oral BID     miconazole   Topical BID     sodium chloride (PF)  3 mL Intracatheter Q8H     hydrALAZINE, lidocaine 4%, lidocaine (buffered or not buffered), magnesium hydroxide, melatonin, OLANZapine zydis, ondansetron **OR** ondansetron, senna-docusate **OR** senna-docusate, sodium chloride (PF)               Physical Exam:   Blood pressure 129/63, pulse 66, temperature 97.6  F (36.4  C), temperature source Oral, resp. rate 18, height 1.778 m (5' 10\"), weight 69.1 kg (152 lb 5.4 oz), SpO2 95 %.  Wt Readings from Last 4 Encounters: "   21 69.1 kg (152 lb 5.4 oz)   21 69.4 kg (153 lb)   21 69.7 kg (153 lb 9.6 oz)   10/13/20 68.5 kg (151 lb)         Vital Sign Ranges  Temperature Temp  Av.4  F (35.8  C)  Min: 95.6  F (35.3  C)  Max: 97.6  F (36.4  C)   Blood pressure Systolic (24hrs), Av , Min:135 , Max:150        Diastolic (24hrs), Av, Min:53, Max:74      Pulse Pulse  Av.6  Min: 57  Max: 77   Respirations Resp  Av  Min: 16  Max: 16   Pulse oximetry SpO2  Av.6 %  Min: 94 %  Max: 98 %       No intake or output data in the 24 hours ending 10/02/21 1517    Patient was angry, upset and poorly cooperative with exam    Constitutional: Pt lying in bed.  Resting.   Lungs:  Nonlabored breathing   Cardiovascular:  No edema   Abdomen:  Nondistended abdomen   Skin: No rashes, no cyanosis, no edema               Data:   All laboratory data reviewed

## 2021-10-05 NOTE — PLAN OF CARE
Patient A&0x1 -2. Up with 1 GB and walker. Incont. Of bladder at times. Mild edema on R foot. Denies numbness and tingling. Lungs clear. On RA. Denies pain.  Full code.Tolerating regular diet.  7 day repeat asymptomatic covid test done this shift and came back Negative. Awaiting placement. Long arm sit. No IV

## 2021-10-06 NOTE — PLAN OF CARE
Pt Alert to self. VSS ex. HTN, on RA. Long arm this shift. Denied pain this shift, refused tylenol. Up ax1 GB +walker. Incontinent at times. Mild edema on feet. 7 day repeat COVID test, negative. No IV access. Tolerating regular diet. Discharge pending. Awaiting placement.

## 2021-10-06 NOTE — PROGRESS NOTES
[facility]  Hospitalist progress note       Assessment and Plan:   Jose Gomez is a 89 year old male with PMH  dementia, CKD stage III, HTN, HLP, Persistent atrial fib, Hyperthyroidism, DM2, known right 5th metatarsal closed fracture who was admitted on 9/26/2021 due to UTI and frequent falls.       Proteus UTI, improved  Frequent falls  Patient resides with his daughter and was brought into the ED due to frequent falls and concern for possible UTI.  The daughter reported that within the prior 48 hours she has found the patient on the floor of his bedroom about 8 times. She also indicated that he has had increased urinary frequency and incontinence. UA grossly abnormal with concern for infection. No concern of sepsis on admission. UCx with pan-sensitive Proteus.  - Transitioned IV Ceftriaxone to PO Cefdinir completed 7-day course of treatment on 10/0/2  - PT consult requested, TCU versus home with 24h assist; family requesting TCU    - CC/SW assisting with discharge planning  - Fall precautions     Known right 5th metatarsal closed fracture  RLE edema, R foot erythema  Pt sustained right 5th metatarsal fx in 08/2021, was intolerant of CAM boot and treated nonsurgically. Followed in clinic with Dr. Ledezma of podiatry. Recent visit noted ongoing fracture on XR, pt asymptomatic. Notes also indicate wound to lateral foot at metatarsal joint of 5th digit which has since healed. Exam is with mild persistent erythema, pitting edema to anterior tibial aspect of right foot likely related to prior injury, no warmth.  - Continue outpatient follow-up with Podiatry (Dr. Ledezma).    - Monitor clinically for worsening s/s of cellulitis     Acute on chronic kidney disease stage 3/4  Anemia of chronic kidney disease  Baseline creatinine around 1.65-1.75 with GFR in the 30's. Cr 1.87--1.98--1.75.  Currently stable at baseline.  - Monitor  - Encourage PO Fluids.       Dementia  Per patient's daughter he has been at his  "baseline mentation.    - Hold PTA Prevagen daily.  Resume at discharge.    -As needed olanzapine available for agitation.  Last received a dose on 9/29.     HTN:  Increased metoprolol XL dosing to 50 mg twice daily.  Titrate further as needed.  Also added amlodipine 5 mg daily on 10/4.  - Hydralazine PO PRN added for SBP > 180/100     HLP  - Hold PTA Zocor 20 mg/d and resume at discharge.       Persistent atrial fib  Patient is not on anticoagulation therapy.    - Continue on PTA Digoxin 62.5 mcg/d and Toprol-XL 50 mg twice daily. Hold parameters in place.       Hyperthyroidism  Noted history of this but does not appear to be on any medications.  Previously on methimazole but stopped several months ago.       DM2  Suspect diet controlled as patient is not on any medications.       COVID-19 screening Negative 9/26/2021.     DVT Prophylaxis: Pneumatic Compression Devices  Code Status: Full Code  PT/OT: ordered  Diet: Regular Diet Adult       Disposition: Expected discharge on to TCU when bed found. SW following.  Medically stable to discharge.                   Interval History:   Patient was eating his breakfast when seen.  No concerns per RN.  Continues with long-arm set.  No agitation overnight.              Medications:       acetaminophen  975 mg Oral Q8H     amLODIPine  5 mg Oral Daily     digoxin  62.5 mcg Oral Daily     metoprolol succinate ER  50 mg Oral BID     miconazole   Topical BID     sodium chloride (PF)  3 mL Intracatheter Q8H     hydrALAZINE, lidocaine 4%, lidocaine (buffered or not buffered), magnesium hydroxide, melatonin, OLANZapine zydis, ondansetron **OR** ondansetron, senna-docusate **OR** senna-docusate, sodium chloride (PF)               Physical Exam:   Blood pressure (!) 156/79, pulse 58, temperature (!) 96.5  F (35.8  C), temperature source Oral, resp. rate 18, height 1.778 m (5' 10\"), weight 68.5 kg (151 lb 0.2 oz), SpO2 97 %.  Wt Readings from Last 4 Encounters:   10/06/21 68.5 kg (151 " lb 0.2 oz)   21 69.4 kg (153 lb)   21 69.7 kg (153 lb 9.6 oz)   10/13/20 68.5 kg (151 lb)         Vital Sign Ranges  Temperature Temp  Av.4  F (35.8  C)  Min: 95.6  F (35.3  C)  Max: 97.6  F (36.4  C)   Blood pressure Systolic (24hrs), Av , Min:135 , Max:150        Diastolic (24hrs), Av, Min:53, Max:74      Pulse Pulse  Av.6  Min: 57  Max: 77   Respirations Resp  Av  Min: 16  Max: 16   Pulse oximetry SpO2  Av.6 %  Min: 94 %  Max: 98 %       No intake or output data in the 24 hours ending 10/02/21 1517    Patient was angry, upset and poorly cooperative with exam    Constitutional: Pt sitting up, eating breakfast   Lungs:  Nonlabored breathing   Cardiovascular:  No edema   Abdomen:  Nondistended abdomen   Skin: No rashes, no cyanosis, no edema               Data:   All laboratory data reviewed

## 2021-10-06 NOTE — PROGRESS NOTES
Patient is alert to self.  Up with 1 GB and walker. Incont. Of bladder at times. Mild edema on R foot.  Denies pain. 7 day repeat asymptomatic covid test is Negative. Awaiting placement. Long arm sit. No IV

## 2021-10-06 NOTE — PLAN OF CARE
Patient A&0x1 -2.Elevated B.P at times otherwise VSS on RA. Up with GB and walker. Incontinent B&B at times. Mild edema on R foot, denies pain, declined scheduled tylenol stating he have not had pain in many years,tolerating regular diet.  7 day repeat asymptomatic covid test done,came back Negative. Awaiting placement. Long arm sit. No IV site.

## 2021-10-07 NOTE — PLAN OF CARE
A&Ox1-2, very forgetful. VSS on RA ex HTN (most recent,155/73). PRN hydralazine available for SBP >180. Up assist x1 GBW. Bed alarm on with frequent bed exits. Occasionally agitated, but redirectable. Regular diet. Incontinent of B&B at times. Mild edema to BLE. Denies pain and refusing tylenol. No IV access. Pending TCU placement.

## 2021-10-07 NOTE — PLAN OF CARE
Pt A/Ox1, disoriented to place, time, and situation. VSS on room air ex elevated BP. Hydralazine available for SBP > 180. Denies pain. Edema to BLE. No IV access. Long arm in place. Fall precaution, up with assist of 1 with GB and walker. Awaiting placement.

## 2021-10-07 NOTE — PROGRESS NOTES
[facility]  Hospitalist progress note       Assessment and Plan:   Jose Gomez is a 89 year old male with PMH  dementia, CKD stage III, HTN, HLP, Persistent atrial fib, Hyperthyroidism, DM2, known right 5th metatarsal closed fracture who was admitted on 9/26/2021 due to UTI and frequent falls.       Proteus UTI, improved  Frequent falls  Patient resides with his daughter and was brought into the ED due to frequent falls and concern for possible UTI.  The daughter reported that within the prior 48 hours she has found the patient on the floor of his bedroom about 8 times. She also indicated that he has had increased urinary frequency and incontinence. UA grossly abnormal with concern for infection. No concern of sepsis on admission. UCx with pan-sensitive Proteus.  - Transitioned IV Ceftriaxone to PO Cefdinir completed 7-day course of treatment on 10/0/2  - PT consult requested, TCU versus home with 24h assist; family requesting TCU    - CC/SW assisting with discharge planning  - Fall precautions     Known right 5th metatarsal closed fracture  RLE edema, R foot erythema  Pt sustained right 5th metatarsal fx in 08/2021, was intolerant of CAM boot and treated nonsurgically. Followed in clinic with Dr. Ledezma of podiatry. Recent visit noted ongoing fracture on XR, pt asymptomatic. Notes also indicate wound to lateral foot at metatarsal joint of 5th digit which has since healed. Exam is with mild persistent erythema, pitting edema to anterior tibial aspect of right foot likely related to prior injury, no warmth.  - Continue outpatient follow-up with Podiatry (Dr. Ledezma).    - Monitor clinically for worsening s/s of cellulitis     Acute on chronic kidney disease stage 3/4  Anemia of chronic kidney disease  Baseline creatinine around 1.65-1.75 with GFR in the 30's. Cr 1.87--1.98--1.75.  Currently stable at baseline.  - Monitor  - Encourage PO Fluids.       Dementia  Per patient's daughter he has been at his  "baseline mentation.    - Hold PTA Prevagen daily.  Resume at discharge.    -As needed olanzapine available for agitation.  Last received a dose on 9/29.     HTN:  Increased metoprolol XL dosing to 50 mg twice daily.  Titrate further as needed.  Also added amlodipine 5 mg daily on 10/4.  - Hydralazine PO PRN added for SBP > 180/100     HLP  - Hold PTA Zocor 20 mg/d and resume at discharge.       Persistent atrial fib  Patient is not on anticoagulation therapy.    - Continue on PTA Digoxin 62.5 mcg/d and Toprol-XL 50 mg twice daily. Hold parameters in place.       Hyperthyroidism  Noted history of this but does not appear to be on any medications.  Previously on methimazole but stopped several months ago.       DM2  Suspect diet controlled as patient is not on any medications.       COVID-19 screening Negative 9/26/2021.     DVT Prophylaxis: Pneumatic Compression Devices  Code Status: Full Code  PT/OT: ordered  Diet: Regular Diet Adult       Disposition: Expected discharge on to TCU when bed found. SW following.  Medically stable to discharge.                   Interval History:   Patient was eating his breakfast when seen.  No concerns per RN.  Remains very confused.  No agitation overnight.              Medications:       acetaminophen  975 mg Oral Q8H     amLODIPine  5 mg Oral Daily     digoxin  62.5 mcg Oral Daily     metoprolol succinate ER  50 mg Oral BID     miconazole   Topical BID     sodium chloride (PF)  3 mL Intracatheter Q8H     hydrALAZINE, lidocaine 4%, lidocaine (buffered or not buffered), magnesium hydroxide, melatonin, OLANZapine zydis, ondansetron **OR** ondansetron, senna-docusate **OR** senna-docusate, sodium chloride (PF)               Physical Exam:   Blood pressure (!) 157/69, pulse 74, temperature (!) 96.4  F (35.8  C), temperature source Oral, resp. rate 18, height 1.778 m (5' 10\"), weight 66.6 kg (146 lb 14.4 oz), SpO2 95 %.  Wt Readings from Last 4 Encounters:   10/07/21 66.6 kg (146 lb " 14.4 oz)   21 69.4 kg (153 lb)   21 69.7 kg (153 lb 9.6 oz)   10/13/20 68.5 kg (151 lb)         Vital Sign Ranges  Temperature Temp  Av.4  F (35.8  C)  Min: 95.6  F (35.3  C)  Max: 97.6  F (36.4  C)   Blood pressure Systolic (24hrs), Av , Min:135 , Max:150        Diastolic (24hrs), Av, Min:53, Max:74      Pulse Pulse  Av.6  Min: 57  Max: 77   Respirations Resp  Av  Min: 16  Max: 16   Pulse oximetry SpO2  Av.6 %  Min: 94 %  Max: 98 %       No intake or output data in the 24 hours ending 10/02/21 1517    Patient was angry, upset and poorly cooperative with exam    Constitutional: Pt sitting up, eating breakfast   Lungs:  Nonlabored breathing   Cardiovascular:  No edema   Abdomen:  Nondistended abdomen   Skin: No rashes, no cyanosis, mild bilateral lower extremity edema               Data:   All laboratory data reviewed

## 2021-10-07 NOTE — PLAN OF CARE
Pt Alert to self.Forgetful at times.VSS ex. HTN, on RA. Long arm this shift. Denied pain this shift, refused tylenol. Up ax1 GB +walker. Incontinent at times. Mild lower extremity edema.Tolerating regular diet. Discharge pending. Awaiting placement.

## 2021-10-08 NOTE — PLAN OF CARE
Pt A/Ox2, disoriented to place and situation. Redirectable. VSS on room air, ex HTN. Denies pain. +1,+2 edema to RLE. Up with assist of 1 with GB and walker. Fall precautions, long arm in place. Waiting TCU placement.

## 2021-10-08 NOTE — PROGRESS NOTES
Care Management Follow Up    Length of Stay (days): 11    Expected Discharge Date: 10/08/2021     Concerns to be Addressed: discharge planning     Patient plan of care discussed at interdisciplinary rounds: Yes    Anticipated Discharge Disposition: Home, Home Care, Skilled Nursing Facilty     Anticipated Discharge Services: None  Anticipated Discharge DME: None    Patient/family educated on Medicare website which has current facility and service quality ratings: yes  Education Provided on the Discharge Plan:    Patient/Family in Agreement with the Plan: yes    Referrals Placed by CM/SW: Homecare  Private pay costs discussed: Not applicable    Additional Information:  Writer attempted to call [facility] twice, with no answer and no option to leave a voicemail.     1:29p Addendum: Writer spoke with [facility], who stated that they do not have any beds currently, but can review pt. Writer also left a vm with the [place] liaison, asking to look for a TCU bed for pt.    3:42p Addendum: Writer spoke with [facility] who stated they are considering pt, but need PT/OT to see pt because they have not seen him since 9/27. Writer paged provider.     JAMES Whyte

## 2021-10-08 NOTE — PLAN OF CARE
Alert to self. VSS on RA. A1 GB+W. Incontinent. Denies pain. Blanchable redness on sacrum/coccyx: barrier cream provided. +1-+2 edema BL LE. PT and OT eval pending. SW/CC following.

## 2021-10-08 NOTE — PLAN OF CARE
A&O to self, frustrated and crabby at times, redirectable.  Up to BR w/ SBA, walker, belt.  Denies pain.  VSS, on RA. Incontinent of urine at times. 1-2+ BLE edema.  PT/SW following, discharge pending placement.

## 2021-10-08 NOTE — PROGRESS NOTES
[facility]  Hospitalist progress note       Assessment and Plan:   Jose Gomez is a 89 year old male with PMH  dementia, CKD stage III, HTN, HLP, Persistent atrial fib, Hyperthyroidism, DM2, known right 5th metatarsal closed fracture who was admitted on 9/26/2021 due to UTI and frequent falls.       Proteus UTI, improved  Frequent falls  Patient resides with his daughter and was brought into the ED due to frequent falls and concern for possible UTI.  The daughter reported that within the prior 48 hours she has found the patient on the floor of his bedroom about 8 times. She also indicated that he has had increased urinary frequency and incontinence. UA grossly abnormal with concern for infection. No concern of sepsis on admission. UCx with pan-sensitive Proteus.  - Transitioned IV Ceftriaxone to PO Cefdinir completed 7-day course of treatment on 10/0/2  - PT consult requested, TCU versus home with 24h assist; family requesting TCU    - CC/SW assisting with discharge planning  - Fall precautions     Known right 5th metatarsal closed fracture  RLE edema, R foot erythema  Pt sustained right 5th metatarsal fx in 08/2021, was intolerant of CAM boot and treated nonsurgically. Followed in clinic with Dr. Ledezma of podiatry. Recent visit noted ongoing fracture on XR, pt asymptomatic. Notes also indicate wound to lateral foot at metatarsal joint of 5th digit which has since healed. Exam is with mild persistent erythema, pitting edema to anterior tibial aspect of right foot likely related to prior injury, no warmth.  - Continue outpatient follow-up with Podiatry (Dr. Ledezma).    - Monitor clinically for worsening s/s of cellulitis     Acute on chronic kidney disease stage 3/4  Anemia of chronic kidney disease  Baseline creatinine around 1.65-1.75 with GFR in the 30's. Cr 1.87--1.98--1.75.  Currently stable at baseline.  - Monitor  - Encourage PO Fluids.       Dementia  Per patient's daughter he has been at his  "baseline mentation.    - Hold PTA Prevagen daily.  Resume at discharge.    -As needed olanzapine available for agitation.  Last received a dose on 9/29.     HTN:  Increased metoprolol XL dosing to 50 mg twice daily.  Titrate further as needed.  Also added amlodipine 5 mg daily on 10/4.  - Hydralazine PO PRN added for SBP > 180/100     HLP  - Hold PTA Zocor 20 mg/d and resume at discharge.       Persistent atrial fib  Patient is not on anticoagulation therapy.    - Continue on PTA Digoxin 62.5 mcg/d and Toprol-XL 50 mg twice daily. Hold parameters in place.       Hyperthyroidism  Noted history of this but does not appear to be on any medications.  Previously on methimazole but stopped several months ago.       DM2  Suspect diet controlled as patient is not on any medications.       COVID-19 screening Negative 9/26/2021.     DVT Prophylaxis: Pneumatic Compression Devices  Code Status: Full Code  PT/OT: ordered  Diet: Regular Diet Adult       Disposition: Expected discharge on to TCU when bed found. SW following.  Medically stable to discharge.                   Interval History:   Patient was alert, sitting up in bed asking to use the bathroom.  Stable overnight.  No concerns per RN.  Patient remains very confused.              Medications:       acetaminophen  975 mg Oral Q8H     amLODIPine  5 mg Oral Daily     digoxin  62.5 mcg Oral Daily     metoprolol succinate ER  50 mg Oral BID     miconazole   Topical BID     sodium chloride (PF)  3 mL Intracatheter Q8H     hydrALAZINE, lidocaine 4%, lidocaine (buffered or not buffered), magnesium hydroxide, melatonin, OLANZapine zydis, ondansetron **OR** ondansetron, senna-docusate **OR** senna-docusate, sodium chloride (PF)               Physical Exam:   Blood pressure 127/67, pulse 61, temperature 97.4  F (36.3  C), temperature source Axillary, resp. rate 16, height 1.778 m (5' 10\"), weight 66.9 kg (147 lb 8 oz), SpO2 96 %.  Wt Readings from Last 4 Encounters:   10/08/21 " 66.9 kg (147 lb 8 oz)   21 69.4 kg (153 lb)   21 69.7 kg (153 lb 9.6 oz)   10/13/20 68.5 kg (151 lb)         Vital Sign Ranges  Temperature Temp  Av.4  F (35.8  C)  Min: 95.6  F (35.3  C)  Max: 97.6  F (36.4  C)   Blood pressure Systolic (24hrs), Av , Min:135 , Max:150        Diastolic (24hrs), Av, Min:53, Max:74      Pulse Pulse  Av.6  Min: 57  Max: 77   Respirations Resp  Av  Min: 16  Max: 16   Pulse oximetry SpO2  Av.6 %  Min: 94 %  Max: 98 %       No intake or output data in the 24 hours ending 10/02/21 1517    Patient was angry, upset and poorly cooperative with exam    Constitutional: Pt sitting up, appeared comfortable   Lungs:  Nonlabored breathing   Cardiovascular:  No edema   Abdomen:  Nondistended abdomen   Skin: No rashes, no cyanosis, mild bilateral lower extremity edema   Neuro: Alert, disoriented x3, moving all extremities, no facial asymmetry            Data:   All laboratory data reviewed

## 2021-10-09 NOTE — PROGRESS NOTES
10/09/21 1527   Quick Adds   Type of Visit PT Re-evaluation   Living Environment   People in home child(katarzyna), adult   Current Living Arrangements house   Transportation Anticipated family or friend will provide   Living Environment Comments  patient unable to describe living situation.  Spoke with daughter who was in a hurry and did not have time to relay information.    Self-Care   Usual Activity Tolerance moderate   Current Activity Tolerance fair   Activity/Exercise/Self-Care Comment Per daughter, who PT spoke with briefly on the phone, patient gets angry when she tries to have him walk with walker.  Daughter states she helps patient walk.    Disability/Function   Hearing Difficulty or Deaf yes   Concentrating, Remembering or Making Decisions Difficulty yes   Walking or Climbing Stairs Difficulty yes   Dressing/Bathing Difficulty yes   Toileting issues yes   Fall history within last six months yes   Number of times patient has fallen within last six months   (per chart, 8 on day of admit)   General Information   Onset of Illness/Injury or Date of Surgery 09/26/21   Referring Physician Nusrat Goss MD   Patient/Family Therapy Goals Statement (PT) Patient not stating.  Daughter in a hurry on telephone.  Wants to talks with someone tomorrow.   Pertinent History of Current Problem (include personal factors and/or comorbidities that impact the POC) Jose Gomez is a 89 year old male with PMH  dementia, CKD stage III, HTN, HLP, Persistent atrial fib, Hyperthyroidism, DM2, known right 5th metatarsal closed fracture who was admitted on 9/26/2021 due to UTI and frequent falls.     Existing Precautions/Restrictions fall   General Observations Lying in bed; pleasantly confused.   Cognition   Orientation Status (Cognition) oriented to;person   Affect/Mental Status (Cognition) confused   Follows Commands (Cognition) 50-74% accuracy   Behavioral Issues overwhelmed easily   Safety Deficit (Cognition) ability to  follow commands;awareness of need for assistance;impulsivity;insight into deficits/self-awareness   Memory Deficit (Cognition) severe deficit   Pain Assessment   Patient Currently in Pain No   Integumentary/Edema   Integumentary/Edema no deficits were identifed   Posture    Posture Forward head position   Range of Motion (ROM)   ROM Comment not formally assessed   Strength   Strength Comments not formally assessed; antigravity movement noted throughout   Bed Mobility   Comment (Bed Mobility) Supine to and from sit with CGA/cueing, HOB raised, and use of rails.     Transfers   Transfer Safety Comments Sit to and from stand with CGA to slight Min A.    Gait/Stairs (Locomotion)   Comment (Gait/Stairs) Patient ambulated 15' for eval with WW and CGA to occasional min A to redirect WW.     Balance   Balance Comments Min A and redirection of WW; patient picks WW up during gait for turns.    Sensory Examination   Sensory Perception patient reports no sensory changes   Coordination   Coordination no deficits were identified   Muscle Tone   Muscle Tone no deficits were identified   Clinical Impression   Criteria for Skilled Therapeutic Intervention yes, treatment indicated   PT Diagnosis (PT) impaired functional mobility   Influenced by the following impairments generalized weakness and deconditioning, dementia   Functional limitations due to impairments decreased independence and endurance in functional mobility   Clinical Presentation Stable/Uncomplicated   Clinical Presentation Rationale per clinical judgment   Clinical Decision Making (Complexity) low complexity   Therapy Frequency (PT) Daily   Predicted Duration of Therapy Intervention (days/wks) 4 days   Planned Therapy Interventions (PT) balance training;bed mobility training;gait training;patient/family education;stair training;strengthening;transfer training;progressive activity/exercise;home program guidelines   Anticipated Equipment Needs at Discharge (PT)   (has  WW and SEC)   Risk & Benefits of therapy have been explained evaluation/treatment results reviewed;care plan/treatment goals reviewed;risks/benefits reviewed;current/potential barriers reviewed;participants voiced agreement with care plan;participants included;patient;daughter   PT Discharge Planning    PT Discharge Recommendation (DC Rec) home with assist;home with home care physical therapy;Transitional Care Facility   PT Rationale for DC Rec Patient is mobilizing for transfers and gait with WW with CGA to slight Min A.  Feel patient may be near baseline.  Called daughter to discuss and told her how patient is doing; tried to assess baseline function and help he receives.  Daughter did note that she or her brother are with patient at all times and have to physically help with mobility.  She could not state if she felt able to continue in that role and was in a hurry and had to end phone conversation quickly.  If patient discharges to home he will continue to require CGA to Min A 24/7, including nights; likely similar to baseline.  If this cannot be provided by family, then would recommend TCU until family able to provide level of support or long term placement located.  Of note, daughter did state she does work some outside the home but her brother is with patient during that time.    Total Evaluation Time   Total Evaluation Time (Minutes) 10

## 2021-10-09 NOTE — PROGRESS NOTES
[facility]    Medicine Progress Note - Hospitalist Service       Date of Admission:  9/26/2021    Assessment & Plan         Jose Gomez is a 89 year old male with PMH  dementia, CKD stage III, HTN, HLP, Persistent atrial fib, Hyperthyroidism, DM2, known right 5th metatarsal closed fracture who was admitted on 9/26/2021 due to UTI and frequent falls.       Proteus UTI, improved  Frequent falls  Patient resides with his daughter and was brought into the ED due to frequent falls and concern for possible UTI.  The daughter reported that within the prior 48 hours she has found the patient on the floor of his bedroom about 8 times. She also indicated that he has had increased urinary frequency and incontinence. UA grossly abnormal with concern for infection. No concern of sepsis on admission. UCx with pan-sensitive Proteus.  - Fall precautions in place   - Transitioned IV Ceftriaxone to PO Cefdinir completed 7-day course of treatment on 10/0/2  - PT/OT consulted and awaiting their recommendations before patient is able to be accepted to TCU   - CC/SW assisting with discharge planning.  Likely needs to TCU      Known right 5th metatarsal closed fracture  RLE edema, R foot erythema  Pt sustained right 5th metatarsal fx in 08/2021, was intolerant of CAM boot and treated nonsurgically. Followed in clinic with Dr. Ledezma of podiatry. Recent visit noted ongoing fracture on XR, pt asymptomatic. Notes also indicate wound to lateral foot at metatarsal joint of 5th digit which has since healed. Exam is with mild persistent erythema, pitting edema to anterior tibial aspect of right foot likely related to prior injury, no warmth.  - Continue outpatient follow-up with Podiatry (Dr. Ledezma)  - Monitor clinically for worsening s/s of cellulitis     Acute on chronic kidney disease stage III/IV. Resolved   Anemia of chronic kidney disease  Baseline creatinine around 1.65-1.75 with GFR in the 30's. Cr  1.87--1.98--1.75.  Currently stable at baseline.  - Monitor  - Encourage PO Fluids     Dementia  Per patient's daughter he has been at his baseline mentation.    - Hold PTA Prevagen daily.  Resume at discharge.    - PRN olanzapine available for agitation     HTN  Pressures appear to have been improved   - Continue metoprolol XL dosing to 50 mg twice daily  - Norvasc 5 mg daily on 10/4.  - Hydralazine PO PRN added for SBP > 180/100     HLP  - Hold PTA Zocor 20 mg/d and resume at discharge     Persistent atrial fib  Patient is not on anticoagulation therapy.    - Continue on PTA Digoxin 62.5 mcg/d and Toprol-XL 50 mg twice daily. Hold parameters in place     Hyperthyroidism  Noted history of this but does not appear to be on any medications.  Previously on methimazole but stopped several months ago.       Prediabetes  HgbA1c in the past has been in the 5.9-6.5 range.  Suspect diet controlled as patient is not on any medications.            Diet: Regular Diet Adult    DVT Prophylaxis: Pneumatic Compression Devices  Barry Catheter: Not present  Central Lines: None  Code Status: Full Code      Disposition Plan   Expected discharge: 10/11/2021   recommended to transitional care unit once safe disposition plan/ TCU bed available.     The patient's care was discussed with the Bedside Nurse, Care Coordinator/ and Patient.    Mckinley Roman DO  Hospitalist Service  [facility]  Securely message with the Vocera Web Console (learn more here)  Text page via [employer] Paging/Directory      Clinically Significant Risk Factors Present on Admission                ______________________________________________________________________    Interval History   Patient seen and examined.  No acute events over night.  No pain or trouble breathing.  No fevers or chills.  No nausea or vomiting.  Tolerating diet.    Data reviewed today: I reviewed all medications, new labs and imaging results over the last  24 hours. I personally reviewed no images or EKG's today.    Physical Exam   Vital Signs: Temp: (!) 95.6  F (35.3  C) Temp src: Oral BP: (!) 153/80 Pulse: 56   Resp: 16 SpO2: 95 % O2 Device: None (Room air)    Weight: 154 lbs 15.73 oz  General Appearance: Resting comfortably.  NAD   Respiratory: Clear to auscultation.  No respiratory distress  Cardiovascular: RRR.  No obvious murmurs  GI: Soft.  Non-distended  Skin: No obvious rashes or cyanosis  Other: No edema.  No      Data   Recent Labs   Lab 10/08/21  0632 10/03/21  0721     --    POTASSIUM 4.7  --    CHLORIDE 109  --    CO2 23  --    BUN 40*  --    CR 1.47* 1.79*   ANIONGAP 5  --    CAMILA 8.5  --    *  --      No results found for this or any previous visit (from the past 24 hour(s)).

## 2021-10-09 NOTE — PLAN OF CARE
Alert, disoriented to time, situation. VSS on RA. A1 GB + W. Incontinent: redness on coccyx/buttocks. Reg diet: tolerating. Denies pain. Repeat PT assessment complete: recommended to TCU or home with PT: SW following for safe disposition plan.

## 2021-10-10 NOTE — PROGRESS NOTES
[facility]    Medicine Progress Note - Hospitalist Service       Date of Admission:  9/26/2021    Assessment & Plan            Jose Gomez is a 89 year old male with PMH  dementia, CKD stage III, HTN, HLP, Persistent atrial fib, Hyperthyroidism, DM2, known right 5th metatarsal closed fracture who was admitted on 9/26/2021 due to UTI and frequent falls.       Proteus UTI, improved  Frequent falls  Patient resides with his daughter and was brought into the ED due to frequent falls and concern for possible UTI.  The daughter reported that within the prior 48 hours she has found the patient on the floor of his bedroom about 8 times. She also indicated that he has had increased urinary frequency and incontinence. UA grossly abnormal with concern for infection. No concern of sepsis on admission. UCx with pan-sensitive Proteus.  - Fall precautions in place   - Transitioned IV Ceftriaxone to PO Cefdinir completed 7-day course of treatment on 10/0/2  - PT/OT consulted   - CC/SW assisting with discharge planning.  Likely needs to TCU but awaiting bed      Known right 5th metatarsal closed fracture  RLE edema, R foot erythema  Pt sustained right 5th metatarsal fx in 08/2021, was intolerant of CAM boot and treated nonsurgically. Followed in clinic with Dr. Ledezma of podiatry. Recent visit noted ongoing fracture on XR, pt asymptomatic. Notes also indicate wound to lateral foot at metatarsal joint of 5th digit which has since healed. Exam is with mild persistent erythema, pitting edema to anterior tibial aspect of right foot likely related to prior injury, no warmth.  No issues currently   - Continue outpatient follow-up with Podiatry (Dr. Ledezma)  - Monitor clinically for worsening s/s of cellulitis     Acute on chronic kidney disease stage III/IV. Resolved   Anemia of chronic kidney disease  Baseline creatinine around 1.65-1.75 with GFR in the 30's. Cr 1.87--1.98--1.75.  Currently stable at baseline.  -  Monitor  - Encourage PO Fluids     Dementia  Per patient's daughter he has been at his baseline mentation.    - Restarted PTA Prevagen daily  - PRN olanzapine available for agitation     HTN  Pressures appear to have been improved   - Continue metoprolol XL dosing to 50 mg twice daily  - Norvasc 5 mg daily on 10/4  - Hydralazine PO PRN added for SBP > 180/100     HLP  - Restarted TA Zocor 20 mg     Persistent atrial fib  Patient is not on anticoagulation therapy.    - Continue on PTA Digoxin 62.5 mcg/d and Toprol-XL 50 mg twice daily.  Hold parameters in place     Hyperthyroidism  Noted history of this but does not appear to be on any medications.  Previously on methimazole but stopped several months ago.       Prediabetes  HgbA1c in the past has been in the 5.9-6.5 range.  Suspect diet controlled as patient is not on any medications.    - No indication for blood sugar checks            Diet: Regular Diet Adult    DVT Prophylaxis: Pneumatic Compression Devices though patient does not like how they feel   Barry Catheter: Not present  Central Lines: None  Code Status: Full Code      Disposition Plan   Expected discharge: 10/11/2021   recommended to transitional care unit once safe disposition plan/ TCU bed available.     The patient's care was discussed with the Bedside Nurse, Care Coordinator/ and Patient.    Mckinley Roman,   Hospitalist Service  [facility]  Securely message with the Vocera Web Console (learn more here)  Text page via [employer] Paging/Directory      Clinically Significant Risk Factors Present on Admission                ______________________________________________________________________    Interval History   Patient seen and examined.  No acute events over night.  No fevers noted.  On my evaluation patient's only complaint is not liking the feel of the PCDs.  No significant pain.  No difficulty breathing.  Tolerating diet.    Data reviewed today: I reviewed  all medications, new labs and imaging results over the last 24 hours. I personally reviewed no images or EKG's today.    Physical Exam   Vital Signs: Temp: (!) 96.1  F (35.6  C) Temp src: Oral BP: 138/65 Pulse: 77   Resp: 16 SpO2: 95 % O2 Device: None (Room air)    Weight: 154 lbs 15.73 oz  General Appearance: Resting comfortably.  NAD   Respiratory: Clear to auscultation.  No respiratory distress  Cardiovascular: RRR.  No obvious murmusr  GI: Soft.  Non-distended  Skin: No obvious rashes or cyanosis to exposed skin  Other: PCDs in place. Alert.  No edema      Data   Recent Labs   Lab 10/08/21  0632      POTASSIUM 4.7   CHLORIDE 109   CO2 23   BUN 40*   CR 1.47*   ANIONGAP 5   CAMILA 8.5   *     No results found for this or any previous visit (from the past 24 hour(s)).

## 2021-10-10 NOTE — PLAN OF CARE
Pt A&O to self and place. VSS on RA. Up Ax1 GB/Walker. Denies pain. No PIV. Blanchable redness on sacrum/coccyx: barrier cream provided. Incontinent of urine at times. PRN tramadol given @0258 for R hip pain. PT recommended to TCU or home with PT. SW following for safe disposition. Continue to monitor.

## 2021-10-10 NOTE — PROGRESS NOTES
"   10/10/21 1528   Quick Adds   Type of Visit Initial Occupational Therapy Evaluation   Living Environment   People in home child(katarzyna), adult   Current Living Arrangements house   Transportation Anticipated family or friend will provide   Living Environment Comments Pt able to state he lives with his \"children\" but unable to state any other information. Poor historian    Self-Care   Usual Activity Tolerance other (see comments)  (unknown)   Current Activity Tolerance fair   Activity/Exercise/Self-Care Comment Pt reports he is indep with self-cares and functional mobility at baseline. Per chart, daughter assists with mobility and most cares at home.    Disability/Function   Fall history within last six months yes   Number of times patient has fallen within last six months 10  (per chart; pt does not state)   General Information   Onset of Illness/Injury or Date of Surgery 09/26/21   Referring Physician Nusrat Goss MD   Additional Occupational Profile Info/Pertinent History of Current Problem Jose Gomez is a 89 year old male with PMH  dementia, CKD stage III, HTN, HLP, Persistent atrial fib, Hyperthyroidism, DM2, known right 5th metatarsal closed fracture who was admitted on 9/26/2021 due to UTI and frequent falls.     Existing Precautions/Restrictions fall   Cognitive Status Examination   Orientation Status person;place   Cognitive Status Comments Orientated to person and place as a hospital. Unknow hospital name or reason for admission. Poor historian. Repeats information.    Sensory   Sensory Quick Adds Other (describe)   Sensory Comments unknown, doesn't state   Pain Assessment   Patient Currently in Pain No   Posture   Posture forward head position;protracted shoulders;kyphosis   Range of Motion Comprehensive   General Range of Motion upper extremity range of motion deficits identified   Comment, General Range of Motion limited into shld flex cam; approx 90 deg   Strength Comprehensive (MMT) "   General Manual Muscle Testing (MMT) Assessment upper extremity strength deficits identified   Comment, General Manual Muscle Testing (MMT) Assessment generalized weakness; 4/5 throughout    Bed Mobility   Bed Mobility supine-sit;sit-supine   Supine-Sit [place] (Bed Mobility) supervision   Sit-Supine [place] (Bed Mobility) supervision   Assistive Device (Bed Mobility) bed rails   Transfers   Transfers toilet transfer   Transfer Comments Min A for mobility with FWW   Activities of Daily Living   BADL Assessment toileting   Toileting   [place] Level (Toileting) moderate assist (50% patient effort)   Assistive Devices (Toileting) grab bar, toilet   Comment (Toileting) Assist for pericares   Clinical Impression   Criteria for Skilled Therapeutic Interventions Met (OT) yes;meets criteria;skilled treatment is necessary   OT Diagnosis Impaired ADLs and functional mobility   OT Problem List-Impairments impacting ADL problems related to;activity tolerance impaired;balance;cognition;mobility;range of motion (ROM);strength   Assessment of Occupational Performance 1-3 Performance Deficits   Identified Performance Deficits dressing, functional mobility, toileting   Planned Therapy Interventions (OT) ADL retraining;cognition;bed mobility training;strengthening;transfer training;home program guidelines;progressive activity/exercise   Clinical Decision Making Complexity (OT) low complexity   Therapy Frequency (OT) 5x/week   Predicted Duration of Therapy 3   Risk & Benefits of therapy have been explained evaluation/treatment results reviewed;care plan/treatment goals reviewed   OT Discharge Planning    OT Discharge Recommendation (DC Rec) Transitional Care Facility;Home with assist;home with home care occupational therapy   OT Rationale for DC Rec Pt below reported baseline of indep with functional mobility and self-cares. Per chart, pt recieved assist from daughter for everything. Pt would benefit from skilled  therapy in TCU to increase strength and activity tolerance prior to returning home.    Total Evaluation Time (Minutes)   Total Evaluation Time (Minutes) 10

## 2021-10-10 NOTE — PROVIDER NOTIFICATION
MD Notification    Notified Person: MD    Notified Person Name:  Dr.Paul Cruz  Notification Date/Time:    Notification Interaction:    Purpose of Notification:  obs 22  pt is complaining BLE foot pain and itching.  Please advise  Thank you,  Nikole JACOBSON     Orders Received:    Comments:PRN benadryl 25mg and PRN tramadol 50 mg ordered

## 2021-10-11 NOTE — DISCHARGE SUMMARY
[facility]  Hospitalist Discharge Summary      Date of Admission:  9/26/2021  Date of Discharge:  10/12/2021 11:40 AM  Discharging Provider: Mckinley Roman,       Discharge Diagnoses   Proteus UTI, improved  Frequent falls  Known right 5th metatarsal closed fracture  RLE edema, R foot erythema  Acute on chronic kidney disease stage III/IV. Resolved   Anemia of chronic kidney disease  Dementia  HTN  HLP  Persistent atrial fib  Hyperthyroidism   Prediabetes      Follow-ups Needed After Discharge   Follow-up Appointments     Follow Up and recommended labs and tests      Follow up with Nursing home physician.  No follow up labs or test are   needed.  Follow up with PCP 1-2 weeks after TCU discharge  Follow up with podiatry as recommended           Unresulted Labs Ordered in the Past 30 Days of this Admission     No orders found from 8/27/2021 to 9/27/2021.        Discharge Disposition   Discharged to short-term care facility  Condition at discharge: Stable    Hospital Course   Jose Gomez is a 89 year old male with PMH  dementia, CKD stage III, HTN, HLP, Persistent atrial fib, Hyperthyroidism, DM2, known right 5th metatarsal closed fracture who was admitted on 9/26/2021 due to UTI and frequent falls.       Proteus UTI, improved  Frequent falls  Patient resides with his daughter and was brought into the ED due to frequent falls and concern for possible UTI.  The daughter reported that within the prior 48 hours she has found the patient on the floor of his bedroom about 8 times. She also indicated that he has had increased urinary frequency and incontinence. UA grossly abnormal with concern for infection. No concern of sepsis on admission. UCx with pan-sensitive Proteus.  - Fall precautions in place   - Transitioned IV Ceftriaxone to PO Cefdinir completed 7-day course of treatment on 10/02  - PT/OT consulted   - CC/SW assisting with discharge planning.  Attempting to find TCU.  Patient  reluctant for TCU but do not feel he has the decision making capabilities to decline     Known right 5th metatarsal closed fracture  RLE edema, R foot erythema  Pt sustained right 5th metatarsal fx in 08/2021, was intolerant of CAM boot and treated nonsurgically. Followed in clinic with Dr. Ledezma of podiatry. Recent visit noted ongoing fracture on XR, pt asymptomatic. Notes also indicate wound to lateral foot at metatarsal joint of 5th digit which has since healed. Exam is with mild persistent erythema, pitting edema to anterior tibial aspect of right foot likely related to prior injury, no warmth.  No issues currently   - Continue outpatient follow-up with Podiatry (Dr. Ledezma)  - Monitor clinically for worsening s/s of cellulitis     Acute on chronic kidney disease stage III/IV. Resolved   Anemia of chronic kidney disease  Baseline creatinine around 1.65-1.75 with GFR in the 30's. Cr 1.87--1.98--1.75.  Currently stable at baseline.  - Monitor  - Encourage PO Fluids     Dementia  Per patient's daughter he has been at his baseline mentation.    - Restarted PTA Prevagen daily  - PRN olanzapine available for agitation     HTN  Pressures appear to have been improved   - Continue metoprolol XL dosing to 50 mg twice daily  - Norvasc 5 mg daily on 10/4  - Hydralazine PO PRN added for SBP > 180/100     HLP  - Restarted TA Zocor 20 mg     Persistent atrial fib  Patient is not on anticoagulation therapy.    - Continue on PTA Digoxin 62.5 mcg/d and Toprol-XL 50 mg twice daily.  Hold parameters in place     Hyperthyroidism  Noted history of this but does not appear to be on any medications.  Previously on methimazole but stopped several months ago.       Prediabetes  HgbA1c in the past has been in the 5.9-6.5 range.  Suspect diet controlled as patient is not on any medications.    - No indication for blood sugar checks       Consultations This Hospital Stay   CARE MANAGEMENT / SOCIAL WORK IP CONSULT  PHYSICAL THERAPY ADULT IP  CONSULT  PHYSICAL THERAPY ADULT IP CONSULT  OCCUPATIONAL THERAPY ADULT IP CONSULT  PHYSICAL THERAPY ADULT IP CONSULT  OCCUPATIONAL THERAPY ADULT IP CONSULT    Code Status   Full Code    Time Spent on this Encounter   I, Mckinley Roman DO, personally saw the patient today and spent greater than 30 minutes discharging this patient.       Mckinley Roman DO  [facility]  [address] [address]  Phone: [phone]  ______________________________________________________________________    Physical Exam   Vital Signs: Temp: 97.4  F (36.3  C) Temp src: Oral BP: 137/82 Pulse: 88   Resp: 20 SpO2: 97 % O2 Device: None (Room air)    Weight: 154 lbs 15.73 oz  General Appearance: Resting comfortably.  NAD   Respiratory: Clear to auscultation.  No respiratory distress  Cardiovascular: RRR.  No obvious murmurs  GI: Soft.  Non-distended  Skin: No obvious rashes or cyanosis to exposed skin  Other: No edema.  No calf tenderness       Primary Care Physician   Gabriel Carroll    Discharge Orders      General info for SNF    Length of Stay Estimate: Short Term Care: Estimated # of Days <30  Condition at Discharge: Stable  Level of care:skilled   Rehabilitation Potential: Fair  Admission H&P remains valid and up-to-date: Yes  Recent Chemotherapy: N/A  Use Nursing Home Standing Orders: Yes     Mantoux instructions    Give two-step Mantoux (PPD) Per Facility Policy Yes     Follow Up and recommended labs and tests    Follow up with Nursing home physician.  No follow up labs or test are needed.  Follow up with PCP 1-2 weeks after TCU discharge  Follow up with podiatry as recommended     Reason for your hospital stay    UTI     Activity - Up ad ricco     Physical Therapy Adult Consult    Evaluate and treat as clinically indicated.    Reason:  Deconditioning     Occupational Therapy Adult Consult    Evaluate and treat as clinically indicated.    Reason:   Deconditioning and cognitive impairment     Advance Diet as Tolerated    Follow this diet upon discharge: Orders Placed This Encounter      Regular Diet Adult       Significant Results and Procedures   Most Recent 3 CBC's:Recent Labs   Lab Test 09/27/21  0636 09/26/21  1451 08/20/21 2033   WBC 7.8 11.3* 6.8   HGB 12.9* 14.2 14.0   MCV 97 98 99   * 134* 150     Most Recent 3 BMP's:Recent Labs   Lab Test 10/08/21  0632 10/03/21  0721 10/02/21  1208 09/28/21  0605 09/27/21  0636 09/27/21  0636     --   --  136  --  136   POTASSIUM 4.7  --   --  4.3  --  3.9   CHLORIDE 109  --   --  109  --  106   CO2 23  --   --  17*  --  21   BUN 40*  --   --  37*  --  40*   CR 1.47* 1.79*  --  1.75*   < > 1.98*   ANIONGAP 5  --   --  10  --  9   CAMILA 8.5  --   --  8.2*  --  8.2*   *  --  147* 112*   < > 89    < > = values in this interval not displayed.   ,   Results for orders placed or performed during the hospital encounter of 09/26/21   CT Head w/o Contrast    Narrative    EXAM: CT HEAD W/O CONTRAST  LOCATION: [facility]  DATE/TIME: 9/26/2021 3:39 PM    INDICATION: Falls frequently, dementia, nonfocal neuro exam.  COMPARISON: 10/13/2020.  TECHNIQUE: Routine CT Head without IV contrast. Multiplanar reformats. Dose reduction techniques were used.    FINDINGS:  INTRACRANIAL CONTENTS: No intracranial hemorrhage, extraaxial collection, or mass effect.  No CT evidence of acute infarct. Moderate presumed chronic small vessel ischemic changes. Mild to moderate generalized volume loss. No hydrocephalus. Caliber of   the lateral and third ventricles is stable from prior study 10/13/2020 with no specific evidence for normal pressure hydrocephalus morphology. Position of the cerebellar tonsils is satisfactory. Sella shows no acute abnormality. Corpus callosum is   normal. Vascular calcification.     VISUALIZED ORBITS/SINUSES/MASTOIDS: Prior bilateral cataract surgery. Visualized portions of the  orbits are otherwise unremarkable. Mucosal thickening primarily involving the ethmoid air cells. No air-fluid levels. No middle ear or mastoid effusion.    BONES/SOFT TISSUES: No acute fracture of the calvarium or skull base. Degenerative changes involve the TMJs. Satisfactory mineralization. No significant swelling of the facial or scalp tissues is apparent.      Impression    IMPRESSION:  1.  No CT evidence for acute intracranial process.  2.  Brain atrophy and presumed chronic microvascular ischemic changes as above.  3.  No acute process and no change from 10/13/2020 as above.   US Lower Extremity Venous Duplex Right    Narrative    EXAM: US LOWER EXTREMITY VENOUS DUPLEX RIGHT  LOCATION: [facility]  DATE/TIME: 9/27/2021 5:16 AM    INDICATION: right LE edema  COMPARISON: None.  TECHNIQUE: Venous Duplex ultrasound of the right lower extremity with and without compression, augmentation and duplex. Color flow and spectral Doppler with waveform analysis performed.    FINDINGS: Exam includes the common femoral, femoral, popliteal, and contralateral common femoral veins as well as segmentally visualized deep calf veins and greater saphenous vein.     RIGHT: No deep vein thrombosis. No superficial thrombophlebitis. No popliteal cyst.      Impression    IMPRESSION:  1.  No deep venous thrombosis in the right lower extremity.       Discharge Medications   Current Discharge Medication List      START taking these medications    Details   amLODIPine (NORVASC) 5 MG tablet Take 1 tablet (5 mg) by mouth daily    Associated Diagnoses: Essential hypertension, benign         CONTINUE these medications which have NOT CHANGED    Details   Acetaminophen (TYLENOL PO) Take 1,000 mg by mouth every 6 hours as needed       Apoaequorin (PREVAGEN PO) Take 1 tablet by mouth daily      digoxin (LANOXIN) 125 MCG tablet Take 0.5 tablets (62.5 mcg) by mouth daily  Qty: 45 tablet, Refills: 0    Associated Diagnoses:  Atrial fibrillation with RVR (H)      metoprolol succinate ER (TOPROL-XL) 25 MG 24 hr tablet Take 2 tabs (50 mg) in AM and 1 tab (25 mg) in PM  Qty: 270 tablet, Refills: 0    Associated Diagnoses: Paroxysmal atrial fibrillation (H)      simvastatin (ZOCOR) 20 MG tablet Take 1 tablet (20 mg) by mouth At Bedtime  Qty: 90 tablet, Refills: 0    Associated Diagnoses: Hyperlipidemia LDL goal <100      vitamin B-12 500 MCG PO tablet Take 1 tablet by mouth daily            Allergies   Allergies   Allergen Reactions     No Known Drug Allergies

## 2021-10-11 NOTE — PLAN OF CARE
Alert only to self. VSS on RA. Denies pain. Ax1 gb/w. Tolerating regular diet. Incontinent of bowel and bladder. Redness to groin and sacrum/coccyx. No IV. Discharge to TCU possibly tomorrow per SW note.

## 2021-10-11 NOTE — PROGRESS NOTES
[facility]    Medicine Progress Note - Hospitalist Service       Date of Admission:  9/26/2021    Assessment & Plan            Jose Gomez is a 89 year old male with PMH  dementia, CKD stage III, HTN, HLP, Persistent atrial fib, Hyperthyroidism, DM2, known right 5th metatarsal closed fracture who was admitted on 9/26/2021 due to UTI and frequent falls.       Proteus UTI, improved  Frequent falls  Patient resides with his daughter and was brought into the ED due to frequent falls and concern for possible UTI.  The daughter reported that within the prior 48 hours she has found the patient on the floor of his bedroom about 8 times. She also indicated that he has had increased urinary frequency and incontinence. UA grossly abnormal with concern for infection. No concern of sepsis on admission. UCx with pan-sensitive Proteus.  - Fall precautions in place   - Transitioned IV Ceftriaxone to PO Cefdinir completed 7-day course of treatment on 10/02  - PT/OT consulted   - CC/SW assisting with discharge planning.  Attempting to find TCU.  Patient reluctant for TCU but do not feel he has the decision making capabilities to decline     Known right 5th metatarsal closed fracture  RLE edema, R foot erythema  Pt sustained right 5th metatarsal fx in 08/2021, was intolerant of CAM boot and treated nonsurgically. Followed in clinic with Dr. Ledezma of podiatry. Recent visit noted ongoing fracture on XR, pt asymptomatic. Notes also indicate wound to lateral foot at metatarsal joint of 5th digit which has since healed. Exam is with mild persistent erythema, pitting edema to anterior tibial aspect of right foot likely related to prior injury, no warmth.  No issues currently   - Continue outpatient follow-up with Podiatry (Dr. Ledezma)  - Monitor clinically for worsening s/s of cellulitis     Acute on chronic kidney disease stage III/IV. Resolved   Anemia of chronic kidney disease  Baseline creatinine around  1.65-1.75 with GFR in the 30's. Cr 1.87--1.98--1.75.  Currently stable at baseline.  - Monitor  - Encourage PO Fluids     Dementia  Per patient's daughter he has been at his baseline mentation.    - Restarted PTA Prevagen daily  - PRN olanzapine available for agitation     HTN  Pressures appear to have been improved   - Continue metoprolol XL dosing to 50 mg twice daily  - Norvasc 5 mg daily on 10/4  - Hydralazine PO PRN added for SBP > 180/100     HLP  - Restarted TA Zocor 20 mg     Persistent atrial fib  Patient is not on anticoagulation therapy.    - Continue on PTA Digoxin 62.5 mcg/d and Toprol-XL 50 mg twice daily.  Hold parameters in place     Hyperthyroidism  Noted history of this but does not appear to be on any medications.  Previously on methimazole but stopped several months ago.       Prediabetes  HgbA1c in the past has been in the 5.9-6.5 range.  Suspect diet controlled as patient is not on any medications.    - No indication for blood sugar checks              Diet: Regular Diet Adult  Advance Diet as Tolerated    DVT Prophylaxis: Pneumatic Compression Devices  Barry Catheter: Not present  Central Lines: None  Code Status: Full Code      Disposition Plan   Expected discharge: 10/11/2021.  To TCU once TCU bed available     The patient's care was discussed with the Bedside Nurse, Care Coordinator/ and Patient.    Mckinley Roman, DO  Hospitalist Service  [facility]  Securely message with the Vocera Web Console (learn more here)  Text page via [employer] Paging/Directory      Clinically Significant Risk Factors Present on Admission                ______________________________________________________________________    Interval History   Patient seen and evaluated.  No acute events over night.  No pain or trouble breathing.  No fevers.  Tolerating diet.    Data reviewed today: I reviewed all medications, new labs and imaging results over the last 24 hours. I personally  reviewed no images or EKG's today.    Physical Exam   Vital Signs: Temp: 98  F (36.7  C) Temp src: Oral BP: 131/61 Pulse: (!) 47   Resp: 14 SpO2: 96 % O2 Device: None (Room air)    Weight: 154 lbs 15.73 oz  General Appearance: Resting comfortably. NAD   Respiratory: Clear to auscultation.  No respiratory distress  Cardiovascular: RRR.  No obvious murmurs  GI: Soft.  Non-distended   Skin: No obvious rashes or cyanosis  Other: No edema.  Moving all extremities grossly      Data   Recent Labs   Lab 10/08/21  0632      POTASSIUM 4.7   CHLORIDE 109   CO2 23   BUN 40*   CR 1.47*   ANIONGAP 5   CAMILA 8.5   *     No results found for this or any previous visit (from the past 24 hour(s)).

## 2021-10-11 NOTE — PLAN OF CARE
[id number]: Pt is disoriented to time and situation. Calm and cooperative. VSS in RA. Calm and cooperative. Frequent incontinence of bladder. Up w/ A1,/GB walker. OT consulted, Plan for TCU placement.

## 2021-10-11 NOTE — PLAN OF CARE
A, disoriented to situation, time. VSS on RA. A1 + GB + W + ADLs. Incontinent: frequent pull up changes, barrier cream for buttocks. Blanchable redness: sacrum, coccyx. +1 edema BL LE: PCDs as pt allows. OT assessment complete. TCU placement pending. SW/CC following.

## 2021-10-11 NOTE — PROGRESS NOTES
Care Management Follow Up    Length of Stay (days): 14    Expected Discharge Date: 10/11/2021     Concerns to be Addressed: discharge planning     Patient plan of care discussed at interdisciplinary rounds: Yes    Anticipated Discharge Disposition:  [facility]     Anticipated Discharge Services: None  Anticipated Discharge DME: None    Patient/family educated on Medicare website which has current facility and service quality ratings: yes  Education Provided on the Discharge Plan:    Patient/Family in Agreement with the Plan: yes    Referrals Placed by CM/SW: SNF  Private pay costs discussed: insurance costs co-pays    Additional Information:  Patient accepted at [facility] with a required quarantine period due to not being vaccinated. SW attempted calling dtr at both home and mobile numbers with no answer and no VM option.  Tuesday SW to coordinate transfer, notify family and determine transport mode.    SW to continue to follow and assist with discharge planning.    JAMES Molina  Daytime (8:00am-4:30pm): [phone]  After-Hours SW Pager (4:30pm-11:30pm): [phone]           JAMES Medrano

## 2021-10-11 NOTE — PROGRESS NOTES
Care Management Follow Up    Length of Stay (days): 14    Expected Discharge Date: 10/11/2021     Concerns to be Addressed: discharge planning     Patient plan of care discussed at interdisciplinary rounds: Yes    Anticipated Discharge Disposition: Skilled Nursing Facilty     Anticipated Discharge Services: None  Anticipated Discharge DME: None    Patient/family educated on Medicare website which has current facility and service quality ratings: yes  Education Provided on the Discharge Plan:    Patient/Family in Agreement with the Plan: yes    Referrals Placed by CM/SW: Homecare  Private pay costs discussed: transportation costs and insurance costs co-pays and deductibles    Additional Information:  VENESSA sent updated PT and OT as well as nursing notes to [facility] TCU. Followed up with , requesting return call.    ADDENDUM: VENESSA left  for Marilia in admissions at [facility]. Awaiting return call.    SW to continue to follow and assist with discharge planning.    JAMES Molina  Daytime (8:00am-4:30pm): [phone]  After-Hours SW Pager (4:30pm-11:30pm): [phone]           JAMES Medrano

## 2021-10-12 NOTE — PROGRESS NOTES
Care Management Follow Up    Length of Stay (days): 15    Expected Discharge Date: 10/12/2021     Concerns to be Addressed: discharge planning     Patient plan of care discussed at interdisciplinary rounds: Yes    Anticipated Discharge Disposition: Home, Home Care, Skilled Nursing Facilty     Anticipated Discharge Services: None  Anticipated Discharge DME: None    Patient/family educated on Medicare website which has current facility and service quality ratings: yes  Education Provided on the Discharge Plan:    Patient/Family in Agreement with the Plan: yes    Referrals Placed by CM/SW: Homecare  Private pay costs discussed: transportation costs    Additional Information:  Call placed to Armida (daughter) and let her know that [facility] accepted patient for TCU. Armida said that she was happy to hear this. Asked her if she would be able to transport him there and she said that she's going to check with a friend to see if she can use her van. She said that she would get back to  and if not then she would be fine with  [email or web address] transport for him. Told her about the transport fee and she was aware of this. Armida called and said that she can't pick him up today. She asked for  to set up transportation for him.  Call placed to [facility] [email or web address] transportation to schedule a wheelchair ride. Set up transportation for today at 1115. Call placed to [facility] and left them know that patient will be coming at 1115 today. Paged patient's physician to let him know of transport time. [facility] called and said the time worked. Asked for social security number of patient and gave to admissions coordinator.  Will continue to follow.    JAMES Nails

## 2021-10-12 NOTE — PLAN OF CARE
Physical Therapy Discharge Summary    Reason for therapy discharge:    Discharged to transitional care facility.    Progress towards therapy goal(s). See goals on Care Plan in [facility] electronic health record for goal details.  Goals partially met.  Barriers to achieving goals:   discharge from facility.    Therapy recommendation(s):    Continued therapy is recommended.  Rationale/Recommendations:  To further increase independence with mobility.

## 2021-10-12 NOTE — PROGRESS NOTES
Care Management Discharge Note    Discharge Date: 10/12/2021  Expected Time of Departure: 1115    Discharge Disposition: Transitional Care    Discharge Services: None    Discharge DME: None    Discharge Transportation: agency (w/c ride)    Private pay costs discussed: transportation costs    PAS Confirmation Code: [id number]  Patient/family educated on Medicare website which has current facility and service quality ratings: no    Education Provided on the Discharge Plan:  yes  Persons Notified of Discharge Plans: Armida (daughter)  Patient/Family in Agreement with the Plan: yes    Handoff Referral Completed: Yes    Additional Information:  Discharge orders received. Bed available at [facility], W/C ride at 11:15 today. Patient and patient's daughter, Armida, is aware. Faxed physician orders to [facility]. Completed PAS, sent to facility and put in patient's file.    PAS-RR    D: Per DHS regulation, VENESSA completed and submitted PAS-RR to [place] [employer] Direct Connect via the Senior LinkAge Line.  PAS-RR confirmation # is : [id number]    I: VENESSA spoke with Armida (daughter) and they are aware a PAS-RR has been submitted.  VENESSA reviewed with Armida that they may be contacted for a follow up appointment within 10 days of hospital discharge if their SNF stay is < 30 days.  Contact information for [facility] LinkAge Line was also provided.    A: Armida verbalized understanding.    P: Further questions may be directed to [facility] LinkAge Line at #[phone], option #4 for PAS-RR staff.      JAMES Nails

## 2021-10-12 NOTE — PLAN OF CARE
Occupational Therapy Discharge Summary    Reason for therapy discharge:    Discharged to transitional care facility.    Progress towards therapy goal(s). See goals on Care Plan in [facility] electronic health record for goal details.  Goals not met.  Barriers to achieving goals:   discharge from facility.    Therapy recommendation(s):    Skilled OT in a TCU setting to address independence I/ADLs.

## 2021-10-12 NOTE — PLAN OF CARE
A&O to self only.  VSS, on RA.  Denies pain.  Asst 1, belt, walker.  Trace BLE edema.  Incontinent of bowel and bladder.  SW following.  Possible discharge today to [facility].

## 2021-10-12 NOTE — TELEPHONE ENCOUNTER
Scheduled for 10/26/2021 with Dr. Kebede.     Scheduled by daughter Armida for this day as Jose was discharged from  on 10/12/2021 and moved to [facility].  Armida wanted him to have a little time to adjust before bringing him out again.    Lynne GERONIMO

## 2021-10-12 NOTE — PLAN OF CARE
Assumed care today at 0730. Pt is a/o to self. Tolerating reg diet. Up with 1 and walker/GB. Takes meds whole. Discharging today at 1115 to TCU. Dtr aware.

## 2021-11-09 NOTE — PROGRESS NOTES
[facility]    Jose Gomez is a nonhealing toe ulcer and is referred by Dr. Gerson Ledezma of podiatry for vascular evaluation.  In the nonhealing ulceration on his right foot with a 1.5 x 0.5 cm eschar over the lateral aspect of the fifth metatarsal.  No evidence of infection.  Did not tolerate an offloading boot.  X-ray revealed a nondisplaced fracture fifth metatarsal bone on the right.  No obvious osteomyelitis.      Jose had been hospitalized for some period for urinary tract infection with sepsis.  Did go to TCU but now back at his regular residence.  He is not overly ambulatory and uses a wheelchair whenever he is out at his home.      PMH: Medications: Toprol-XL, Norvasc, Lanoxin, Zocor   Medical: Hypertension    Hyperlipidemia on statin    Paroxysmal atrial fibrillation on Coumadin    Type 2 diabetes with polyneuropathy    CKD stage III    Former smoker        10/8/2021 laboratory: K= 4.7 SCr= 1.47 GFR= 41    9/24/2021 SOTERO: Right= 1.03/0.93    Left= 0.58/0.6.    Multiphasic right popliteal artery and biphasic tibial arteries.    Multiphasic left common femoral.  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  NAD.  Comfortable.   Here with his daughter who does see him on a regular basis.   Blood pressure 161/95 bilaterally with a pulse of 80   Chest= clear   Cardiovascular= regular rate   Extremities= superficial excoriations right calf with no ulcers.    No tenderness over the right fifth metatarsal and no open sores with    Mature epithelium.  No bony prominences.  Good nail care.    Clinical trophus of dorsal left third toe with no hammertoe deformity or open                                   lesion or callus.  Resolving scab over the left dorsal lateral second toe.    No tenderness at any site.    Very minimal right dorsal foot edema and mild left ankle/dorsal foot edema    Intact light touch sensation  bilaterally.     More monophasic signals to both right and left dorsalis pedis artery noted.      Impression: #1.  Infrapopliteal peripheral artery disease more prominent on the left left greater than right.  The scabs are completely healed over on the right with a very nicely resolving scab over the left dorsal lateral second toe.  Since there is healing he is asymptomatic no vascular intervention is indicated and this is discussed with the patient and his daughter.     #2.  With his poor blood supply we want to avoid any ulcer or other issue from developing.  I have recommended Eucerin or similar lotion to his feet and toes especially during the drier months and his daughters will apply this.  Also concerned about the excoriations on his right calf where I suspect the scratching himself.  This along with the mild swelling left greater than right would warrant a very low-grade compression knee-high stocking less than 10 to 15 mmHg.  He does have good shoes with a high toe box and an insert which is important.  Also stressed the importance of good nail care.     #3.  Clinical resolution of nondisplaced right fifth metatarsal fracture.    25 minutes with patient and his daughter today.  Discussed vascular findings but also prophylactic measures to prevent skin lesions from developing.  Since there are no ongoing foot issues follow-up with Dr. Ledezma or myself is not necessary.    Nabil Dozier MD    CC: Dr. Gerson Ledezma DPBLAISE

## 2021-11-11 NOTE — PROGRESS NOTES
[facility]        Patient is here for a consult.    Pt is currently taking Statin.    BP (!) 161/95 (BP Location: Right arm, Patient Position: Chair, Cuff Size: Adult Regular)   Pulse 71     The provider has been notified that the patient has no concerns.     Questions patient would like addressed today are: N/A.    Refills are needed: N/A    Has homecare services and agency name:  Yajaira Adame MA

## 2021-11-12 NOTE — TELEPHONE ENCOUNTER
[facility] home care calling requesting verbal orders for nurse visits.     homecare nurse will inform family, that pt is to Follow up with PCP 1-2 weeks after TCU discharge, and call clinic to schedule hospital f/u appt with Dr. Carroll.    Verbal OK given, per standing orders.  Form will be faxed to PCP to review, sign, and complete.

## 2021-11-18 NOTE — TELEPHONE ENCOUNTER
[facility] calling for orders for PT, and adding a SW.    Verbal OK given, per standing orders.  Form will be faxed to PCP to review, sign, and complete.    Called and spoke to pt's daughter Armida. She is his primary caretaker, and handles his appts.   Hospital f/u appt (VV) scheduled with Dr. Escalante on:    Appointments in Next Year    Dec 03, 2021  2:30 PM  (Arrive by 2:15 PM)  ED/Hospital Follow Up with Gabriel Carroll MD  [facility] ([facility] ) [phone]          She plans on purchasing a home BP cuff, to be able to check/monitor his BP at home.

## 2021-11-22 NOTE — TELEPHONE ENCOUNTER
Cancelling approved home health aid. Patient had one shower and is now refusing home health aid. Patricia Victoria RN

## 2021-12-03 PROBLEM — I73.9 PAD (PERIPHERAL ARTERY DISEASE) (H): Status: ACTIVE | Noted: 2021-01-01

## 2021-12-03 NOTE — PATIENT INSTRUCTIONS
Please have labs drawn by your home care RN or come into our clinic and have them done by scheduling a lab only appointment.  The ideal time for getting the labs done would be approximately 2 weeks after starting the lisinopril.

## 2021-12-03 NOTE — PROGRESS NOTES
Jose is a 89 year old who is being evaluated via a telephone visit.      How would you like to obtain your AVS? Martinezhart  If the video visit is dropped, the invitation should be resent by: Text to cell phone: [phone]  Will anyone else be joining your video visit? No        Assessment & Plan     Essential hypertension, benign  Stage 3b chronic kidney disease (H)  Currently on amlodipine rather than an ACE inhibitor.  ACE inhibitor stopped early 2020 during hospital stay for rapid A. fib and hypothyroidism.  It has not been restarted since.  He has moderate microalbuminuria with CKD  On the we should give up on an ACE inhibitor and this is likely more beneficial than the amlodipine.  Restart this and check labs  - lisinopril (ZESTRIL) 10 MG tablet; Take 1 tablet (10 mg) by mouth every evening    Permanent atrial fibrillation (H)  Rate controlled per family  Not on full anticoagulation due to fall risk.  He does use walker now at all times and no reported falls with this.    - metoprolol succinate ER (TOPROL-XL) 25 MG 24 hr tablet; Take 2 tabs (50 mg) in AM and 1 tab (25 mg) in PM  - digoxin (LANOXIN) 125 MCG tablet; Take 0.5 tablets (62.5 mcg) by mouth daily  - Digoxin level; Future    Type 2 diabetes mellitus with diabetic polyneuropathy, without long-term current use of insulin (H)  - Hemoglobin A1c; Future  - Albumin Random Urine Quantitative with Creat Ratio; Future  - Lipid Profile; Future  - Basic metabolic panel; Future    PAD (peripheral artery disease) (H)  We will start him on a low-dose aspirin due to his PAD-continue statin.  Use a PPI for gastric protection given his age and aspirin use  - aspirin (ASA) 81 MG EC tablet; Take 1 tablet (81 mg) by mouth daily  - omeprazole (PRILOSEC) 20 MG DR capsule; Take 1 capsule (20 mg) by mouth daily    Hyperlipidemia LDL goal <100  - simvastatin (ZOCOR) 20 MG tablet; Take 1 tablet (20 mg) by mouth At Bedtime    Hyperthyroidism  Seems to have resolved.  Is no  longer on the methimazole.  Recheck.   - TSH with free T4 reflex; Future    Dementia  Lives with daughter and son.  Has 24-hour care    Review of external notes as documented elsewhere in note  Review of the result(s) of each unique test - llabs  Ordering of each unique test  Prescription drug management             Return in about 3 months (around 3/3/2022) for Blood Pressure Check.    Gabriel Carroll MD  [facility] N[facility]YDAB[facility] CenterDENISA Garcia is a 89 year old who presents for the following health issues along with his daughter.      [facility]       Hospital Follow-up Visit:    Hospital/Nursing Home/IP Rehab Facility: [facility]  Date of Admission: 9/26/21  Date of Discharge: 11/10/21  Reason(s) for Admission: UTI and falls       Was your hospitalization related to COVID-19? No   Problems taking medications regularly:  None  Medication changes since discharge: None  Problems adhering to non-medication therapy:  None    Summary of hospitalization:  [facility] discharge summary reviewed  Diagnostic Tests/Treatments reviewed.  Follow up needed: none  Other Healthcare Providers Involved in Patient s Care:         Specialist appointment - vascular  Update since discharge: stable.       Post Discharge Medication Reconciliation: discharge medications reconciled, continue medications without change.  Plan of care communicated with patient                  Review of Systems         Objective         Vitals:  No vitals were obtained today due to virtual visit.    Physical Exam   GENERAL: alert and no distress    Previous labs reviewed          Telephone visit   25 min

## 2021-12-03 NOTE — TELEPHONE ENCOUNTER
I placed lab orders - wondering if home care can do these at an upcoming visit?  Uses [facility] RN case Mgmr there is Polina?

## 2021-12-06 NOTE — TELEPHONE ENCOUNTER
No contact that I can find for homecare.   Called number and they paged Polina to call back or view this encounter in EPIC      Please relay info below upon callback:    Dr. Carroll has ordered labs, would like them to be drawn at upcoming homecare appointment    Francine Brito CMA

## 2022-01-01 ENCOUNTER — OFFICE VISIT (OUTPATIENT)
Dept: URGENT CARE | Facility: URGENT CARE | Age: 87
End: 2022-01-01
Payer: MEDICARE

## 2022-01-01 ENCOUNTER — OFFICE VISIT (OUTPATIENT)
Dept: URGENT CARE | Facility: URGENT CARE | Age: 87
End: 2022-01-01

## 2022-01-01 ENCOUNTER — TELEPHONE (OUTPATIENT)
Dept: UROLOGY | Facility: CLINIC | Age: 87
End: 2022-01-01
Payer: MEDICARE

## 2022-01-01 ENCOUNTER — LAB REQUISITION (OUTPATIENT)
Dept: LAB | Facility: CLINIC | Age: 87
End: 2022-01-01
Payer: MEDICARE

## 2022-01-01 ENCOUNTER — APPOINTMENT (OUTPATIENT)
Dept: CARDIOLOGY | Facility: CLINIC | Age: 87
DRG: 291 | End: 2022-01-01
Attending: HOSPITALIST
Payer: MEDICARE

## 2022-01-01 ENCOUNTER — HOSPITAL ENCOUNTER (INPATIENT)
Facility: CLINIC | Age: 87
LOS: 7 days | Discharge: NURSING, REHAB OR LONG-TERM CARE | DRG: 683 | End: 2022-06-17
Attending: EMERGENCY MEDICINE | Admitting: INTERNAL MEDICINE
Payer: MEDICARE

## 2022-01-01 ENCOUNTER — APPOINTMENT (OUTPATIENT)
Dept: GENERAL RADIOLOGY | Facility: CLINIC | Age: 87
DRG: 291 | End: 2022-01-01
Attending: EMERGENCY MEDICINE
Payer: MEDICARE

## 2022-01-01 ENCOUNTER — PATIENT OUTREACH (OUTPATIENT)
Dept: CARE COORDINATION | Facility: CLINIC | Age: 87
End: 2022-01-01
Payer: MEDICARE

## 2022-01-01 ENCOUNTER — TELEPHONE (OUTPATIENT)
Dept: GERIATRICS | Facility: CLINIC | Age: 87
End: 2022-01-01

## 2022-01-01 ENCOUNTER — TRANSITIONAL CARE UNIT VISIT (OUTPATIENT)
Dept: GERIATRICS | Facility: CLINIC | Age: 87
End: 2022-01-01
Payer: MEDICARE

## 2022-01-01 ENCOUNTER — TRANSITIONAL CARE UNIT VISIT (OUTPATIENT)
Dept: GERIATRICS | Facility: CLINIC | Age: 87
End: 2022-01-01

## 2022-01-01 ENCOUNTER — PATIENT OUTREACH (OUTPATIENT)
Dept: NURSING | Facility: CLINIC | Age: 87
End: 2022-01-01
Attending: INTERNAL MEDICINE
Payer: MEDICARE

## 2022-01-01 ENCOUNTER — APPOINTMENT (OUTPATIENT)
Dept: PHYSICAL THERAPY | Facility: CLINIC | Age: 87
DRG: 683 | End: 2022-01-01
Payer: MEDICARE

## 2022-01-01 ENCOUNTER — ALLIED HEALTH/NURSE VISIT (OUTPATIENT)
Dept: UROLOGY | Facility: CLINIC | Age: 87
End: 2022-01-01
Payer: MEDICARE

## 2022-01-01 ENCOUNTER — APPOINTMENT (OUTPATIENT)
Dept: ULTRASOUND IMAGING | Facility: CLINIC | Age: 87
DRG: 683 | End: 2022-01-01
Attending: INTERNAL MEDICINE
Payer: MEDICARE

## 2022-01-01 ENCOUNTER — APPOINTMENT (OUTPATIENT)
Dept: CT IMAGING | Facility: CLINIC | Age: 87
DRG: 683 | End: 2022-01-01
Attending: INTERNAL MEDICINE
Payer: MEDICARE

## 2022-01-01 ENCOUNTER — APPOINTMENT (OUTPATIENT)
Dept: CT IMAGING | Facility: CLINIC | Age: 87
DRG: 683 | End: 2022-01-01
Attending: EMERGENCY MEDICINE
Payer: MEDICARE

## 2022-01-01 ENCOUNTER — APPOINTMENT (OUTPATIENT)
Dept: PHYSICAL THERAPY | Facility: CLINIC | Age: 87
DRG: 291 | End: 2022-01-01
Payer: MEDICARE

## 2022-01-01 ENCOUNTER — MEDICAL CORRESPONDENCE (OUTPATIENT)
Dept: HEALTH INFORMATION MANAGEMENT | Facility: CLINIC | Age: 87
End: 2022-01-01
Payer: MEDICARE

## 2022-01-01 ENCOUNTER — APPOINTMENT (OUTPATIENT)
Dept: ULTRASOUND IMAGING | Facility: CLINIC | Age: 87
DRG: 291 | End: 2022-01-01
Attending: INTERNAL MEDICINE
Payer: MEDICARE

## 2022-01-01 ENCOUNTER — HOSPITAL ENCOUNTER (INPATIENT)
Facility: CLINIC | Age: 87
LOS: 9 days | Discharge: NURSING, REHAB OR LONG-TERM CARE | DRG: 291 | End: 2022-05-27
Attending: EMERGENCY MEDICINE | Admitting: HOSPITALIST
Payer: MEDICARE

## 2022-01-01 ENCOUNTER — APPOINTMENT (OUTPATIENT)
Dept: OCCUPATIONAL THERAPY | Facility: CLINIC | Age: 87
DRG: 683 | End: 2022-01-01
Payer: MEDICARE

## 2022-01-01 ENCOUNTER — APPOINTMENT (OUTPATIENT)
Dept: ULTRASOUND IMAGING | Facility: CLINIC | Age: 87
DRG: 291 | End: 2022-01-01
Attending: EMERGENCY MEDICINE
Payer: MEDICARE

## 2022-01-01 ENCOUNTER — APPOINTMENT (OUTPATIENT)
Dept: OCCUPATIONAL THERAPY | Facility: CLINIC | Age: 87
DRG: 683 | End: 2022-01-01
Attending: INTERNAL MEDICINE
Payer: MEDICARE

## 2022-01-01 ENCOUNTER — TELEPHONE (OUTPATIENT)
Dept: INTERNAL MEDICINE | Facility: CLINIC | Age: 87
End: 2022-01-01
Payer: MEDICARE

## 2022-01-01 ENCOUNTER — APPOINTMENT (OUTPATIENT)
Dept: PHYSICAL THERAPY | Facility: CLINIC | Age: 87
DRG: 291 | End: 2022-01-01
Attending: HOSPITALIST
Payer: MEDICARE

## 2022-01-01 ENCOUNTER — APPOINTMENT (OUTPATIENT)
Dept: CT IMAGING | Facility: CLINIC | Age: 87
DRG: 291 | End: 2022-01-01
Attending: EMERGENCY MEDICINE
Payer: MEDICARE

## 2022-01-01 ENCOUNTER — APPOINTMENT (OUTPATIENT)
Dept: PHYSICAL THERAPY | Facility: CLINIC | Age: 87
DRG: 683 | End: 2022-01-01
Attending: INTERNAL MEDICINE
Payer: MEDICARE

## 2022-01-01 ENCOUNTER — PATIENT OUTREACH (OUTPATIENT)
Dept: CARE COORDINATION | Facility: CLINIC | Age: 87
End: 2022-01-01

## 2022-01-01 ENCOUNTER — OFFICE VISIT (OUTPATIENT)
Dept: INTERNAL MEDICINE | Facility: CLINIC | Age: 87
End: 2022-01-01
Payer: MEDICARE

## 2022-01-01 ENCOUNTER — APPOINTMENT (OUTPATIENT)
Dept: CT IMAGING | Facility: CLINIC | Age: 87
DRG: 291 | End: 2022-01-01
Attending: NURSE PRACTITIONER
Payer: MEDICARE

## 2022-01-01 VITALS
SYSTOLIC BLOOD PRESSURE: 167 MMHG | OXYGEN SATURATION: 97 % | HEART RATE: 53 BPM | HEART RATE: 58 BPM | DIASTOLIC BLOOD PRESSURE: 95 MMHG | TEMPERATURE: 97.3 F | BODY MASS INDEX: 20.09 KG/M2 | DIASTOLIC BLOOD PRESSURE: 76 MMHG | SYSTOLIC BLOOD PRESSURE: 125 MMHG | OXYGEN SATURATION: 98 % | WEIGHT: 140 LBS

## 2022-01-01 VITALS
OXYGEN SATURATION: 94 % | TEMPERATURE: 94 F | DIASTOLIC BLOOD PRESSURE: 73 MMHG | HEIGHT: 69 IN | SYSTOLIC BLOOD PRESSURE: 137 MMHG | WEIGHT: 162.92 LBS | BODY MASS INDEX: 24.13 KG/M2 | RESPIRATION RATE: 16 BRPM | HEART RATE: 55 BPM

## 2022-01-01 VITALS
TEMPERATURE: 96.2 F | DIASTOLIC BLOOD PRESSURE: 90 MMHG | WEIGHT: 140 LBS | HEART RATE: 106 BPM | RESPIRATION RATE: 14 BRPM | OXYGEN SATURATION: 99 % | SYSTOLIC BLOOD PRESSURE: 144 MMHG | BODY MASS INDEX: 20.09 KG/M2

## 2022-01-01 VITALS
RESPIRATION RATE: 18 BRPM | HEIGHT: 69 IN | BODY MASS INDEX: 22.86 KG/M2 | OXYGEN SATURATION: 92 % | HEART RATE: 64 BPM | DIASTOLIC BLOOD PRESSURE: 55 MMHG | WEIGHT: 154.32 LBS | SYSTOLIC BLOOD PRESSURE: 110 MMHG | TEMPERATURE: 97 F

## 2022-01-01 VITALS
WEIGHT: 153.7 LBS | BODY MASS INDEX: 22.77 KG/M2 | SYSTOLIC BLOOD PRESSURE: 127 MMHG | RESPIRATION RATE: 17 BRPM | HEART RATE: 74 BPM | DIASTOLIC BLOOD PRESSURE: 78 MMHG | TEMPERATURE: 97.5 F | OXYGEN SATURATION: 97 % | HEIGHT: 69 IN

## 2022-01-01 VITALS
SYSTOLIC BLOOD PRESSURE: 99 MMHG | HEIGHT: 69 IN | HEART RATE: 65 BPM | BODY MASS INDEX: 22.1 KG/M2 | WEIGHT: 149.2 LBS | DIASTOLIC BLOOD PRESSURE: 40 MMHG | OXYGEN SATURATION: 97 % | TEMPERATURE: 96.8 F | RESPIRATION RATE: 15 BRPM

## 2022-01-01 VITALS
SYSTOLIC BLOOD PRESSURE: 119 MMHG | DIASTOLIC BLOOD PRESSURE: 61 MMHG | RESPIRATION RATE: 19 BRPM | HEART RATE: 81 BPM | HEIGHT: 69 IN | OXYGEN SATURATION: 99 % | BODY MASS INDEX: 23.61 KG/M2 | TEMPERATURE: 97.8 F | WEIGHT: 159.4 LBS

## 2022-01-01 VITALS
TEMPERATURE: 97.4 F | BODY MASS INDEX: 15.11 KG/M2 | RESPIRATION RATE: 18 BRPM | WEIGHT: 102 LBS | HEIGHT: 69 IN | DIASTOLIC BLOOD PRESSURE: 53 MMHG | SYSTOLIC BLOOD PRESSURE: 104 MMHG | HEART RATE: 79 BPM | OXYGEN SATURATION: 97 %

## 2022-01-01 VITALS — BODY MASS INDEX: 20.09 KG/M2 | WEIGHT: 140 LBS

## 2022-01-01 DIAGNOSIS — I48.0 PAROXYSMAL ATRIAL FIBRILLATION (H): Primary | ICD-10-CM

## 2022-01-01 DIAGNOSIS — Z87.440 PERSONAL HISTORY OF URINARY (TRACT) INFECTIONS: ICD-10-CM

## 2022-01-01 DIAGNOSIS — L03.116 LEFT LEG CELLULITIS: ICD-10-CM

## 2022-01-01 DIAGNOSIS — Z23 COVID-19 VACCINE ADMINISTERED: ICD-10-CM

## 2022-01-01 DIAGNOSIS — E87.5 HYPERKALEMIA: ICD-10-CM

## 2022-01-01 DIAGNOSIS — N18.32 STAGE 3B CHRONIC KIDNEY DISEASE (H): Primary | ICD-10-CM

## 2022-01-01 DIAGNOSIS — I50.9 ACUTE CONGESTIVE HEART FAILURE, UNSPECIFIED HEART FAILURE TYPE (H): Primary | ICD-10-CM

## 2022-01-01 DIAGNOSIS — I73.9 PAD (PERIPHERAL ARTERY DISEASE) (H): ICD-10-CM

## 2022-01-01 DIAGNOSIS — E11.42 TYPE 2 DIABETES MELLITUS WITH DIABETIC POLYNEUROPATHY, WITHOUT LONG-TERM CURRENT USE OF INSULIN (H): ICD-10-CM
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
## 2022-01-01 DIAGNOSIS — I73.9 PAD (PERIPHERAL ARTERY DISEASE) (H): Primary | ICD-10-CM

## 2022-01-01 DIAGNOSIS — N18.4 ACUTE RENAL FAILURE SUPERIMPOSED ON STAGE 4 CHRONIC KIDNEY DISEASE, UNSPECIFIED ACUTE RENAL FAILURE TYPE (H): ICD-10-CM

## 2022-01-01 DIAGNOSIS — S91.302A UNSPECIFIED OPEN WOUND, LEFT FOOT, INITIAL ENCOUNTER: ICD-10-CM

## 2022-01-01 DIAGNOSIS — I48.0 PAROXYSMAL ATRIAL FIBRILLATION (H): ICD-10-CM

## 2022-01-01 DIAGNOSIS — R33.9 URINE RETENTION: Primary | ICD-10-CM

## 2022-01-01 DIAGNOSIS — N17.9 ACUTE RENAL FAILURE SUPERIMPOSED ON STAGE 4 CHRONIC KIDNEY DISEASE, UNSPECIFIED ACUTE RENAL FAILURE TYPE (H): ICD-10-CM

## 2022-01-01 DIAGNOSIS — K21.00 GASTROESOPHAGEAL REFLUX DISEASE WITH ESOPHAGITIS, UNSPECIFIED WHETHER HEMORRHAGE: ICD-10-CM

## 2022-01-01 DIAGNOSIS — R33.9 URINARY RETENTION: ICD-10-CM

## 2022-01-01 DIAGNOSIS — R29.6 RECURRENT FALLS: ICD-10-CM

## 2022-01-01 DIAGNOSIS — D64.9 ANEMIA, UNSPECIFIED TYPE: ICD-10-CM

## 2022-01-01 DIAGNOSIS — R63.0 POOR APPETITE: ICD-10-CM

## 2022-01-01 DIAGNOSIS — S61.211A LACERATION OF LEFT INDEX FINGER WITHOUT FOREIGN BODY WITHOUT DAMAGE TO NAIL, INITIAL ENCOUNTER: Primary | ICD-10-CM

## 2022-01-01 DIAGNOSIS — R60.0 EDEMA OF BOTH LEGS: ICD-10-CM

## 2022-01-01 DIAGNOSIS — F03.90 DEMENTIA WITHOUT BEHAVIORAL DISTURBANCE, UNSPECIFIED DEMENTIA TYPE: ICD-10-CM

## 2022-01-01 DIAGNOSIS — N18.9 ACUTE KIDNEY INJURY SUPERIMPOSED ON CHRONIC KIDNEY DISEASE (H): ICD-10-CM

## 2022-01-01 DIAGNOSIS — Z48.02 VISIT FOR SUTURE REMOVAL: Primary | ICD-10-CM

## 2022-01-01 DIAGNOSIS — N18.30 CHRONIC KIDNEY DISEASE, STAGE 3 UNSPECIFIED (H): ICD-10-CM

## 2022-01-01 DIAGNOSIS — N17.9 ACUTE KIDNEY FAILURE, UNSPECIFIED (H): ICD-10-CM

## 2022-01-01 DIAGNOSIS — R60.9 EDEMA, UNSPECIFIED TYPE: ICD-10-CM

## 2022-01-01 DIAGNOSIS — R53.81 PHYSICAL DECONDITIONING: ICD-10-CM

## 2022-01-01 DIAGNOSIS — R63.0 POOR APPETITE: Primary | ICD-10-CM

## 2022-01-01 DIAGNOSIS — I50.9 ACUTE CONGESTIVE HEART FAILURE, UNSPECIFIED HEART FAILURE TYPE (H): ICD-10-CM

## 2022-01-01 DIAGNOSIS — I10 ESSENTIAL HYPERTENSION, BENIGN: Primary | ICD-10-CM

## 2022-01-01 DIAGNOSIS — Z71.89 OTHER SPECIFIED COUNSELING: ICD-10-CM

## 2022-01-01 DIAGNOSIS — I48.21 PERMANENT ATRIAL FIBRILLATION (H): ICD-10-CM

## 2022-01-01 DIAGNOSIS — R74.8 ELEVATED LIVER ENZYMES: ICD-10-CM

## 2022-01-01 DIAGNOSIS — L97.529: ICD-10-CM

## 2022-01-01 DIAGNOSIS — R54 FRAIL ELDERLY: ICD-10-CM

## 2022-01-01 DIAGNOSIS — S92.354D CLOSED NONDISPLACED FRACTURE OF FIFTH METATARSAL BONE OF RIGHT FOOT WITH ROUTINE HEALING, SUBSEQUENT ENCOUNTER: ICD-10-CM

## 2022-01-01 DIAGNOSIS — N18.32 STAGE 3B CHRONIC KIDNEY DISEASE (H): ICD-10-CM

## 2022-01-01 DIAGNOSIS — M62.81 GENERALIZED MUSCLE WEAKNESS: ICD-10-CM

## 2022-01-01 DIAGNOSIS — I10 ESSENTIAL HYPERTENSION, BENIGN: ICD-10-CM

## 2022-01-01 DIAGNOSIS — E13.621: ICD-10-CM

## 2022-01-01 DIAGNOSIS — I50.9 HEART FAILURE, UNSPECIFIED (H): ICD-10-CM

## 2022-01-01 DIAGNOSIS — N17.9 ACUTE KIDNEY INJURY SUPERIMPOSED ON CHRONIC KIDNEY DISEASE (H): ICD-10-CM

## 2022-01-01 DIAGNOSIS — D69.6 THROMBOCYTOPENIA (H): ICD-10-CM

## 2022-01-01 DIAGNOSIS — N39.0 URINARY TRACT INFECTION WITHOUT HEMATURIA, SITE UNSPECIFIED: ICD-10-CM

## 2022-01-01 DIAGNOSIS — R60.0 PERIPHERAL EDEMA: ICD-10-CM

## 2022-01-01 DIAGNOSIS — Z87.440 PERSONAL HISTORY OF URINARY TRACT INFECTION: ICD-10-CM

## 2022-01-01 DIAGNOSIS — E78.5 HYPERLIPIDEMIA LDL GOAL <100: ICD-10-CM

## 2022-01-01 DIAGNOSIS — N18.9 CHRONIC KIDNEY DISEASE, UNSPECIFIED: ICD-10-CM

## 2022-01-01 DIAGNOSIS — E05.90 HYPERTHYROIDISM: ICD-10-CM

## 2022-01-01 DIAGNOSIS — L97.421: ICD-10-CM

## 2022-01-01 DIAGNOSIS — R53.1 WEAKNESS: ICD-10-CM
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
## 2022-01-01 LAB
ALBUMIN SERPL-MCNC: 2.7 G/DL (ref 3.4–5)
ALBUMIN SERPL-MCNC: 2.8 G/DL (ref 3.4–5)
ALBUMIN SERPL-MCNC: 2.9 G/DL (ref 3.4–5)
ALBUMIN SERPL-MCNC: 3.4 G/DL (ref 3.4–5)
ALBUMIN SERPL-MCNC: 3.5 G/DL (ref 3.4–5)
ALBUMIN UR-MCNC: 100 MG/DL
ALBUMIN UR-MCNC: 30 MG/DL
ALBUMIN UR-MCNC: NEGATIVE MG/DL
ALBUMIN UR-MCNC: NEGATIVE MG/DL
ALP SERPL-CCNC: 107 U/L (ref 40–150)
ALP SERPL-CCNC: 109 U/L (ref 40–150)
ALP SERPL-CCNC: 125 U/L (ref 40–150)
ALP SERPL-CCNC: 146 U/L (ref 40–150)
ALP SERPL-CCNC: 79 U/L (ref 40–150)
ALT SERPL W P-5'-P-CCNC: 114 U/L (ref 0–70)
ALT SERPL W P-5'-P-CCNC: 127 U/L (ref 0–70)
ALT SERPL W P-5'-P-CCNC: 142 U/L (ref 0–70)
ALT SERPL W P-5'-P-CCNC: 29 U/L (ref 0–70)
ALT SERPL W P-5'-P-CCNC: 74 U/L (ref 0–70)
ANION GAP SERPL CALCULATED.3IONS-SCNC: 10 MMOL/L (ref 3–14)
ANION GAP SERPL CALCULATED.3IONS-SCNC: 10 MMOL/L (ref 5–18)
ANION GAP SERPL CALCULATED.3IONS-SCNC: 11 MMOL/L (ref 3–14)
ANION GAP SERPL CALCULATED.3IONS-SCNC: 11 MMOL/L (ref 3–14)
ANION GAP SERPL CALCULATED.3IONS-SCNC: 11 MMOL/L (ref 5–18)
ANION GAP SERPL CALCULATED.3IONS-SCNC: 13 MMOL/L (ref 3–14)
ANION GAP SERPL CALCULATED.3IONS-SCNC: 13 MMOL/L (ref 5–18)
ANION GAP SERPL CALCULATED.3IONS-SCNC: 14 MMOL/L (ref 3–14)
ANION GAP SERPL CALCULATED.3IONS-SCNC: 14 MMOL/L (ref 3–14)
ANION GAP SERPL CALCULATED.3IONS-SCNC: 2 MMOL/L (ref 3–14)
ANION GAP SERPL CALCULATED.3IONS-SCNC: 6 MMOL/L (ref 3–14)
ANION GAP SERPL CALCULATED.3IONS-SCNC: 6 MMOL/L (ref 3–14)
ANION GAP SERPL CALCULATED.3IONS-SCNC: 7 MMOL/L (ref 3–14)
ANION GAP SERPL CALCULATED.3IONS-SCNC: 8 MMOL/L (ref 3–14)
ANION GAP SERPL CALCULATED.3IONS-SCNC: 8 MMOL/L (ref 5–18)
ANION GAP SERPL CALCULATED.3IONS-SCNC: 9 MMOL/L (ref 3–14)
APPEARANCE UR: ABNORMAL
APPEARANCE UR: CLEAR
AST SERPL W P-5'-P-CCNC: 151 U/L (ref 0–45)
AST SERPL W P-5'-P-CCNC: 174 U/L (ref 0–45)
AST SERPL W P-5'-P-CCNC: 25 U/L (ref 0–45)
AST SERPL W P-5'-P-CCNC: 57 U/L (ref 0–45)
AST SERPL W P-5'-P-CCNC: 84 U/L (ref 0–45)
ATRIAL RATE - MUSE: 83 BPM
ATRIAL RATE - MUSE: 92 BPM
ATRIAL RATE - MUSE: 98 BPM
BACTERIA #/AREA URNS HPF: ABNORMAL /HPF
BACTERIA #/AREA URNS HPF: ABNORMAL /HPF
BACTERIA UR CULT: NORMAL
BACTERIA WND CULT: ABNORMAL
BACTERIA WND CULT: ABNORMAL
BACTERIA WND CULT: NORMAL
BASOPHILS # BLD AUTO: 0 10E3/UL (ref 0–0.2)
BASOPHILS # BLD AUTO: 0 10E3/UL (ref 0–0.2)
BASOPHILS NFR BLD AUTO: 0 %
BASOPHILS NFR BLD AUTO: 1 %
BILIRUB DIRECT SERPL-MCNC: 0.3 MG/DL (ref 0–0.2)
BILIRUB SERPL-MCNC: 0.8 MG/DL (ref 0.2–1.3)
BILIRUB SERPL-MCNC: 0.8 MG/DL (ref 0.2–1.3)
BILIRUB SERPL-MCNC: 1.2 MG/DL (ref 0.2–1.3)
BILIRUB SERPL-MCNC: 1.2 MG/DL (ref 0.2–1.3)
BILIRUB SERPL-MCNC: 1.7 MG/DL (ref 0.2–1.3)
BILIRUB UR QL STRIP: NEGATIVE
BUN SERPL-MCNC: 39 MG/DL (ref 7–30)
BUN SERPL-MCNC: 45 MG/DL (ref 7–30)
BUN SERPL-MCNC: 47 MG/DL (ref 7–30)
BUN SERPL-MCNC: 55 MG/DL (ref 8–28)
BUN SERPL-MCNC: 57 MG/DL (ref 7–30)
BUN SERPL-MCNC: 57 MG/DL (ref 8–28)
BUN SERPL-MCNC: 63 MG/DL (ref 7–30)
BUN SERPL-MCNC: 66 MG/DL (ref 7–30)
BUN SERPL-MCNC: 66 MG/DL (ref 7–30)
BUN SERPL-MCNC: 67 MG/DL (ref 8–28)
BUN SERPL-MCNC: 68 MG/DL (ref 7–30)
BUN SERPL-MCNC: 69 MG/DL (ref 7–30)
BUN SERPL-MCNC: 70 MG/DL (ref 7–30)
BUN SERPL-MCNC: 72 MG/DL (ref 7–30)
BUN SERPL-MCNC: 72 MG/DL (ref 7–30)
BUN SERPL-MCNC: 73 MG/DL (ref 7–30)
BUN SERPL-MCNC: 73 MG/DL (ref 7–30)
BUN SERPL-MCNC: 76 MG/DL (ref 7–30)
BUN SERPL-MCNC: 76 MG/DL (ref 8–28)
BUN SERPL-MCNC: 79 MG/DL (ref 7–30)
BUN SERPL-MCNC: 85 MG/DL (ref 7–30)
BUN SERPL-MCNC: 87 MG/DL (ref 7–30)
BUN SERPL-MCNC: 91 MG/DL (ref 7–30)
CALCIUM SERPL-MCNC: 8.2 MG/DL (ref 8.5–10.1)
CALCIUM SERPL-MCNC: 8.2 MG/DL (ref 8.5–10.1)
CALCIUM SERPL-MCNC: 8.4 MG/DL (ref 8.5–10.1)
CALCIUM SERPL-MCNC: 8.5 MG/DL (ref 8.5–10.1)
CALCIUM SERPL-MCNC: 8.6 MG/DL (ref 8.5–10.1)
CALCIUM SERPL-MCNC: 8.6 MG/DL (ref 8.5–10.1)
CALCIUM SERPL-MCNC: 8.6 MG/DL (ref 8.5–10.5)
CALCIUM SERPL-MCNC: 8.7 MG/DL (ref 8.5–10.1)
CALCIUM SERPL-MCNC: 8.7 MG/DL (ref 8.5–10.1)
CALCIUM SERPL-MCNC: 8.7 MG/DL (ref 8.5–10.5)
CALCIUM SERPL-MCNC: 8.7 MG/DL (ref 8.5–10.5)
CALCIUM SERPL-MCNC: 8.8 MG/DL (ref 8.5–10.1)
CALCIUM SERPL-MCNC: 8.8 MG/DL (ref 8.5–10.1)
CALCIUM SERPL-MCNC: 8.9 MG/DL (ref 8.5–10.1)
CALCIUM SERPL-MCNC: 9 MG/DL (ref 8.5–10.1)
CALCIUM SERPL-MCNC: 9.1 MG/DL (ref 8.5–10.1)
CALCIUM SERPL-MCNC: 9.1 MG/DL (ref 8.5–10.1)
CALCIUM SERPL-MCNC: 9.1 MG/DL (ref 8.5–10.5)
CALCIUM SERPL-MCNC: 9.2 MG/DL (ref 8.5–10.1)
CALCIUM SERPL-MCNC: 9.3 MG/DL (ref 8.5–10.1)
CALCIUM SERPL-MCNC: 9.4 MG/DL (ref 8.5–10.1)
CHLORIDE BLD-SCNC: 102 MMOL/L (ref 94–109)
CHLORIDE BLD-SCNC: 103 MMOL/L (ref 94–109)
CHLORIDE BLD-SCNC: 103 MMOL/L (ref 94–109)
CHLORIDE BLD-SCNC: 104 MMOL/L (ref 94–109)
CHLORIDE BLD-SCNC: 104 MMOL/L (ref 94–109)
CHLORIDE BLD-SCNC: 104 MMOL/L (ref 98–107)
CHLORIDE BLD-SCNC: 105 MMOL/L (ref 94–109)
CHLORIDE BLD-SCNC: 105 MMOL/L (ref 94–109)
CHLORIDE BLD-SCNC: 106 MMOL/L (ref 94–109)
CHLORIDE BLD-SCNC: 106 MMOL/L (ref 94–109)
CHLORIDE BLD-SCNC: 107 MMOL/L (ref 94–109)
CHLORIDE BLD-SCNC: 107 MMOL/L (ref 94–109)
CHLORIDE BLD-SCNC: 107 MMOL/L (ref 98–107)
CHLORIDE BLD-SCNC: 107 MMOL/L (ref 98–107)
CHLORIDE BLD-SCNC: 108 MMOL/L (ref 94–109)
CHLORIDE BLD-SCNC: 110 MMOL/L (ref 94–109)
CHLORIDE BLD-SCNC: 111 MMOL/L (ref 94–109)
CHLORIDE BLD-SCNC: 99 MMOL/L (ref 98–107)
CK SERPL-CCNC: 47 U/L (ref 30–300)
CO2 SERPL-SCNC: 15 MMOL/L (ref 20–32)
CO2 SERPL-SCNC: 16 MMOL/L (ref 20–32)
CO2 SERPL-SCNC: 19 MMOL/L (ref 20–32)
CO2 SERPL-SCNC: 20 MMOL/L (ref 20–32)
CO2 SERPL-SCNC: 20 MMOL/L (ref 22–31)
CO2 SERPL-SCNC: 21 MMOL/L (ref 20–32)
CO2 SERPL-SCNC: 21 MMOL/L (ref 22–31)
CO2 SERPL-SCNC: 22 MMOL/L (ref 20–32)
CO2 SERPL-SCNC: 22 MMOL/L (ref 20–32)
CO2 SERPL-SCNC: 23 MMOL/L (ref 20–32)
CO2 SERPL-SCNC: 23 MMOL/L (ref 20–32)
CO2 SERPL-SCNC: 23 MMOL/L (ref 22–31)
CO2 SERPL-SCNC: 24 MMOL/L (ref 20–32)
CO2 SERPL-SCNC: 24 MMOL/L (ref 22–31)
CO2 SERPL-SCNC: 25 MMOL/L (ref 20–32)
CO2 SERPL-SCNC: 26 MMOL/L (ref 20–32)
COLOR UR AUTO: ABNORMAL
COLOR UR AUTO: ABNORMAL
COLOR UR AUTO: YELLOW
COLOR UR AUTO: YELLOW
CREAT SERPL-MCNC: 1.98 MG/DL (ref 0.66–1.25)
CREAT SERPL-MCNC: 2 MG/DL (ref 0.66–1.25)
CREAT SERPL-MCNC: 2.15 MG/DL (ref 0.66–1.25)
CREAT SERPL-MCNC: 2.37 MG/DL (ref 0.7–1.3)
CREAT SERPL-MCNC: 2.58 MG/DL (ref 0.66–1.25)
CREAT SERPL-MCNC: 2.61 MG/DL (ref 0.66–1.25)
CREAT SERPL-MCNC: 2.65 MG/DL (ref 0.66–1.25)
CREAT SERPL-MCNC: 2.7 MG/DL (ref 0.66–1.25)
CREAT SERPL-MCNC: 2.71 MG/DL (ref 0.66–1.25)
CREAT SERPL-MCNC: 2.71 MG/DL (ref 0.7–1.3)
CREAT SERPL-MCNC: 2.72 MG/DL (ref 0.66–1.25)
CREAT SERPL-MCNC: 2.84 MG/DL (ref 0.66–1.25)
CREAT SERPL-MCNC: 2.89 MG/DL (ref 0.7–1.3)
CREAT SERPL-MCNC: 2.98 MG/DL (ref 0.66–1.25)
CREAT SERPL-MCNC: 3.05 MG/DL (ref 0.66–1.25)
CREAT SERPL-MCNC: 3.41 MG/DL (ref 0.7–1.3)
CREAT SERPL-MCNC: 3.66 MG/DL (ref 0.66–1.25)
CREAT SERPL-MCNC: 3.89 MG/DL (ref 0.66–1.25)
CREAT SERPL-MCNC: 3.94 MG/DL (ref 0.66–1.25)
CREAT SERPL-MCNC: 4.06 MG/DL (ref 0.66–1.25)
CREAT SERPL-MCNC: 4.38 MG/DL (ref 0.66–1.25)
CRP SERPL-MCNC: 5 MG/L (ref 0–8)
DIASTOLIC BLOOD PRESSURE - MUSE: NORMAL MMHG
EOSINOPHIL # BLD AUTO: 0.2 10E3/UL (ref 0–0.7)
EOSINOPHIL # BLD AUTO: 0.3 10E3/UL (ref 0–0.7)
EOSINOPHIL NFR BLD AUTO: 4 %
EOSINOPHIL NFR BLD AUTO: 6 %
ERYTHROCYTE [DISTWIDTH] IN BLOOD BY AUTOMATED COUNT: 13.4 % (ref 10–15)
ERYTHROCYTE [DISTWIDTH] IN BLOOD BY AUTOMATED COUNT: 13.5 % (ref 10–15)
ERYTHROCYTE [DISTWIDTH] IN BLOOD BY AUTOMATED COUNT: 13.6 % (ref 10–15)
ERYTHROCYTE [DISTWIDTH] IN BLOOD BY AUTOMATED COUNT: 13.8 % (ref 10–15)
ERYTHROCYTE [DISTWIDTH] IN BLOOD BY AUTOMATED COUNT: 14 % (ref 10–15)
ERYTHROCYTE [DISTWIDTH] IN BLOOD BY AUTOMATED COUNT: 14 % (ref 10–15)
ERYTHROCYTE [DISTWIDTH] IN BLOOD BY AUTOMATED COUNT: 14.6 % (ref 10–15)
GFR SERPL CREATININE-BSD FRML MDRD: 12 ML/MIN/1.73M2
GFR SERPL CREATININE-BSD FRML MDRD: 13 ML/MIN/1.73M2
GFR SERPL CREATININE-BSD FRML MDRD: 13 ML/MIN/1.73M2
GFR SERPL CREATININE-BSD FRML MDRD: 14 ML/MIN/1.73M2
GFR SERPL CREATININE-BSD FRML MDRD: 15 ML/MIN/1.73M2
GFR SERPL CREATININE-BSD FRML MDRD: 16 ML/MIN/1.73M2
GFR SERPL CREATININE-BSD FRML MDRD: 18 ML/MIN/1.73M2
GFR SERPL CREATININE-BSD FRML MDRD: 19 ML/MIN/1.73M2
GFR SERPL CREATININE-BSD FRML MDRD: 20 ML/MIN/1.73M2
GFR SERPL CREATININE-BSD FRML MDRD: 20 ML/MIN/1.73M2
GFR SERPL CREATININE-BSD FRML MDRD: 21 ML/MIN/1.73M2
GFR SERPL CREATININE-BSD FRML MDRD: 22 ML/MIN/1.73M2
GFR SERPL CREATININE-BSD FRML MDRD: 25 ML/MIN/1.73M2
GFR SERPL CREATININE-BSD FRML MDRD: 28 ML/MIN/1.73M2
GFR SERPL CREATININE-BSD FRML MDRD: 30 ML/MIN/1.73M2
GFR SERPL CREATININE-BSD FRML MDRD: 31 ML/MIN/1.73M2
GLUCOSE BLD-MCNC: 100 MG/DL (ref 70–99)
GLUCOSE BLD-MCNC: 101 MG/DL (ref 70–125)
GLUCOSE BLD-MCNC: 102 MG/DL (ref 70–99)
GLUCOSE BLD-MCNC: 102 MG/DL (ref 70–99)
GLUCOSE BLD-MCNC: 103 MG/DL (ref 70–125)
GLUCOSE BLD-MCNC: 106 MG/DL (ref 70–125)
GLUCOSE BLD-MCNC: 106 MG/DL (ref 70–99)
GLUCOSE BLD-MCNC: 106 MG/DL (ref 70–99)
GLUCOSE BLD-MCNC: 113 MG/DL (ref 70–99)
GLUCOSE BLD-MCNC: 115 MG/DL (ref 70–99)
GLUCOSE BLD-MCNC: 116 MG/DL (ref 70–99)
GLUCOSE BLD-MCNC: 116 MG/DL (ref 70–99)
GLUCOSE BLD-MCNC: 117 MG/DL (ref 70–99)
GLUCOSE BLD-MCNC: 128 MG/DL (ref 70–99)
GLUCOSE BLD-MCNC: 133 MG/DL (ref 70–99)
GLUCOSE BLD-MCNC: 135 MG/DL (ref 70–99)
GLUCOSE BLD-MCNC: 138 MG/DL (ref 70–99)
GLUCOSE BLD-MCNC: 139 MG/DL (ref 70–99)
GLUCOSE BLD-MCNC: 168 MG/DL (ref 70–99)
GLUCOSE BLD-MCNC: 177 MG/DL (ref 70–99)
GLUCOSE BLD-MCNC: 188 MG/DL (ref 70–99)
GLUCOSE BLD-MCNC: 88 MG/DL (ref 70–125)
GLUCOSE BLD-MCNC: 98 MG/DL (ref 70–99)
GLUCOSE BLDC GLUCOMTR-MCNC: 100 MG/DL (ref 70–99)
GLUCOSE BLDC GLUCOMTR-MCNC: 102 MG/DL (ref 70–99)
GLUCOSE BLDC GLUCOMTR-MCNC: 106 MG/DL (ref 70–99)
GLUCOSE BLDC GLUCOMTR-MCNC: 121 MG/DL (ref 70–99)
GLUCOSE BLDC GLUCOMTR-MCNC: 123 MG/DL (ref 70–99)
GLUCOSE BLDC GLUCOMTR-MCNC: 130 MG/DL (ref 70–99)
GLUCOSE BLDC GLUCOMTR-MCNC: 132 MG/DL (ref 70–99)
GLUCOSE BLDC GLUCOMTR-MCNC: 153 MG/DL (ref 70–99)
GLUCOSE BLDC GLUCOMTR-MCNC: 158 MG/DL (ref 70–99)
GLUCOSE BLDC GLUCOMTR-MCNC: 96 MG/DL (ref 70–99)
GLUCOSE UR STRIP-MCNC: NEGATIVE MG/DL
HBV SURFACE AB SERPL IA-ACNC: 0 M[IU]/ML
HCT VFR BLD AUTO: 32.7 % (ref 40–53)
HCT VFR BLD AUTO: 32.9 % (ref 40–53)
HCT VFR BLD AUTO: 34 % (ref 40–53)
HCT VFR BLD AUTO: 36.7 % (ref 40–53)
HCT VFR BLD AUTO: 39.4 % (ref 40–53)
HCT VFR BLD AUTO: 39.8 % (ref 40–53)
HCT VFR BLD AUTO: 40.3 % (ref 40–53)
HCT VFR BLD AUTO: 42.6 % (ref 40–53)
HCT VFR BLD AUTO: 43.9 % (ref 40–53)
HCV AB SERPL QL IA: NONREACTIVE
HGB BLD-MCNC: 10.3 G/DL (ref 13.3–17.7)
HGB BLD-MCNC: 10.4 G/DL (ref 13.3–17.7)
HGB BLD-MCNC: 10.8 G/DL (ref 13.3–17.7)
HGB BLD-MCNC: 11.4 G/DL (ref 13.3–17.7)
HGB BLD-MCNC: 12.9 G/DL (ref 13.3–17.7)
HGB BLD-MCNC: 13 G/DL (ref 13.3–17.7)
HGB BLD-MCNC: 13.2 G/DL (ref 13.3–17.7)
HGB BLD-MCNC: 14.1 G/DL (ref 13.3–17.7)
HGB BLD-MCNC: 14.2 G/DL (ref 13.3–17.7)
HGB UR QL STRIP: ABNORMAL
HOLD SPECIMEN: NORMAL
HOLD SPECIMEN: NORMAL
HYALINE CASTS: 1 /LPF
IMM GRANULOCYTES # BLD: 0 10E3/UL
IMM GRANULOCYTES # BLD: 0 10E3/UL
IMM GRANULOCYTES NFR BLD: 0 %
IMM GRANULOCYTES NFR BLD: 0 %
INR PPP: 1.26 (ref 0.85–1.15)
INTERPRETATION ECG - MUSE: NORMAL
KETONES UR STRIP-MCNC: NEGATIVE MG/DL
LEUKOCYTE ESTERASE UR QL STRIP: ABNORMAL
LEUKOCYTE ESTERASE UR QL STRIP: NEGATIVE
LVEF ECHO: NORMAL
LYMPHOCYTES # BLD AUTO: 0.8 10E3/UL (ref 0.8–5.3)
LYMPHOCYTES # BLD AUTO: 0.8 10E3/UL (ref 0.8–5.3)
LYMPHOCYTES NFR BLD AUTO: 16 %
LYMPHOCYTES NFR BLD AUTO: 18 %
MAGNESIUM SERPL-MCNC: 2 MG/DL (ref 1.6–2.3)
MAGNESIUM SERPL-MCNC: 2.1 MG/DL (ref 1.6–2.3)
MCH RBC QN AUTO: 32.3 PG (ref 26.5–33)
MCH RBC QN AUTO: 32.4 PG (ref 26.5–33)
MCH RBC QN AUTO: 32.5 PG (ref 26.5–33)
MCH RBC QN AUTO: 32.6 PG (ref 26.5–33)
MCH RBC QN AUTO: 32.6 PG (ref 26.5–33)
MCH RBC QN AUTO: 32.7 PG (ref 26.5–33)
MCH RBC QN AUTO: 33 PG (ref 26.5–33)
MCH RBC QN AUTO: 33 PG (ref 26.5–33)
MCH RBC QN AUTO: 33.1 PG (ref 26.5–33)
MCHC RBC AUTO-ENTMCNC: 31.1 G/DL (ref 31.5–36.5)
MCHC RBC AUTO-ENTMCNC: 31.3 G/DL (ref 31.5–36.5)
MCHC RBC AUTO-ENTMCNC: 31.8 G/DL (ref 31.5–36.5)
MCHC RBC AUTO-ENTMCNC: 31.8 G/DL (ref 31.5–36.5)
MCHC RBC AUTO-ENTMCNC: 32.3 G/DL (ref 31.5–36.5)
MCHC RBC AUTO-ENTMCNC: 32.4 G/DL (ref 31.5–36.5)
MCHC RBC AUTO-ENTMCNC: 32.8 G/DL (ref 31.5–36.5)
MCHC RBC AUTO-ENTMCNC: 33 G/DL (ref 31.5–36.5)
MCHC RBC AUTO-ENTMCNC: 33.1 G/DL (ref 31.5–36.5)
MCV RBC AUTO: 100 FL (ref 78–100)
MCV RBC AUTO: 102 FL (ref 78–100)
MCV RBC AUTO: 103 FL (ref 78–100)
MCV RBC AUTO: 105 FL (ref 78–100)
MCV RBC AUTO: 106 FL (ref 78–100)
MCV RBC AUTO: 99 FL (ref 78–100)
MONOCYTES # BLD AUTO: 0.5 10E3/UL (ref 0–1.3)
MONOCYTES # BLD AUTO: 0.5 10E3/UL (ref 0–1.3)
MONOCYTES NFR BLD AUTO: 10 %
MONOCYTES NFR BLD AUTO: 11 %
MUCOUS THREADS #/AREA URNS LPF: PRESENT /LPF
MUCOUS THREADS #/AREA URNS LPF: PRESENT /LPF
NEUTROPHILS # BLD AUTO: 2.8 10E3/UL (ref 1.6–8.3)
NEUTROPHILS # BLD AUTO: 3.4 10E3/UL (ref 1.6–8.3)
NEUTROPHILS NFR BLD AUTO: 64 %
NEUTROPHILS NFR BLD AUTO: 70 %
NITRATE UR QL: NEGATIVE
NRBC # BLD AUTO: 0 10E3/UL
NRBC # BLD AUTO: 0 10E3/UL
NRBC BLD AUTO-RTO: 0 /100
NRBC BLD AUTO-RTO: 0 /100
NT-PROBNP SERPL-MCNC: 9802 PG/ML (ref 0–1800)
NT-PROBNP SERPL-MCNC: ABNORMAL PG/ML (ref 0–1800)
P AXIS - MUSE: NORMAL DEGREES
PH UR STRIP: 5 [PH] (ref 5–7)
PH UR STRIP: 6 [PH] (ref 5–7)
PHOSPHATE SERPL-MCNC: 4.6 MG/DL (ref 2.5–4.5)
PHOSPHATE SERPL-MCNC: 5.5 MG/DL (ref 2.5–4.5)
PLAT MORPH BLD: NORMAL
PLATELET # BLD AUTO: 106 10E3/UL (ref 150–450)
PLATELET # BLD AUTO: 109 10E3/UL (ref 150–450)
PLATELET # BLD AUTO: 121 10E3/UL (ref 150–450)
PLATELET # BLD AUTO: 121 10E3/UL (ref 150–450)
PLATELET # BLD AUTO: 126 10E3/UL (ref 150–450)
PLATELET # BLD AUTO: 128 10E3/UL (ref 150–450)
PLATELET # BLD AUTO: 130 10E3/UL (ref 150–450)
PLATELET # BLD AUTO: 142 10E3/UL (ref 150–450)
PLATELET # BLD AUTO: 91 10E3/UL (ref 150–450)
POTASSIUM BLD-SCNC: 3.6 MMOL/L (ref 3.4–5.3)
POTASSIUM BLD-SCNC: 3.7 MMOL/L (ref 3.4–5.3)
POTASSIUM BLD-SCNC: 3.8 MMOL/L (ref 3.4–5.3)
POTASSIUM BLD-SCNC: 3.8 MMOL/L (ref 3.5–5)
POTASSIUM BLD-SCNC: 3.9 MMOL/L (ref 3.4–5.3)
POTASSIUM BLD-SCNC: 4 MMOL/L (ref 3.4–5.3)
POTASSIUM BLD-SCNC: 4.1 MMOL/L (ref 3.4–5.3)
POTASSIUM BLD-SCNC: 4.2 MMOL/L (ref 3.4–5.3)
POTASSIUM BLD-SCNC: 4.2 MMOL/L (ref 3.4–5.3)
POTASSIUM BLD-SCNC: 4.3 MMOL/L (ref 3.4–5.3)
POTASSIUM BLD-SCNC: 4.3 MMOL/L (ref 3.4–5.3)
POTASSIUM BLD-SCNC: 4.3 MMOL/L (ref 3.5–5)
POTASSIUM BLD-SCNC: 4.7 MMOL/L (ref 3.4–5.3)
POTASSIUM BLD-SCNC: 4.7 MMOL/L (ref 3.4–5.3)
POTASSIUM BLD-SCNC: 5 MMOL/L (ref 3.4–5.3)
POTASSIUM BLD-SCNC: 5.1 MMOL/L (ref 3.4–5.3)
POTASSIUM BLD-SCNC: 5.1 MMOL/L (ref 3.4–5.3)
POTASSIUM BLD-SCNC: 5.1 MMOL/L (ref 3.5–5)
POTASSIUM BLD-SCNC: 5.3 MMOL/L (ref 3.4–5.3)
POTASSIUM BLD-SCNC: 5.4 MMOL/L (ref 3.4–5.3)
POTASSIUM BLD-SCNC: 5.4 MMOL/L (ref 3.4–5.3)
POTASSIUM BLD-SCNC: 5.5 MMOL/L (ref 3.4–5.3)
POTASSIUM BLD-SCNC: 5.5 MMOL/L (ref 3.5–5)
POTASSIUM BLD-SCNC: 5.6 MMOL/L (ref 3.4–5.3)
POTASSIUM BLD-SCNC: 6.5 MMOL/L (ref 3.4–5.3)
PR INTERVAL - MUSE: NORMAL MS
PROT SERPL-MCNC: 5.5 G/DL (ref 6.8–8.8)
PROT SERPL-MCNC: 5.5 G/DL (ref 6.8–8.8)
PROT SERPL-MCNC: 5.8 G/DL (ref 6.8–8.8)
PROT SERPL-MCNC: 6.6 G/DL (ref 6.8–8.8)
PROT SERPL-MCNC: 6.9 G/DL (ref 6.8–8.8)
QRS DURATION - MUSE: 64 MS
QRS DURATION - MUSE: 74 MS
QRS DURATION - MUSE: 76 MS
QT - MUSE: 360 MS
QT - MUSE: 380 MS
QT - MUSE: 392 MS
QTC - MUSE: 447 MS
QTC - MUSE: 449 MS
QTC - MUSE: 452 MS
R AXIS - MUSE: 77 DEGREES
R AXIS - MUSE: 83 DEGREES
R AXIS - MUSE: 83 DEGREES
RBC # BLD AUTO: 3.12 10E6/UL (ref 4.4–5.9)
RBC # BLD AUTO: 3.2 10E6/UL (ref 4.4–5.9)
RBC # BLD AUTO: 3.31 10E6/UL (ref 4.4–5.9)
RBC # BLD AUTO: 3.45 10E6/UL (ref 4.4–5.9)
RBC # BLD AUTO: 3.97 10E6/UL (ref 4.4–5.9)
RBC # BLD AUTO: 4 10E6/UL (ref 4.4–5.9)
RBC # BLD AUTO: 4.05 10E6/UL (ref 4.4–5.9)
RBC # BLD AUTO: 4.26 10E6/UL (ref 4.4–5.9)
RBC # BLD AUTO: 4.38 10E6/UL (ref 4.4–5.9)
RBC MORPH BLD: NORMAL
RBC URINE: 27 /HPF
RBC URINE: 53 /HPF
RBC URINE: 85 /HPF
RBC URINE: >182 /HPF
SARS-COV-2 RNA RESP QL NAA+PROBE: NEGATIVE
SARS-COV-2 RNA RESP QL NAA+PROBE: NEGATIVE
SODIUM SERPL-SCNC: 131 MMOL/L (ref 133–144)
SODIUM SERPL-SCNC: 133 MMOL/L (ref 133–144)
SODIUM SERPL-SCNC: 134 MMOL/L (ref 133–144)
SODIUM SERPL-SCNC: 134 MMOL/L (ref 133–144)
SODIUM SERPL-SCNC: 135 MMOL/L (ref 133–144)
SODIUM SERPL-SCNC: 135 MMOL/L (ref 133–144)
SODIUM SERPL-SCNC: 135 MMOL/L (ref 136–145)
SODIUM SERPL-SCNC: 136 MMOL/L (ref 133–144)
SODIUM SERPL-SCNC: 136 MMOL/L (ref 136–145)
SODIUM SERPL-SCNC: 137 MMOL/L (ref 133–144)
SODIUM SERPL-SCNC: 137 MMOL/L (ref 136–145)
SODIUM SERPL-SCNC: 138 MMOL/L (ref 133–144)
SODIUM SERPL-SCNC: 139 MMOL/L (ref 133–144)
SODIUM SERPL-SCNC: 139 MMOL/L (ref 136–145)
SODIUM UR-SCNC: 61 MMOL/L
SP GR UR STRIP: 1.01 (ref 1–1.03)
SP GR UR STRIP: 1.02 (ref 1–1.03)
SQUAMOUS EPITHELIAL: <1 /HPF
SYSTOLIC BLOOD PRESSURE - MUSE: NORMAL MMHG
T AXIS - MUSE: -26 DEGREES
T AXIS - MUSE: -5 DEGREES
T AXIS - MUSE: 36 DEGREES
T3FREE SERPL-MCNC: 2.3 PG/ML (ref 2.3–4.2)
T4 FREE SERPL-MCNC: 1.25 NG/DL (ref 0.76–1.46)
T4 FREE SERPL-MCNC: 1.36 NG/DL (ref 0.76–1.46)
TROPONIN I SERPL HS-MCNC: 33 NG/L
TROPONIN I SERPL HS-MCNC: 36 NG/L
TROPONIN I SERPL HS-MCNC: 38 NG/L
TSH SERPL DL<=0.005 MIU/L-ACNC: 4.54 MU/L (ref 0.4–4)
UROBILINOGEN UR STRIP-MCNC: NORMAL MG/DL
VENTRICULAR RATE- MUSE: 79 BPM
VENTRICULAR RATE- MUSE: 85 BPM
VENTRICULAR RATE- MUSE: 93 BPM
WBC # BLD AUTO: 3.8 10E3/UL (ref 4–11)
WBC # BLD AUTO: 4.2 10E3/UL (ref 4–11)
WBC # BLD AUTO: 4.4 10E3/UL (ref 4–11)
WBC # BLD AUTO: 4.6 10E3/UL (ref 4–11)
WBC # BLD AUTO: 4.9 10E3/UL (ref 4–11)
WBC # BLD AUTO: 5.8 10E3/UL (ref 4–11)
WBC # BLD AUTO: 6.9 10E3/UL (ref 4–11)
WBC # BLD AUTO: 7.8 10E3/UL (ref 4–11)
WBC # BLD AUTO: 9.5 10E3/UL (ref 4–11)
WBC URINE: 22 /HPF
WBC URINE: 4 /HPF
WBC URINE: 7 /HPF
WBC URINE: >182 /HPF

## 2022-01-01 PROCEDURE — 250N000013 HC RX MED GY IP 250 OP 250 PS 637: Performed by: HOSPITALIST

## 2022-01-01 PROCEDURE — 85027 COMPLETE CBC AUTOMATED: CPT | Mod: ORL

## 2022-01-01 PROCEDURE — 86803 HEPATITIS C AB TEST: CPT | Performed by: INTERNAL MEDICINE

## 2022-01-01 PROCEDURE — 97165 OT EVAL LOW COMPLEX 30 MIN: CPT | Mod: GO

## 2022-01-01 PROCEDURE — G1004 CDSM NDSC: HCPCS

## 2022-01-01 PROCEDURE — C9803 HOPD COVID-19 SPEC COLLECT: HCPCS

## 2022-01-01 PROCEDURE — 84484 ASSAY OF TROPONIN QUANT: CPT | Performed by: HOSPITALIST

## 2022-01-01 PROCEDURE — 120N000001 HC R&B MED SURG/OB

## 2022-01-01 PROCEDURE — 85027 COMPLETE CBC AUTOMATED: CPT | Performed by: NURSE PRACTITIONER

## 2022-01-01 PROCEDURE — 36415 COLL VENOUS BLD VENIPUNCTURE: CPT | Performed by: INTERNAL MEDICINE

## 2022-01-01 PROCEDURE — 250N000011 HC RX IP 250 OP 636: Performed by: HOSPITALIST

## 2022-01-01 PROCEDURE — 250N000013 HC RX MED GY IP 250 OP 250 PS 637: Performed by: STUDENT IN AN ORGANIZED HEALTH CARE EDUCATION/TRAINING PROGRAM

## 2022-01-01 PROCEDURE — 83880 ASSAY OF NATRIURETIC PEPTIDE: CPT | Performed by: EMERGENCY MEDICINE

## 2022-01-01 PROCEDURE — 36415 COLL VENOUS BLD VENIPUNCTURE: CPT | Mod: ORL

## 2022-01-01 PROCEDURE — 99239 HOSP IP/OBS DSCHRG MGMT >30: CPT | Performed by: HOSPITALIST

## 2022-01-01 PROCEDURE — 36415 COLL VENOUS BLD VENIPUNCTURE: CPT | Performed by: HOSPITALIST

## 2022-01-01 PROCEDURE — 93970 EXTREMITY STUDY: CPT

## 2022-01-01 PROCEDURE — 250N000013 HC RX MED GY IP 250 OP 250 PS 637: Performed by: INTERNAL MEDICINE

## 2022-01-01 PROCEDURE — 97161 PT EVAL LOW COMPLEX 20 MIN: CPT | Mod: GP | Performed by: PHYSICAL THERAPIST

## 2022-01-01 PROCEDURE — 84100 ASSAY OF PHOSPHORUS: CPT | Performed by: HOSPITALIST

## 2022-01-01 PROCEDURE — 84484 ASSAY OF TROPONIN QUANT: CPT | Performed by: EMERGENCY MEDICINE

## 2022-01-01 PROCEDURE — 99232 SBSQ HOSP IP/OBS MODERATE 35: CPT | Performed by: INTERNAL MEDICINE

## 2022-01-01 PROCEDURE — 97116 GAIT TRAINING THERAPY: CPT | Mod: GP | Performed by: PHYSICAL THERAPIST

## 2022-01-01 PROCEDURE — 80048 BASIC METABOLIC PNL TOTAL CA: CPT | Performed by: INTERNAL MEDICINE

## 2022-01-01 PROCEDURE — 80053 COMPREHEN METABOLIC PANEL: CPT | Performed by: EMERGENCY MEDICINE

## 2022-01-01 PROCEDURE — 97110 THERAPEUTIC EXERCISES: CPT | Mod: GP | Performed by: PHYSICAL THERAPIST

## 2022-01-01 PROCEDURE — 84132 ASSAY OF SERUM POTASSIUM: CPT | Performed by: EMERGENCY MEDICINE

## 2022-01-01 PROCEDURE — 82550 ASSAY OF CK (CPK): CPT | Performed by: INTERNAL MEDICINE

## 2022-01-01 PROCEDURE — 84132 ASSAY OF SERUM POTASSIUM: CPT | Performed by: INTERNAL MEDICINE

## 2022-01-01 PROCEDURE — U0003 INFECTIOUS AGENT DETECTION BY NUCLEIC ACID (DNA OR RNA); SEVERE ACUTE RESPIRATORY SYNDROME CORONAVIRUS 2 (SARS-COV-2) (CORONAVIRUS DISEASE [COVID-19]), AMPLIFIED PROBE TECHNIQUE, MAKING USE OF HIGH THROUGHPUT TECHNOLOGIES AS DESCRIBED BY CMS-2020-01-R: HCPCS | Performed by: EMERGENCY MEDICINE

## 2022-01-01 PROCEDURE — 258N000003 HC RX IP 258 OP 636: Performed by: EMERGENCY MEDICINE

## 2022-01-01 PROCEDURE — 97140 MANUAL THERAPY 1/> REGIONS: CPT | Mod: GP | Performed by: PHYSICAL THERAPIST

## 2022-01-01 PROCEDURE — 86140 C-REACTIVE PROTEIN: CPT | Performed by: EMERGENCY MEDICINE

## 2022-01-01 PROCEDURE — 99211 OFF/OP EST MAY X REQ PHY/QHP: CPT

## 2022-01-01 PROCEDURE — 99233 SBSQ HOSP IP/OBS HIGH 50: CPT | Performed by: HOSPITALIST

## 2022-01-01 PROCEDURE — 36415 COLL VENOUS BLD VENIPUNCTURE: CPT | Performed by: EMERGENCY MEDICINE

## 2022-01-01 PROCEDURE — 71046 X-RAY EXAM CHEST 2 VIEWS: CPT

## 2022-01-01 PROCEDURE — 99233 SBSQ HOSP IP/OBS HIGH 50: CPT | Performed by: INTERNAL MEDICINE

## 2022-01-01 PROCEDURE — 76705 ECHO EXAM OF ABDOMEN: CPT

## 2022-01-01 PROCEDURE — 83735 ASSAY OF MAGNESIUM: CPT | Performed by: HOSPITALIST

## 2022-01-01 PROCEDURE — G0463 HOSPITAL OUTPT CLINIC VISIT: HCPCS

## 2022-01-01 PROCEDURE — 99309 SBSQ NF CARE MODERATE MDM 30: CPT

## 2022-01-01 PROCEDURE — 85027 COMPLETE CBC AUTOMATED: CPT | Performed by: HOSPITALIST

## 2022-01-01 PROCEDURE — 82040 ASSAY OF SERUM ALBUMIN: CPT | Performed by: EMERGENCY MEDICINE

## 2022-01-01 PROCEDURE — 84439 ASSAY OF FREE THYROXINE: CPT | Performed by: HOSPITALIST

## 2022-01-01 PROCEDURE — 99223 1ST HOSP IP/OBS HIGH 75: CPT | Mod: AI | Performed by: INTERNAL MEDICINE

## 2022-01-01 PROCEDURE — 84443 ASSAY THYROID STIM HORMONE: CPT | Performed by: HOSPITALIST

## 2022-01-01 PROCEDURE — 97530 THERAPEUTIC ACTIVITIES: CPT | Mod: GP | Performed by: PHYSICAL THERAPIST

## 2022-01-01 PROCEDURE — 80048 BASIC METABOLIC PNL TOTAL CA: CPT | Performed by: HOSPITALIST

## 2022-01-01 PROCEDURE — P9604 ONE-WAY ALLOW PRORATED TRIP: HCPCS | Mod: ORL

## 2022-01-01 PROCEDURE — 99214 OFFICE O/P EST MOD 30 MIN: CPT | Performed by: INTERNAL MEDICINE

## 2022-01-01 PROCEDURE — 0052A COVID-19,PF,PFIZER (12+ YRS): CPT

## 2022-01-01 PROCEDURE — 97116 GAIT TRAINING THERAPY: CPT | Mod: GP

## 2022-01-01 PROCEDURE — 99232 SBSQ HOSP IP/OBS MODERATE 35: CPT | Performed by: HOSPITALIST

## 2022-01-01 PROCEDURE — 93005 ELECTROCARDIOGRAM TRACING: CPT

## 2022-01-01 PROCEDURE — 12001 RPR S/N/AX/GEN/TRNK 2.5CM/<: CPT | Performed by: FAMILY MEDICINE

## 2022-01-01 PROCEDURE — 80048 BASIC METABOLIC PNL TOTAL CA: CPT | Mod: ORL | Performed by: INTERNAL MEDICINE

## 2022-01-01 PROCEDURE — 81001 URINALYSIS AUTO W/SCOPE: CPT | Performed by: INTERNAL MEDICINE

## 2022-01-01 PROCEDURE — 80048 BASIC METABOLIC PNL TOTAL CA: CPT

## 2022-01-01 PROCEDURE — 97530 THERAPEUTIC ACTIVITIES: CPT | Mod: GP

## 2022-01-01 PROCEDURE — 82248 BILIRUBIN DIRECT: CPT | Performed by: INTERNAL MEDICINE

## 2022-01-01 PROCEDURE — 84481 FREE ASSAY (FT-3): CPT | Performed by: HOSPITALIST

## 2022-01-01 PROCEDURE — 82374 ASSAY BLOOD CARBON DIOXIDE: CPT | Performed by: HOSPITALIST

## 2022-01-01 PROCEDURE — 97535 SELF CARE MNGMENT TRAINING: CPT | Mod: GO

## 2022-01-01 PROCEDURE — 258N000003 HC RX IP 258 OP 636: Performed by: INTERNAL MEDICINE

## 2022-01-01 PROCEDURE — 250N000011 HC RX IP 250 OP 636: Performed by: INTERNAL MEDICINE

## 2022-01-01 PROCEDURE — 99207 PR NO CHARGE NURSE ONLY: CPT

## 2022-01-01 PROCEDURE — 99285 EMERGENCY DEPT VISIT HI MDM: CPT | Mod: 25

## 2022-01-01 PROCEDURE — 82310 ASSAY OF CALCIUM: CPT | Performed by: INTERNAL MEDICINE

## 2022-01-01 PROCEDURE — 80053 COMPREHEN METABOLIC PANEL: CPT | Performed by: INTERNAL MEDICINE

## 2022-01-01 PROCEDURE — P9603 ONE-WAY ALLOW PRORATED MILES: HCPCS | Mod: ORL

## 2022-01-01 PROCEDURE — 99223 1ST HOSP IP/OBS HIGH 75: CPT | Mod: AI | Performed by: HOSPITALIST

## 2022-01-01 PROCEDURE — 76770 US EXAM ABDO BACK WALL COMP: CPT

## 2022-01-01 PROCEDURE — 90471 IMMUNIZATION ADMIN: CPT | Performed by: FAMILY MEDICINE

## 2022-01-01 PROCEDURE — P9603 ONE-WAY ALLOW PRORATED MILES: HCPCS

## 2022-01-01 PROCEDURE — 93306 TTE W/DOPPLER COMPLETE: CPT

## 2022-01-01 PROCEDURE — 85027 COMPLETE CBC AUTOMATED: CPT

## 2022-01-01 PROCEDURE — 99223 1ST HOSP IP/OBS HIGH 75: CPT | Performed by: INTERNAL MEDICINE

## 2022-01-01 PROCEDURE — 74176 CT ABD & PELVIS W/O CONTRAST: CPT

## 2022-01-01 PROCEDURE — 84300 ASSAY OF URINE SODIUM: CPT | Performed by: INTERNAL MEDICINE

## 2022-01-01 PROCEDURE — 250N000011 HC RX IP 250 OP 636: Performed by: EMERGENCY MEDICINE

## 2022-01-01 PROCEDURE — 93971 EXTREMITY STUDY: CPT | Mod: RT

## 2022-01-01 PROCEDURE — 250N000012 HC RX MED GY IP 250 OP 636 PS 637: Performed by: EMERGENCY MEDICINE

## 2022-01-01 PROCEDURE — 85014 HEMATOCRIT: CPT | Performed by: INTERNAL MEDICINE

## 2022-01-01 PROCEDURE — 36415 COLL VENOUS BLD VENIPUNCTURE: CPT

## 2022-01-01 PROCEDURE — U0005 INFEC AGEN DETEC AMPLI PROBE: HCPCS | Performed by: EMERGENCY MEDICINE

## 2022-01-01 PROCEDURE — 83880 ASSAY OF NATRIURETIC PEPTIDE: CPT | Performed by: INTERNAL MEDICINE

## 2022-01-01 PROCEDURE — 36415 COLL VENOUS BLD VENIPUNCTURE: CPT | Performed by: NURSE PRACTITIONER

## 2022-01-01 PROCEDURE — 82310 ASSAY OF CALCIUM: CPT | Performed by: HOSPITALIST

## 2022-01-01 PROCEDURE — 86706 HEP B SURFACE ANTIBODY: CPT | Performed by: INTERNAL MEDICINE

## 2022-01-01 PROCEDURE — 96374 THER/PROPH/DIAG INJ IV PUSH: CPT

## 2022-01-01 PROCEDURE — 87075 CULTR BACTERIA EXCEPT BLOOD: CPT | Performed by: INTERNAL MEDICINE

## 2022-01-01 PROCEDURE — 90715 TDAP VACCINE 7 YRS/> IM: CPT | Performed by: FAMILY MEDICINE

## 2022-01-01 PROCEDURE — 85025 COMPLETE CBC W/AUTO DIFF WBC: CPT | Performed by: EMERGENCY MEDICINE

## 2022-01-01 PROCEDURE — 80048 BASIC METABOLIC PNL TOTAL CA: CPT | Mod: ORL

## 2022-01-01 PROCEDURE — 999N000105 HC STATISTIC NO DOCUMENTATION TO SUPPORT CHARGE

## 2022-01-01 PROCEDURE — 84132 ASSAY OF SERUM POTASSIUM: CPT | Performed by: HOSPITALIST

## 2022-01-01 PROCEDURE — 87077 CULTURE AEROBIC IDENTIFY: CPT | Performed by: INTERNAL MEDICINE

## 2022-01-01 PROCEDURE — 258N000001 HC RX 258: Performed by: INTERNAL MEDICINE

## 2022-01-01 PROCEDURE — 97530 THERAPEUTIC ACTIVITIES: CPT | Mod: GO

## 2022-01-01 PROCEDURE — 81001 URINALYSIS AUTO W/SCOPE: CPT | Mod: ORL | Performed by: INTERNAL MEDICINE

## 2022-01-01 PROCEDURE — 93306 TTE W/DOPPLER COMPLETE: CPT | Mod: 26 | Performed by: INTERNAL MEDICINE

## 2022-01-01 PROCEDURE — 250N000009 HC RX 250: Performed by: EMERGENCY MEDICINE

## 2022-01-01 PROCEDURE — 99221 1ST HOSP IP/OBS SF/LOW 40: CPT | Performed by: STUDENT IN AN ORGANIZED HEALTH CARE EDUCATION/TRAINING PROGRAM

## 2022-01-01 PROCEDURE — 99222 1ST HOSP IP/OBS MODERATE 55: CPT | Performed by: INTERNAL MEDICINE

## 2022-01-01 PROCEDURE — 81003 URINALYSIS AUTO W/O SCOPE: CPT | Performed by: EMERGENCY MEDICINE

## 2022-01-01 PROCEDURE — 258N000001 HC RX 258: Performed by: EMERGENCY MEDICINE

## 2022-01-01 PROCEDURE — 99305 1ST NF CARE MODERATE MDM 35: CPT | Performed by: INTERNAL MEDICINE

## 2022-01-01 PROCEDURE — 85610 PROTHROMBIN TIME: CPT | Performed by: INTERNAL MEDICINE

## 2022-01-01 PROCEDURE — 0051A COVID-19,PF,PFIZER (12+ YRS): CPT | Performed by: INTERNAL MEDICINE

## 2022-01-01 PROCEDURE — 96375 TX/PRO/DX INJ NEW DRUG ADDON: CPT

## 2022-01-01 PROCEDURE — 91305 COVID-19,PF,PFIZER (12+ YRS): CPT | Performed by: INTERNAL MEDICINE

## 2022-01-01 PROCEDURE — 91305 COVID-19,PF,PFIZER (12+ YRS): CPT

## 2022-01-01 PROCEDURE — 70490 CT SOFT TISSUE NECK W/O DYE: CPT

## 2022-01-01 PROCEDURE — 99215 OFFICE O/P EST HI 40 MIN: CPT | Performed by: PHYSICIAN ASSISTANT

## 2022-01-01 PROCEDURE — 85027 COMPLETE CBC AUTOMATED: CPT | Performed by: EMERGENCY MEDICINE

## 2022-01-01 PROCEDURE — 87086 URINE CULTURE/COLONY COUNT: CPT | Performed by: EMERGENCY MEDICINE

## 2022-01-01 PROCEDURE — 82310 ASSAY OF CALCIUM: CPT

## 2022-01-01 RX ORDER — AMOXICILLIN 250 MG
2 CAPSULE ORAL 2 TIMES DAILY PRN
Status: DISCONTINUED | OUTPATIENT
Start: 2022-01-01 | End: 2022-01-01 | Stop reason: HOSPADM

## 2022-01-01 RX ORDER — METOPROLOL TARTRATE 25 MG/1
25 TABLET, FILM COATED ORAL ONCE
Status: COMPLETED | OUTPATIENT
Start: 2022-01-01 | End: 2022-01-01

## 2022-01-01 RX ORDER — FUROSEMIDE 10 MG/ML
40 INJECTION INTRAMUSCULAR; INTRAVENOUS ONCE
Status: COMPLETED | OUTPATIENT
Start: 2022-01-01 | End: 2022-01-01

## 2022-01-01 RX ORDER — PANTOPRAZOLE SODIUM 40 MG/1
40 TABLET, DELAYED RELEASE ORAL DAILY
Status: DISCONTINUED | OUTPATIENT
Start: 2022-01-01 | End: 2022-01-01 | Stop reason: HOSPADM

## 2022-01-01 RX ORDER — METOPROLOL SUCCINATE 50 MG/1
50 TABLET, EXTENDED RELEASE ORAL 2 TIMES DAILY
Status: DISCONTINUED | OUTPATIENT
Start: 2022-01-01 | End: 2022-01-01 | Stop reason: HOSPADM

## 2022-01-01 RX ORDER — ACETAMINOPHEN 650 MG/1
650 SUPPOSITORY RECTAL EVERY 6 HOURS PRN
Status: DISCONTINUED | OUTPATIENT
Start: 2022-01-01 | End: 2022-01-01 | Stop reason: HOSPADM

## 2022-01-01 RX ORDER — ONDANSETRON 2 MG/ML
4 INJECTION INTRAMUSCULAR; INTRAVENOUS EVERY 6 HOURS PRN
Status: DISCONTINUED | OUTPATIENT
Start: 2022-01-01 | End: 2022-01-01 | Stop reason: HOSPADM

## 2022-01-01 RX ORDER — BISACODYL 10 MG
10 SUPPOSITORY, RECTAL RECTAL DAILY PRN
Status: DISCONTINUED | OUTPATIENT
Start: 2022-01-01 | End: 2022-01-01 | Stop reason: HOSPADM

## 2022-01-01 RX ORDER — SODIUM POLYSTYRENE SULFONATE 15 G/60ML
30 SUSPENSION ORAL; RECTAL ONCE
Status: COMPLETED | OUTPATIENT
Start: 2022-01-01 | End: 2022-01-01

## 2022-01-01 RX ORDER — SIMVASTATIN 20 MG
20 TABLET ORAL AT BEDTIME
Status: DISCONTINUED | OUTPATIENT
Start: 2022-01-01 | End: 2022-01-01 | Stop reason: HOSPADM

## 2022-01-01 RX ORDER — AMOXICILLIN 250 MG
1 CAPSULE ORAL 2 TIMES DAILY PRN
Status: DISCONTINUED | OUTPATIENT
Start: 2022-01-01 | End: 2022-01-01 | Stop reason: HOSPADM

## 2022-01-01 RX ORDER — NICOTINE POLACRILEX 4 MG
15-30 LOZENGE BUCCAL
Status: DISCONTINUED | OUTPATIENT
Start: 2022-01-01 | End: 2022-01-01 | Stop reason: HOSPADM

## 2022-01-01 RX ORDER — CALCIUM GLUCONATE 20 MG/ML
1 INJECTION, SOLUTION INTRAVENOUS ONCE
Status: COMPLETED | OUTPATIENT
Start: 2022-01-01 | End: 2022-01-01

## 2022-01-01 RX ORDER — FUROSEMIDE 10 MG/ML
20 INJECTION INTRAMUSCULAR; INTRAVENOUS ONCE
Status: COMPLETED | OUTPATIENT
Start: 2022-01-01 | End: 2022-01-01

## 2022-01-01 RX ORDER — TAMSULOSIN HYDROCHLORIDE 0.4 MG/1
0.4 CAPSULE ORAL DAILY
Status: DISCONTINUED | OUTPATIENT
Start: 2022-01-01 | End: 2022-01-01 | Stop reason: HOSPADM

## 2022-01-01 RX ORDER — POLYETHYLENE GLYCOL 3350 17 G/17G
1 POWDER, FOR SOLUTION ORAL DAILY
COMMUNITY

## 2022-01-01 RX ORDER — CEFTRIAXONE 2 G/1
2 INJECTION, POWDER, FOR SOLUTION INTRAMUSCULAR; INTRAVENOUS EVERY 24 HOURS
Status: COMPLETED | OUTPATIENT
Start: 2022-01-01 | End: 2022-01-01

## 2022-01-01 RX ORDER — ACETAMINOPHEN 500 MG
500 TABLET ORAL EVERY 6 HOURS PRN
COMMUNITY

## 2022-01-01 RX ORDER — TAMSULOSIN HYDROCHLORIDE 0.4 MG/1
0.4 CAPSULE ORAL DAILY
Qty: 90 CAPSULE | Refills: 3 | Status: SHIPPED | OUTPATIENT
Start: 2022-01-01

## 2022-01-01 RX ORDER — DEXTROSE MONOHYDRATE 25 G/50ML
25 INJECTION, SOLUTION INTRAVENOUS ONCE
Status: COMPLETED | OUTPATIENT
Start: 2022-01-01 | End: 2022-01-01

## 2022-01-01 RX ORDER — TAMSULOSIN HYDROCHLORIDE 0.4 MG/1
0.4 CAPSULE ORAL DAILY
Qty: 30 CAPSULE | Refills: 0 | Status: ON HOLD | DISCHARGE
Start: 2022-01-01 | End: 2022-01-01

## 2022-01-01 RX ORDER — SODIUM CHLORIDE 9 MG/ML
INJECTION, SOLUTION INTRAVENOUS CONTINUOUS
Status: DISCONTINUED | OUTPATIENT
Start: 2022-01-01 | End: 2022-01-01

## 2022-01-01 RX ORDER — METOPROLOL SUCCINATE 50 MG/1
50 TABLET, EXTENDED RELEASE ORAL 2 TIMES DAILY
DISCHARGE
Start: 2022-01-01

## 2022-01-01 RX ORDER — LIDOCAINE 40 MG/G
CREAM TOPICAL
Status: DISCONTINUED | OUTPATIENT
Start: 2022-01-01 | End: 2022-01-01 | Stop reason: HOSPADM

## 2022-01-01 RX ORDER — TORSEMIDE 20 MG/1
20 TABLET ORAL DAILY
DISCHARGE
Start: 2022-01-01

## 2022-01-01 RX ORDER — DEXTROSE MONOHYDRATE 25 G/50ML
25-50 INJECTION, SOLUTION INTRAVENOUS
Status: DISCONTINUED | OUTPATIENT
Start: 2022-01-01 | End: 2022-01-01 | Stop reason: HOSPADM

## 2022-01-01 RX ORDER — UREA 10 %
500 LOTION (ML) TOPICAL DAILY
Status: DISCONTINUED | OUTPATIENT
Start: 2022-01-01 | End: 2022-01-01 | Stop reason: HOSPADM

## 2022-01-01 RX ORDER — ONDANSETRON 4 MG/1
4 TABLET, ORALLY DISINTEGRATING ORAL EVERY 6 HOURS PRN
Status: DISCONTINUED | OUTPATIENT
Start: 2022-01-01 | End: 2022-01-01 | Stop reason: HOSPADM

## 2022-01-01 RX ORDER — POLYETHYLENE GLYCOL 3350 17 G/17G
17 POWDER, FOR SOLUTION ORAL DAILY PRN
Status: DISCONTINUED | OUTPATIENT
Start: 2022-01-01 | End: 2022-01-01 | Stop reason: HOSPADM

## 2022-01-01 RX ORDER — ASPIRIN 81 MG/1
81 TABLET ORAL DAILY
Status: DISCONTINUED | OUTPATIENT
Start: 2022-01-01 | End: 2022-01-01 | Stop reason: HOSPADM

## 2022-01-01 RX ORDER — FUROSEMIDE 10 MG/ML
40 INJECTION INTRAMUSCULAR; INTRAVENOUS
Status: DISCONTINUED | OUTPATIENT
Start: 2022-01-01 | End: 2022-01-01

## 2022-01-01 RX ORDER — FINASTERIDE 5 MG/1
5 TABLET, FILM COATED ORAL DAILY
Status: DISCONTINUED | OUTPATIENT
Start: 2022-01-01 | End: 2022-01-01 | Stop reason: HOSPADM

## 2022-01-01 RX ORDER — TORSEMIDE 20 MG/1
20 TABLET ORAL DAILY
Status: DISCONTINUED | OUTPATIENT
Start: 2022-01-01 | End: 2022-01-01 | Stop reason: HOSPADM

## 2022-01-01 RX ORDER — CEFAZOLIN SODIUM 1 G/3ML
1 INJECTION, POWDER, FOR SOLUTION INTRAMUSCULAR; INTRAVENOUS EVERY 8 HOURS
Status: DISCONTINUED | OUTPATIENT
Start: 2022-01-01 | End: 2022-01-01

## 2022-01-01 RX ORDER — METOPROLOL SUCCINATE 25 MG/1
25 TABLET, EXTENDED RELEASE ORAL 2 TIMES DAILY
Status: DISCONTINUED | OUTPATIENT
Start: 2022-01-01 | End: 2022-01-01

## 2022-01-01 RX ORDER — ACETAMINOPHEN 325 MG/1
650 TABLET ORAL EVERY 6 HOURS PRN
Status: DISCONTINUED | OUTPATIENT
Start: 2022-01-01 | End: 2022-01-01 | Stop reason: HOSPADM

## 2022-01-01 RX ORDER — FINASTERIDE 5 MG/1
5 TABLET, FILM COATED ORAL DAILY
Qty: 90 TABLET | Refills: 3 | Status: SHIPPED | OUTPATIENT
Start: 2022-01-01

## 2022-01-01 RX ORDER — ALBUTEROL SULFATE 5 MG/ML
10 SOLUTION RESPIRATORY (INHALATION) ONCE
Status: COMPLETED | OUTPATIENT
Start: 2022-01-01 | End: 2022-01-01

## 2022-01-01 RX ADMIN — SIMVASTATIN 20 MG: 20 TABLET, FILM COATED ORAL at 21:17

## 2022-01-01 RX ADMIN — Medication 1 MG: at 01:14

## 2022-01-01 RX ADMIN — CEFTRIAXONE SODIUM 2 G: 2 INJECTION, POWDER, FOR SOLUTION INTRAMUSCULAR; INTRAVENOUS at 12:24

## 2022-01-01 RX ADMIN — METOPROLOL SUCCINATE 50 MG: 50 TABLET, EXTENDED RELEASE ORAL at 08:59

## 2022-01-01 RX ADMIN — FINASTERIDE 5 MG: 5 TABLET, FILM COATED ORAL at 09:38

## 2022-01-01 RX ADMIN — CYANOCOBALAMIN TAB 500 MCG 500 MCG: 500 TAB at 08:41

## 2022-01-01 RX ADMIN — SIMVASTATIN 20 MG: 20 TABLET, FILM COATED ORAL at 21:26

## 2022-01-01 RX ADMIN — ASPIRIN 81 MG: 81 TABLET, COATED ORAL at 09:38

## 2022-01-01 RX ADMIN — PANTOPRAZOLE SODIUM 40 MG: 40 TABLET, DELAYED RELEASE ORAL at 08:41

## 2022-01-01 RX ADMIN — METOPROLOL SUCCINATE 50 MG: 50 TABLET, EXTENDED RELEASE ORAL at 21:46

## 2022-01-01 RX ADMIN — METOPROLOL SUCCINATE 50 MG: 50 TABLET, EXTENDED RELEASE ORAL at 09:38

## 2022-01-01 RX ADMIN — METOPROLOL SUCCINATE 50 MG: 50 TABLET, EXTENDED RELEASE ORAL at 21:17

## 2022-01-01 RX ADMIN — CALCIUM GLUCONATE 1 G: 20 INJECTION, SOLUTION INTRAVENOUS at 19:31

## 2022-01-01 RX ADMIN — SIMVASTATIN 20 MG: 20 TABLET, FILM COATED ORAL at 21:40

## 2022-01-01 RX ADMIN — METOPROLOL SUCCINATE 50 MG: 50 TABLET, EXTENDED RELEASE ORAL at 21:10

## 2022-01-01 RX ADMIN — METOPROLOL SUCCINATE 50 MG: 50 TABLET, EXTENDED RELEASE ORAL at 21:40

## 2022-01-01 RX ADMIN — METOPROLOL SUCCINATE 50 MG: 50 TABLET, EXTENDED RELEASE ORAL at 21:13

## 2022-01-01 RX ADMIN — FINASTERIDE 5 MG: 5 TABLET, FILM COATED ORAL at 09:17

## 2022-01-01 RX ADMIN — PANTOPRAZOLE SODIUM 40 MG: 40 TABLET, DELAYED RELEASE ORAL at 08:59

## 2022-01-01 RX ADMIN — PANTOPRAZOLE SODIUM 40 MG: 40 TABLET, DELAYED RELEASE ORAL at 09:15

## 2022-01-01 RX ADMIN — CEFTRIAXONE SODIUM 2 G: 2 INJECTION, POWDER, FOR SOLUTION INTRAMUSCULAR; INTRAVENOUS at 11:19

## 2022-01-01 RX ADMIN — CYANOCOBALAMIN TAB 500 MCG 500 MCG: 500 TAB at 09:17

## 2022-01-01 RX ADMIN — SODIUM POLYSTYRENE SULFONATE 30 G: 15 SUSPENSION ORAL; RECTAL at 02:09

## 2022-01-01 RX ADMIN — SIMVASTATIN 20 MG: 20 TABLET, FILM COATED ORAL at 21:55

## 2022-01-01 RX ADMIN — METOPROLOL SUCCINATE 50 MG: 50 TABLET, EXTENDED RELEASE ORAL at 08:40

## 2022-01-01 RX ADMIN — PANTOPRAZOLE SODIUM 40 MG: 40 TABLET, DELAYED RELEASE ORAL at 09:38

## 2022-01-01 RX ADMIN — SODIUM CHLORIDE 500 ML: 9 INJECTION, SOLUTION INTRAVENOUS at 12:58

## 2022-01-01 RX ADMIN — METOPROLOL SUCCINATE 50 MG: 50 TABLET, EXTENDED RELEASE ORAL at 08:16

## 2022-01-01 RX ADMIN — DEXTROSE MONOHYDRATE 300 ML: 100 INJECTION, SOLUTION INTRAVENOUS at 02:07

## 2022-01-01 RX ADMIN — CEFTRIAXONE SODIUM 2 G: 2 INJECTION, POWDER, FOR SOLUTION INTRAMUSCULAR; INTRAVENOUS at 12:18

## 2022-01-01 RX ADMIN — TAMSULOSIN HYDROCHLORIDE 0.4 MG: 0.4 CAPSULE ORAL at 09:17

## 2022-01-01 RX ADMIN — METOPROLOL SUCCINATE 50 MG: 50 TABLET, EXTENDED RELEASE ORAL at 21:26

## 2022-01-01 RX ADMIN — FUROSEMIDE 40 MG: 10 INJECTION, SOLUTION INTRAVENOUS at 11:45

## 2022-01-01 RX ADMIN — ASPIRIN 81 MG: 81 TABLET, COATED ORAL at 08:43

## 2022-01-01 RX ADMIN — METOPROLOL SUCCINATE 50 MG: 50 TABLET, EXTENDED RELEASE ORAL at 08:43

## 2022-01-01 RX ADMIN — ASPIRIN 81 MG: 81 TABLET, COATED ORAL at 08:18

## 2022-01-01 RX ADMIN — PANTOPRAZOLE SODIUM 40 MG: 40 TABLET, DELAYED RELEASE ORAL at 08:16

## 2022-01-01 RX ADMIN — SODIUM POLYSTYRENE SULFONATE 30 G: 15 SUSPENSION ORAL; RECTAL at 10:46

## 2022-01-01 RX ADMIN — ALBUTEROL SULFATE 10 MG: 2.5 SOLUTION RESPIRATORY (INHALATION) at 19:35

## 2022-01-01 RX ADMIN — SODIUM ZIRCONIUM CYCLOSILICATE 10 G: 5 POWDER, FOR SUSPENSION ORAL at 16:25

## 2022-01-01 RX ADMIN — SODIUM ZIRCONIUM CYCLOSILICATE 10 G: 5 POWDER, FOR SUSPENSION ORAL at 12:45

## 2022-01-01 RX ADMIN — METOPROLOL SUCCINATE 50 MG: 50 TABLET, EXTENDED RELEASE ORAL at 21:19

## 2022-01-01 RX ADMIN — DEXTROSE MONOHYDRATE 25 G: 500 INJECTION PARENTERAL at 19:39

## 2022-01-01 RX ADMIN — TAMSULOSIN HYDROCHLORIDE 0.4 MG: 0.4 CAPSULE ORAL at 08:18

## 2022-01-01 RX ADMIN — ASPIRIN 81 MG: 81 TABLET, COATED ORAL at 08:20

## 2022-01-01 RX ADMIN — TORSEMIDE 20 MG: 20 TABLET ORAL at 09:01

## 2022-01-01 RX ADMIN — SIMVASTATIN 20 MG: 20 TABLET, FILM COATED ORAL at 21:06

## 2022-01-01 RX ADMIN — ASPIRIN 81 MG: 81 TABLET, COATED ORAL at 09:02

## 2022-01-01 RX ADMIN — TAMSULOSIN HYDROCHLORIDE 0.4 MG: 0.4 CAPSULE ORAL at 13:33

## 2022-01-01 RX ADMIN — SIMVASTATIN 20 MG: 20 TABLET, FILM COATED ORAL at 22:07

## 2022-01-01 RX ADMIN — TORSEMIDE 20 MG: 20 TABLET ORAL at 08:41

## 2022-01-01 RX ADMIN — METOPROLOL SUCCINATE 50 MG: 50 TABLET, EXTENDED RELEASE ORAL at 09:14

## 2022-01-01 RX ADMIN — METOPROLOL SUCCINATE 50 MG: 50 TABLET, EXTENDED RELEASE ORAL at 09:17

## 2022-01-01 RX ADMIN — CEFTRIAXONE SODIUM 2 G: 2 INJECTION, POWDER, FOR SOLUTION INTRAMUSCULAR; INTRAVENOUS at 11:55

## 2022-01-01 RX ADMIN — PANTOPRAZOLE SODIUM 40 MG: 40 TABLET, DELAYED RELEASE ORAL at 09:13

## 2022-01-01 RX ADMIN — ASPIRIN 81 MG: 81 TABLET, COATED ORAL at 08:16

## 2022-01-01 RX ADMIN — TAMSULOSIN HYDROCHLORIDE 0.4 MG: 0.4 CAPSULE ORAL at 09:38

## 2022-01-01 RX ADMIN — ASPIRIN 81 MG: 81 TABLET, COATED ORAL at 09:01

## 2022-01-01 RX ADMIN — FUROSEMIDE 40 MG: 10 INJECTION, SOLUTION INTRAVENOUS at 09:42

## 2022-01-01 RX ADMIN — PANTOPRAZOLE SODIUM 40 MG: 40 TABLET, DELAYED RELEASE ORAL at 09:01

## 2022-01-01 RX ADMIN — SIMVASTATIN 20 MG: 20 TABLET, FILM COATED ORAL at 21:49

## 2022-01-01 RX ADMIN — TAMSULOSIN HYDROCHLORIDE 0.4 MG: 0.4 CAPSULE ORAL at 09:01

## 2022-01-01 RX ADMIN — ASPIRIN 81 MG: 81 TABLET, COATED ORAL at 08:21

## 2022-01-01 RX ADMIN — SODIUM ZIRCONIUM CYCLOSILICATE 10 G: 5 POWDER, FOR SUSPENSION ORAL at 23:54

## 2022-01-01 RX ADMIN — METOPROLOL TARTRATE 25 MG: 25 TABLET, FILM COATED ORAL at 03:43

## 2022-01-01 RX ADMIN — SIMVASTATIN 20 MG: 20 TABLET, FILM COATED ORAL at 22:11

## 2022-01-01 RX ADMIN — ASPIRIN 81 MG: 81 TABLET, COATED ORAL at 08:59

## 2022-01-01 RX ADMIN — PANTOPRAZOLE SODIUM 40 MG: 40 TABLET, DELAYED RELEASE ORAL at 08:20

## 2022-01-01 RX ADMIN — METOPROLOL SUCCINATE 50 MG: 50 TABLET, EXTENDED RELEASE ORAL at 09:02

## 2022-01-01 RX ADMIN — TORSEMIDE 20 MG: 20 TABLET ORAL at 15:21

## 2022-01-01 RX ADMIN — ASPIRIN 81 MG: 81 TABLET, COATED ORAL at 08:40

## 2022-01-01 RX ADMIN — SODIUM BICARBONATE 50 MEQ: 84 INJECTION INTRAVENOUS at 19:43

## 2022-01-01 RX ADMIN — ASPIRIN 81 MG: 81 TABLET, COATED ORAL at 11:37

## 2022-01-01 RX ADMIN — SENNOSIDES AND DOCUSATE SODIUM 2 TABLET: 50; 8.6 TABLET ORAL at 18:04

## 2022-01-01 RX ADMIN — METOPROLOL SUCCINATE 50 MG: 50 TABLET, EXTENDED RELEASE ORAL at 20:31

## 2022-01-01 RX ADMIN — FUROSEMIDE 20 MG: 10 INJECTION, SOLUTION INTRAVENOUS at 21:49

## 2022-01-01 RX ADMIN — SIMVASTATIN 20 MG: 20 TABLET, FILM COATED ORAL at 02:08

## 2022-01-01 RX ADMIN — SIMVASTATIN 20 MG: 20 TABLET, FILM COATED ORAL at 21:10

## 2022-01-01 RX ADMIN — CEFTRIAXONE SODIUM 2 G: 2 INJECTION, POWDER, FOR SOLUTION INTRAMUSCULAR; INTRAVENOUS at 12:58

## 2022-01-01 RX ADMIN — CYANOCOBALAMIN TAB 500 MCG 500 MCG: 500 TAB at 08:20

## 2022-01-01 RX ADMIN — CYANOCOBALAMIN TAB 500 MCG 500 MCG: 500 TAB at 09:01

## 2022-01-01 RX ADMIN — Medication 1 MG: at 23:52

## 2022-01-01 RX ADMIN — ASPIRIN 81 MG: 81 TABLET, COATED ORAL at 09:17

## 2022-01-01 RX ADMIN — METOPROLOL SUCCINATE 50 MG: 50 TABLET, EXTENDED RELEASE ORAL at 20:21

## 2022-01-01 RX ADMIN — CEFTRIAXONE SODIUM 2 G: 2 INJECTION, POWDER, FOR SOLUTION INTRAMUSCULAR; INTRAVENOUS at 11:52

## 2022-01-01 RX ADMIN — METOPROLOL SUCCINATE 50 MG: 50 TABLET, EXTENDED RELEASE ORAL at 02:08

## 2022-01-01 RX ADMIN — METOPROLOL SUCCINATE 25 MG: 25 TABLET, EXTENDED RELEASE ORAL at 22:07

## 2022-01-01 RX ADMIN — TAMSULOSIN HYDROCHLORIDE 0.4 MG: 0.4 CAPSULE ORAL at 08:40

## 2022-01-01 RX ADMIN — TAMSULOSIN HYDROCHLORIDE 0.4 MG: 0.4 CAPSULE ORAL at 09:13

## 2022-01-01 RX ADMIN — PANTOPRAZOLE SODIUM 40 MG: 40 TABLET, DELAYED RELEASE ORAL at 09:02

## 2022-01-01 RX ADMIN — METOPROLOL SUCCINATE 50 MG: 50 TABLET, EXTENDED RELEASE ORAL at 09:13

## 2022-01-01 RX ADMIN — METOPROLOL SUCCINATE 25 MG: 25 TABLET, EXTENDED RELEASE ORAL at 09:15

## 2022-01-01 RX ADMIN — ASPIRIN 81 MG: 81 TABLET, COATED ORAL at 09:13

## 2022-01-01 RX ADMIN — FUROSEMIDE 40 MG: 10 INJECTION, SOLUTION INTRAVENOUS at 13:15

## 2022-01-01 RX ADMIN — CYANOCOBALAMIN TAB 500 MCG 500 MCG: 500 TAB at 08:18

## 2022-01-01 RX ADMIN — Medication 1 MG: at 01:00

## 2022-01-01 RX ADMIN — METOPROLOL SUCCINATE 50 MG: 50 TABLET, EXTENDED RELEASE ORAL at 20:52

## 2022-01-01 RX ADMIN — METOPROLOL SUCCINATE 50 MG: 50 TABLET, EXTENDED RELEASE ORAL at 21:24

## 2022-01-01 RX ADMIN — METOPROLOL SUCCINATE 50 MG: 50 TABLET, EXTENDED RELEASE ORAL at 08:20

## 2022-01-01 RX ADMIN — CEFTRIAXONE SODIUM 2 G: 2 INJECTION, POWDER, FOR SOLUTION INTRAMUSCULAR; INTRAVENOUS at 11:38

## 2022-01-01 RX ADMIN — FUROSEMIDE 40 MG: 10 INJECTION, SOLUTION INTRAVENOUS at 20:14

## 2022-01-01 RX ADMIN — METOPROLOL SUCCINATE 50 MG: 50 TABLET, EXTENDED RELEASE ORAL at 21:39

## 2022-01-01 RX ADMIN — ASPIRIN 81 MG: 81 TABLET, COATED ORAL at 09:15

## 2022-01-01 RX ADMIN — TAMSULOSIN HYDROCHLORIDE 0.4 MG: 0.4 CAPSULE ORAL at 08:21

## 2022-01-01 RX ADMIN — METOPROLOL SUCCINATE 50 MG: 50 TABLET, EXTENDED RELEASE ORAL at 21:06

## 2022-01-01 RX ADMIN — PANTOPRAZOLE SODIUM 40 MG: 40 TABLET, DELAYED RELEASE ORAL at 09:14

## 2022-01-01 RX ADMIN — SIMVASTATIN 20 MG: 20 TABLET, FILM COATED ORAL at 22:14

## 2022-01-01 RX ADMIN — Medication 1 MG: at 21:06

## 2022-01-01 RX ADMIN — SIMVASTATIN 20 MG: 20 TABLET, FILM COATED ORAL at 21:24

## 2022-01-01 RX ADMIN — CYANOCOBALAMIN TAB 500 MCG 500 MCG: 500 TAB at 09:38

## 2022-01-01 RX ADMIN — PANTOPRAZOLE SODIUM 40 MG: 40 TABLET, DELAYED RELEASE ORAL at 08:43

## 2022-01-01 RX ADMIN — PANTOPRAZOLE SODIUM 40 MG: 40 TABLET, DELAYED RELEASE ORAL at 08:21

## 2022-01-01 RX ADMIN — Medication 1 MG: at 21:17

## 2022-01-01 RX ADMIN — TAMSULOSIN HYDROCHLORIDE 0.4 MG: 0.4 CAPSULE ORAL at 08:20

## 2022-01-01 RX ADMIN — FINASTERIDE 5 MG: 5 TABLET, FILM COATED ORAL at 09:13

## 2022-01-01 RX ADMIN — SIMVASTATIN 20 MG: 20 TABLET, FILM COATED ORAL at 21:13

## 2022-01-01 RX ADMIN — SIMVASTATIN 20 MG: 20 TABLET, FILM COATED ORAL at 21:39

## 2022-01-01 RX ADMIN — METOPROLOL SUCCINATE 50 MG: 50 TABLET, EXTENDED RELEASE ORAL at 11:36

## 2022-01-01 RX ADMIN — SODIUM CHLORIDE 4.63 UNITS: 9 INJECTION, SOLUTION INTRAVENOUS at 19:41

## 2022-01-01 RX ADMIN — METOPROLOL SUCCINATE 50 MG: 50 TABLET, EXTENDED RELEASE ORAL at 09:01

## 2022-01-01 RX ADMIN — PANTOPRAZOLE SODIUM 40 MG: 40 TABLET, DELAYED RELEASE ORAL at 08:18

## 2022-01-01 RX ADMIN — ASPIRIN 81 MG: 81 TABLET, COATED ORAL at 09:14

## 2022-01-01 RX ADMIN — FINASTERIDE 5 MG: 5 TABLET, FILM COATED ORAL at 08:40

## 2022-01-01 RX ADMIN — FUROSEMIDE 40 MG: 10 INJECTION, SOLUTION INTRAVENOUS at 17:29

## 2022-01-01 RX ADMIN — SIMVASTATIN 20 MG: 20 TABLET, FILM COATED ORAL at 21:46

## 2022-01-01 RX ADMIN — CYANOCOBALAMIN TAB 500 MCG 500 MCG: 500 TAB at 09:13

## 2022-01-01 RX ADMIN — SIMVASTATIN 20 MG: 20 TABLET, FILM COATED ORAL at 21:19

## 2022-01-01 RX ADMIN — SODIUM CHLORIDE: 9 INJECTION, SOLUTION INTRAVENOUS at 02:08

## 2022-01-01 RX ADMIN — FINASTERIDE 5 MG: 5 TABLET, FILM COATED ORAL at 08:18

## 2022-01-01 RX ADMIN — METOPROLOL SUCCINATE 50 MG: 50 TABLET, EXTENDED RELEASE ORAL at 08:26

## 2022-01-01 RX ADMIN — PANTOPRAZOLE SODIUM 40 MG: 40 TABLET, DELAYED RELEASE ORAL at 11:37

## 2022-01-01 RX ADMIN — METOPROLOL SUCCINATE 50 MG: 50 TABLET, EXTENDED RELEASE ORAL at 22:11

## 2022-01-01 RX ADMIN — FINASTERIDE 5 MG: 5 TABLET, FILM COATED ORAL at 09:01

## 2022-01-01 RX ADMIN — FUROSEMIDE 40 MG: 10 INJECTION, SOLUTION INTRAVENOUS at 09:15

## 2022-01-01 RX ADMIN — PANTOPRAZOLE SODIUM 40 MG: 40 TABLET, DELAYED RELEASE ORAL at 09:17

## 2022-01-01 RX ADMIN — METOPROLOL SUCCINATE 50 MG: 50 TABLET, EXTENDED RELEASE ORAL at 08:17

## 2022-01-01 ASSESSMENT — ACTIVITIES OF DAILY LIVING (ADL)
ADLS_ACUITY_SCORE: 48
ADLS_ACUITY_SCORE: 44
ADLS_ACUITY_SCORE: 49
ADLS_ACUITY_SCORE: 50
ADLS_ACUITY_SCORE: 48
ADLS_ACUITY_SCORE: 49
ADLS_ACUITY_SCORE: 49
ADLS_ACUITY_SCORE: 44
ADLS_ACUITY_SCORE: 47
ADLS_ACUITY_SCORE: 44
ADLS_ACUITY_SCORE: 48
ADLS_ACUITY_SCORE: 49
ADLS_ACUITY_SCORE: 50
ADLS_ACUITY_SCORE: 48
ADLS_ACUITY_SCORE: 49
ADLS_ACUITY_SCORE: 48
ADLS_ACUITY_SCORE: 52
ADLS_ACUITY_SCORE: 49
ADLS_ACUITY_SCORE: 52
ADLS_ACUITY_SCORE: 47
ADLS_ACUITY_SCORE: 35
ADLS_ACUITY_SCORE: 47
ADLS_ACUITY_SCORE: 47
ADLS_ACUITY_SCORE: 49
ADLS_ACUITY_SCORE: 48
ADLS_ACUITY_SCORE: 52
ADLS_ACUITY_SCORE: 49
ADLS_ACUITY_SCORE: 48
ADLS_ACUITY_SCORE: 48
ADLS_ACUITY_SCORE: 51
ADLS_ACUITY_SCORE: 44
ADLS_ACUITY_SCORE: 49
ADLS_ACUITY_SCORE: 49
ADLS_ACUITY_SCORE: 47
ADLS_ACUITY_SCORE: 48
ADLS_ACUITY_SCORE: 44
ADLS_ACUITY_SCORE: 49
ADLS_ACUITY_SCORE: 49
ADLS_ACUITY_SCORE: 44
ADLS_ACUITY_SCORE: 48
ADLS_ACUITY_SCORE: 44
ADLS_ACUITY_SCORE: 48
ADLS_ACUITY_SCORE: 48
ADLS_ACUITY_SCORE: 46
ADLS_ACUITY_SCORE: 48
ADLS_ACUITY_SCORE: 48
ADLS_ACUITY_SCORE: 49
ADLS_ACUITY_SCORE: 51
ADLS_ACUITY_SCORE: 44
ADLS_ACUITY_SCORE: 51
ADLS_ACUITY_SCORE: 44
ADLS_ACUITY_SCORE: 48
ADLS_ACUITY_SCORE: 49
ADLS_ACUITY_SCORE: 49
ADLS_ACUITY_SCORE: 47
ADLS_ACUITY_SCORE: 49
EQUIPMENT_CURRENTLY_USED_AT_HOME: WALKER, ROLLING
ADLS_ACUITY_SCORE: 48
ADLS_ACUITY_SCORE: 44
ADLS_ACUITY_SCORE: 48
ADLS_ACUITY_SCORE: 48
ADLS_ACUITY_SCORE: 49
ADLS_ACUITY_SCORE: 44
ADLS_ACUITY_SCORE: 52
ADLS_ACUITY_SCORE: 48
ADLS_ACUITY_SCORE: 49
ADLS_ACUITY_SCORE: 44
ADLS_ACUITY_SCORE: 52
ADLS_ACUITY_SCORE: 52
ADLS_ACUITY_SCORE: 49
ADLS_ACUITY_SCORE: 47
ADLS_ACUITY_SCORE: 52
ADLS_ACUITY_SCORE: 44
ADLS_ACUITY_SCORE: 48
ADLS_ACUITY_SCORE: 49
ADLS_ACUITY_SCORE: 52
ADLS_ACUITY_SCORE: 44
ADLS_ACUITY_SCORE: 48
ADLS_ACUITY_SCORE: 47
ADLS_ACUITY_SCORE: 44
ADLS_ACUITY_SCORE: 47
ADLS_ACUITY_SCORE: 35
ADLS_ACUITY_SCORE: 41
ADLS_ACUITY_SCORE: 48
ADLS_ACUITY_SCORE: 49
ADLS_ACUITY_SCORE: 49
ADLS_ACUITY_SCORE: 44
ADLS_ACUITY_SCORE: 49
ADLS_ACUITY_SCORE: 46
ADLS_ACUITY_SCORE: 48
ADLS_ACUITY_SCORE: 49
ADLS_ACUITY_SCORE: 41
ADLS_ACUITY_SCORE: 49
ADLS_ACUITY_SCORE: 47
ADLS_ACUITY_SCORE: 48
ADLS_ACUITY_SCORE: 49
ADLS_ACUITY_SCORE: 48
ADLS_ACUITY_SCORE: 51
ADLS_ACUITY_SCORE: 49
ADLS_ACUITY_SCORE: 47
ADLS_ACUITY_SCORE: 49
CHANGE_IN_FUNCTIONAL_STATUS_SINCE_ONSET_OF_CURRENT_ILLNESS/INJURY: NO
ADLS_ACUITY_SCORE: 44
ADLS_ACUITY_SCORE: 48
ADLS_ACUITY_SCORE: 49
ADLS_ACUITY_SCORE: 49
ADLS_ACUITY_SCORE: 48
ADLS_ACUITY_SCORE: 49
ADLS_ACUITY_SCORE: 48
ADLS_ACUITY_SCORE: 41
ADLS_ACUITY_SCORE: 51
ADLS_ACUITY_SCORE: 49
ADLS_ACUITY_SCORE: 48
ADLS_ACUITY_SCORE: 49
ADLS_ACUITY_SCORE: 48
ADLS_ACUITY_SCORE: 48
ADLS_ACUITY_SCORE: 49
ADLS_ACUITY_SCORE: 44
ADLS_ACUITY_SCORE: 49
ADLS_ACUITY_SCORE: 44
ADLS_ACUITY_SCORE: 49
ADLS_ACUITY_SCORE: 51
ADLS_ACUITY_SCORE: 48
ADLS_ACUITY_SCORE: 48
ADLS_ACUITY_SCORE: 44
ADLS_ACUITY_SCORE: 48
ADLS_ACUITY_SCORE: 49
ADLS_ACUITY_SCORE: 48
ADLS_ACUITY_SCORE: 47
ADLS_ACUITY_SCORE: 44
ADLS_ACUITY_SCORE: 48
ADLS_ACUITY_SCORE: 49
ADLS_ACUITY_SCORE: 49
ADLS_ACUITY_SCORE: 48
ADLS_ACUITY_SCORE: 48
ADLS_ACUITY_SCORE: 49
ADLS_ACUITY_SCORE: 48
ADLS_ACUITY_SCORE: 48
ADLS_ACUITY_SCORE: 47
ADLS_ACUITY_SCORE: 44
ADLS_ACUITY_SCORE: 45
ADLS_ACUITY_SCORE: 48
ADLS_ACUITY_SCORE: 50
ADLS_ACUITY_SCORE: 47
ADLS_ACUITY_SCORE: 44
ADLS_ACUITY_SCORE: 49
ADLS_ACUITY_SCORE: 48
ADLS_ACUITY_SCORE: 47
ADLS_ACUITY_SCORE: 49
ADLS_ACUITY_SCORE: 48
ADLS_ACUITY_SCORE: 49
ADLS_ACUITY_SCORE: 49
ADLS_ACUITY_SCORE: 52
ADLS_ACUITY_SCORE: 48
ADLS_ACUITY_SCORE: 44
ADLS_ACUITY_SCORE: 47
ADLS_ACUITY_SCORE: 48
ADLS_ACUITY_SCORE: 37
ADLS_ACUITY_SCORE: 49
ADLS_ACUITY_SCORE: 52
ADLS_ACUITY_SCORE: 49
ADLS_ACUITY_SCORE: 52
ADLS_ACUITY_SCORE: 49
ADLS_ACUITY_SCORE: 49
ADLS_ACUITY_SCORE: 47
ADLS_ACUITY_SCORE: 51
ADLS_ACUITY_SCORE: 44
ADLS_ACUITY_SCORE: 50
ADLS_ACUITY_SCORE: 47
ADLS_ACUITY_SCORE: 49
ADLS_ACUITY_SCORE: 48
ADLS_ACUITY_SCORE: 49
ADLS_ACUITY_SCORE: 44
ADLS_ACUITY_SCORE: 44
ADLS_ACUITY_SCORE: 51
ADLS_ACUITY_SCORE: 48
ADLS_ACUITY_SCORE: 51
ADLS_ACUITY_SCORE: 48
ADLS_ACUITY_SCORE: 44
ADLS_ACUITY_SCORE: 52
ADLS_ACUITY_SCORE: 50
ADLS_ACUITY_SCORE: 49

## 2022-01-01 ASSESSMENT — ENCOUNTER SYMPTOMS
PALPITATIONS: 0
SHORTNESS OF BREATH: 0
FEVER: 0

## 2022-01-05 NOTE — TELEPHONE ENCOUNTER
Can they do repeat BMP before d/c and send us a home BP reading since med changes. GFR down some w/lisinopril addition so would like this repeated if possible.  Thanks

## 2022-01-05 NOTE — TELEPHONE ENCOUNTER
Polina homecare nurse from [facility] called to request verbal order to discharge patient from home care.     She would like to know if this is okay with Dr. Carroll or if follow up labs are needed for patient?    Akira Samano RN

## 2022-02-25 NOTE — PATIENT INSTRUCTIONS
Patient Education     Hand Laceration: All Closures  A laceration is a cut through the skin. Deep cuts usually require stitches. Minor cuts may be closed with surgical tape or skin adhesive.    X-rays may be done if something may have entered the skin through the cut, such as broken glass. You may also be given a tetanus shot if you are not up to date on this vaccination and the object that cut you may carry tetanus.     Home care    Follow all instructions for taking medicines that your healthcare provider may prescribe.  ? Your healthcare provider may prescribe an antibiotic. This is to help prevent infection. Take the medicine every day until it's gone or you are told to stop. You should not have any left over.  ? Your healthcare provider may prescribe medicine for pain. Know how and when to take this medicine.    The healthcare provider may prescribe medicines for pain. Follow instructions for taking them.    Follow the healthcare provider s instructions on how to care for the cut.    Keep the wound clean and dry. Don't get the wound wet until you are told it's OK to do so. If the bandage gets wet, remove it. Gently pat the wound dry with a clean cloth. Then put on a clean, dry bandage.    To help prevent infection, wash your hands with soap and water before and after caring for the wound.     Caring for stitches: Once you no longer need to keep the stitches dry, clean the wound daily. First, remove the bandage. Then wash the area gently with soap and warm water, or as directed by the healthcare provider. Use a wet cotton swab to loosen and remove any blood or crust that forms. After cleaning, apply a thin layer of antibiotic ointment if advised. Then put on a new bandage unless you are told not to.    Caring for skin glue: Don t put any liquid, ointment, or cream on the wound while the glue is in place. It's OK to shower but don't submerge under water for at least 7 days. Avoid activities that cause heavy  sweating. Protect the wound from sunlight. Don't scratch, rub, or pick at the adhesive film. Don't place tape directly over the film. The glue should peel off by itself within 5 to 10 days. Call your provider if you have skin blistering or excessive itching.    Caring for surgical tape: Keep the area dry. If it gets wet, blot it dry with a clean towel. Surgical tape usually falls off within 7 to 10 days. If it has not fallen off after 10 days, you can take it off yourself. Put mineral oil or petroleum jelly on a cotton ball and gently rub the tape until it's removed.    Shower as usual once you can get the wound wet, but don't soak the wound in water. This means no tub baths or swimming.    Check the wound daily for signs of infection listed below. Even with proper treatment, a wound infection may sometimes occur.    Follow-up care  Follow up with your healthcare provider, or as advised. If you have stitches, be sure to return as directed to have them removed.   When to seek medical advice  Call your healthcare provider right away if any of these occur:    Wound bleeding not controlled by direct pressure    Signs of infection, including increasing pain in the wound, increasing wound redness or swelling, or pus or bad odor coming from the wound    Fever of 100.4 F (38. C) or higher, or as directed by your healthcare provider    Chills    Stitches come apart or fall out or surgical tape falls off before 7 days    Wound edges reopen    Wound changes colors    Numbness or weakness in the affected hand     Decreased movement of the hand  [employer] last reviewed this educational content on 6/1/2020 2000-2021 [employer]. All rights reserved. This information is not intended as a substitute for professional medical care. Always follow your healthcare professional's instructions.

## 2022-03-09 NOTE — PROGRESS NOTES
SUBJECTIVE:  Jose Gomez, a 89 year old male scheduled an appointment to discuss the following issues:  Laceration of left index finger without foreign body without damage to nail, initial encounter    Medical, social, surgical, and family histories reviewed.     Laceration (fingers on left hand, fell yesterday)    Pt brought in by his relative because he accidentally hit his hand against a wooden bannister at home and sustained a 2 cm laceration left index finger and abrasion left third fingers, both at the PIP joint.  No tendon involvement.  Able to move all fingers symmetrically.  Bleeding has stopped with simple pressure.  Tetanus outdated (last Tdap 2011).  Denies any head or neck or trunk injuries.  No LOC.  No headache or neck pain.  Not on anticoagulation.  Pt does have dementia and paroxysmal atrial fibrillation.      ROS:  See HPI.  No nausea/vomiting.  No fever/chills.  No chest pain/SOB.  No BM/urine problems.  No dizziness or syncope.      OBJECTIVE:  BP (!) 167/95 (BP Location: Right arm, Patient Position: Sitting, Cuff Size: Adult Regular)   Pulse 58   Wt 63.5 kg (140 lb)   SpO2 98%   BMI 20.09 kg/m    EXAM:  GENERAL APPEARANCE: alert and mild distress; afebrile; hypertensive, has dementia but no cloudiness to his consciousness; GCS 15, moist mucus membrane  EYES: Eyes grossly normal to inspection, PERRL and conjunctivae and sclerae normal  HENT: ear canals and TM's normal ( no hemotympanum) and nose (no epistaxis) and mouth without ulcers or lesions  NECK: no adenopathy, no asymmetry, masses, or scars and neck normal to palpation  RESP: lungs clear to auscultation - no rales, rhonchi or wheezes  CV: regular rates and rhythm, normal S1 S2, no S3 or S4 and no murmur, click or rub  LYMPHATICS: no cervical adenopathy  ABDOMEN: soft, non-tender  MS: extremities normal- no gross deformities noted; moving all fingers normally; otherwise neurovascularly intact bilateral upper and lower  extremtiies  SKIN: 2 cm dermal deep gaping laceration at skin fold at left index finger PIP joint dorsally; abrasion with mild skin avulsion left third finger PIP joint dorsally; no other suspicious lesions or rashes  NEURO: Non-focal, has dementia; pt was taciturn, answered questions with a few words      ASSESSMENT/PLAN:  (H52.135G) Laceration of left index finger without foreign body without damage to nail, initial encounter  (primary encounter diagnosis)  Plan: With thorough irrigation of wound with normal saline and cleansed with antiseptic; with proviodine prep, under sterile condition, and with 2 mls of Lidocaine 1% as adequate local anesthesia, pt's left index finger wound repaired with 7X 4-0 Ethilon.  Pt tolerated procedure well without complication.  Proper Bacitracin dressing applied for sutured wound of left index finger and the wound of left third finger.  Tdap given to update tetanus status.    Wound care instructions given.  Pt to f/up PCP in 7-10 days for suture removal, sooner if no improvement or new problems arise.  Warning signs and symptoms explained---be seen ASAP if worsening.

## 2022-03-11 NOTE — PROGRESS NOTES
Suture removal:     Date sutures applied: 2/25/22         Where (setting) in which they applied:Urgent Care visit    Description:  Type: sutures  Location: finger    History:    Cause of laceration: unknown    Accompanying Signs & Symptoms: (staff: if yes-describe)  Redness: no  Warmth: no  Drainage: no  Still bleeding: no  Fevers: no    Last tetanus shot: last tetanus booster within 10 years    Hesham Meier CMA on 3/11/2022 at 2:17 PM

## 2022-03-17 NOTE — PROGRESS NOTES
{PROVIDER CHARTING PREFERENCE:471904}    Kristi Garcia is a 89 year old who presents for the following health issues {ACCOMPANIED BY STATEMENT (Optional):601461}    History of Present Illness       Reason for visit:  General check up    He eats 0-1 servings of fruits and vegetables daily.He consumes 0 sweetened beverage(s) daily.He exercises with enough effort to increase his heart rate 9 or less minutes per day.  He exercises with enough effort to increase his heart rate 3 or less days per week.   He is taking medications regularly.       {SUPERLIST (Optional):801083}  {additonal problems for provider to add (Optional):732795}    Review of Systems   {ROS COMP (Optional):972632}      Objective    /75   Pulse 53   Temp 97.3  F (36.3  C)   SpO2 97%   There is no height or weight on file to calculate BMI.  Physical Exam   {Exam List (Optional):023239}    {Diagnostic Test Results (Optional):686056}    {AMBULATORY ATTESTATION (Optional):234344}

## 2022-03-17 NOTE — PROGRESS NOTES
Assessment & Plan     Essential hypertension, benign  Ok control given his overall health      Atrial Fib  discharge digoxin. Rate low 50s. Simplify regimen    Dementia without behavioral disturbance, unspecified dementia type (H)  - Home Care Referral  - Primary Care - Care Coordination Referral; Future    Recurrent falls  - Home Care Referral  - Primary Care - Care Coordination Referral; Future    Type 2 diabetes mellitus with diabetic polyneuropathy, without long-term current use of insulin (H)  Diet controlled  - Adult Eye Referral; Future      Review of the result(s) of each unique test - daughter  Assessment requiring an independent historian(s) - family - daughter  Prescription drug management             No follow-ups on file.    Gabriel Carroll MD  [facility]    Kristi Garcia is a 89 year old who presents for the following health issues     HPI           Review of Systems         Objective    /76   Pulse 53   Temp 97.3  F (36.3  C)   SpO2 97%   There is no height or weight on file to calculate BMI.  Physical Exam   GENERAL: no distress, frail and elderly  RESP: lungs clear to auscultation - no rales, rhonchi or wheezes  CV: irregularly irregular rhythm  NEURO: in wheelchair..   PSYCH: judgement and insight impaired

## 2022-03-18 NOTE — PROGRESS NOTES
Clinic Care Coordination Contact  [facility]/Voicemail    Chart Review: PCP referral from Dr. Carroll on 2/17/22. Referrals placed for adult eye, home care referral, and community paramedic placed 2/17/22. COVID booster schedule for 4/7/22.    Reason for Referral: Complex Medical Concerns/Education   Complex Medical Concerns: Chronic Diagnosis - Use Comments   Clinical Staff have discussed the Care Coordination Referral with the patient and/or caregiver: Yes   Additional Information: contact daughter. pt with mod dementia. needs 24/7 supervision at home. she works, bro at home most of day.  dont feel they feel enough $$ to pay for more care , either at home or elsewhere        Clinical Data: Care Coordinator Outreach  Outreach attempted x 1.  Spoke wit pt's daughter, Armida Corey, on the phone this afternoon. No CTC noted in EPIC. Armida states she would like to talk with a family member before talking with CHW. Armida asks if she could contact CHW on either 3/22/22 or 3/23/22 next week. Armida took down CHW's contact information.   Plan: Care Coordinator will try to reach patient again in 2-4 business days, per pt's daughter's, Armida's, request.    Vonda Milton  Community Health Worker  [facility]  [phone]

## 2022-03-23 NOTE — LETTER
[facility]ORDIN[facility]  [address]  [address]  [facility] [place] [address]      March 23, 2022      Jose Gomez  [address] [facility] [address]      Dear Jose,    I am a clinic community health worker who works with Gabriel Carroll MD at [facility]. I have been trying to reach you recently to introduce Clinic Care Coordination and to see if there was anything I could assist you with.  Below is a description of clinic care coordination and how I can further assist you.      The clinic care coordination team is made up of a registered nurse,  and community health worker who understand the health care system. The goal of clinic care coordination is to help you manage your health and improve access to the health care system in the most efficient manner. The team can assist you in meeting your health care goals by providing education, coordinating services, strengthening the communication among your providers and supporting you with any resource needs.    Please feel free to contact me at [phone] with any questions or concerns. We are focused on providing you with the highest-quality healthcare experience possible and that all starts with you.     Sincerely,     NASRA Zaidi  Clinic Care Coordination  [facility]: [place], [place], [facility], and [facility]  Phone: [phone]

## 2022-03-23 NOTE — PROGRESS NOTES
Clinic Care Coordination Contact  [facility]/Voicemail    Referral Source: PCP  Clinical Data: Care Coordinator Outreach    Reason for Referral:    Complex Medical Concerns/Education     Chronic Diagnosis - Use Comments     contact daughter. pt with mod dementia needs 24/7 supervision at home.  she works, bro at home most of day dont feel they feel enough $$ to pay  for more care , either at home  or elsewhere     Outreach attempted x 2.  Could not leave a message on patient's daughter's voicemail with call back information.  Voicemail not available.    Plan: Care Coordinator will send care coordination introduction letter with care coordinator contact information and explanation of care coordination services via mail.   Care Coordinator will do no further outreaches at this time.    NASRA Zaidi  Clinic Care Coordination  [facility]: [place], [place], [place], and [facility]  Phone: [phone]

## 2022-05-18 PROBLEM — I50.9 ACUTE CONGESTIVE HEART FAILURE, UNSPECIFIED HEART FAILURE TYPE (H): Status: ACTIVE | Noted: 2022-01-01

## 2022-05-18 NOTE — ED TRIAGE NOTES
Pt has bilateral leg swelling and oozing since last Saturday.  UC sent here.  Lives with daughter, increased falls recently.  Dementia.

## 2022-05-18 NOTE — ED PROVIDER NOTES
u  History     Chief Complaint:  Leg Swelling       HPI   Jose Gomez is a 89 year old male who presents with bilateral leg swelling and fall.  Daughter is noted since last Friday he has had increasing swelling of his lower extremities and developed open wounds on the tops of both feet.  He has not been complaining of pain, but daughter did find him on the floor this morning.  She did not witness his fall.  She is unsure if he hit his head.  He has not been complaining of a headache.  He is not had any fevers at home.  He went to urgent care and was directed here to the emergency department for further evaluation.  He is not taking any diuretics.  I have been trying to use compression stockings at home but they were unsuccessful because he keeps taking them off.  They are worried about possible infection of his feet.  They do not think that he is safe at home currently due to his recurrent falls.    ROS:  Review of Systems   Unable to perform ROS: Dementia       Allergies:  No Known Drug Allergies     Medications:    Acetaminophen (TYLENOL PO)  Apoaequorin (PREVAGEN PO)  aspirin (ASA) 81 MG EC tablet  lisinopril (ZESTRIL) 10 MG tablet  metoprolol succinate ER (TOPROL-XL) 25 MG 24 hr tablet  omeprazole (PRILOSEC) 20 MG DR capsule  simvastatin (ZOCOR) 20 MG tablet  vitamin B-12 500 MCG PO tablet        Past Medical History:    Past Medical History:   Diagnosis Date     CKD (chronic kidney disease) stage 3, GFR 30-59 ml/min (H)      Esophageal reflux      Essential hypertension, benign      Hypertensive emergency 4/26/2018     Hyperthyroidism      Mixed hyperlipidemia      Paroxysmal atrial fibrillation (H) 05/28/2018     Pernicious anemia 3/19/2008     Type 2 diabetes, HbA1c goal < 7% (H)      Unspecified internal derangement of knee        Past Surgical History:    Past Surgical History:   Procedure Laterality Date     COLONOSCOPY       NO HISTORY OF SURGERY       PHACOEMULSIFICATION CLEAR CORNEA WITH STANDARD  "INTRAOCULAR LENS IMPLANT  9/23/2013    Procedure: PHACOEMULSIFICATION CLEAR CORNEA WITH STANDARD INTRAOCULAR LENS IMPLANT;  RIGHT PHACOEMULSIFICATION CLEAR CORNEA WITH STANDARD INTRAOCULAR LENS IMPLANT ;  Surgeon: Hieu Jaimes MD;  Location: [facility]     PHACOEMULSIFICATION CLEAR CORNEA WITH STANDARD INTRAOCULAR LENS IMPLANT  10/14/2013    Procedure: PHACOEMULSIFICATION CLEAR CORNEA WITH STANDARD INTRAOCULAR LENS IMPLANT;  LEFT PHACOEMULSIFICATION CLEAR CORNEA WITH STANDARD INTRAOCULAR LENS IMPLANT ;  Surgeon: Hieu Jaimes MD;  Location: [facility]        Family History:    family history includes Cancer in his sister; Cerebrovascular Disease in his brother, brother, brother, and sister; Family History Negative in his mother; Heart Disease in his father.    Social History:   reports that he quit smoking about 38 years ago. His smoking use included cigars. He has never used smokeless tobacco. He reports current alcohol use. He reports that he does not use drugs.  PCP: Gabriel Carroll     Physical Exam     Patient Vitals for the past 24 hrs:   BP Temp Temp src Pulse Resp SpO2 Height Weight   05/18/22 1405 (!) 165/75 98.8  F (37.1  C) Temporal 98 16 97 % 1.753 m (5' 9\") 63.5 kg (140 lb)        Physical Exam  Eyes:  The pupils are equal and round    Conjunctivae and sclerae are normal  ENT:    The nose is normal    Pinnae are normal  Neck:  Normal range of motion    There is no rigidity noted  CV:  Regular rate and rhythm     Pitting edema in bilateral lower extremities  Resp:  Lungs are clear    Non-labored    No rales    No wheezing   GI:  Abdomen is soft, there is no rigidity    No distension    No rebound tenderness   MS:  Normal muscular tone    No asymmetric leg swelling  Skin:  Open wound on dorsum of feet, left greater than right  Neuro:   Awake, alert.      Speech is normal and fluent.    Face is symmetric.     Moves all extremities      Emergency Department Course   ECG:  ECG results from " 09/26/21   EKG 12-lead, tracing only     Value    Systolic Blood Pressure     Diastolic Blood Pressure     Ventricular Rate 72    Atrial Rate 75    AL Interval     QRS Duration 86        QTc 446    P Axis     R AXIS 49    T Axis -25    Interpretation ECG      Atrial fibrillation  Abnormal QRS-T angle, consider primary T wave abnormality  Abnormal ECG  When compared with ECG of 13-OCT-2020 19:53,  T wave inversion more evident in Inferior leads  Confirmed by GENERATED REPORT, COMPUTER ([id number]),  MICHAEL IVEY ([id number]) on 9/27/2021 8:09:40 AM         Imaging:  CT Head w/o Contrast   Final Result   IMPRESSION:   1.  No CT evidence for acute intracranial process.   2.  Brain atrophy and presumed chronic microvascular ischemic changes as above.      US Lower Extremity Venous Duplex Bilateral   Final Result   IMPRESSION:   1.  No deep venous thrombosis in the bilateral lower extremities.   2.  Mild subcutaneous edema.      XR Chest 2 Views   Final Result   IMPRESSION: Cardiomediastinal silhouette is enlarged. Pulmonary vascular congestion with moderate pulmonary edema. Possible small bilateral pleural effusions. No pneumothorax. Multiple healed left rib fractures and chronic compression deformities of the    thoracolumbar spine. No acute bony abnormality.         Report per radiology    Laboratory:  Labs Ordered and Resulted from Time of ED Arrival to Time of ED Departure   COMPREHENSIVE METABOLIC PANEL - Abnormal       Result Value    Sodium 135      Potassium 5.0      Chloride 107      Carbon Dioxide (CO2) 19 (*)     Anion Gap 9      Urea Nitrogen 45 (*)     Creatinine 2.00 (*)     Calcium 8.9      Glucose 133 (*)     Alkaline Phosphatase 79      AST 25      ALT 29      Protein Total 6.6 (*)     Albumin 3.4      Bilirubin Total 1.2      GFR Estimate 30 (*)    NT PROBNP INPATIENT - Abnormal    N terminal Pro BNP Inpatient 9,802 (*)    CBC WITH PLATELETS AND DIFFERENTIAL - Abnormal    WBC Count 4.4      RBC Count  4.00 (*)     Hemoglobin 12.9 (*)     Hematocrit 39.8 (*)           MCH 32.3      MCHC 32.4      RDW 14.0      Platelet Count 121 (*)     % Neutrophils 64      % Lymphocytes 18      % Monocytes 11      % Eosinophils 6      % Basophils 1      % Immature Granulocytes 0      NRBCs per 100 WBC 0      Absolute Neutrophils 2.8      Absolute Lymphocytes 0.8      Absolute Monocytes 0.5      Absolute Eosinophils 0.3      Absolute Basophils 0.0      Absolute Immature Granulocytes 0.0      Absolute NRBCs 0.0     TROPONIN I - Normal    Troponin I High Sensitivity 33     CRP INFLAMMATION - Normal    CRP Inflammation 5.0     COVID-19 VIRUS (CORONAVIRUS) BY PCR - Normal    SARS CoV2 PCR Negative     ROUTINE UA WITH MICROSCOPIC REFLEX TO CULTURE        Emergency Department Course:             Reviewed:  I reviewed nursing notes, vitals and past medical history      Interventions:  Medications   furosemide (LASIX) injection 40 mg (40 mg Intravenous Given 5/18/22 2014)        Disposition:  The patient was admitted to the hospital under the care of Dr. Ashton.     Impression & Plan      Medical Decision Making:  Jose Gomez is a 89 year old male who presents to the emergency department with leg swelling.  This is been building over the past several weeks but much worse since last Friday.  No fevers or chills.  He has baseline dementia and so family states history is unreliable from him.  He was found on the floor this morning after sounds like a short period of time his daughter was there with him.  Unsure if he hit his head.  CT scan performed and was negative.  Pulses in the legs were difficult to palpate, but were dopplered by nursing staff successfully.  He has a history of atrial fibrillation.  Labs show an elevated BNP.  Ultrasound showed no DVT.  Suspect given results of labs and exam that he has heart failure.  Chest x-ray did show some evidence of pulmonary edema.  Given findings recommend admission to the hospital  which family is initially agreeable to.  Patient was discussed with the hospitalist agreed accept him as an admission.    Diagnosis:    ICD-10-CM    1. Acute congestive heart failure, unspecified heart failure type (H)  I50.9         Discharge Medications:  New Prescriptions    No medications on file        5/18/2022   Christiano Miller MD [place], Aaron Joseph, MD  05/18/22 [id number]
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
## 2022-05-19 NOTE — CONSULTS
[facility]  WO Nurse Inpatient Wound Assessment     Reason for consultation: Evaluate and treat dorsal feet      Assessment  Bilateral dorsal feet: small wounds/blisters likely from edema, appear superficial.  Edema and erythema to feet, L>R, non tender.  Left 3rd toe has very small ulcer with tophaceous material.  Scattered scabbing excoriations to lower legs, very mild weeping.  Legs being elevated in bed is likely helping the edema and weeping.  Noted that Lymphedema has also been consulted.  Noted pt already on abx for UTI which per MD note should also cover for any cellulitis in LLE.     Treatment Plan  BLE:  Daily:   1.  Cleanse intact and scabbing skin on lower legs and feet with mild cleanser (ie bath wipes) then apply Sween 24 cream.    2.  Elevate legs when possible; float heels at all times.    3.  Compression per Lymphedema if ordered.     Left foot: every other day and prn:   1.  Cleanse wounds with mild soap and water or wound cleanser.  2.  Swab periwounds with skin prep, let dry  3.  Apply small amount Skintegrity wound gel to dorsal foot wound and 3rd toe wound.  4.  Cover with Mepilex to foot and 1/2 PolyMem to toe.     Right foot can stay open to air, unless draining, then cover with Mepilex or apply plain Optifoam under any compression as needed, change every 1-2 days and prn.     Orders Written  Recommended provider order: None, at this time  WOC Nurse follow-up plan: weekly  Nursing to notify the Provider(s) and re-consult the WOC Nurse if wound(s) deteriorates or new skin concern.    Patient History  According to provider note(s):    Jose Gomez is a 89 year old male who presents with lower extremity edema.  Admitted for further evaluation and treatment.    Objective Data  Containment of urine/stool: Incontinence Protocol    Active Diet Order:  Orders Placed This Encounter      Combination Diet Low Saturated Fat Na <2400mg Diet, No Caffeine Diet    Output:   I/O  last 3 completed shifts:  In: -   Out: 800 [Urine:800]    Risk Assessment:   Sensory Perception: 4-->no impairment  Moisture: 3-->occasionally moist  Activity: 2-->chairfast  Mobility: 3-->slightly limited  Nutrition: 3-->adequate  Friction and Shear: 2-->potential problem  Ariel Score: 17                          Labs: Recent Labs   Lab 05/19/22  0759 05/18/22  1823   ALBUMIN  --  3.4   HGB 14.2 12.9*   WBC 5.8 4.4   CRP  --  5.0       Physical Exam  Skin inspection: focused bilateral feet    Wound Location:  Left dorsal foot  Date of last photo:  5-19-22          Wound History: recent increase in swelling to feet/BLE and recent fall per chart review.  Pt confused and unable to give history.  Pt had removed his gown and all blankets at assessment and was picking at his oximeter probe, but was calm and pleasant.  Resource nurse present to replace IV.   Measurements (length x width x depth, in cm): approx 1 s 1.5 x 1+cm left dorsal foot, 0.5 x 1.5 x 0cm intact serous blister dorsal right foot, 0.3 x 0.3 x 0.1cm ulcer dorsal left 3rd toe  Wound Base: left dorsal foot thin dark red crusting tissue, obscuring true wound bed; left 3rd toe white tophaceous material; right dorsal foot intact serous blister and small red scabs  Tunneling: N/A  Undermining: N/A  Palpation of the wound bed: normal   Periwound skin: pitting edema and deep erythema L>R, maceration to right dorsal foot; scattered scabbing to lower legs  Color: deep pink with red speckles on left; duskier pink to right  Temperature: feet are cool  Drainage: scant serous weeping from various areas of legs and feet  Description of drainage: serous  Odor: none  Pain: minimal    Interventions  Current support surface: Standard  Atmos Air mattress  Current off-loading measures: Pillows  Visual inspection of wound(s) completed  Wound Care: done per plan of care  Supplies: reviewed, gathered and placed at the bedside  Education provided: plan of care  Discussed plan of  care with Patient and Nurse    Lenora Gaming RN, CWOCN

## 2022-05-19 NOTE — ED NOTES
"[facility]  ED Nurse Handoff Report    ED Chief complaint: Leg Swelling      ED Diagnosis:   Final diagnoses:   Acute congestive heart failure, unspecified heart failure type (H)       Code Status: Full Code    Allergies:   Allergies   Allergen Reactions     No Known Drug Allergies        Patient Story: leg swelling    Focused Assessment:  89 year old male who presents with bilateral leg swelling and fall.  Daughter is noted since last Friday he has had increasing swelling of his lower extremities and developed open wounds on the tops of both feet.  He has not been complaining of pain, but daughter did find him on the floor this morning.  She did not witness his fall.  She is unsure if he hit his head.  He has not been complaining of a headache.  He is not had any fevers at home.  He went to urgent care and was directed here to the emergency department for further evaluation.  He is not taking any diuretics.  I have been trying to use compression stockings at home but they were unsuccessful because he keeps taking them off.  They are worried about possible infection of his feet.  They do not think that he is safe at home currently due to his recurrent falls.  Pt is confused / dementia at baseline    Treatments and/or interventions provided: lasix  Patient's response to treatments and/or interventions:       To be done/followed up on inpatient unit:        Does this patient have any cognitive concerns?: Baseline dementia    Activity level - Baseline/Home:  Stand with Assist  Activity Level - Current:   Total Care    Patient's Preferred language: English   Needed?: No    Isolation: None  Infection: Not Applicable  Patient tested for COVID 19 prior to admission: YES  Bariatric?: No    Vital Signs:   Vitals:    05/18/22 1405   BP: (!) 165/75   Pulse: 98   Resp: 16   Temp: 98.8  F (37.1  C)   TempSrc: Temporal   SpO2: 97%   Weight: 63.5 kg (140 lb)   Height: 1.753 m (5' 9\")       Cardiac " Rhythm:     Was the PSS-3 completed:   Yes  What interventions are required if any?               Family Comments:     OBS brochure/video discussed/provided to patient/family: N/A              Name of person given brochure if not patient:                 Relationship to patient:       For the majority of the shift this patient's behavior was Green.   Behavioral interventions performed were   .    ED NURSE PHONE NUMBER: [id number]

## 2022-05-19 NOTE — PROGRESS NOTES
[facility]    Hospitalist Progress Note    Interval History   Patient awake and alert.  Could not obtain review of systems due to his severe dementia.  Tends to pull out his IV lines.  Does not keep his cardiac monitor on.  Had pulled his close off this morning.  Not severely agitated.    -Data reviewed today: I reviewed all new labs and imaging results over the last 24 hours. I personally reviewed the EKG tracing showing A. fib RVR.    Physical Exam   Temp: 97.5  F (36.4  C) Temp src: Axillary BP: (!) 139/99 Pulse: 99   Resp: 18 SpO2: 95 % O2 Device: None (Room air)    Vitals:    05/18/22 1405 05/18/22 2056 05/19/22 0622   Weight: 63.5 kg (140 lb) 68.4 kg (150 lb 12.7 oz) 69.9 kg (154 lb 1.6 oz)     Vital Signs with Ranges  Temp:  [97.1  F (36.2  C)-98.8  F (37.1  C)] 97.5  F (36.4  C)  Pulse:  [] 99  Resp:  [16-18] 18  BP: (139-172)/() 139/99  SpO2:  [95 %-99 %] 95 %  I/O last 3 completed shifts:  In: -   Out: 800 [Urine:800]    Physical Exam  Constitutional:       Appearance: Normal appearance.      Comments: Old and frail   HENT:      Head: Normocephalic.   Eyes:      Pupils: Pupils are equal, round, and reactive to light.   Cardiovascular:      Rate and Rhythm: Normal rate and regular rhythm.      Pulses: Normal pulses.      Heart sounds: Normal heart sounds.   Pulmonary:      Effort: Pulmonary effort is normal. No respiratory distress.      Breath sounds: Normal breath sounds.   Abdominal:      General: Abdomen is flat. Bowel sounds are normal. There is no distension.      Tenderness: There is no abdominal tenderness. There is no guarding.   Musculoskeletal:         General: Normal range of motion.      Cervical back: Normal range of motion.      Right lower leg: Edema present.      Left lower leg: Edema present.   Skin:     General: Skin is warm and dry.      Comments: Has a wound on the dorsum of the left foot with surrounding erythema and edema.   Neurological:       General: No focal deficit present.   Psychiatric:         Mood and Affect: Mood normal.           Medications       aspirin  81 mg Oral Daily     cefTRIAXone  2 g Intravenous Q24H     furosemide  40 mg Intravenous BID     metoprolol succinate ER  50 mg Oral BID     pantoprazole  40 mg Oral Daily     simvastatin  20 mg Oral At Bedtime     sodium chloride (PF)  3 mL Intracatheter Q8H       Data   Recent Labs   Lab 05/19/22  1053 05/19/22  0759 05/18/22  1823   WBC  --  5.8 4.4   HGB  --  14.2 12.9*   MCV  --  100 100   PLT  --  128* 121*   NA  --  134 135   POTASSIUM 5.3 5.6* 5.0   CHLORIDE  --  105 107   CO2  --  16* 19*   BUN  --  47* 45*   CR  --  2.15* 2.00*   ANIONGAP  --  13 9   CAMILA  --  9.4 8.9   GLC  --  188* 133*   ALBUMIN  --   --  3.4   PROTTOTAL  --   --  6.6*   BILITOTAL  --   --  1.2   ALKPHOS  --   --  79   ALT  --   --  29   AST  --   --  25       Recent Results (from the past 24 hour(s))   XR Chest 2 Views    Narrative    EXAM: XR CHEST 2 VW  LOCATION: [facility]  DATE/TIME: 5/18/2022 6:35 PM    INDICATION: edema, evaluate for pulmonary edema  COMPARISON: Chest radiograph 01/26/2020      Impression    IMPRESSION: Cardiomediastinal silhouette is enlarged. Pulmonary vascular congestion with moderate pulmonary edema. Possible small bilateral pleural effusions. No pneumothorax. Multiple healed left rib fractures and chronic compression deformities of the   thoracolumbar spine. No acute bony abnormality.   US Lower Extremity Venous Duplex Bilateral    Narrative    EXAM: US LOWER EXTREMITY VENOUS DUPLEX BILATERAL  LOCATION: [facility]  DATE/TIME: 5/18/2022 6:51 PM    INDICATION: leg swelling, pain  COMPARISON: Ultrasound 09/27/2021  TECHNIQUE: Venous Duplex ultrasound of bilateral lower extremities with and without compression, augmentation and duplex. Color flow and spectral Doppler with waveform analysis performed.    FINDINGS: Exam includes the common  femoral, femoral, popliteal veins as well as segmentally visualized deep calf veins and greater saphenous vein.     RIGHT: No deep vein thrombosis. No superficial thrombophlebitis. No popliteal cyst. Mild subcutaneous edema.    LEFT: No deep vein thrombosis. No superficial thrombophlebitis. No popliteal cyst. Mild subcutaneous edema.      Impression    IMPRESSION:  1.  No deep venous thrombosis in the bilateral lower extremities.  2.  Mild subcutaneous edema.   CT Head w/o Contrast    Narrative    EXAM: CT HEAD W/O CONTRAST  LOCATION: [facility]  DATE/TIME: 5/18/2022 7:55 PM    INDICATION: Head trauma, minor (Age >= 65y)  COMPARISON: CT 09/06/2021  TECHNIQUE: Routine CT Head without IV contrast. Multiplanar reformats. Dose reduction techniques were used.    FINDINGS:  INTRACRANIAL CONTENTS: No intracranial hemorrhage, extraaxial collection, or mass effect.  No CT evidence of acute infarct. Moderate presumed chronic small vessel ischemic changes. Moderate generalized volume loss. No hydrocephalus.     VISUALIZED ORBITS/SINUSES/MASTOIDS: No intraorbital abnormality. No paranasal sinus mucosal disease. No middle ear or mastoid effusion.    BONES/SOFT TISSUES: No acute abnormality.      Impression    IMPRESSION:  1.  No CT evidence for acute intracranial process.  2.  Brain atrophy and presumed chronic microvascular ischemic changes as above.         Assessment & Plan      Assessment & Plan     Jose Gomez is a 89 year old male who presents with lower extremity edema.  Admitted for further evaluation and treatment.     Leg swelling, suspected acute heart failure exacerbation, wounds to dorsum of feet: Patient reporting bilateral lower extremity swelling and recent fall, accompanied by daughter who notes that patient has had increasing swelling in both of the lower extremities with open wounds to the tops of both feet since last week.  Patient does not report pain, however fall was when  unwitnessed, denies headache, fever, ear pain, eye pain, sore throat, cough, chest pain, shortness of breath, abdominal pain, nausea, vomiting, dysuria, blood in the stool or urine, rash, fever, constipation.  Patient does endorse lower extremity edema and wounds to the feet.  Creatinine is 2.0 on admission with protein total of 6.6, basic natruretic peptide of greater than 9000, CRP of 5, troponin of 33.  White blood cell count is 4.4 on admission with a platelet count of 121.  Chest x-ray showing pulmonary vascular congestion, see report for further details.  -Echocardiogram.  -IV diuresis IV Lasix 40 twice daily  -Serial troponin negative for acute ischemia. (33---38---36)  -Supportive management.  -Wound care.  -Lower extremity ultrasound pending.     Suspected mechanical fall: Patient with a fall at home with family worried about home safety.  -Physical therapy consulted.  Will likely need rehab facility placement.     Cardiac, persistent atrial fibrillation, hyperlipidemia: According to discharge summary back in October 2021 patient is not on anticoagulation therapy.  -Continue prior to admission aspirin 81 mg when verified by pharmacy.  -Hold prior to admission lisinopril at this time.  -Continue prior to admission metoprolol when verified by pharmacy.  -Continue prior to admission simvastatin when verified by pharmacy.     Anemia, thrombocytopenia: Patient with a hemoglobin of 12.9 on admission.  Platelet count is 121 on admission.  -Monitor.     Chronic kidney disease, stage II-III: Patient with a creatinine of 2.0 on admission.  -Creatinine slowly creeping up to 2.2.  Did have some hyperkalemia.  Was given a dose of Kayexalate after which it improved to 5.4.  Try to keep him on cardiac monitoring but he has been pulling it off.  -Avoid nephrotoxins.     History of hyperthyroidism: This is noted in past history however patient is not on any medications, stopping methimazole several months  ago.  -Monitor.  -TSH with reflex.  TSH was elevated at 4.54 with normal T4.  Free T3 pending.     History of Proteus UTI: This is from past records.  -Monitor.  Urinalysis does appear positive for infection.  Urine culture pending.  Started on IV ceftriaxone 2 g every 24 hours.     History of GERD: Stable.  -On oral pantoprazole 40 daily.     History of right fifth metatarsal closed fracture in August 2021.  -Monitor.     History of dementia: Patient with previous history of dementia.  -Delirium precautions.  -Lives with his son and daughter.  Daughter makes his healthcare decisions.  Spoke with her extensively.    Left leg wound with surrounding cellulitis  -Woke with daughter this morning.  It appears that the patient developed this wound about a week ago and she has been doing local wound cares at home.  She works in a dementia care unit.  It has been progressively getting worse with increasing swelling and erythema.  Currently on IV ceftriaxone which should cover for both cellulitis and UTI.  Wound care consulted.     DVT Prophylaxis: Pneumatic Compression Devices  Code Status: Full Code     Disposition: Inpatient.        Shahla Summers MD, MD  [phone](p)  [phone]( c)

## 2022-05-19 NOTE — H&P
[facility]    History and Physical  Hospitalist       Date of Admission:  5/18/2022  Date of Service (when I saw the patient): 05/18/22    Assessment & Plan   Jose Gomez is a 89 year old male who presents with lower extremity edema.  Admitted for further evaluation and treatment.    Leg swelling, suspected acute heart failure exacerbation, wounds to dorsum of feet: Patient reporting bilateral lower extremity swelling and recent fall, accompanied by daughter who notes that patient has had increasing swelling in both of the lower extremities with open wounds to the tops of both feet since last week.  Patient does not report pain, however fall was when unwitnessed, denies headache, fever, ear pain, eye pain, sore throat, cough, chest pain, shortness of breath, abdominal pain, nausea, vomiting, dysuria, blood in the stool or urine, rash, fever, constipation.  Patient does endorse lower extremity edema and wounds to the feet.  Creatinine is 2.0 on admission with protein total of 6.6, basic natruretic peptide of greater than 9000, CRP of 5, troponin of 33.  White blood cell count is 4.4 on admission with a platelet count of 121.  Chest x-ray showing pulmonary vascular congestion, see report for further details.  -Echocardiogram.  -IV diuresis.  -Serial troponin.  -Supportive management.  -Wound care.  -Lower extremity ultrasound pending.    Suspected mechanical fall: Patient with a fall at home with family worried about home safety.  -Physical therapy consulted.    Cardiac, persistent atrial fibrillation, hyperlipidemia: According to discharge summary back in October 2021 patient is not on anticoagulation therapy.  -Continue prior to admission aspirin 81 mg when verified by pharmacy.  -Hold prior to admission lisinopril at this time.  -Continue prior to admission metoprolol when verified by pharmacy.  -Continue prior to admission simvastatin when verified by pharmacy.    Anemia,  thrombocytopenia: Patient with a hemoglobin of 12.9 on admission.  Platelet count is 121 on admission.  -Monitor.    Chronic kidney disease, stage II-III: Patient with a creatinine of 2.0 on admission.  -Monitor.  -Avoid nephrotoxins.    History of hyperthyroidism: This is noted in past history however patient is not on any medications, stopping methimazole several months ago.  -Monitor.  -TSH with reflex.    History of Proteus UTI: This is from past records.  -Monitor.    History of GERD: Stable.  -Continue prior to admission omeprazole when verified by pharmacy.    History of right fifth metatarsal closed fracture in August 2021.  -Monitor.    History of dementia: Patient with previous history of dementia.  -Delirium precautions.    DVT Prophylaxis: Pneumatic Compression Devices  Code Status: Full Code    Disposition: Inpatient.    Clinically Significant Risk Factors Present on Admission                # Platelet Defect: home medication list includes an antiplatelet medication         Dr. Santana Ashton D.O.  [facility] Hospitalist  Pager [phone]    Primary Care Physician   Gabriel Carroll    Chief Complaint   Lower extremity swelling    History is obtained from the patient and medical records.     History of Present Illness   Jose Gomez is a 89 year old male who presents with lower extremity edema.  Admitted for further evaluation and treatment. Patient reporting bilateral lower extremity swelling and recent fall, accompanied by daughter who notes that patient has had increasing swelling in both of the lower extremities with open wounds to the tops of both feet since last week.  Patient does not report pain, however fall was when unwitnessed, denies headache, fever, ear pain, eye pain, sore throat, cough, chest pain, shortness of breath, abdominal pain, nausea, vomiting, dysuria, blood in the stool or urine, rash, fever, constipation.  Patient does endorse lower extremity edema and wounds  to the feet.  Creatinine is 2.0 on admission with protein total of 6.6, basic natruretic peptide of greater than 9000, CRP of 5, troponin of 33.  White blood cell count is 4.4 on admission with a platelet count of 121.  Chest x-ray showing pulmonary vascular congestion, see report for further details.    Past Medical History    I have reviewed this patient's medical history and updated it with pertinent information if needed.   Past Medical History:   Diagnosis Date     CKD (chronic kidney disease) stage 3, GFR 30-59 ml/min (H)      Esophageal reflux     very mild     Essential hypertension, benign      Hypertensive emergency 4/26/2018     Hyperthyroidism      Mixed hyperlipidemia      Paroxysmal atrial fibrillation (H) 05/28/2018     Pernicious anemia 3/19/2008     Type 2 diabetes, HbA1c goal < 7% (H)      Unspecified internal derangement of knee     LEFT       Past Surgical History   I have reviewed this patient's surgical history and updated it with pertinent information if needed.  Past Surgical History:   Procedure Laterality Date     COLONOSCOPY       NO HISTORY OF SURGERY       PHACOEMULSIFICATION CLEAR CORNEA WITH STANDARD INTRAOCULAR LENS IMPLANT  9/23/2013    Procedure: PHACOEMULSIFICATION CLEAR CORNEA WITH STANDARD INTRAOCULAR LENS IMPLANT;  RIGHT PHACOEMULSIFICATION CLEAR CORNEA WITH STANDARD INTRAOCULAR LENS IMPLANT ;  Surgeon: Hieu Jaimes MD;  Location: [facility]     PHACOEMULSIFICATION CLEAR CORNEA WITH STANDARD INTRAOCULAR LENS IMPLANT  10/14/2013    Procedure: PHACOEMULSIFICATION CLEAR CORNEA WITH STANDARD INTRAOCULAR LENS IMPLANT;  LEFT PHACOEMULSIFICATION CLEAR CORNEA WITH STANDARD INTRAOCULAR LENS IMPLANT ;  Surgeon: Hieu Jaimes MD;  Location: [facility]       Prior to Admission Medications   Prior to Admission Medications   Prescriptions Last Dose Informant Patient Reported? Taking?   Acetaminophen (TYLENOL PO)  Daughter Yes No   Sig: Take 1,000 mg by mouth every 6 hours as needed     Apoaequorin (PREVAGEN PO)  Daughter Yes No   Sig: Take 1 tablet by mouth daily   aspirin (ASA) 81 MG EC tablet   No No   Sig: Take 1 tablet (81 mg) by mouth daily   lisinopril (ZESTRIL) 10 MG tablet   No No   Sig: Take 1 tablet (10 mg) by mouth every evening   metoprolol succinate ER (TOPROL-XL) 25 MG 24 hr tablet   No No   Sig: Take 2 tabs (50 mg) in AM and 1 tab (25 mg) in PM   omeprazole (PRILOSEC) 20 MG DR capsule   No No   Sig: Take 1 capsule (20 mg) by mouth daily   simvastatin (ZOCOR) 20 MG tablet   No No   Sig: Take 1 tablet (20 mg) by mouth At Bedtime   vitamin B-12 500 MCG PO tablet  Daughter Yes No   Sig: Take 1 tablet by mouth daily       Facility-Administered Medications: None     Allergies   Allergies   Allergen Reactions     No Known Drug Allergies        Social History   I have reviewed this patient's social history and updated it with pertinent information if needed. Jose REY Jason  reports that he quit smoking about 38 years ago. His smoking use included cigars. He has never used smokeless tobacco. He reports current alcohol use. He reports that he does not use drugs.    Family History   I have reviewed this patient's family history and updated it with pertinent information if needed.   Family History   Problem Relation Age of Onset     Heart Disease Father         enlarged heart  at age 66     Family History Negative Mother          at age 88     Cancer Sister          at age 69     Cerebrovascular Disease Brother          at age 81     Cerebrovascular Disease Brother          at age 78     Cerebrovascular Disease Brother          at age 88     Cerebrovascular Disease Sister         b, 1930       Review of Systems   The 10 point Review of Systems is negative other than noted in the HPI or here.     Physical Exam   Temp: 98.8  F (37.1  C) Temp src: Temporal BP: (!) 165/75 Pulse: 98   Resp: 16 SpO2: 97 % O2 Device: None (Room air)    Vital Signs with Ranges  Temp:  [96.2  F  (35.7  C)-98.8  F (37.1  C)] 98.8  F (37.1  C)  Pulse:  [] 98  Resp:  [14-16] 16  BP: (144-165)/(75-90) 165/75  SpO2:  [97 %-99 %] 97 %  140 lbs 0 oz    GENERAL: Alert and oriented. NAD. Conversational, appropriate.   HEENT: Normocephalic. EOMI. No icterus or injection. Nares normal.   LUNGS: Clear to auscultation. No dyspnea at rest.   HEART: Irregular rate. Extremities perfused.   ABDOMEN: Soft, nontender, and nondistended. Positive bowel sounds.   EXTREMITIES: LE edema noted, wounds to feet.   NEUROLOGIC: Moves extremities x4 on command. No acute focal neurologic abnormalities noted.     Data   Data reviewed today:  I personally reviewed both laboratory and imaging data.   Recent Labs   Lab 05/18/22  1823   WBC 4.4   HGB 12.9*      *      POTASSIUM 5.0   CHLORIDE 107   CO2 19*   BUN 45*   CR 2.00*   ANIONGAP 9   CAMILA 8.9   *   ALBUMIN 3.4   PROTTOTAL 6.6*   BILITOTAL 1.2   ALKPHOS 79   ALT 29   AST 25       Recent Results (from the past 24 hour(s))   XR Chest 2 Views    Narrative    EXAM: XR CHEST 2 VW  LOCATION: [facility]  DATE/TIME: 5/18/2022 6:35 PM    INDICATION: edema, evaluate for pulmonary edema  COMPARISON: Chest radiograph 01/26/2020      Impression    IMPRESSION: Cardiomediastinal silhouette is enlarged. Pulmonary vascular congestion with moderate pulmonary edema. Possible small bilateral pleural effusions. No pneumothorax. Multiple healed left rib fractures and chronic compression deformities of the   thoracolumbar spine. No acute bony abnormality.

## 2022-05-19 NOTE — PROGRESS NOTES
Date/Time:05/19 ,[id number]    Trauma/Ortho/Medical (Choose one) :Medical    Diagnosis:BLE swelling with wounds on top of feet.  Mental Status:Alert confused  Activity/dangle:A-2 walker gait belt to bedside commode.  Diet:Low NA,low Fat  Pain:Denies  Barry/Voiding:Incontinent.  Tele/Restraints/Iso:Tele,A-fib,Tachy  02/LDA:RA  D/C Date:TBD  Other Info:Pt with elevated HR in the 130's,obtained one time order for Metoprolol ER 25 mg administered at 0330.(Effective,HR down to < 120's)  UC  pending.  Pt restless at night,pulling out tele and oximeter several times ,and trying to get out of bed.

## 2022-05-19 NOTE — PROGRESS NOTES
Disoriented x4. Hypertensive and tachycardic; provider aware. On schedule metoprolol. Potassium was high this morning, and kayexalate was prescribed and administered. Potassium recheck at 1430. Tolerating regular diet. Needs to be fed. Assist of one with GB/W. Incontinent with B/B.

## 2022-05-19 NOTE — PLAN OF CARE
Shift summary [id number]:     Patient admitted from ER at 2045. Patient alert to self and time. Denies pain. Denies SOB. Hypertensive 's, tele afib CVR/RVR with rates 's, on room air. LS dim. Bilateral foot wounds, weeping. Denies numbness/tingling. Generalized weakness. Incontinent of urine, external cath in place.

## 2022-05-19 NOTE — PROGRESS NOTES
RECEIVING UNIT ED HANDOFF REVIEW    ED Nurse Handoff Report was reviewed by: Tania Daniel RN on May 18, 2022 at 8:21 PM

## 2022-05-19 NOTE — PROGRESS NOTES
05/19/22 1500   Quick Adds   Type of Visit Initial PT Evaluation   Living Environment   People in Home child(katarzyna), adult   Living Environment Comments Per ED note: pt lives with daughter (who works in a dementia memory care unit), family believes current living situation is unsafe d/t hx of dementia and recent recurrent falls.   Self-Care   Current Activity Tolerance poor   Regular Exercise No   Equipment Currently Used at Home walker, rolling   Fall history within last six months yes   Number of times patient has fallen within last six months 10   Activity/Exercise/Self-Care Comment No family present at time of eval and pt is poor historian, lives with daughter and daughter assists with most cares per chart.   General Information   Onset of Illness/Injury or Date of Surgery 05/18/22   Referring Physician Santana Ashton, DO   Patient/Family Therapy Goals Statement (PT) none stated   Pertinent History of Current Problem (include personal factors and/or comorbidities that impact the POC) 89 year old male who presents with lower extremity edema and mechanical fall at home, per family pt has been have recurrent falls. Edema suspected from acute heart failure exacerbation vs. cellulitis. Pt being treated for possible UTI, cellulitis.   Existing Precautions/Restrictions fall   Weight-Bearing Status - LLE full weight-bearing   Weight-Bearing Status - RLE full weight-bearing   Edema General Information   Onset of Edema 05/12/22   Affected Body Part(s) Right LE;Left LE   Edema Etiology Infection  (vs. cardiac)   Etiology Comments cellulitis vs. acute heart failure exacerbation   Edema Precautions Cardiac Edema/CHF   Edema Examination/Assessment   Skin Condition Pitting   Skin Condition Comments wounds address by WOC on dorsum of feet   Scar No   Stemmer Sign Negative   Skin Integrity wounds, discoloration   Pitting Assessment 3+ dorsum of feet, 1+ lower legs bilaterally   Fibrosis Assessment none noted   Cognition    Affect/Mental Status (Cognition) confused;low arousal/lethargic   Orientation Status (Cognition) oriented to;person   Follows Commands (Cognition) follows one-step commands   Behavioral Issues withdrawn   Pain Assessment   Patient Currently in Pain No   Strength (Manual Muscle Testing)   Strength (Manual Muscle Testing) Able to perform L SLR;Able to perform R SLR   Bed Mobility   Comment, (Bed Mobility) Mod A x 1 supine <> sit   Transfers   Comment, (Transfers) Mod A x 1 sit <> stand with FWW   Gait/Stairs (Locomotion)   [place] Level (Gait) moderate assist (50% patient effort)   Assistive Device (Gait) walker, front-wheeled   Distance in Feet (Required for LE Total Joints) 3'   Comment, (Gait/Stairs) unsteady and leaning backwards, poor activity tolerance and HR slightly increased at rest  bpm during session.   Balance   Balance Comments poor static and dynamic standing balance, unsteady gait even with FWW   Clinical Impression   Criteria for Skilled Therapeutic Intervention Yes, treatment indicated   PT Diagnosis (PT) impaired mobility   Edema: Patient Presentation Edema;Wounds/Ulcers   Influenced by the following impairments impaired mobility   Functional limitations due to impairments pain, weakness, impaired balance   Clinical Presentation (PT Evaluation Complexity) Stable/Uncomplicated   Clinical Presentation Rationale clinical judgement   Clinical Decision Making (Complexity) low complexity   Planned Therapy Interventions (PT) balance training;bed mobility training;gait training;neuromuscular re-education;patient/family education;strengthening;transfer training   Edema: Planned Interventions Gradient compression bandaging;Edema exercises;Precautions to prevent infection/exacerbation;Education;Manual therapy   Anticipated Equipment Needs at Discharge (PT) walker, rolling;wheelchair   Risk & Benefits of therapy have been explained evaluation/treatment results reviewed;care plan/treatment goals  reviewed;risks/benefits reviewed;participants voiced agreement with care plan;participants included;patient;current/potential barriers reviewed   PT Discharge Planning   PT Discharge Recommendation (DC Rec) Transitional Care Facility   PT Rationale for DC Rec Pt currently needing A x 2 for safe mobility due to poor activity tolerance and fall risk, recommend TCU for continued PT. If pt's family can accommodate A x 2 and wish to take patient home, recommend 24/7 assist, HHPT.   PT Brief overview of current status A x 2 with FWW, Mod A bed <> chair with walker, impaired cognition   Physical Therapy Goals   PT Frequency Daily   PT Predicted Duration/Target Date for Goal Attainment 05/26/22   PT Goals Bed Mobility;Transfers;Gait;Edema   PT: Bed Mobility Supervision/stand-by assist;Supine to/from sit   PT: Transfers Supervision/stand-by assist;Sit to/from stand;Bed to/from chair;Assistive device   PT: Gait Supervision/stand-by assist;Assistive device;50 feet   PT: Edema education to increase ability to manage edema after discharge from the hospital Caregiver;Verbalize;Demonstrate;[place];wear schedule;limb positioning;signs/symptoms of intolerance;garment/bandage care;discharge recommendations   PT: Management of edema bandages Caregiver;Verbalize;Demonstrate;[place];quick wrap   PT: Functional edema exercise program to reduce limb volume, increase activity tolerance and improve independence with ADL Caregiver;Demonstrates;[place];walking program

## 2022-05-19 NOTE — PHARMACY-ADMISSION MEDICATION HISTORY
Pharmacy Medication History  Admission medication history interview status for the 5/18/2022  admission is complete. See EPIC admission navigator for prior to admission medications     Location of Interview: Phone  Medication history sources: Patient's family/friend (daughter Nickie) and [employer]    Significant changes made to the medication list:  none    In the past week, patient estimated taking medication this percent of the time: 50-90% due to falls    Additional medication history information:   -Digoxin 62.5mcg daily was stopped 3/17/22    Medication reconciliation completed by provider prior to medication history? No    Time spent in this activity: 10 min    Prior to Admission medications    Medication Sig Last Dose Taking? Auth Provider   acetaminophen (TYLENOL) 500 MG tablet Take 500 mg by mouth every 6 hours as needed for mild pain More than a month Yes Unknown, Entered By History   Apoaequorin (PREVAGEN PO) Take 1 tablet by mouth daily Past Week Yes Reported, Patient   aspirin (ASA) 81 MG EC tablet Take 1 tablet (81 mg) by mouth daily Past Week Yes Gabriel Carroll MD   lisinopril (ZESTRIL) 10 MG tablet Take 1 tablet (10 mg) by mouth every evening Past Week Yes Gabriel Carroll MD   metoprolol succinate ER (TOPROL-XL) 25 MG 24 hr tablet Take 2 tabs (50 mg) in AM and 1 tab (25 mg) in PM Past Week Yes Gabriel Carroll MD   omeprazole (PRILOSEC) 20 MG DR capsule Take 1 capsule (20 mg) by mouth daily Past Week Yes Gabriel Carroll MD   simvastatin (ZOCOR) 20 MG tablet Take 1 tablet (20 mg) by mouth At Bedtime Past Week Yes Gabriel Carroll MD   vitamin B-12 500 MCG PO tablet Take 1 tablet by mouth daily  Past Week Yes Reported, Patient       The information provided in this note is only as accurate as the sources available at the time of update(s)

## 2022-05-20 NOTE — PROGRESS NOTES
[facility]    Hospitalist Progress Note    Interval History   Patient awake and alert.  More stable this morning.  Has baseline dementia.  Not oriented to place or time but could tell me his full name.  Afebrile.  Denies any shortness of breath.    -Data reviewed today: I reviewed all new labs and imaging results over the last 24 hours. I personally reviewed the EKG tracing showing A. fib RVR.    Physical Exam   Temp: 97.4  F (36.3  C) Temp src: Axillary BP: 136/80 Pulse: 81   Resp: 18 SpO2: 97 % O2 Device: None (Room air)    Vitals:    05/18/22 2056 05/19/22 0622 05/20/22 0517   Weight: 68.4 kg (150 lb 12.7 oz) 69.9 kg (154 lb 1.6 oz) 66.2 kg (145 lb 15.1 oz)     Vital Signs with Ranges  Temp:  [97.4  F (36.3  C)-97.6  F (36.4  C)] 97.4  F (36.3  C)  Pulse:  [] 81  Resp:  [18] 18  BP: (124-150)/() 136/80  SpO2:  [96 %-97 %] 97 %  No intake/output data recorded.    Physical Exam  Constitutional:       Appearance: Normal appearance.      Comments: Old and frail   HENT:      Head: Normocephalic.   Eyes:      Pupils: Pupils are equal, round, and reactive to light.   Cardiovascular:      Rate and Rhythm: Normal rate and regular rhythm.      Pulses: Normal pulses.      Heart sounds: Normal heart sounds.   Pulmonary:      Effort: Pulmonary effort is normal. No respiratory distress.      Breath sounds: Normal breath sounds.   Abdominal:      General: Abdomen is flat. Bowel sounds are normal. There is no distension.      Tenderness: There is no abdominal tenderness. There is no guarding.   Musculoskeletal:         General: Normal range of motion.      Cervical back: Normal range of motion.      Right lower leg: Edema present.      Left lower leg: Edema present.   Skin:     General: Skin is warm and dry.      Comments: Has a wound on the dorsum of the left foot with surrounding erythema and edema.   Neurological:      General: No focal deficit present.   Psychiatric:         Mood and  Affect: Mood normal.           Medications       aspirin  81 mg Oral Daily     cefTRIAXone  2 g Intravenous Q24H     metoprolol succinate ER  50 mg Oral BID     pantoprazole  40 mg Oral Daily     simvastatin  20 mg Oral At Bedtime     sodium chloride (PF)  3 mL Intracatheter Q8H       Data   Recent Labs   Lab 05/20/22  1035 05/19/22  1553 05/19/22  1053 05/19/22  0759 05/18/22  1823   WBC  --   --   --  5.8 4.4   HGB  --   --   --  14.2 12.9*   MCV  --   --   --  100 100   PLT  --   --   --  128* 121*     --   --  134 135   POTASSIUM 4.7 5.1 5.3 5.6* 5.0   CHLORIDE 104  --   --  105 107   CO2 20  --   --  16* 19*   BUN 57*  --   --  47* 45*   CR 2.98*  --   --  2.15* 2.00*   ANIONGAP 13  --   --  13 9   CAMILA 9.3  --   --  9.4 8.9   *  --   --  188* 133*   ALBUMIN  --   --   --   --  3.4   PROTTOTAL  --   --   --   --  6.6*   BILITOTAL  --   --   --   --  1.2   ALKPHOS  --   --   --   --  79   ALT  --   --   --   --  29   AST  --   --   --   --  25       No results found for this or any previous visit (from the past 24 hour(s)).      Assessment & Plan      Assessment & Plan     Jose Gomez is a 89 year old male who presents with lower extremity edema.  Admitted for further evaluation and treatment.     Leg swelling, suspected acute heart failure exacerbation, wounds to dorsum of feet: Patient reporting bilateral lower extremity swelling and recent fall, accompanied by daughter who notes that patient has had increasing swelling in both of the lower extremities with open wounds to the tops of both feet since last week.  Patient does not report pain, however fall was when unwitnessed, denies headache, fever, ear pain, eye pain, sore throat, cough, chest pain, shortness of breath, abdominal pain, nausea, vomiting, dysuria, blood in the stool or urine, rash, fever, constipation.  Patient does endorse lower extremity edema and wounds to the feet.  Creatinine is 2.0 on admission with protein total of 6.6,  basic natruretic peptide of greater than 9000, CRP of 5, troponin of 33.  White blood cell count is 4.4 on admission with a platelet count of 121.  Chest x-ray showing pulmonary vascular congestion, see report for further details.  -Echocardiogram showed an ejection fraction at 55 to 60% with no regional wall motion abnormalities.  Patient has chronic moderate tricuspid regurgitation which is the same as before.  -Was on IV diuresis IV Lasix 40 twice daily.  We will discontinue this since his renal function is worsening.  -Serial troponin negative for acute ischemia. (33---38---36)  -Supportive management.  -Wound care.  -Lower extremity ultrasound negative for DVT.  -Continue on IV cefazolin for cellulitis.     Suspected mechanical fall: Patient with a fall at home with family worried about home safety.  -Physical therapy consulted.  Recommending nursing home placement  -Social work consulted.     Cardiac, persistent atrial fibrillation, hyperlipidemia: According to discharge summary back in October 2021 patient is not on anticoagulation therapy.  -Continue aspirin 81 daily.  -Hold prior to admission lisinopril at this time.  -On metoprolol 50 twice daily  -On simvastatin 20 mg daily     Anemia, thrombocytopenia: Patient with a hemoglobin of 12.9 on admission.  Platelet count is 121 on admission.  -Monitor.     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  -Has worsened to 2.8 with elevated BUN in the setting of aggressive diuresis.  Will hold IV diuretics.  We will hold off on any IV fluids at this time and recheck this evening.  Will consider adding fluids if creatinine continues to worsen.     History of hyperthyroidism: This is noted in past history however patient is not on any medications, stopping methimazole several months ago.  -Monitor.  -TSH with reflex.  TSH was elevated at 4.54 with normal T4.  And T3    History of Proteus UTI: This is from  past records.  -Monitor.  Urinalysis does appear positive for infection.  Urine culture pending.  Started on IV ceftriaxone 2 g every 24 hours.     History of GERD: Stable.  -On oral pantoprazole 40 daily.     History of right fifth metatarsal closed fracture in August 2021.  -Monitor.     History of dementia: Patient with previous history of dementia.  -Delirium precautions.  -Lives with his son and daughter.  Daughter makes his healthcare decisions.  Spoke with her extensively.    Left leg wound with surrounding cellulitis  -Spoke with daughter yesterday.  It appears that the patient developed this wound about a week ago and she has been doing local wound cares at home.  She works in a dementia care unit.  It has been progressively getting worse with increasing swelling and erythema.  Currently on IV ceftriaxone which should cover for both cellulitis and UTI.  Wound care consulted.    Hyperkalemia-resolved with Kayexalate and Lasix.     DVT Prophylaxis: Pneumatic Compression Devices  Code Status: Full Code     Disposition: Inpatient.        Shahla Summers MD, MD  [phone](p)  [phone]( c)

## 2022-05-20 NOTE — PROGRESS NOTES
Confused x4. Restless. VSS on RA.Incontinent of urine and bowel. Dressing LLE intact. BLE elevated. Denied pain. PIV SL. Will co ntinue to monitor.

## 2022-05-20 NOTE — PLAN OF CARE
Alert to self, confused but follows commands.  Up with assist of 1-2 and walker, voids in bathroom but also incontinent.  Lymphedema wraps to bilateral LE put on today at 1500, plan for RN to take off tomorrow at 1345.  Denies pain.  Oxygen 97% on room air, lung sounds clear.  Denies SOB.  Poor appetite for lunch and dinner.

## 2022-05-20 NOTE — PROGRESS NOTES
[id number]    Pt continues to be confused per baseline. Often restless, but pleasant and redirectable. Continuously tries to remove tele cords, IV's, and lymph wraps. Denies pain. Ambulating assist of 1-2. Walked to bathroom twice this evening, otherwise incontinent at times as well. VSS on room air.

## 2022-05-21 NOTE — PROGRESS NOTES
[facility]    Hospitalist Progress Note    Assessment & Plan   Jose Gomez is a 89 year old male who presents with lower extremity edema.  Admitted for further evaluation and treatment.     Leg swelling, suspected acute heart failure exacerbation, wounds to dorsum of feet:  Left leg wound with surrounding cellulitis   Patient reporting bilateral lower extremity swelling and recent fall, accompanied by daughter who notes that patient has had increasing swelling in both of the lower extremities with open wounds to the tops of both feet since last week.  Patient does not report pain, however fall was when unwitnessed, Patient does endorse lower extremity edema and wounds to the feet.  Creatinine is 2.0 on admission with protein total of 6.6, basic natruretic peptide of greater than 9000, CRP of 5, troponin of 33.  White blood cell count is 4.4 on admission with a platelet count of 121.  Chest x-ray showing pulmonary vascular congestion, see report for further details.  -Echocardiogram showed an ejection fraction at 55 to 60% with no regional wall motion abnormalities.  Patient has chronic moderate tricuspid regurgitation which is the same as before.  -Patient did receive IV Lasix twice daily following admission, his renal function started deteriorating since 5/20 and Lasix is on hold since then.  -Serial troponin negative, current plan include supportive care, wound care following.  -Lower extremity ultrasound negative for DVT.  -Continue on IV cefazolin for cellulitis.     Suspected mechanical fall: Patient with a fall at home with family worried about home safety.  -Physical therapy consulted.  Recommending nursing home placement.  I tried to discuss this with patient's daughter on 5/21, will try to reach her at a later time.  -Social work consulted.     Cardiac, persistent atrial fibrillation, hyperlipidemia: According to discharge summary back in October 2021 patient is not on  anticoagulation therapy.  -Continue aspirin 81 daily.  -Hold prior to admission lisinopril at this time.  -On metoprolol 50 twice daily  -On simvastatin 20 mg daily     Anemia, thrombocytopenia: Patient with a hemoglobin of 12.9 on admission.  Platelet count is 121 on admission.  -Monitor.     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  -Creatinine started worsening on 5/20, diuretics have been on hold, hold lisinopril, creatinine did go up further today, will try a 500 cc normal saline bolus over 4 hours.  Repeat BMP in AM.     History of hyperthyroidism: This is noted in past history however patient is not on any medications, stopping methimazole several months ago.  -Monitor.  -TSH with reflex.  TSH was elevated at 4.54 with normal T4.  And T3     History of Proteus UTI: This is from past records.  -UA was abnormal, urine culture indicates urogenital roger, no pathogen noted, will continue Rocephin for cellulitis.     History of GERD: Stable.  -On oral pantoprazole 40 daily.     History of right fifth metatarsal closed fracture in August 2021.  -Monitor.     History of dementia: Patient with previous history of dementia.  -Delirium precautions.  -Lives with his son and daughter.  Daughter makes his healthcare decisions.  Spoke with her extensively.     Hyperkalemia  resolved with Kayexalate and Lasix.    DVT Prophylaxis: sequential compression device   Code Status: Full Code     Disposition: Expected discharge possibly in 1 to 2 days depending on renal function, patient might need TCU on discharge.  Social following.    Ariella Meléndez MD  Text Page   (7am to 6pm)    Interval History   Patient is resting, he wanted to go home, he was asking about the discharge plan is,.  The patient is confused, he is also trying to pull the telemetry out.    -Data reviewed today: I reviewed all new labs and imaging results over the last 24 hours.  Physical Exam    Temp: 97.4  F (36.3  C) Temp src: Axillary BP: 139/77 Pulse: 74   Resp: 16 SpO2: 97 % O2 Device: None (Room air)    Vitals:    05/19/22 0622 05/20/22 0517 05/21/22 0624   Weight: 69.9 kg (154 lb 1.6 oz) 66.2 kg (145 lb 15.1 oz) 68.1 kg (150 lb 2.1 oz)     Vital Signs with Ranges  Temp:  [97  F (36.1  C)-97.8  F (36.6  C)] 97.4  F (36.3  C)  Pulse:  [74-98] 74  Resp:  [16-18] 16  BP: (119-140)/() 139/77  SpO2:  [97 %-98 %] 97 %  I/O last 3 completed shifts:  In: 730 [P.O.:730]  Out: -     Constitutional: Awake, alert, cooperative, no apparent distress  Respiratory: Clear to auscultation bilaterally, no crackles or wheezing  Cardiovascular: Regular rate and rhythm, normal S1 and S2, and no murmur noted  GI: Normal bowel sounds, soft, non-distended, non-tender  Skin/Integumen: Lateral lower extremity edema present, there is also ulcer on the dorsum of left foot with erythema and edema  Neuro : moving all 4 extremities, no focal deficit noted     Medications       sodium chloride 0.9%  500 mL Intravenous Once     aspirin  81 mg Oral Daily     cefTRIAXone  2 g Intravenous Q24H     metoprolol succinate ER  50 mg Oral BID     pantoprazole  40 mg Oral Daily     simvastatin  20 mg Oral At Bedtime     sodium chloride (PF)  3 mL Intracatheter Q8H       Data   Recent Labs   Lab 05/21/22  1058 05/20/22  1035 05/19/22  1553 05/19/22  1053 05/19/22  0759 05/18/22  1823   WBC  --   --   --   --  5.8 4.4   HGB  --   --   --   --  14.2 12.9*   MCV  --   --   --   --  100 100   PLT  --   --   --   --  128* 121*    137  --   --  134 135   POTASSIUM 4.2 4.7 5.1   < > 5.6* 5.0   CHLORIDE 102 104  --   --  105 107   CO2 19* 20  --   --  16* 19*   BUN 66* 57*  --   --  47* 45*   CR 3.89* 2.98*  --   --  2.15* 2.00*   ANIONGAP 14 13  --   --  13 9   CAMILA 8.8 9.3  --   --  9.4 8.9   * 139*  --   --  188* 133*   ALBUMIN  --   --   --   --   --  3.4   PROTTOTAL  --   --   --   --   --  6.6*   BILITOTAL  --   --   --   --    --  1.2   ALKPHOS  --   --   --   --   --  79   ALT  --   --   --   --   --  29   AST  --   --   --   --   --  25    < > = values in this interval not displayed.     Recent Labs   Lab 05/21/22  1058 05/20/22  1035 05/19/22  0759 05/18/22  1823   * 139* 188* 133*       Imaging:   No results found for this or any previous visit (from the past 24 hour(s)).

## 2022-05-21 NOTE — PLAN OF CARE
Goal Outcome Evaluation:        Pt. Alert, confused; oriented to self only. Follows commands and redirectable.  On room air.  Up with assist of 1-2, gait belt, walker.  Incontinent of bowel & bladder; occasionally can call appropriately.  Ambulated to bathroom once this shift.  L IV saline locked.  Unable to tolerate tele; pulls leads off.  Tolerated BLE dressings overnight.  Denies pain when asked.

## 2022-05-21 NOTE — PLAN OF CARE
Alert to self.  Up with 1-2 and walker to bathroom.  Voiding in bathroom but also incontinent.  Buttocks red/blanchable, mepilex covering and turned and repositioned frequently.  Denies pain.  Denies SOB.  Lymphedema wraps to bilateral LE.  Creat elevated, 500mL fluid bolus given over 4 hours.

## 2022-05-22 NOTE — PROGRESS NOTES
[facility]    Hospitalist Progress Note    Assessment & Plan   Jose Gomez is a 89 year old male who presents with lower extremity edema.  Admitted for further evaluation and treatment.     Leg swelling, suspected acute heart failure exacerbation, wounds to dorsum of feet:  Left leg wound with surrounding cellulitis   Patient reporting bilateral lower extremity swelling and recent fall, accompanied by daughter who notes that patient has had increasing swelling in both of the lower extremities with open wounds to the tops of both feet since last week.  Patient does not report pain, however fall was when unwitnessed, Patient does endorse lower extremity edema and wounds to the feet.  Creatinine is 2.0 on admission with protein total of 6.6, basic natruretic peptide of greater than 9000, CRP of 5, troponin of 33.  White blood cell count is 4.4 on admission with a platelet count of 121.  Chest x-ray showing pulmonary vascular congestion, see report for further details.  -Echocardiogram showed an ejection fraction at 55 to 60% with no regional wall motion abnormalities.  Patient has chronic moderate tricuspid regurgitation which is the same as before.  -Patient did receive IV Lasix twice daily following admission, his renal function started deteriorating since 5/20 and Lasix is on hold since then.  Creatinine is getting worse, currently on 4.02, will request nephrology input.  -Serial troponin negative, current plan include supportive care, wound care following.  -Lower extremity ultrasound negative for DVT.  -Continue on IV cefazolin for cellulitis.     Suspected mechanical fall: Patient with a fall at home with family worried about home safety.  -Physical therapy consulted.  Recommending nursing home placement.  I tried to discuss this with patient's daughter on 5/21, contacted again on 5/22 at home and the mobile number.  Was unable to make contact.  -Social work consulted.     Cardiac,  persistent atrial fibrillation, hyperlipidemia: According to discharge summary back in October 2021 patient is not on anticoagulation therapy.  -Continue aspirin 81 daily.  -Hold prior to admission lisinopril at this time.  -On metoprolol 50 twice daily  -On simvastatin 20 mg daily     Anemia, thrombocytopenia: Patient with a hemoglobin of 12.9 on admission.  Platelet count is 121 on admission.  -Monitor.     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  -Creatinine started worsening on 5/20, diuretics have been on hold, hold lisinopril, creatinine did go up further today, will try a 500 cc normal saline bolus over 4 hours.   -Creatinine did go up on 5/22,will requested nephrology input.  -UA and urine sodium ordered.     History of hyperthyroidism: This is noted in past history however patient is not on any medications, stopping methimazole several months ago.  -Monitor.  -TSH with reflex.  TSH was elevated at 4.54 with normal T4.  And T3     History of Proteus UTI: This is from past records.  -UA was abnormal, urine culture indicates urogenital roger, no pathogen noted, will continue Rocephin for cellulitis.     History of GERD: Stable.  -On oral pantoprazole 40 daily.     History of right fifth metatarsal closed fracture in August 2021.  -Monitor.     History of dementia: Patient with previous history of dementia.  -Delirium precautions.  -Lives with his son and daughter.  Daughter makes his healthcare decisions.       Hyperkalemia  resolved with Kayexalate and Lasix.    DVT Prophylaxis: sequential compression device   Code Status: Full Code     Disposition: Expected discharge possibly in 1 to 2 days depending on renal function, patient might need TCU on discharge.  Social work following, if I could barely communicate with patient's daughter on 5/21 since she was anymore, contacted on 5/22 both home and cell phone number and was unable to reach  her.    Ariella Meléndez MD  Text Page   (7am to 6pm)    Interval History   Patient is resting comfortably, denies any new concerns, patient only confused    -Data reviewed today: I reviewed all new labs and imaging results over the last 24 hours.  Physical Exam   Temp: 97.4  F (36.3  C) Temp src: Axillary BP: (!) 165/103 Pulse: 80   Resp: 16 SpO2: 97 % O2 Device: None (Room air)    Vitals:    05/20/22 0517 05/21/22 0624 05/22/22 0614   Weight: 66.2 kg (145 lb 15.1 oz) 68.1 kg (150 lb 2.1 oz) 68.3 kg (150 lb 9.2 oz)     Vital Signs with Ranges  Temp:  [97  F (36.1  C)-97.4  F (36.3  C)] 97.4  F (36.3  C)  Pulse:  [67-91] 80  Resp:  [16-18] 16  BP: (139-165)/() 165/103  SpO2:  [97 %-99 %] 97 %  I/O last 3 completed shifts:  In: 1260 [P.O.:1260]  Out: -     Constitutional: Awake, alert, cooperative, no apparent distress  Respiratory: Clear to auscultation bilaterally, no crackles or wheezing  Cardiovascular: Regular rate and rhythm, normal S1 and S2, and no murmur noted  GI: Normal bowel sounds, soft, non-distended, non-tender  Skin/Integumen: Lateral lower extremity edema present, there is also ulcer on the dorsum of left foot with erythema and edema  Neuro : moving all 4 extremities, no focal deficit noted     Medications       aspirin  81 mg Oral Daily     cefTRIAXone  2 g Intravenous Q24H     metoprolol succinate ER  50 mg Oral BID     pantoprazole  40 mg Oral Daily     simvastatin  20 mg Oral At Bedtime     sodium chloride (PF)  3 mL Intracatheter Q8H       Data   Recent Labs   Lab 05/22/22  1044 05/21/22  1058 05/20/22  1035 05/19/22  1053 05/19/22  0759 05/18/22  1823   WBC  --   --   --   --  5.8 4.4   HGB  --   --   --   --  14.2 12.9*   MCV  --   --   --   --  100 100   PLT  --   --   --   --  128* 121*   * 135 137  --  134 135   POTASSIUM 4.1 4.2 4.7   < > 5.6* 5.0   CHLORIDE 102 102 104  --  105 107   CO2 15* 19* 20  --  16* 19*   BUN 76* 66* 57*  --  47* 45*   CR 4.06* 3.89* 2.98*  --  2.15*  2.00*   ANIONGAP 14 14 13  --  13 9   CAMILA 8.8 8.8 9.3  --  9.4 8.9   * 138* 139*  --  188* 133*   ALBUMIN  --   --   --   --   --  3.4   PROTTOTAL  --   --   --   --   --  6.6*   BILITOTAL  --   --   --   --   --  1.2   ALKPHOS  --   --   --   --   --  79   ALT  --   --   --   --   --  29   AST  --   --   --   --   --  25    < > = values in this interval not displayed.     Recent Labs   Lab 05/22/22  1044 05/21/22  1058 05/20/22  1035 05/19/22  0759 05/18/22  1823   * 138* 139* 188* 133*       Imaging:   No results found for this or any previous visit (from the past 24 hour(s)).

## 2022-05-22 NOTE — PLAN OF CARE
Goal Outcome Evaluation:        Pt. Alert, confused; oriented to self, place only.  On room air.  Up with assist of 1-2, gait belt, walker.  Tolerating PO intake.  Frequency, incontinence of urine.  Q2 repositioning; sacral mepilex in place.  L foot dressing CDI.  Pt. Tolerated edema wraps until 0300, then began to pull at ends and remove dressings; writer completely removed. TCU placement pending.

## 2022-05-22 NOTE — PROGRESS NOTES
"[facility]    Fall Note  5/22/2022   Time Called: 447pm    RRT called for: 447pm    Assessment & Plan   IMPRESSION & PLAN:    Jose Gomez is a 89 year old male w/ PMH of Chronic A. fib without anticoagulation, hyperlipidemia, anemia and thrombocytopenia, CKD stage II-III, hypothyroidism, GERD who was admitted on 5/18/2022 for lower extremity edema suspected heart failure although LVEF is preserved on echo from 5/19/2022 he does have mild bilateral atrial enlargement.  Also had a fall prior to admission was on antimicrobials for lower extremity cellulitis.    On the afternoon of 5/22/2022 patient was sitting in the chair, had been placed there by the nursing assistant.  The afternoon shift RN was preparing to go and do initial shift assessment when he heard calls of  \"help\" coming from patient's room.  On arrival RN found patient on the ground awake in front of the chair lying on his left side.  Chair alarm was blinking but not making any alarming noise. I was asked to evaluate the patient post fall.    On my arrival patient is awake and talking lying on his left side on the floor.  He has dementia and denies a head strike or loss of consciousness.  RN staff denies hearing any thud around the time of his fall.  Per RN he is oriented to self only.  Pt thinks we are in a \"utility building\" and states the year is \"2002\".  He denies any pain.  Patient was placed in the ceiling lift and placed on a stretcher.  He was lifting up his head and neck prior to being returned to bed, has full neck range of motion, denies pain on palpation of neck or back.  Also denies pain on palpation of hips, full range of motion of all large joint articulations    VS: /83, SPO2 98%, heart rate 85    Impression  Unwitnessed fall in a demented patient with thrombocytopenia on aspirin    INTERVENTIONS:  -Stat CT head without contrast, need to rule out intracranial hemorrhage as patient is an unreliable " historian  -Glucose 132s  -Vital signs as above  -Stat CBC post CT  -neuro checks q4h while awake, not to be done between 10pm and 6am so as to allow pt to sleep    Working diagnosis: Unwitnessed fall out of chair in a demented patient on aspirin    At the end of the evaluation patient had been returned to a stretcher, he is en route to CT    disposition continue current level of care pending CT imaging    Discussed with and defer further cares to hospitalist attending physician Dr. Ariella Meléndez     557pm:  I have reviewed the CT results, see below for details, in brief, no acute pathology.  I have spoken w/ pt's dght Armida by phone, informed her of pt's fall and CT results.  CBC is pending.    Code Status: Full Code    Allergies   Allergies   Allergen Reactions     No Known Drug Allergies        Physical Exam   Vital Signs with Ranges:  Temp:  [97  F (36.1  C)-97.6  F (36.4  C)] 97.6  F (36.4  C)  Pulse:  [70-91] 85  Resp:  [16-18] 18  BP: (114-165)/() 114/68  SpO2:  [97 %] 97 %  I/O last 3 completed shifts:  In: 1200 [P.O.:1200]  Out: -     Constitutional: vs as per EMR  General:  adult pt lying on the floor without acute distress  Neuro: +follows commands wiggle toes and show 2 fingers bilat, face symmetric, tongue midline, speech fluent  Eyes for  Head, ENT & mouth: NC/AT, prominent occiput, mouth moist oral mucosa  Neck: supple, very prominent right carotid artery that is pulsatile also prominent external jugular vein bilaterally  CV S1S2  resp: Eating in full sentences SPO2 98%  gi: Per  Ext: no edema, cool knees  Skin: no rashes on exposed skin, left scapular prominence erythematous  Lymph: Defer  Musculoskeletal no bony joint deformities, no pain on palpation of hips shoulders or knees, full range of motion of all large joint articulations    Data     Troponin:    Recent Labs   Lab Test 09/26/21  1452 10/13/20  1951   TROPI  --  <0.015   TROPONIN 0.023  --        IMAGING: (X-ray/CT/MRI)   Recent  Results (from the past 24 hour(s))   CT Head w/o Contrast    Narrative    EXAM: CT HEAD W/O CONTRAST  LOCATION: [facility]  DATE/TIME: 5/22/2022 5:27 PM    INDICATION: Cerebral hemorrhage suspected  COMPARISON: Head CT 05/18/2022  TECHNIQUE: Routine CT Head without IV contrast. Multiplanar reformats. Dose reduction techniques were used.    FINDINGS:  INTRACRANIAL CONTENTS: No intracranial hemorrhage, extraaxial collection, or mass effect.  No CT evidence of acute infarct. Moderate presumed chronic small vessel ischemic changes. Moderate generalized volume loss. No hydrocephalus.     VISUALIZED ORBITS/SINUSES/MASTOIDS: No intraorbital abnormality. No paranasal sinus mucosal disease. No middle ear or mastoid effusion.    BONES/SOFT TISSUES: No acute abnormality.      Impression    IMPRESSION:  1.  No CT evidence for acute intracranial process.  2.  Brain atrophy and presumed chronic microvascular ischemic changes as above.  3.  No change.       CBC with Diff:  Recent Labs   Lab Test 05/19/22  0759 05/27/18  0450 05/26/18  1010   WBC 5.8   < >  --    HGB 14.2   < >  --       < >  --    *   < >  --    INR  --   --  1.07    < > = values in this interval not displayed.        Comprehensive Metabolic Panel:  Recent Labs   Lab 05/22/22  1655 05/22/22  1044 05/19/22  0759 05/18/22  1823   NA  --  131*   < > 135   POTASSIUM  --  4.1   < > 5.0   CHLORIDE  --  102   < > 107   CO2  --  15*   < > 19*   ANIONGAP  --  14   < > 9   * 177*   < > 133*   BUN  --  76*   < > 45*   CR  --  4.06*   < > 2.00*   GFRESTIMATED  --  13*   < > 30*   CAMILA  --  8.8   < > 8.9   PROTTOTAL  --   --   --  6.6*   ALBUMIN  --   --   --  3.4   BILITOTAL  --   --   --  1.2   ALKPHOS  --   --   --  79   AST  --   --   --  25   ALT  --   --   --  29    < > = values in this interval not displayed.       UA:  Recent Labs   Lab 05/18/22  2208   COLOR Light Yellow   APPEARANCE Clear   URINEGLC Negative   URINEBILI  Negative   URINEKETONE Negative   SG 1.011   UBLD Large*   URINEPH 5.0   PROTEIN Negative   NITRITE Negative   LEUKEST Moderate*   RBCU 85*   WBCU 22*       Time Spent on this Encounter   I spent 25 minutes  (452-510pm and and 550-557pm)  on the unit/floor managing the care of Jose Gomez and calling his family with update re: events of afternoon and discussing CT results.     Celia Lomas, [facility]  Hospitalist [place] [facility]  [phone]

## 2022-05-22 NOTE — PLAN OF CARE
Alert to self, pleasantly confused.  Up in chairs for meals. Up with strong assist of 1 and walker to bathroom.  Voiding in bathroom also incontinent.  Turned and repositioned frequently, mepilex covering buttocks that is red/blanchable.  Denies pain.  Denies SOB.  Creatine elevated MD aware, nephrology consult placed and need UA to be collected still.

## 2022-05-23 NOTE — PROGRESS NOTES
Care Management Follow Up    Length of Stay (days): 5    Expected Discharge Date: 05/23/2022     Concerns to be Addressed:       Patient plan of care discussed at interdisciplinary rounds: Yes    Anticipated Discharge Disposition: Skilled Nursing Facility     Anticipated Discharge Services: Transportation Services  Anticipated Discharge DME: None    Patient/family educated on Medicare website which has current facility and service quality ratings: yes  Education Provided on the Discharge Plan:    Patient/Family in Agreement with the Plan: yes    Referrals Placed by CM/SW:    Private pay costs discussed: Not applicable    Additional Information:  Per chart review, no choices were provided over the weekend. Writer sent referrals to [facility], [facility], [facility], [facility] and [facility] as were mentioned to daughter on Friday. Writer will follow up with Armida regarding referrals.     Writer spoke to patient's daughter Armida who confirmed that a TCU closest to [place] would be good. She is aware that not all will be able to meet patient's needs. She suggested [facility] as he has been there and it was a good experience. Referral sent and message for review.     Addendum 1540: Patient accepted to both [facility] and [facility]. Patient declined to [facility] due to no memory care. Writer attempted to reach Armida but her phone never went to [facility].     Writer will continue to follow and call Armida to choose placement.    JAMES Yates

## 2022-05-23 NOTE — PLAN OF CARE
Goal Outcome Evaluation:        Pt. Confused, oriented to self only; baseline dementia.  Post-fall neuro check completed per protocol with no changes from baseline.  On room air.  Tolerating PO intake, needs assistance.  IV saline locked.  Incontinent of urine, unable to obtain urine specimen this shift. Up with assist of 2, gait belt, walker; unsteady gait. Q2 repositioning provided; sacral mepilex in place.  Trace edema in BLE; edema wraps in place; pt. Requires frequent redirection to keep wraps on. **0500 removed as pt. Could no longer tolerate** L foot & toe dressings CDI. Denies pain at this time.

## 2022-05-23 NOTE — CONSULTS
Case d/w Dr. Duval.    Notes, labs, vitals reviewed.    AF-VSS  Cr stable ~4  UO x 8 (total not recorded)    Full consult tmrw.

## 2022-05-23 NOTE — PLAN OF CARE
Goal Outcome Evaluation:  Pt. Confused base line dementia, denied any pain, incontinent of urine, up with 1-2 assist, discharge plan: TCU pending.

## 2022-05-23 NOTE — PROGRESS NOTES
[facility]    Hospitalist Progress Note      Assessment & Plan   Jose Gomez is a 89 year old male who was admitted on 5/18/2022 with lower extremity edema and left leg cellulitis. Subsequently had worsening renal function with diuresis.    Leg swelling, suspected acute heart failure exacerbation, wounds to dorsum of feet:  Left leg wound with surrounding cellulitis   Patient reporting bilateral lower extremity swelling and recent fall, accompanied by daughter who notes that patient has had increasing swelling in both of the lower extremities with open wounds to the tops of both feet since last week.  Patient does not report pain, however fall was when unwitnessed, Patient does endorse lower extremity edema and wounds to the feet.  Creatinine is 2.0 on admission with protein total of 6.6, basic natruretic peptide of greater than 9000, CRP of 5. Serial troponin negative. White blood cell count is 4.4 on admission with a platelet count of 121.  Chest x-ray showing pulmonary vascular congestion.  * Echo: EF 55 to 60% with no regional WMA.  Patient has chronic moderate tricuspid regurgitation which is the same as before.  -Patient did receive IV Lasix twice daily following admission, his renal function started deteriorating since 5/20 and Lasix is on hold since then.  Creatinine was getting worse, up to 4 on 5/22  -Lower extremity ultrasound negative for DVT.  -Continue on IV rocephin for cellulitis day 5/7     Mechanical fall  Patient with a fall at home with family worried about home safety. Also had fall out of chair on 5/22, CT head without trauma or intracranial process  -Physical therapy consulted, recommends TCU  -Social work consulted, has sent referrals     Persistent atrial fibrillation  hyperlipidemia  PTA not on AC  -Continue aspirin 81 daily.  -Hold prior to admission lisinopril at this time.  -On metoprolol 50 twice daily  -On simvastatin 20 mg daily     Anemia,  thrombocytopenia  Patient with a hemoglobin of 12.9 on admission.  Platelet count is 121 on admission.  -Monitor.     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  - nephrology consulted  -UA and urine sodium ordered 5/22, not yet collected     History of hyperthyroidism  Stopped methimazole several months ago. TSH was elevated at 4.54 with normal T4.  And T3     History of Proteus UTI: This is from past records.  -UA was abnormal, urine culture indicates urogenital roger, no pathogen noted, will continue Rocephin for cellulitis.     History of GERD: Stable.  -On oral pantoprazole 40 daily.     History of right fifth metatarsal closed fracture in August 2021.  -Monitor.     History of dementia: Patient with previous history of dementia.  -Delirium precautions.  -Lives with his son and daughter.  Daughter makes his healthcare decisions.       Hyperkalemia  resolved with Kayexalate and Lasix.    Clinically Significant Risk Factors Present on Admission                      DVT Prophylaxis: Pneumatic Compression Devices  Code Status: Full Code  Expected Discharge: 05/24/2022     Anticipated discharge location:  Awaiting care coordination huddle  Delays:     Specialist Consult (enter specialist & decision needed in comments)           Frannie Duval, DO  Hospitalist Service  [facility]  Securely message with the Vocera Web Console (learn more here)  Text Page (7am - 6pm) via [employer] Paging/Directory      Interval History   Patient seen and examined. He is eating his breakfast this morning. He answers a few questions. He has not complaints otherwise and seems a little hard of hearing. RRT yesterday evening after slip out of chair. Discussed with RN    -Data reviewed today: I reviewed all new labs and imaging results over the last 24 hours. I personally reviewed no images or EKG's today.    Physical Exam    Temp: 96.8  F (36  C) Temp src: Axillary BP: (!) 147/88 Pulse: 61   Resp: 18 SpO2: 97 % O2 Device: None (Room air)    Vitals:    05/21/22 0624 05/22/22 0614 05/23/22 0457   Weight: 68.1 kg (150 lb 2.1 oz) 68.3 kg (150 lb 9.2 oz) 70.2 kg (154 lb 12.2 oz)     Vital Signs with Ranges  Temp:  [96.8  F (36  C)-97.7  F (36.5  C)] 96.8  F (36  C)  Pulse:  [61-85] 61  Resp:  [18-20] 18  BP: (114-154)/(68-98) 147/88  SpO2:  [93 %-97 %] 97 %  I/O last 3 completed shifts:  In: 540 [P.O.:540]  Out: -     Constitutional: Awake, alert, cooperative, no apparent distress  Respiratory: Clear to auscultation bilaterally, no crackles or wheezing  Cardiovascular: Regular rate and rhythm, normal S1 and S2, and no murmur noted  GI: Normal bowel sounds, soft, non-distended, non-tender  Skin/Integumen: No rashes, no cyanosis,pedal edema, prominent in toes of left foot. Ulcer dorsum of left foot.  Other:     Medications       aspirin  81 mg Oral Daily     cefTRIAXone  2 g Intravenous Q24H     metoprolol succinate ER  50 mg Oral BID     pantoprazole  40 mg Oral Daily     simvastatin  20 mg Oral At Bedtime     sodium chloride (PF)  3 mL Intracatheter Q8H       Data   Recent Labs   Lab 05/23/22  0713 05/22/22  1757 05/22/22  1655 05/22/22  1044 05/21/22  1058 05/19/22  1053 05/19/22  0759 05/18/22  1823   WBC  --  9.5  --   --   --   --  5.8 4.4   HGB  --  14.1  --   --   --   --  14.2 12.9*   MCV  --  100  --   --   --   --  100 100   PLT  --  126*  --   --   --   --  128* 121*     --   --  131* 135   < > 134 135   POTASSIUM 4.0  --   --  4.1 4.2   < > 5.6* 5.0   CHLORIDE 102  --   --  102 102   < > 105 107   CO2 19*  --   --  15* 19*   < > 16* 19*   BUN 87*  --   --  76* 66*   < > 47* 45*   CR 3.94*  --   --  4.06* 3.89*   < > 2.15* 2.00*   ANIONGAP 13  --   --  14 14   < > 13 9   CAMILA 8.2*  --   --  8.8 8.8   < > 9.4 8.9   *  --  132* 177* 138*   < > 188* 133*   ALBUMIN  --   --   --   --   --   --   --  3.4   PROTTOTAL  --    --   --   --   --   --   --  6.6*   BILITOTAL  --   --   --   --   --   --   --  1.2   ALKPHOS  --   --   --   --   --   --   --  79   ALT  --   --   --   --   --   --   --  29   AST  --   --   --   --   --   --   --  25    < > = values in this interval not displayed.       Recent Results (from the past 24 hour(s))   CT Head w/o Contrast    Narrative    EXAM: CT HEAD W/O CONTRAST  LOCATION: [facility]  DATE/TIME: 5/22/2022 5:27 PM    INDICATION: Cerebral hemorrhage suspected  COMPARISON: Head CT 05/18/2022  TECHNIQUE: Routine CT Head without IV contrast. Multiplanar reformats. Dose reduction techniques were used.    FINDINGS:  INTRACRANIAL CONTENTS: No intracranial hemorrhage, extraaxial collection, or mass effect.  No CT evidence of acute infarct. Moderate presumed chronic small vessel ischemic changes. Moderate generalized volume loss. No hydrocephalus.     VISUALIZED ORBITS/SINUSES/MASTOIDS: No intraorbital abnormality. No paranasal sinus mucosal disease. No middle ear or mastoid effusion.    BONES/SOFT TISSUES: No acute abnormality.      Impression    IMPRESSION:  1.  No CT evidence for acute intracranial process.  2.  Brain atrophy and presumed chronic microvascular ischemic changes as above.  3.  No change.

## 2022-05-24 NOTE — PROVIDER NOTIFICATION
Noted some bloody urine in govea possibly from govea insertion (had govea placed earlier). Urine color already improving; Dr. Duval notified via web-page. MD also notified of UA result.

## 2022-05-24 NOTE — PROGRESS NOTES
Care Management Follow Up    Length of Stay (days): 6    Expected Discharge Date: 05/24/2022     Concerns to be Addressed:       Patient plan of care discussed at interdisciplinary rounds: Yes    Anticipated Discharge Disposition: Skilled Nursing Facility     Anticipated Discharge Services: Transportation Services  Anticipated Discharge DME: None    Patient/family educated on Medicare website which has current facility and service quality ratings: yes  Education Provided on the Discharge Plan:    Patient/Family in Agreement with the Plan: yes    Referrals Placed by CM/SW:    Private pay costs discussed: Not applicable    Additional Information:  Call placed to daughter Armida to discuss TCU choices. Phone continued to ring with no voicemail picking up. Writer sent a secure text to Armida requesting a call back. PAS completed.    PAS-RR    D: Per DHS regulation, SW completed and submitted PAS-RR to [place] [employer] Direct Connect via the Senior LinkAge Line.  PAS-RR confirmation # is : [id number]    I: SW spoke with daughter and they are aware a PAS-RR has been submitted.  SW reviewed with daughter that they may be contacted for a follow up appointment within 10 days of hospital discharge if their SNF stay is < 30 days.  Contact information for [facility] LinkAge Line was also provided.    A: daughter verbalized understanding.    P: Further questions may be directed to [facility] LinkAge Line at #[phone], option #4 for PAS-RR staff.      Addendum: second call placed 1122, no voicemail or answer. Writer attempted to call home number listed but the call would not go through.    [facility] also called and can offer a bed tomorrow.    SW will attempt to reach her at a later time.      JAMES Yates

## 2022-05-24 NOTE — PLAN OF CARE
Goal Outcome Evaluation:    Plan of Care Reviewed With: patient   Pt A&O to self and place. Confused. Reorientation provided. Calm and cooperative. Neuros stable/no new changes. VSS except for elevated BP. Denies pain. Creatinine continues to rise; had renal U/S done today.  Govea cath placed for urinary retention; urine sample sent. Initially, urine color dark claribel with sediment. Later on, urine color became dark red possibly d/t  govea insertion; however, color appears to be already improving. BLE skin/ LLE wound care provided; BLE edema-wear removed and re-applied after skin care. Up with assist of 2/GB/W. Tolerated PO but poor to fair appetite. Turned/repositioned. Mepilex in place on coccyx. Plan for discharge to TCU possibly this Thursday. Will continue to monitor.

## 2022-05-24 NOTE — PLAN OF CARE
Goal Outcome Evaluation:    Plan of Care Reviewed With: patient     Overall Patient Progress: no change    A&O x1 to Self.   CMS Intact.   VSS on RA.   Up with Ax2 GB and walker.   Voiding Incontinent in brief.   Denies Pain.   Continue to monitor.

## 2022-05-24 NOTE — PROGRESS NOTES
Patient is Alert to self with confusion,  Up with A2 GB and walker or Armida steady, incontinent of B&B, denies pain or shortness of breath, edema wear in place, cr elevated to 4.38 higher than yesterday, Nephrology following, pending to TCU pending clinical stability, continue to monitor.

## 2022-05-24 NOTE — PROGRESS NOTES
[facility]    Hospitalist Progress Note      Assessment & Plan   Jose Gomez is a 89 year old male who was admitted on 5/18/2022 with lower extremity edema and left leg cellulitis. Subsequently had worsening renal function with diuresis.    Leg swelling, suspected acute heart failure exacerbation, wounds to dorsum of feet:  Left leg wound with surrounding cellulitis   Patient reporting bilateral lower extremity swelling and recent fall, accompanied by daughter who notes that patient has had increasing swelling in both of the lower extremities with open wounds to the tops of both feet since last week.  Patient does not report pain, however fall was when unwitnessed, Patient does endorse lower extremity edema and wounds to the feet.  Creatinine is 2.0 on admission with protein total of 6.6, basic natruretic peptide of greater than 9000, CRP of 5. Serial troponin negative. White blood cell count is 4.4 on admission with a platelet count of 121.  Chest x-ray showing pulmonary vascular congestion.  * Echo: EF 55 to 60% with no regional WMA.  Patient has chronic moderate tricuspid regurgitation which is the same as before.  -Patient did receive IV Lasix twice daily following admission, his renal function started deteriorating since 5/20 and Lasix is on hold since then.  -Lower extremity ultrasound negative for DVT.  -Continue on IV rocephin for cellulitis day 6/7, if ongoing redness could consider extending course to 10 days with keflex.     Mechanical fall  Patient with a fall at home with family worried about home safety. Also had fall out of chair on 5/22, CT head without trauma or intracranial process  -Physical therapy consulted, recommends TCU  -SW following for TCU placement     Persistent atrial fibrillation  hyperlipidemia  PTA not on AC  -Continue aspirin 81 daily.  -Hold prior to admission lisinopril at this time.  -On metoprolol 50 twice daily  -On simvastatin 20 mg  daily     Anemia, thrombocytopenia  Patient with a hemoglobin of 12.9 on admission.  Platelet count is 121 on admission.  -Monitor.     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 Noted to have urinary retention on 5/24, govea placed with almost 1L out. UA small leuk esterase, 7 WBCs.  * Renal US: mild bilateral hydronephrosis, bladder distended  - nephrology consulted  - continue govea catheter, may consider discharging with govea and outpatient follow up with urology  - monitor BMP     History of hyperthyroidism  Stopped methimazole several months ago. TSH was elevated at 4.54 with normal T4.  And T3     History of Proteus UTI: This is from past records.  -UA was abnormal, urine culture indicates urogenital roger, no pathogen noted, will continue Rocephin for cellulitis.     History of GERD: Stable.  -On oral pantoprazole 40 daily.     History of right fifth metatarsal closed fracture in August 2021.  -Monitor.     History of dementia: Patient with previous history of dementia.  -Delirium precautions.  -Lives with his son and daughter.  Daughter makes his healthcare decisions.       Hyperkalemia  resolved with Kayexalate and Lasix.    Clinically Significant Risk Factors Present on Admission                      DVT Prophylaxis: Pneumatic Compression Devices  Code Status: Full Code  Expected Discharge: 05/26/2022     Anticipated discharge location:  Awaiting care coordination huddle  Delays:     Lab Result Pending (enter specific test & time in comments)     Await renal function improving      Frannie Duval DO  Hospitalist Service  [facility]  Securely message with the Vocera Web Console (learn more here)  Text Page (7am - 6pm) via [facility] Paging/Directory      Interval History   Patient seen and examined. He is a little more  tired today. He had no pain when nurse bladder scanned or with placement of govea-but had almost 1L out of urine immediately with placement. No chest pain, shortness of breath currently. Feels okay. Discussed with SW  Spent 35 minutes with >50% time spent evaluating patient, discussing care plan with RN, updating daughter Armida via phone x20 minutes.    -Data reviewed today: I reviewed all new labs and imaging results over the last 24 hours. I personally reviewed no images or EKG's today.    Physical Exam   Temp: (!) 96.2  F (35.7  C) Temp src: Axillary BP: (!) 151/66 Pulse: 67   Resp: 16 SpO2: 98 % O2 Device: None (Room air)    Vitals:    05/21/22 0624 05/22/22 0614 05/23/22 0457   Weight: 68.1 kg (150 lb 2.1 oz) 68.3 kg (150 lb 9.2 oz) 70.2 kg (154 lb 12.2 oz)     Vital Signs with Ranges  Temp:  [96.2  F (35.7  C)-97.4  F (36.3  C)] 96.2  F (35.7  C)  Pulse:  [65-96] 67  Resp:  [16-18] 16  BP: (127-152)/(66-98) 151/66  SpO2:  [96 %-98 %] 98 %  I/O last 3 completed shifts:  In: 240 [P.O.:240]  Out: 1025 [Urine:1025]    Constitutional: drowsy, cooperative, no apparent distress  Respiratory: Clear to auscultation bilaterally, no crackles or wheezing  Cardiovascular: Regular rate and rhythm, normal S1 and S2, and no murmur noted  GI: Normal bowel sounds, soft, non-distended, non-tender  Skin/Integumen: No rashes, no cyanosis, pedal edema, prominent in toes of left foot. Wounds dressed  Other:     Medications       aspirin  81 mg Oral Daily     cefTRIAXone  2 g Intravenous Q24H     metoprolol succinate ER  50 mg Oral BID     pantoprazole  40 mg Oral Daily     simvastatin  20 mg Oral At Bedtime     sodium chloride (PF)  3 mL Intracatheter Q8H       Data   Recent Labs   Lab 05/24/22  0929 05/24/22  0853 05/23/22  0713 05/22/22  1757 05/22/22  1655 05/22/22  1044 05/19/22  1053 05/19/22  3289 05/18/22  1823   WBC 6.9  --   --  9.5  --   --   --  5.8 4.4   HGB 13.0*  --   --  14.1  --   --   --  14.2 12.9*   MCV 99  --   --   100  --   --   --  100 100   *  --   --  126*  --   --   --  128* 121*     --  134  --   --  131*   < > 134 135   POTASSIUM 3.7  --  4.0  --   --  4.1   < > 5.6* 5.0   CHLORIDE 103  --  102  --   --  102   < > 105 107   CO2 19*  --  19*  --   --  15*   < > 16* 19*   BUN 91*  --  87*  --   --  76*   < > 47* 45*   CR 4.38*  --  3.94*  --   --  4.06*   < > 2.15* 2.00*   ANIONGAP 11  --  13  --   --  14   < > 13 9   CAMILA 8.4*  --  8.2*  --   --  8.8   < > 9.4 8.9   * 153* 116*  --    < > 177*   < > 188* 133*   ALBUMIN  --   --   --   --   --   --   --   --  3.4   PROTTOTAL  --   --   --   --   --   --   --   --  6.6*   BILITOTAL  --   --   --   --   --   --   --   --  1.2   ALKPHOS  --   --   --   --   --   --   --   --  79   ALT  --   --   --   --   --   --   --   --  29   AST  --   --   --   --   --   --   --   --  25    < > = values in this interval not displayed.       Recent Results (from the past 24 hour(s))   US Renal Complete    Narrative    ULTRASOUND RENAL COMPLETE  5/24/2022 10:53 AM    CLINICAL HISTORY: Acute kidney injury. Evaluate for obstruction.    TECHNIQUE: Routine Bilateral Renal and Bladder Ultrasound.    COMPARISON: None.    FINDINGS:  RIGHT KIDNEY: 10 x 5 x 5 cm. Mild right hydronephrosis.     LEFT KIDNEY: 9 x 4 x 4 cm. Mild left hydronephrosis.     BLADDER: The bladder is distended with a trabeculated wall. The  prostate gland protrudes into the base of the bladder.      Impression    IMPRESSION:  Mild bilateral hydronephrosis may be on the basis of  bladder outlet obstruction.    VANESSA WALDRON MD         SYSTEM ID:  [id number]

## 2022-05-24 NOTE — PROGRESS NOTES
Confused, alert to self only. Up with assist of 2 / faye steady. Incontinent of B/B. Had large BM x1. Denied pain. Dressing CDI. Edema wraps on BLE intact. PIV sl. Nephro consult today.Pending TCU placement.

## 2022-05-24 NOTE — PLAN OF CARE
Date/Time: 5/23/22 1397-0386    Trauma/Ortho/Medical (Choose one):     Diagnosis: CHF, Cellulitis, ARF  POD#: NA  Mental Status: A&Ox1  Activity/dangle: Assist of two with GB and walker  Diet: Cardiac  Pain: Denies  Barry/Voiding: Incont  Tele/Restraints/Iso:   02/LDA:  D/C Date: Pending TCU, pt accepted at [facility], nephrology consult tomorrow  Other Info: Confused, can be impulsive, foot care every other day completed today

## 2022-05-24 NOTE — CONSULTS
[facility]    Nephrology Consultation     [facility]    Date of Admission:  5/18/2022    Assessment & Plan     <Acute kidney injury on chronic kidney disease stage III.  ~The ultrasound, with a large bladder and some hydronephrosis is consistent with obstruction being at least a component of the acute kidney injury.  He will likely improve with just the relief of the obstruction, however I would not rule out him still having some issues with fluid retention/CHF.   -Would keep off diuretics at this point.  -Continue to trend creatinine/ins and outs.  -At risk for postobstructive overdiuresis    Jayden Jordan MD  [facility], Nephrology  Cell:[phone]  Pager:[phone]    Securely message with the Vocera Web Console (learn more here)  Text page via [employer] Paging/Directory         /\/\/\/\/\/\/\/\/\/\/\/\/\/\/\/\/\/\/\/\/\/\/\/\/\/\    Reason for Consult     I was asked to see the patient for acute kidney injury.    Primary Care Physician     Gabriel Carroll    Chief Complaint     Lower extremity edema        History of Present Illness     Jose Gomez is a 89 year old male retired  who presented with lower extremity edema on 5/18.    The patient was brought in on 518 with concerns about lower extremity edema and a recent fall.  The patient injured his feet at the time of the fall.    The patient does have dementia and when I speak with him today he says he feels all right.  He says his appetite is good and he in fact is eating all of his breakfast.    History is limited due to the dementia.    On presentation to the hospital the patient's creatinine was 2.0 BNP was greater than 9000.  There was pulmonary vascular congestion on chest x-ray.    After admission to the hospital patient received intravenous diuretics.  Despite that diuretics the weight has gone up.  Urine output has been marginal, but collection of the urine has been  complicated by the patient is inability to cooperate with collection of the urine he does have incontinence.    Mr. Arvizu and does have chronic kidney disease which at baseline has been stage III historically.  He does have hypertension, diabetes mellitus.   -Diabetic diet  -Continue home diabetes regimen  -Start sliding-scale insulin, type II and atrial fibrillation.  He has not received nephrotoxins although is on an ACE inhibitor at baseline.    History is obtained from the patient and chart review.      Past Medical History   I have reviewed this patient's medical history and updated it with pertinent information if needed.   Past Medical History:   Diagnosis Date     CKD (chronic kidney disease) stage 3, GFR 30-59 ml/min (H)      Esophageal reflux     very mild     Essential hypertension, benign      Hypertensive emergency 4/26/2018     Hyperthyroidism      Mixed hyperlipidemia      Paroxysmal atrial fibrillation (H) 05/28/2018     Pernicious anemia 3/19/2008     Type 2 diabetes, HbA1c goal < 7% (H)      Unspecified internal derangement of knee     LEFT       Past Surgical History   I have reviewed this patient's surgical history and updated it with pertinent information if needed.  Past Surgical History:   Procedure Laterality Date     COLONOSCOPY       NO HISTORY OF SURGERY       PHACOEMULSIFICATION CLEAR CORNEA WITH STANDARD INTRAOCULAR LENS IMPLANT  9/23/2013    Procedure: PHACOEMULSIFICATION CLEAR CORNEA WITH STANDARD INTRAOCULAR LENS IMPLANT;  RIGHT PHACOEMULSIFICATION CLEAR CORNEA WITH STANDARD INTRAOCULAR LENS IMPLANT ;  Surgeon: Hieu Jaimes MD;  Location: [facility]     PHACOEMULSIFICATION CLEAR CORNEA WITH STANDARD INTRAOCULAR LENS IMPLANT  10/14/2013    Procedure: PHACOEMULSIFICATION CLEAR CORNEA WITH STANDARD INTRAOCULAR LENS IMPLANT;  LEFT PHACOEMULSIFICATION CLEAR CORNEA WITH STANDARD INTRAOCULAR LENS IMPLANT ;  Surgeon: Hieu Jaimes MD;  Location: [facility]       Prior to Admission  Medications   Prior to Admission Medications   Prescriptions Last Dose Informant Patient Reported? Taking?   Apoaequorin (PREVAGEN PO) Past Week Daughter Yes Yes   Sig: Take 1 tablet by mouth daily   acetaminophen (TYLENOL) 500 MG tablet More than a month  Yes Yes   Sig: Take 500 mg by mouth every 6 hours as needed for mild pain   aspirin (ASA) 81 MG EC tablet Past Week  No Yes   Sig: Take 1 tablet (81 mg) by mouth daily   lisinopril (ZESTRIL) 10 MG tablet Past Week  No Yes   Sig: Take 1 tablet (10 mg) by mouth every evening   metoprolol succinate ER (TOPROL-XL) 25 MG 24 hr tablet Past Week  No Yes   Sig: Take 2 tabs (50 mg) in AM and 1 tab (25 mg) in PM   omeprazole (PRILOSEC) 20 MG DR capsule Past Week  No Yes   Sig: Take 1 capsule (20 mg) by mouth daily   simvastatin (ZOCOR) 20 MG tablet Past Week  No Yes   Sig: Take 1 tablet (20 mg) by mouth At Bedtime   vitamin B-12 500 MCG PO tablet Past Week Daughter Yes Yes   Sig: Take 1 tablet by mouth daily       Facility-Administered Medications: None     [unfilled]  Allergies   Allergies   Allergen Reactions     No Known Drug Allergies        Social History   I have reviewed this patient's social history and updated it with pertinent information if needed. Jose REY Jason  reports that he quit smoking about 38 years ago. His smoking use included cigars. He has never used smokeless tobacco. He reports current alcohol use. He reports that he does not use drugs. The patient's usual occupation: He is a retired .    Family History   I have reviewed this patient's family history and updated it with pertinent information if needed. No family history of kidney disease.   Family History   Problem Relation Age of Onset     Heart Disease Father         enlarged heart  at age 66     Family History Negative Mother          at age 88     Cancer Sister          at age 69     Cerebrovascular Disease Brother           at age 81     Cerebrovascular Disease Brother          at age 78     Cerebrovascular Disease Brother          at age 88     Cerebrovascular Disease Sister         b[birth year] 1930       Review of Systems   Unable to perform accurate review of systems related to the patient's dementia.    Physical Exam   Temp: (!) 96.2  F (35.7  C) Temp src: Axillary BP: (!) 151/66 Pulse: 67   Resp: 16 SpO2: 98 % O2 Device: None (Room air)    Vital Signs with Ranges  Temp:  [96.2  F (35.7  C)-97.4  F (36.3  C)] 96.2  F (35.7  C)  Pulse:  [65-96] 67  Resp:  [16-18] 16  BP: (127-152)/(66-98) 151/66  SpO2:  [96 %-98 %] 98 %  154 lbs 12.21 oz    GENERAL APPEARANCE:  Alert, in no distress, pleasant, cooperative, oriented x self, but has been noted to be not oriented otherwise.  EYES:  EOM, lids, pupils and irises normal, sclera clear and conjunctiva normal  ENT:  Mouth normal, moist mucous membranes, nose normal without drainage or crusting, external ears without lesions, hearing acuity grossly intact  NECK: Supple, symmetrical, trachea midline, there is some jugular venous distention present.  The right side has an interesting ectasia of the external jugular vein.  RESP:  Respiratory effort normal,no respiratory distress, Lung sounds clear   CV:  Auscultation of heart done, rate and rhythm controlled and irregular, no murmur, no rub or gallop.   ABDOMEN:  Soft, nontender, no palpable masses or organomegaly.  : There is a firm smooth mass in the lower abdomen consistent with an enlarged bladder.  EXT: Edema trace bilateral lower extremities.  No gross deformities.  SKIN:  skin on extremities warm, dry and intact without rashes  NEURO: cranial nerves grossly intact, no facial asymmetry, no speech deficits and able to follow directions, moves all extremities symmetrically  PSYCH:   Mood is normal, but as noted he is limits to his insight and memory.  Data   BMP  Recent Labs   Lab 22  0929 22  0853 22  0713 22  4323  05/22/22  1044 05/21/22  1058     --  134  --  131* 135   POTASSIUM 3.7  --  4.0  --  4.1 4.2   CHLORIDE 103  --  102  --  102 102   CAMILA 8.4*  --  8.2*  --  8.8 8.8   CO2 19*  --  19*  --  15* 19*   BUN 91*  --  87*  --  76* 66*   CR 4.38*  --  3.94*  --  4.06* 3.89*   * 153* 116* 132* 177* 138*     Phos@[email or web address](phos:4)  CBC)  Recent Labs   Lab 05/24/22  0929 05/22/22  1757 05/19/22  0759 05/18/22  1823   WBC 6.9 9.5 5.8 4.4   HGB 13.0* 14.1 14.2 12.9*   HCT 39.4* 42.6 43.9 39.8*   MCV 99 100 100 100   * 126* 128* 121*     Recent Labs   Lab 05/18/22  1823   AST 25   ALT 29   ALKPHOS 79   BILITOTAL 1.2     No lab results found in last 7 days.  No results found for: D2VIT, D3VIT, DTOT  Recent Labs   Lab 05/24/22 0929   HGB 13.0*   HCT 39.4*   MCV 99     No results for input(s): PTHI in the last 168 hours.  Time spent interviewing, examining patient, looking at ultrasound images, interpreting laboratory tests, and coordination/discussion with primary team: 60  =========================

## 2022-05-25 NOTE — PROGRESS NOTES
[facility]  WO Nurse Inpatient Wound Assessment     Reason for consultation: Evaluate and treat dorsal feet      Assessment  Bilateral dorsal feet: small wounds and previous blisters likely from edema/ cellulitis.  Overall improving, though left foot has developed a small area of eschar; will change topical dressing to Plurogel to help debride.  Left 3rd toe has very small ulcer with tophaceous material.  Edema improving; EdemaWear in place.  Scattered scabbing excoriations to lower legs, slowly improving, no weeping this week.  Sx cellulitis resolving.     Treatment Plan  BLE:  Daily:   1.  Cleanse intact and scabbing skin on lower legs and feet with mild cleanser (ie bath wipes) then apply Sween 24 cream.    2.  Elevate legs when possible; float heels at all times.    3.  Compression per Lymphedema if ordered.     Bilateral dorsal feet: every other day and prn:   1.  Cleanse wounds with mild soap and water or wound cleanser.  2.  Swab periwounds with skin prep, let dry.  3.  Apply Plurogel (# [id number]) to dorsal foot wounds and left 2nd and 3rd toe wounds.  4.  Cover with Mepilex to dorsal feet and 1/2 PolyMem to toes.       Orders Updated  Recommended provider order: None, at this time  WO Nurse follow-up plan: weekly  Nursing to notify the Provider(s) and re-consult the [facility] Nurse if wound(s) deteriorates or new skin concern.    Patient History  According to provider note(s):    Jose oGmez is a 89 year old male who presents with lower extremity edema.  Admitted for further evaluation and treatment.    Objective Data  Containment of urine/stool: Incontinence Protocol    Active Diet Order:  Orders Placed This Encounter      Combination Diet Low Saturated Fat Na <2400mg Diet, No Caffeine Diet    Output:   I/O last 3 completed shifts:  In: 240 [P.O.:240]  Out: 1750 [Urine:1750]    Risk Assessment:   Sensory Perception: 3-->slightly limited  Moisture: 4-->rarely moist  Activity:  1-->bedfast  Mobility: 2-->very limited  Nutrition: 2-->probably inadequate  Friction and Shear: 2-->potential problem  Ariel Score: 14                          Labs:   Recent Labs   Lab 05/25/22  0735 05/19/22  0759 05/18/22  1823   ALBUMIN  --   --  3.4   HGB 13.2*   < > 12.9*   WBC 7.8   < > 4.4   CRP  --   --  5.0    < > = values in this interval not displayed.       Physical Exam  Skin inspection: focused bilateral feet    Wound Location:  Left dorsal foot  Date of last photo:  5-25-22 left        5-25-22 right        5-19-22 5-25-25 BLE        5-19-22 BLE      Wound History: recent increase in swelling to feet/BLE and recent fall per chart review.  Pt confused and unable to give history.     Measurements (length x width x depth, in cm): approx 1.4 x 1.5 x 0+cm left dorsal foot, 0.8 x 1.2 x 0cm main scabbing dorsal right foot, 0.5 x 0.5 x 0.1cm ulcer dorsal left 3rd toe, 0.6 x 1.2 x <0.1cm left 2nd toe  Wound Base: left dorsal foot brown thin eschar, semi-moist; left 3rd toe white tophaceous material; left 2nd toe pink moist blistered tissue; right dorsal foot drying scabbing  Tunneling: N/A  Undermining: N/A  Palpation of the wound bed: normal   Periwound skin: moderate edema and mild erythema, scattered scabbing to lower legs  Color: pink  Temperature: normal to cool  Drainage: scant    Description of drainage: serosanguinous  Odor: none  Pain: minimal    Interventions  Current support surface: Standard  Atmos Air mattress  Current off-loading measures: Pillows  Visual inspection of wound(s) completed  Wound Care: done per plan of care  Supplies: reviewed, gathered and placed at the bedside  Education provided: plan of care  Discussed plan of care with Patient and Nurse    Lenora Gaming RN, CWOCN

## 2022-05-25 NOTE — CONSULTS
"BRIEF NUTRITION ASSESSMENT    REASON FOR ASSESSMENT:  Jose Gomez is a 89 year old male seen by Registered Dietitian for LOS and Nutrition Admission Risk Screen Received -   Have you recently lost weight without trying in the last 6 months ? - \"unsure\"  Have you been eating poorly due to a decreased appetite ?- \"no\"    NUTRITION HISTORY:  - Admitted from home with bilateral leg swelling and a fall. Had not been taking diuretics.   - H/o dementia.   - Stepped into room to see patient, however he was sleeping. Obtained visual physical exam.     CURRENT DIET AND INTAKE:  Diet: Low Saturated Fat, Na <2400 mg   Room service w/ assist            Patient is tolerating between % of meals. Receiving three adequate meals each day. Room service assistance was ordered yesterday to ensure meals TID.     ANTHROPOMETRICS:  Height: 5' 9\"  Weight: 66.2 kg (145 lb 15.1 kg)   Body mass index is 21.6 kg/m .   Weight Status: Normal BMI  IBW:  72.7 kg   %IBW: 91%  Weight History: No weight loss indicated.   Wt Readings from Last 10 Encounters:   05/23/22 70.2 kg (154 lb 12.2 oz)   05/18/22 63.5 kg (140 lb)   03/11/22 63.5 kg (140 lb)   02/25/22 63.5 kg (140 lb)   10/09/21 70.3 kg (154 lb 15.7 oz)   09/17/21 69.4 kg (153 lb)   09/01/21 69.7 kg (153 lb 9.6 oz)   10/13/20 68.5 kg (151 lb)   10/13/20 68.6 kg (151 lb 3.2 oz)   03/13/20 66.7 kg (147 lb)     Vitals:    05/18/22 1405 05/18/22 2056 05/19/22 0622 05/20/22 0517   Weight: 63.5 kg (140 lb) 68.4 kg (150 lb 12.7 oz) 69.9 kg (154 lb 1.6 oz) 66.2 kg (145 lb 15.1 oz)    05/21/22 0624 05/22/22 0614 05/23/22 0457   Weight: 68.1 kg (150 lb 2.1 oz) 68.3 kg (150 lb 9.2 oz) 70.2 kg (154 lb 12.2 oz)       LABS/MEDS/PHYSICAL FINDINGS:  Reviewed    5/22 - Fall reported      WOCN 5/19 -   Bilateral dorsal feet: small wounds/blisters likely from edema, appear superficial.  Edema and erythema to feet, L>R, non tender.  Left 3rd toe has very small ulcer with tophaceous material.  Scattered " scabbing excoriations to lower legs, very mild weeping.    - Low baseline stores of muscle and fat. Do not suspect acute changes.     MALNUTRITION:  Visual Nutrition Focused Physical Assessment (NFPA) completed. Do not suspect muscle/fat losses.  Patient does not meet two of the following criteria necessary for diagnosing malnutrition.     % Weight Loss:  None noted  % Intake:  <75% for > 7 days (moderate malnutrition) - on average  Subcutaneous Fat Loss:  Baseline  Muscle Loss:  Baseline   Fluid Retention:  Mild - Moderate - not nutrition related    NUTRITION INTERVENTION:  Nutrition Diagnosis:  No nutrition diagnosis at this time.    Implementation:  Nutrition Education:Per Provider order if indicated    FOLLOW UP/MONITORING:   Will re-evaluate in 7 - 10 days, or sooner, if re-consulted.    Ann Joe RD, LD  [facility] [place], [facility], Ortho, Ortho Spine  Pager: [phone]  Weekend Pager: [phone]

## 2022-05-25 NOTE — DISCHARGE INSTRUCTIONS
Bilateral lower leg skin care:  Daily:   1.  Cleanse intact and scabbing skin on lower legs and feet with mild skin cleanser then apply Sween 24 cream (or similar)    2.  Elevate legs when possible; float heels at all times.    3.  Compression per Lymphedema.      Bilateral dorsal feet: every other day and prn:   1.  Cleanse wounds with mild soap and water or wound cleanser.  2.  Swab periwounds with skin prep, let dry.  3.  Apply Plurogel to dorsal foot wounds and left 2nd and 3rd toe wounds.  4.  Cover with Mepilex (or similar) to dorsal feet and 1/2 PolyMem (or bandaids) to toes.

## 2022-05-25 NOTE — PLAN OF CARE
Goal Outcome Evaluation:  Alert to self, confused. Has been getting out of bed. Forgetting that he has catheter. Denies pain and SOB. A2 with GB and walker. No PIV in place. A2 with GB and walker  but did not get up on my shift. To continue to monitor.

## 2022-05-25 NOTE — PROGRESS NOTES
Care Management Follow Up    Length of Stay (days): 7    Expected Discharge Date: 05/26/2022     Concerns to be Addressed:       Patient plan of care discussed at interdisciplinary rounds: Yes    Anticipated Discharge Disposition: Skilled Nursing Facility     Anticipated Discharge Services: Transportation Services  Anticipated Discharge DME: None    Patient/family educated on Medicare website which has current facility and service quality ratings: yes  Education Provided on the Discharge Plan:    Patient/Family in Agreement with the Plan: yes    Referrals Placed by CM/SW: Senior Linkage Line, Post Acute Facilities, Transportation, Communication hand-offs to next level of Care Providers  Private pay costs discussed: Not applicable    Additional Information:  Writer called Armida (daughter) and wasn't able to get ahold of her. Need to talk to her before accepting one of the three tcu beds for patient.    Writer called Dior (niece) and asked if she could give writer an email for Armida (dtr) or Vanna (son). She gave writer vanna's email ([email or web address]). Writer emailed asking for a call regarding patient.    Writer called [facility] and let them know we are waiting to hear back from daughter before patient discharges. She said the next opening is 5/31. Writer called [facility] and updated them that we are waiting for a call back from patient's dtr. Called [facility] and left a message letting her know that we are waiting to hear back from family.    Addendum: Writer called Armida (daughter) 2 times this afternoon, no response. Armida called writer back and said that she would like patient to go to [facility]. Writer explained that they don't have a bed available until Tuesday now. She said she would be fine with patient going to [facility] tomorrow. She said that patient would need transport set up. Told her that we can set up a ride for patient. Writer called [facility] and let her  know that patient would like bed for tomorrow.    Nancie Joshi, MINW

## 2022-05-25 NOTE — PROGRESS NOTES
[facility]    Hospitalist Progress Note      Assessment & Plan   Jose Gomez is a 89 year old male who was admitted on 5/18/2022 with lower extremity edema and left leg cellulitis. Subsequently had worsening renal function with diuresis.    Leg swelling, suspected acute heart failure exacerbation, wounds to dorsum of feet:  Left leg wound with surrounding cellulitis   Patient reporting bilateral lower extremity swelling and recent fall, accompanied by daughter who notes that patient has had increasing swelling in both of the lower extremities with open wounds to the tops of both feet since last week.  Patient does not report pain, however fall was when unwitnessed, Patient does endorse lower extremity edema and wounds to the feet.  Creatinine is 2.0 on admission with protein total of 6.6, basic natruretic peptide of greater than 9000, CRP of 5. Serial troponin negative. White blood cell count is 4.4 on admission with a platelet count of 121.  Chest x-ray showing pulmonary vascular congestion.  * Echo: EF 55 to 60% with no regional WMA.  Patient has chronic moderate tricuspid regurgitation which is the same as before.  -Patient did receive IV Lasix twice daily following admission, his renal function started deteriorating since 5/20 and Lasix is on hold since then.  -Lower extremity ultrasound negative for DVT.  -Continue on IV rocephin for cellulitis      Mechanical fall  Patient with a fall at home with family worried about home safety. Also had fall out of chair on 5/22, CT head without trauma or intracranial process  -Physical therapy consulted, recommends TCU  -SW following for TCU placement     Persistent atrial fibrillation  hyperlipidemia  PTA not on AC  -Continue aspirin 81 daily.  -Hold prior to admission lisinopril at this time.  -On metoprolol 50 twice daily  -On simvastatin 20 mg daily     Anemia, thrombocytopenia  Patient with a hemoglobin of 12.9 on admission.  Platelet  count is 121 on admission.  -Monitor.     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 Noted to have urinary retention on 5/24, govea placed with almost 1L out. UA small leuk esterase, 7 WBCs.  * Renal US: mild bilateral hydronephrosis, bladder distended  - nephrology consulted  - continue govea catheter, may consider discharging with goeva and outpatient follow up with urology  - monitor BMP noted creatinine improving.     History of hyperthyroidism  Stopped methimazole several months ago. TSH was elevated at 4.54 with normal T4.  And T3     History of Proteus UTI: This is from past records.  -UA was abnormal, urine culture indicates urogenital roger, no pathogen noted, will continue Rocephin for cellulitis.     History of GERD: Stable.  -On oral pantoprazole 40 daily.     History of right fifth metatarsal closed fracture in August 2021.  -Monitor.     History of dementia: Patient with previous history of dementia.  -Delirium precautions.  -Lives with his son and daughter.  Daughter makes his healthcare decisions.       Hyperkalemia  resolved with Kayexalate and Lasix.    Clinically Significant Risk Factors Present on Admission                      DVT Prophylaxis: Pneumatic Compression Devices  Code Status: Full Code  Expected Discharge: 05/26/2022     Anticipated discharge location:  Awaiting care coordination huddle  Delays:     Lab Result Pending (enter specific test & time in comments)     Await renal function improving      Interval History   Patient seen and examined.  He is feeling okay.  Did not bring up any concerns.  Has some pain in his legs but not really present at rest.  No fevers.    -Data reviewed today: I reviewed all new labs and imaging results over the last 24 hours. I personally reviewed no images or EKG's today.    Physical Exam    Temp: 97.1  F (36.2  C) Temp src: Axillary BP: 125/66 Pulse: 77   Resp: 16 SpO2: 96 % O2 Device: None (Room air)    Vitals:    05/21/22 0624 05/22/22 0614 05/23/22 0457   Weight: 68.1 kg (150 lb 2.1 oz) 68.3 kg (150 lb 9.2 oz) 70.2 kg (154 lb 12.2 oz)     Vital Signs with Ranges  Temp:  [97.1  F (36.2  C)] 97.1  F (36.2  C)  Pulse:  [62-77] 77  Resp:  [16] 16  BP: (125-148)/(63-83) 125/66  SpO2:  [95 %-97 %] 96 %  I/O last 3 completed shifts:  In: 460 [P.O.:460]  Out: 725 [Urine:725]    Constitutional: drowsy, cooperative, no apparent distress  Respiratory: Clear to auscultation bilaterally, no crackles or wheezing  Cardiovascular: Regular rate and rhythm, normal S1 and S2, and no murmur noted  GI: Normal bowel sounds, soft, non-distended, non-tender  Skin/Integumen: Noted bilateral leg edema, prominent in toes of left foot. Wounds dressed  Other:     Medications       aspirin  81 mg Oral Daily     metoprolol succinate ER  50 mg Oral BID     pantoprazole  40 mg Oral Daily     simvastatin  20 mg Oral At Bedtime     sodium chloride (PF)  3 mL Intracatheter Q8H       Data   Recent Labs   Lab 05/25/22  0735 05/24/22  0929 05/24/22  0853 05/23/22  0713 05/22/22  1757 05/19/22  0759 05/18/22  1823   WBC 7.8 6.9  --   --  9.5   < > 4.4   HGB 13.2* 13.0*  --   --  14.1   < > 12.9*    99  --   --  100   < > 100   * 109*  --   --  126*   < > 121*    133  --  134  --    < > 135   POTASSIUM 3.8 3.7  --  4.0  --    < > 5.0   CHLORIDE 105 103  --  102  --    < > 107   CO2 21 19*  --  19*  --    < > 19*   BUN 85* 91*  --  87*  --    < > 45*   CR 3.66* 4.38*  --  3.94*  --    < > 2.00*   ANIONGAP 10 11  --  13  --    < > 9   CAMILA 8.2* 8.4*  --  8.2*  --    < > 8.9   * 168* 153* 116*  --    < > 133*   ALBUMIN  --   --   --   --   --   --  3.4   PROTTOTAL  --   --   --   --   --   --  6.6*   BILITOTAL  --   --   --   --   --   --  1.2   ALKPHOS  --   --   --   --   --   --  79   ALT  --   --   --   --   --    --  29   AST  --   --   --   --   --   --  25    < > = values in this interval not displayed.       No results found for this or any previous visit (from the past 24 hour(s)).

## 2022-05-25 NOTE — PLAN OF CARE
Goal Outcome Evaluation:  Date/Time 05/25/22 1852     Medical    Diagnosis:bilateral LLE  Hosp day #7  Mental Status: A&O to self only  Activity/dangle up in chair 2 walker  Diet:reg  Pain: No C/O  Govea/Voiding: govea for retention  Tele/Restraints/Iso: NA  02/LDA: IV SL  D/C Date: tomorrow to TCU  Other Info:

## 2022-05-26 NOTE — PLAN OF CARE
Medical    Diagnosis: CHF bilateral LLE and wounds  Hops day #8   Mental Status:alert to self olny  Activity/dangle Up with 2 and a walker  Diet:no caffeine low sodium  Pain: denies pain  Govea/Voiding: govea  Tele/Restraints/Iso:NA  02/LDA: IV Sl  D/C Date: tomorrow at 1100  Other Info: monitoring Creat for discharge when stable, pulling on lines but redirectable.

## 2022-05-26 NOTE — PROGRESS NOTES
Care Management Follow Up    Length of Stay (days): 8    Expected Discharge Date: 05/26/2022     Concerns to be Addressed:       Patient plan of care discussed at interdisciplinary rounds: Yes    Anticipated Discharge Disposition: Skilled Nursing Facility     Anticipated Discharge Services: Transportation Services  Anticipated Discharge DME: None    Patient/family educated on Medicare website which has current facility and service quality ratings: yes  Education Provided on the Discharge Plan:    Patient/Family in Agreement with the Plan: yes    Referrals Placed by CM/SW: Senior Linkage Line, Post Acute Facilities, Transportation, Communication hand-offs to next level of Care Providers  Private pay costs discussed: Not applicable    Additional Information:  [facility] can accept patient today and had requested that he arrive before 1300. Writer paged Dr. Goss and was informed that patient is not ready to discharge but should be tomorrow. Writer scheduled a [facility] stretcher ride for 5/27/22 @ 1100 for dementia and need for supervision. Writer updated Adarsh Toney who are in agreement for this.    Writer attempted to call Armida but was unable to reach. Email sent to Tye at [email or web address] to inform of discharge plan and to reach out with any questions or concerns.    JAMES Yates

## 2022-05-26 NOTE — PROGRESS NOTES
[facility]    Nephrology Progress Note  Assessment & Plan       <Acute kidney injury on chronic kidney disease stage III.  ~With relief of obstruction, creatinine coming down but not as much as typical. May still be seeing effect of diuresis and also has poor oral intake.   -With CHF would see what the renal function is in the morning prior to considering give some fluids back  -Will need urology follow up.   -Would consider putting on tamsulosin.      Jayden Jordan MD  [facility], Nephrology  Cell:[phone]  Pager:[phone]    Securely message with the Vocera Web Console (learn more here)  Text page via [employer] Paging/Directory     <><><><><><><><><><><>    Interval History     Delirious. Seeing shoes on the floor that aren't there. Appears to be breathing easily and comfortable.       Temp: 97.1  F (36.2  C) Temp src: Axillary BP: 125/66 Pulse: 77   Resp: 16 SpO2: 96 % O2 Device: None (Room air)      Vitals:    05/21/22 0624 05/22/22 0614 05/23/22 0457   Weight: 68.1 kg (150 lb 2.1 oz) 68.3 kg (150 lb 9.2 oz) 70.2 kg (154 lb 12.2 oz)       Unable to do ROS with mental status.     Vital Signs with Ranges    Temp:  [97.1  F (36.2  C)] 97.1  F (36.2  C)  Pulse:  [62-77] 77  Resp:  [16] 16  BP: (125-148)/(63-83) 125/66  SpO2:  [95 %-97 %] 96 %    I/O last 3 completed shifts:  In: 460 [P.O.:460]  Out: 725 [Urine:725]    Physical Exam    BP Readings from Last 10 Encounters:   05/25/22 125/66   05/18/22 (!) 144/90   03/17/22 125/76   02/25/22 (!) 167/95   11/11/21 (!) 161/95   10/12/21 137/82   09/17/21 122/80   09/01/21 (!) 130/90   08/20/21 (!) 152/98   08/20/21 (!) 134/95        Wt Readings from Last 20 Encounters:   05/23/22 70.2 kg (154 lb 12.2 oz)   05/18/22 63.5 kg (140 lb)   03/11/22 63.5 kg (140 lb)   02/25/22 63.5 kg (140 lb)   10/09/21 70.3 kg (154 lb 15.7 oz)   09/17/21 69.4 kg (153 lb)   09/01/21 69.7 kg (153 lb 9.6 oz)   10/13/20 68.5 kg (151 lb)   10/13/20 68.6  kg (151 lb 3.2 oz)   03/13/20 66.7 kg (147 lb)   02/28/20 66.7 kg (147 lb)   02/21/20 69.1 kg (152 lb 4.8 oz)   02/12/20 63.5 kg (140 lb)   02/11/20 63.5 kg (140 lb)   02/10/20 63.5 kg (140 lb)   02/07/20 63.8 kg (140 lb 11.2 oz)   02/02/20 63.7 kg (140 lb 6.4 oz)   01/31/20 63.2 kg (139 lb 6.4 oz)   11/18/19 74.1 kg (163 lb 4.8 oz)   10/07/19 75.8 kg (167 lb 3.2 oz)       GENERAL: Confusted and mildly agitated appearing.   HEENT:  Normocephalic. No gross abnormalities.  Pupils equal.  The mouth moist.    RESP:Breathing appears comfortable.  SKIN: no new lesions or rashes, dry to touch.  NEURO:  Very confused/  PSYCH: Mild agitation.    Medications           aspirin  81 mg Oral Daily     metoprolol succinate ER  50 mg Oral BID     pantoprazole  40 mg Oral Daily     simvastatin  20 mg Oral At Bedtime     sodium chloride (PF)  3 mL Intracatheter Q8H       Data     UA RESULTS:  Recent Labs   Lab Test 05/24/22  1209 04/26/18  1845 03/10/18  1009   COLOR Yellow   < > Yellow   APPEARANCE Clear   < > Clear   URINEGLC Negative   < > Negative   URINEBILI Negative   < > Negative   URINEKETONE Negative   < > Trace*   SG 1.012   < > 1.025   UBLD Small*   < > Trace*   URINEPH 5.0   < > 5.5   PROTEIN Negative   < > 30*   UROBILINOGEN  --   --  0.2   NITRITE Negative   < > Negative   LEUKEST Small*   < > Trace*   RBCU 53*   < >  --    WBCU 7*   < >  --     < > = values in this interval not displayed.      BMP  Recent Labs   Lab 05/25/22  0735 05/24/22  0929 05/24/22  0853 05/23/22  0713 05/22/22  1655 05/22/22  1044    133  --  134  --  131*   POTASSIUM 3.8 3.7  --  4.0  --  4.1   CHLORIDE 105 103  --  102  --  102   CAMILA 8.2* 8.4*  --  8.2*  --  8.8   CO2 21 19*  --  19*  --  15*   BUN 85* 91*  --  87*  --  76*   CR 3.66* 4.38*  --  3.94*  --  4.06*   * 168* 153* 116*   < > 177*    < > = values in this interval not displayed.     Phos@LABRCNTIPR(phos:4)  CBC)  Recent Labs   Lab 05/25/22  0735 05/24/22  0929  05/22/22  1757 05/19/22  0759   WBC 7.8 6.9 9.5 5.8   HGB 13.2* 13.0* 14.1 14.2   HCT 40.3 39.4* 42.6 43.9    99 100 100   * 109* 126* 128*     Lab Results   Component Value Date    ALBUMIN 3.4 05/18/2022    ALBUMIN 3.3 09/26/2021    ALBUMIN 2.4 01/27/2020    ALBUMIN 3.2 01/26/2020    ALBUMIN 3.6 05/26/2018        Recent Labs   Lab 05/25/22  0735   HGB 13.2*   HCT 40.3          No lab results found in last 7 days.    Invalid input(s): BILIRUBININDIRECT  No lab results found in last 7 days.  No results found for: D2VIT, D3VIT, DTOT  No results for input(s): PTHI in the last 168 hours.    Attestation:   I have reviewed today's relevant vital signs, notes, medications, labs and imaging.    No therapy plan of the specified type found.

## 2022-05-26 NOTE — PLAN OF CARE
Pt A&O to self, frequently tries to get out of bed but redirectable. Assist of 2. Vss on ra. Edema wear in place. Dressing BLE CDI. Denies pain. Discharge awaiting renal improvement and placement.

## 2022-05-26 NOTE — PROGRESS NOTES
[facility]    Nephrology Progress Note  Assessment & Plan       Securely message with the Vocera Web Console (learn more here)  Text page via [employer]/Directory     =========================    <Acute kidney injury on chronic kidney disease stage III.    ~With relief of obstruction, creatinine continues to come down.     -Does not seem to require supplementary fluids currently.  -Will need urology follow up.   -Would consider putting on tamsulosin.      Jayden Jordan MD  [facility], Nephrology  Cell:[phone]  Pager:[phone]    Securely message with the Vocera Web Console (learn more here)  Text page via [employer] Paging[employer]       <><><><><><><><><><><>    Interval History     Less confused than last night. Able to respond appropriately, but remains confused.    Unable to obtain adequate history.       Temp: 96.8  F (36  C) Temp src: Axillary BP: (!) 149/88 Pulse: 69   Resp: 16 SpO2: 96 % O2 Device: None (Room air)      Vitals:    05/22/22 0614 05/23/22 0457 05/26/22 0541   Weight: 68.3 kg (150 lb 9.2 oz) 70.2 kg (154 lb 12.2 oz) 71.4 kg (157 lb 6.5 oz)       Unable to do ROS. Confused.    Vital Signs with Ranges    Temp:  [96.8  F (36  C)-96.9  F (36.1  C)] 96.8  F (36  C)  Pulse:  [69-77] 69  Resp:  [16] 16  BP: (122-149)/(66-88) 149/88  SpO2:  [96 %] 96 %    I/O last 3 completed shifts:  In: 340 [P.O.:340]  Out: 950 [Urine:950]    Physical Exam    BP Readings from Last 10 Encounters:   05/26/22 (!) 149/88   05/18/22 (!) 144/90   03/17/22 125/76   02/25/22 (!) 167/95   11/11/21 (!) 161/95   10/12/21 137/82   09/17/21 122/80   09/01/21 (!) 130/90   08/20/21 (!) 152/98   08/20/21 (!) 134/95        Wt Readings from Last 20 Encounters:   05/26/22 71.4 kg (157 lb 6.5 oz)   05/18/22 63.5 kg (140 lb)   03/11/22 63.5 kg (140 lb)   02/25/22 63.5 kg (140 lb)   10/09/21 70.3 kg (154 lb 15.7 oz)   09/17/21 69.4 kg (153 lb)   09/01/21 69.7 kg (153 lb 9.6 oz)   10/13/20  68.5 kg (151 lb)   10/13/20 68.6 kg (151 lb 3.2 oz)   03/13/20 66.7 kg (147 lb)   02/28/20 66.7 kg (147 lb)   02/21/20 69.1 kg (152 lb 4.8 oz)   02/12/20 63.5 kg (140 lb)   02/11/20 63.5 kg (140 lb)   02/10/20 63.5 kg (140 lb)   02/07/20 63.8 kg (140 lb 11.2 oz)   02/02/20 63.7 kg (140 lb 6.4 oz)   01/31/20 63.2 kg (139 lb 6.4 oz)   11/18/19 74.1 kg (163 lb 4.8 oz)   10/07/19 75.8 kg (167 lb 3.2 oz)     GENERAL APPEARANCE: Drowsy but rousable. Attends. Responds appropriately, but slowly.   EYES:  EOM grossly, lids, pupils and irises normal, sclera clear and conjunctiva normal  ENT:  Mouth normal, moist mucous membranes, nose normal without drainage or crusting, external ears without lesions, hearing acuity grossly intact  NECK: Supple, symmetrical, trachea midline, there is some jugular venous distention present.  The right side has an interesting ectasia of the external jugular vein.  RESP:  Respiratory effort normal,no respiratory distress, Lung sounds clear   CV:  Auscultation of heart done, irregular rate and rhythm controlled and irregular, no murmur, no rub or gallop.   ABDOMEN:  Soft, nontender, no palpable masses or organomegaly.  : Bladder no longer palpable.  EXT: No edema.  No gross deformities.  SKIN:  skin on extremities warm, dry and intact without rashes Pale  NEURO: cranial nerves grossly intact, no facial asymmetry, no speech deficits and able to follow directions, moves all extremities symmetrically. Dementia.  PSYCH:   Mildly agitated, but redirectable.        Medications           aspirin  81 mg Oral Daily     metoprolol succinate ER  50 mg Oral BID     pantoprazole  40 mg Oral Daily     simvastatin  20 mg Oral At Bedtime     sodium chloride (PF)  3 mL Intracatheter Q8H       Data     UA RESULTS:  Recent Labs   Lab Test 05/24/22  1209 04/26/18  1845 03/10/18  1009   COLOR Yellow   < > Yellow   APPEARANCE Clear   < > Clear   URINEGLC Negative   < > Negative   URINEBILI Negative   < > Negative    URINEKETONE Negative   < > Trace*   SG 1.012   < > 1.025   UBLD Small*   < > Trace*   URINEPH 5.0   < > 5.5   PROTEIN Negative   < > 30*   UROBILINOGEN  --   --  0.2   NITRITE Negative   < > Negative   LEUKEST Small*   < > Trace*   RBCU 53*   < >  --    WBCU 7*   < >  --     < > = values in this interval not displayed.      BMP  Recent Labs   Lab 05/26/22  0711 05/25/22  0735 05/24/22  0929 05/24/22  0853 05/23/22  0713    136 133  --  134   POTASSIUM 3.9 3.8 3.7  --  4.0   CHLORIDE 104 105 103  --  102   CAMILA 8.7 8.2* 8.4*  --  8.2*   CO2 21 21 19*  --  19*   BUN 79* 85* 91*  --  87*   CR 3.05* 3.66* 4.38*  --  3.94*   * 106* 168* 153* 116*     Phos@[email or web address](phos:4)  CBC)  Recent Labs   Lab 05/25/22  0735 05/24/22  0929 05/22/22  1757   WBC 7.8 6.9 9.5   HGB 13.2* 13.0* 14.1   HCT 40.3 39.4* 42.6    99 100   * 109* 126*     Lab Results   Component Value Date    ALBUMIN 3.4 05/18/2022    ALBUMIN 3.3 09/26/2021    ALBUMIN 2.4 01/27/2020    ALBUMIN 3.2 01/26/2020    ALBUMIN 3.6 05/26/2018        Recent Labs   Lab 05/25/22  0735   HGB 13.2*   HCT 40.3          No lab results found in last 7 days.    Invalid input(s): BILIRUBININDIRECT  No lab results found in last 7 days.  No results found for: D2VIT, D3VIT, DTOT  No results for input(s): PTHI in the last 168 hours.    Attestation:   I have reviewed today's relevant vital signs, notes, medications, labs and imaging.    No therapy plan of the specified type found.

## 2022-05-26 NOTE — PROGRESS NOTES
[facility]    Hospitalist Progress Note      Assessment & Plan   Jose Gomez is a 89 year old male who was admitted on 5/18/2022 with lower extremity edema and left leg cellulitis. Subsequently had worsening renal function with diuresis.    Leg swelling, suspected acute heart failure exacerbation, wounds to dorsum of feet:  Left leg wound with surrounding cellulitis   Patient reporting bilateral lower extremity swelling and recent fall, accompanied by daughter who notes that patient has had increasing swelling in both of the lower extremities with open wounds to the tops of both feet since last week.  Patient does not report pain, however fall was when unwitnessed, Patient does endorse lower extremity edema and wounds to the feet.  Creatinine is 2.0 on admission with protein total of 6.6, basic natruretic peptide of greater than 9000, CRP of 5. Serial troponin negative. White blood cell count is 4.4 on admission with a platelet count of 121.  Chest x-ray showing pulmonary vascular congestion.  * Echo: EF 55 to 60% with no regional WMA.  Patient has chronic moderate tricuspid regurgitation which is the same as before.  -Patient did receive IV Lasix twice daily following admission, his renal function started deteriorating since 5/20 and Lasix is on hold since then.  -Lower extremity ultrasound negative for DVT.  -Continue on IV rocephin for cellulitis.  Discontinue at discharge [we will have completed 1 week of antibiotics]     Mechanical fall  Patient with a fall at home with family worried about home safety. Also had fall out of chair on 5/22, CT head without trauma or intracranial process  -Physical therapy consulted, recommends TCU  -SW following for TCU placement     Persistent atrial fibrillation  hyperlipidemia  PTA not on AC  -Continue aspirin 81 daily.  -Hold prior to admission lisinopril at this time.  -On metoprolol 50 twice daily  -On simvastatin 20 mg daily     Anemia,  thrombocytopenia  Patient with a hemoglobin of 12.9 on admission.  Platelet count is 121 on admission.  -Monitor.     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 Noted to have urinary retention on 5/24, govea placed with almost 1L out. UA small leuk esterase, 7 WBCs.  * Renal US: mild bilateral hydronephrosis, bladder distended  - nephrology consulted  - continue govea catheter, will discharge with govea and outpatient follow up with urology  - monitor BMP noted creatinine improving.  -Started on tamsulosin     History of hyperthyroidism  Stopped methimazole several months ago. TSH was elevated at 4.54 with normal T4.  And T3     History of Proteus UTI: This is from past records.  -UA was abnormal, urine culture indicates urogenital roger, no pathogen noted, will continue Rocephin for cellulitis.  Discontinued at discharge.     History of GERD: Stable.  -On oral pantoprazole 40 daily.     History of right fifth metatarsal closed fracture in August 2021.  -Monitor.     History of dementia: Patient with previous history of dementia.  -Delirium precautions.  -Lives with his son and daughter.  Daughter makes his healthcare decisions.       Hyperkalemia  resolved with Kayexalate and Lasix.    Clinically Significant Risk Factors Present on Admission                      DVT Prophylaxis: Pneumatic Compression Devices  Code Status: Full Code  Expected Discharge: 05/27/2022     Anticipated discharge location:  Awaiting care coordination huddle  Delays:     Lab Result Pending (enter specific test & time in comments)     Await renal function improving      Interval History   Patient seen and examined.  He is feeling okay.  Did not bring up any concerns.  Has some pain in his legs but not really present at rest.  Plan to discharge tomorrow.  Told me he did not think  he needs rehab and he preferred to go home.    -Data reviewed today: I reviewed all new labs and imaging results over the last 24 hours. I personally reviewed no images or EKG's today.    Physical Exam   Temp: 97  F (36.1  C) Temp src: Axillary BP: (!) 152/82 Pulse: 68   Resp: 16 SpO2: 97 % O2 Device: None (Room air)    Vitals:    05/22/22 0614 05/23/22 0457 05/26/22 0541   Weight: 68.3 kg (150 lb 9.2 oz) 70.2 kg (154 lb 12.2 oz) 71.4 kg (157 lb 6.5 oz)     Vital Signs with Ranges  Temp:  [96.8  F (36  C)-97  F (36.1  C)] 97  F (36.1  C)  Pulse:  [68-72] 68  Resp:  [16] 16  BP: (122-152)/(74-88) 152/82  SpO2:  [96 %-97 %] 97 %  I/O last 3 completed shifts:  In: 240 [P.O.:240]  Out: 1400 [Urine:1400]    Constitutional: drowsy, cooperative, no apparent distress  Respiratory: Clear to auscultation bilaterally, no crackles or wheezing  Cardiovascular: Regular rate and rhythm, normal S1 and S2, and no murmur noted  GI: Normal bowel sounds, soft, non-distended, non-tender  Skin/Integumen: Noted bilateral leg edema, prominent in toes of left foot. Wounds dressed  Other: Somewhat confused, unreliable historian, baseline dementia    Medications       aspirin  81 mg Oral Daily     metoprolol succinate ER  50 mg Oral BID     pantoprazole  40 mg Oral Daily     simvastatin  20 mg Oral At Bedtime     sodium chloride (PF)  3 mL Intracatheter Q8H     tamsulosin  0.4 mg Oral Daily       Data   Recent Labs   Lab 05/26/22  0711 05/25/22  0735 05/24/22  0929 05/23/22  0713 05/22/22  1757   WBC  --  7.8 6.9  --  9.5   HGB  --  13.2* 13.0*  --  14.1   MCV  --  100 99  --  100   PLT  --  106* 109*  --  126*    136 133   < >  --    POTASSIUM 3.9 3.8 3.7   < >  --    CHLORIDE 104 105 103   < >  --    CO2 21 21 19*   < >  --    BUN 79* 85* 91*   < >  --    CR 3.05* 3.66* 4.38*   < >  --    ANIONGAP 11 10 11   < >  --    CAMILA 8.7 8.2* 8.4*   < >  --    * 106* 168*   < >  --     < > = values in this interval not displayed.       No  results found for this or any previous visit (from the past 24 hour(s)).

## 2022-05-27 NOTE — PLAN OF CARE
Goal Outcome Evaluation:    Plan of Care Reviewed With: patient     Overall Patient Progress: improving    Outcome Evaluation: Pt discharging to  TCU today via medical transport    Patient vital signs are at baseline: Yes  Patient able to ambulate as they were prior to admission or with assist devices provided by therapies during their stay:  No,  Reason:  Discharge to TCU today.  Patient MUST void prior to discharge:  No,  Reason:  Voiding via Barry cath w/ red urine.  MD aware and referral to urologist placed.  Patient able to tolerate oral intake:  Yes  Pain has adequate pain control using Oral analgesics:  Yes  Does patient have an identified :  No,  Reason:  Discharge to TCU today.  Has goal D/C date and time been discussed with patient:  Yes     Reviewed discharge instructions and medications with patient: NO. Pt confused and discharging to TCU today.  Questions answered: YES  Patient discharged to: [facility] via EMS Medical Transport.  All belongs discharged with patient: YES

## 2022-05-27 NOTE — PLAN OF CARE
Goal Outcome Evaluation:    ([id number]) patient is alert to self, forgetful but redirectable this shift. Up with 1-2 assist/gb/walker to chair. Barry catheter patent, denies pain. Edema wear on BLE. Discharging to TCU tomorrow @ 1100. Will continue to monitor.

## 2022-05-27 NOTE — PLAN OF CARE
Goal Outcome Evaluation:        Pt. Alert, opens eyes spontaneously.  Oriented to self only, confused.  On room air. Govea patent, in place for retention; urine tea/claribel, clear in appearance. Pt. Needs redirection to not pull at govea; statlock replaced this shift.  On room air. L IV saline locked. Up with serasteady.  1 formed BM this shift.  BLE edema wear in place.  L & R foot dressings CDI.  Sacral mepilex in place.  Pt. Denies pain.  Tolerating PO intake, needs assistance with tray set-up.  Discharge pending TCU.

## 2022-05-27 NOTE — PLAN OF CARE
Physical Therapy Discharge Summary    Reason for therapy discharge:    Discharged to transitional care facility.    Progress towards therapy goal(s). See goals on Care Plan in [facility] electronic health record for goal details.  Goals not met.  Barriers to achieving goals:   discharge from facility.    Therapy recommendation(s):    Continued therapy is recommended.  Rationale/Recommendations:  Per chart: Pt will benefit from continued skilled PT intervention at TCU to progress independence and safety with mobility.

## 2022-05-27 NOTE — PROGRESS NOTES
Care Management Discharge Note    Discharge Date: 05/27/2022       Discharge Disposition: Skilled Nursing Facility    Discharge Services: Transportation Services    Discharge DME: None    Discharge Transportation:  Stretcher [address] [facility], PCS sent    Private pay costs discussed: transportation costs    PAS Confirmation Code: [id number]  Patient/family educated on Medicare website which has current facility and service quality ratings: yes    Education Provided on the Discharge Plan:    Persons Notified of Discharge Plans: yes  Patient/Family in Agreement with the Plan: yes    Handoff Referral Completed: Yes    Additional Information:  Patient accepted to [facility] today. Patient will transport via stretcher at 1100, PCS sent. Discharge orders received for discharge today and faxed to [facility]. Writer confirmed ride time with facility and attempted to reach daughter Armida but was unable to however had sent Tye an email yesterday updating regarding discharge. PAS completed.         JAMES Yates

## 2022-05-27 NOTE — DISCHARGE SUMMARY
Discharge Summary  Hospitalist    Date of Admission:  5/18/2022  Date of Discharge:  5/27/2022  Discharging Provider: Nusrat Goss MD, MD  Date of Service (when I saw the patient): 05/27/22    Discharge Diagnoses   Leg swelling, suspected acute on chronic heart failure with preserved EF, wounds to dorsum of feet:  Left leg wound with surrounding cellulitis    History of Present Illness   Please refer H & P for details.      Hospital Course     Jose Gomez is a 89 year old male who was admitted on 5/18/2022 with lower extremity edema and left leg cellulitis. Subsequently had worsening renal function with diuresis.     Leg swelling, suspected acute on chronic heart failure with preserved EF, wounds to dorsum of feet:  Left leg wound with surrounding cellulitis   Patient reporting bilateral lower extremity swelling and recent fall, accompanied by daughter who notes that patient has had increasing swelling in both of the lower extremities with open wounds to the tops of both feet since last week.  Patient does not report pain, however fall was when unwitnessed, Patient does endorse lower extremity edema and wounds to the feet.  Creatinine is 2.0 on admission with protein total of 6.6, basic natruretic peptide of greater than 9000, CRP of 5. Serial troponin negative. White blood cell count is 4.4 on admission with a platelet count of 121.  Chest x-ray showing pulmonary vascular congestion.  * Echo: EF 55 to 60% with no regional WMA.  Patient has chronic moderate tricuspid regurgitation which is the same as before.  -Patient did receive IV Lasix twice daily following admission, his renal function started deteriorating since 5/20 and Lasix is on hold since then.  -Lower extremity ultrasound negative for DVT.  -Continue on IV rocephin for cellulitis.  Discontinue at discharge [completed 1 week of antibiotics]     Mechanical fall  Patient with a fall at home with family worried about home safety. Also had fall out of  chair on 5/22, CT head without trauma or intracranial process  -Physical therapy consulted, recommends TCU  -SW following for TCU placement     Persistent atrial fibrillation  hyperlipidemia  PTA not on AC  -Continue aspirin 81 daily.  -Hold prior to admission lisinopril at this time.  -On metoprolol 50 twice daily  -On simvastatin 20 mg daily     Anemia, thrombocytopenia  Patient with a hemoglobin of 12.9 on admission.  Platelet count is 121 on admission.  -Monitor.     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 Noted to have urinary retention on 5/24, govea placed with almost 1L out. UA small leuk esterase, 7 WBCs.  * Renal US: mild bilateral hydronephrosis, bladder distended  - nephrology consulted  - continue govea catheter, will discharge with govea and outpatient follow up with urology  - monitor BMP noted creatinine improving.  -Started on tamsulosin     History of hyperthyroidism  Stopped methimazole several months ago. TSH was elevated at 4.54 with normal T4.  And T3     History of Proteus UTI: This is from past records.  -UA was abnormal, urine culture indicates urogenital roger, no pathogen noted, will continue Rocephin for cellulitis.  Discontinued at discharge.     History of GERD: Stable.  -On oral pantoprazole 40 daily.     History of right fifth metatarsal closed fracture in August 2021.  -Monitor.     History of dementia: Patient with previous history of dementia.  -Delirium precautions.  -Lives with his son and daughter.  Daughter makes his healthcare decisions.       Hyperkalemia  resolved with Kayexalate and Lasix.    Nusrat Goss MD, MD      Pending Results   These results will be followed up by Hospitalist team.  Unresulted Labs Ordered in the Past 30 Days of this Admission     No orders found from 4/18/2022 to 5/19/2022.           Code Status   Full Code       Primary Care Physician   Gabriel Carroll    Follow-ups Needed After Discharge   Follow-up Appointments     Follow Up and recommended labs and tests      Follow up with [facility] physician.  The following labs/tests are   recommended: BMP in 5 days.  Follow up in Urology clinic in a week for urinary retention.             Physical Exam   Temp: 97  F (36.1  C) Temp src: Axillary BP: 110/55 Pulse: 64   Resp: 18 SpO2: 92 % O2 Device: None (Room air)    Vitals:    05/23/22 0457 05/26/22 0541 05/27/22 0620   Weight: 70.2 kg (154 lb 12.2 oz) 71.4 kg (157 lb 6.5 oz) 70 kg (154 lb 5.2 oz)     Vital Signs with Ranges  Temp:  [97  F (36.1  C)-97.3  F (36.3  C)] 97  F (36.1  C)  Pulse:  [64-66] 64  Resp:  [16-18] 18  BP: (110-125)/(55-74) 110/55  SpO2:  [92 %-97 %] 92 %  I/O last 3 completed shifts:  In: 360 [P.O.:360]  Out: 1000 [Urine:1000]      Constitutional: alert, cooperative, no apparent distress  Respiratory: Clear to auscultation bilaterally, no crackles or wheezing  Cardiovascular: Regular rate and rhythm, normal S1 and S2, and no murmur noted  GI: Normal bowel sounds, soft, non-distended, non-tender  Skin/Integumen: Noted bilateral leg edema, prominent in toes of left foot. Wounds dressed  Other:  Somewhat confused, unreliable historian, baseline dementia          Discharge Disposition   Discharged to short-term care facility  Condition at discharge: Stable    Consultations This Hospital Stay   PHYSICAL THERAPY ADULT IP CONSULT  WOUND OSTOMY CONTINENCE NURSE  IP CONSULT  LYMPHEDEMA THERAPY IP CONSULT  IV TEAM IP CONSULT  VASCULAR ACCESS ADULT IP CONSULT  CARE MANAGEMENT / SOCIAL WORK IP CONSULT  NEPHROLOGY IP CONSULT  PHYSICAL THERAPY ADULT IP CONSULT  OCCUPATIONAL THERAPY ADULT IP CONSULT    Time Spent on this Encounter   Nusrat GORDON MD, personally saw the patient today and spent greater than 30 minutes discharging this patient.    Discharge Orders      General info  for SNF    Length of Stay Estimate: Short Term Care: Estimated # of Days <30  Condition at Discharge: Improving  Level of care:skilled   Rehabilitation Potential: Fair  Admission H&P remains valid and up-to-date: Yes  Recent Chemotherapy: N/A  Use Nursing Home Standing Orders: Yes     Mantoux instructions    Give two-step Mantoux (PPD) Per Facility Policy Yes     Follow Up and recommended labs and tests    Follow up with snf physician.  The following labs/tests are recommended: BMP in 5 days.  Follow up in Urology clinic in a week for urinary retention.     Reason for your hospital stay    Leg cellulitis, edema, heart failure exacerbation, kidney failure     Intake and output    Every shift     Daily weights    Call Provider for weight gain of more than 2 pounds per day or 5 pounds per week.     Barry catheter    To straight gravity drainage. Change catheter every 2 weeks and PRN for leaking or decreased uring output with signs of bladder distention. DO NOT change catheter without a specific MD order IF diagnosis of benign prostatic hypertrophy (BPH), neurogenic bladder, or other urological conditions     Activity - Up with nursing assistance     Full Code     Physical Therapy Adult Consult    Evaluate and treat as clinically indicated.    Reason:  weakness     Occupational Therapy Adult Consult    Evaluate and treat as clinically indicated.    Reason:  weakness     Fall precautions     Diet    Follow this diet upon discharge: Orders Placed This Encounter      Room Service      Combination Diet Low Saturated Fat Na <2400mg Diet, No Caffeine Diet     Discharge Medications   Discharge Medication List as of 5/27/2022 10:33 AM      START taking these medications    Details   tamsulosin (FLOMAX) 0.4 MG capsule Take 1 capsule (0.4 mg) by mouth daily, Disp-30 capsule, R-0, Transitional         CONTINUE these medications which have CHANGED    Details   metoprolol succinate ER (TOPROL XL) 50 MG 24 hr tablet Take 1  tablet (50 mg) by mouth 2 times daily, Transitional         CONTINUE these medications which have NOT CHANGED    Details   acetaminophen (TYLENOL) 500 MG tablet Take 500 mg by mouth every 6 hours as needed for mild pain, Historical      Apoaequorin (PREVAGEN PO) Take 1 tablet by mouth daily, Historical      aspirin (ASA) 81 MG EC tablet Take 1 tablet (81 mg) by mouth daily, OTC      omeprazole (PRILOSEC) 20 MG DR capsule Take 1 capsule (20 mg) by mouth daily, Disp-90 capsule, R-3, E-Prescribe      simvastatin (ZOCOR) 20 MG tablet Take 1 tablet (20 mg) by mouth At Bedtime, Disp-90 tablet, R-3, E-Prescribe      vitamin B-12 500 MCG PO tablet Take 1 tablet by mouth daily , Historical         STOP taking these medications       lisinopril (ZESTRIL) 10 MG tablet Comments:   Reason for Stopping:             Allergies   Allergies   Allergen Reactions     No Known Drug Allergies      Data   Most Recent 3 CBC's:  Recent Labs   Lab Test 05/25/22  0735 05/24/22  0929 05/22/22  1757   WBC 7.8 6.9 9.5   HGB 13.2* 13.0* 14.1    99 100   * 109* 126*      Most Recent 3 BMP's:  Recent Labs   Lab Test 05/27/22  0732 05/26/22  0711 05/25/22  0735    136 136   POTASSIUM 3.9 3.9 3.8   CHLORIDE 107 104 105   CO2 22 21 21   BUN 70* 79* 85*   CR 2.58* 3.05* 3.66*   ANIONGAP 7 11 10   CAMILA 8.6 8.7 8.2*   * 113* 106*     Most Recent 2 LFT's:  Recent Labs   Lab Test 05/18/22  1823 09/26/21  1452   AST 25 28   ALT 29 20   ALKPHOS 79 88   BILITOTAL 1.2 2.4*     Most Recent INR's and Anticoagulation Dosing History:  Anticoagulation Dose History     Recent Dosing and Labs Latest Ref Rng & Units 4/26/2018 5/26/2018    INR 0.86 - 1.14 1.01 1.07        Most Recent 3 Troponin's:  Recent Labs   Lab Test 09/26/21  1452 10/13/20  1951 01/27/20  1210 01/27/20  0626   TROPI  --  <0.015 0.051* 0.042   TROPONIN 0.023  --   --   --      Most Recent Cholesterol Panel:  Recent Labs   Lab Test 12/23/21  0900   CHOL 144   LDL 72   HDL  55   TRIG 83     Most Recent 6 Bacteria Isolates From Any Culture (See EPIC Reports for Culture Details):  Recent Labs   Lab Test 03/10/18  1040 07/10/14  1136   CULT >100,000 colonies/mL  Enterococcus faecalis  *  <10,000 colonies/mL  urogenital roger  Susceptibility testing not routinely done   <10,000 colonies/mL Mixed gram positive roger  Multiple species present, probable perineal contamination.  Susceptibility testing not routinely done       Most Recent TSH, T4 and A1c Labs:  Recent Labs   Lab Test 05/19/22  1053 05/18/22  1823 12/23/21  0900 12/23/21  0900   TSH  --  4.54*  --  3.32   T4 1.36 1.25   < >  --    A1C  --   --   --  6.7*    < > = values in this interval not displayed.       Results for orders placed or performed during the hospital encounter of 05/18/22   XR Chest 2 Views    Narrative    EXAM: XR CHEST 2 VW  LOCATION: [facility]  DATE/TIME: 5/18/2022 6:35 PM    INDICATION: edema, evaluate for pulmonary edema  COMPARISON: Chest radiograph 01/26/2020      Impression    IMPRESSION: Cardiomediastinal silhouette is enlarged. Pulmonary vascular congestion with moderate pulmonary edema. Possible small bilateral pleural effusions. No pneumothorax. Multiple healed left rib fractures and chronic compression deformities of the   thoracolumbar spine. No acute bony abnormality.   CT Head w/o Contrast    Narrative    EXAM: CT HEAD W/O CONTRAST  LOCATION: [facility]  DATE/TIME: 5/18/2022 7:55 PM    INDICATION: Head trauma, minor (Age >= 65y)  COMPARISON: CT 09/06/2021  TECHNIQUE: Routine CT Head without IV contrast. Multiplanar reformats. Dose reduction techniques were used.    FINDINGS:  INTRACRANIAL CONTENTS: No intracranial hemorrhage, extraaxial collection, or mass effect.  No CT evidence of acute infarct. Moderate presumed chronic small vessel ischemic changes. Moderate generalized volume loss. No hydrocephalus.     VISUALIZED ORBITS/SINUSES/MASTOIDS:  No intraorbital abnormality. No paranasal sinus mucosal disease. No middle ear or mastoid effusion.    BONES/SOFT TISSUES: No acute abnormality.      Impression    IMPRESSION:  1.  No CT evidence for acute intracranial process.  2.  Brain atrophy and presumed chronic microvascular ischemic changes as above.   US Lower Extremity Venous Duplex Bilateral    Narrative    EXAM: US LOWER EXTREMITY VENOUS DUPLEX BILATERAL  LOCATION: [facility]  DATE/TIME: 5/18/2022 6:51 PM    INDICATION: leg swelling, pain  COMPARISON: Ultrasound 09/27/2021  TECHNIQUE: Venous Duplex ultrasound of bilateral lower extremities with and without compression, augmentation and duplex. Color flow and spectral Doppler with waveform analysis performed.    FINDINGS: Exam includes the common femoral, femoral, popliteal veins as well as segmentally visualized deep calf veins and greater saphenous vein.     RIGHT: No deep vein thrombosis. No superficial thrombophlebitis. No popliteal cyst. Mild subcutaneous edema.    LEFT: No deep vein thrombosis. No superficial thrombophlebitis. No popliteal cyst. Mild subcutaneous edema.      Impression    IMPRESSION:  1.  No deep venous thrombosis in the bilateral lower extremities.  2.  Mild subcutaneous edema.   CT Head w/o Contrast    Narrative    EXAM: CT HEAD W/O CONTRAST  LOCATION: [facility]  DATE/TIME: 5/22/2022 5:27 PM    INDICATION: Cerebral hemorrhage suspected  COMPARISON: Head CT 05/18/2022  TECHNIQUE: Routine CT Head without IV contrast. Multiplanar reformats. Dose reduction techniques were used.    FINDINGS:  INTRACRANIAL CONTENTS: No intracranial hemorrhage, extraaxial collection, or mass effect.  No CT evidence of acute infarct. Moderate presumed chronic small vessel ischemic changes. Moderate generalized volume loss. No hydrocephalus.     VISUALIZED ORBITS/SINUSES/MASTOIDS: No intraorbital abnormality. No paranasal sinus mucosal disease. No middle  ear or mastoid effusion.    BONES/SOFT TISSUES: No acute abnormality.      Impression    IMPRESSION:  1.  No CT evidence for acute intracranial process.  2.  Brain atrophy and presumed chronic microvascular ischemic changes as above.  3.  No change.   US Renal Complete    Narrative    ULTRASOUND RENAL COMPLETE  2022 10:53 AM    CLINICAL HISTORY: Acute kidney injury. Evaluate for obstruction.    TECHNIQUE: Routine Bilateral Renal and Bladder Ultrasound.    COMPARISON: None.    FINDINGS:  RIGHT KIDNEY: 10 x 5 x 5 cm. Mild right hydronephrosis.     LEFT KIDNEY: 9 x 4 x 4 cm. Mild left hydronephrosis.     BLADDER: The bladder is distended with a trabeculated wall. The  prostate gland protrudes into the base of the bladder.      Impression    IMPRESSION:  Mild bilateral hydronephrosis may be on the basis of  bladder outlet obstruction.    VANESSA WALDRON MD         SYSTEM ID:  [id number]   Echocardiogram Complete     Value    LVEF  55-60%    Narrative    [id number]  [id number]  [id number]  [id number]^[email or web address]^EWA^[facility]     [facility]  Echocardiography Laboratory  [address] [place] [address]     Name: NEO VILLAVICENCIO  MRN: [id number]  : 1928  Study Date: 2022 10:03 AM  Age: 89 yrs  Gender: Male  Patient Location: [facility]  Reason For Study: SOB  Ordering Physician: EWA SOLORZANO  Referring Physician: EWA SOLORZANO  Performed By: BARBARA Oswald     BSA: 1.8 m2  Height: 69 in  Weight: 150 lb  HR: 90  BP: 157/110 mmHg  ______________________________________________________________________________  Procedure  Complete Portable Echo Adult.  ______________________________________________________________________________  Interpretation Summary     1. The left ventricular cavity is small. There is normal left ventricular wall  thickness. Left ventricular systolic function is normal. The visual ejection  fraction is 55-60%. Diastolic function not assessed due to  atrial  fibrillation. No regional wall motion abnormalities noted.  2. The right ventricle is normal size. Mildly decreased right ventricular  systolic function.  3. Moderate to severe bi-atrial enlargement.  4. Moderate tricuspid regurgitation.  5. No pericardial effusion.  6. In comparison to the previous report datead 01/29/2020, the findings are  similar.  ______________________________________________________________________________  Left Ventricle  The left ventricular cavity is small. There is normal left ventricular wall  thickness. Left ventricular systolic function is normal. The visual ejection  fraction is 55-60%. Diastolic function not assessed due to atrial  fibrillation. No regional wall motion abnormalities noted.     Right Ventricle  The right ventricle is normal size. Mildly decreased right ventricular  systolic function.     Atria  There is mod-severe biatrial enlargement.     Mitral Valve  The mitral valve leaflets are mildly thickened. There is mild (1+) mitral  regurgitation.     Tricuspid Valve  There is moderate (2+) tricuspid regurgitation. The right ventricular systolic  pressure is approximated at 29.2 mmHg plus the right atrial pressure.     Aortic Valve  There is moderate trileaflet aortic sclerosis. There is trace to mild aortic  regurgitation. No aortic stenosis is present.     Pulmonic Valve  There is mild (1+) pulmonic valvular regurgitation. There is no pulmonic  valvular stenosis.     Vessels  Borderline aortic root dilatation.     Pericardium  There is no pericardial effusion.     Rhythm  The rhythm was atrial fibrillation.  ______________________________________________________________________________  MMode/2D Measurements & Calculations  IVSd: 1.1 cm     LVIDd: 3.5 cm  LVIDs: 2.8 cm  LVPWd: 1.1 cm  FS: 20.5 %  LV mass(C)d: 117.1 grams  LV mass(C)dI: 64.0 grams/m2  Ao root diam: 3.7 cm  asc Aorta Diam: 3.5 cm  LVOT diam: 2.3 cm  LVOT area: 4.0 cm2  LA Volume (BP): 81.7 ml  LA  Volume Index (BP): 44.6 ml/m2     LA Volume Indexed (AL/bp): 42.3 ml/m2  RWT: 0.63     Doppler Measurements & Calculations  MV E max mateo: 87.0 cm/sec  MV dec time: 0.11 sec  Ao V2 max: 120.2 cm/sec  Ao max P.0 mmHg  Ao V2 mean: 83.7 cm/sec  Ao mean PG: 3.2 mmHg  Ao V2 VTI: 23.3 cm  GUILLAUME(I,D): 2.2 cm2  GUILLAUME(V,D): 1.8 cm2  LV V1 max P.2 mmHg  LV V1 max: 54.3 cm/sec  LV V1 VTI: 12.5 cm  SV(LVOT): 50.5 ml  SI(LVOT): 27.6 ml/m2  PA acc time: 0.09 sec  TR max mateo: 268.4 cm/sec  TR max P.2 mmHg     AV Mateo Ratio (DI): 0.45  GUILLAUME Index (cm2/m2): 1.2  E/E' av.1  Lateral E/e': 9.9  Medial E/e': 12.2     ______________________________________________________________________________  Report approved by: Christina Butcher 2022 12:43 PM

## 2022-05-28 NOTE — PROGRESS NOTES
Clinic Care Coordination Contact    Background: Care Coordination referral placed from [facility] discharge report for reason of patient meeting criteria for a TCM outreach call by [facility] team.    Assessment: Upon chart review, [facility] Team member will cancel/close the referral for TCM outreach due to reason below:    Patient has discharged to a Group home, Memory Care or Nursing Home     Discharged to short-term care facility    Plan: Care Coordination referral for TCM outreach canceled.    NASRA Mcclendon  [facility]

## 2022-05-31 NOTE — PROGRESS NOTES
[facility] GERIATRICS    PRIMARY CARE PROVIDER AND CLINIC:  Gabriel Carroll MD, [address]  [address] / [facility] [address]  Chief Complaint   Patient presents with     [facility] Medical Record Number:  [id number]  Place of Service where encounter took place:  [facility] (U) [[id number]]    HPI:  Jose Gomez  is a 89 year old  (1/21/1928), admitted to the above facility from  [facility]. Hospital stay 5/18/22 through 5/27/22.     Presented to the hospital on 5/18 with bilateral lower extremity swelling, falls, and open wounds to the tops of both feet.  Admitted for suspected acute heart failure exacerbation and left leg cellulitis.  Admission labs and diagnostics remarkable for Cr.2.0, protein total 6.6, BNP >9000, CRP 5, and platelet 121.  Chest x-ray showed pulmonary vascular congestion. Echo with EF 55-60% with no regional WMA; chronic moderate tricuspid regurgitation unchanged. Received IV Lasix BID following admission and later discontinued due to worsening kidney function. Received IV Rocephin for cellulitis.  Negative for DVT. Discharged to [facility] on 5/27 for ongoing medical management, rehab, and skilled nursing care.     Seen and examined in bed this morning.  History limited due to underlying cognitive deficits.  Thinks he is still in the hospital.  No acute distress.  Denies pain or discomfort.  Hemodynamically stable.  Has Barry catheter in place draining yellow clear urine without hematuria.  BLE (R>L) edema.  Quarter sized wound to dorsal aspect of left foot, left middle toe small open area, and reddened right foot with healing small open area. Also, has bruises on his right buttock and sacral area.  Poor appetite per nursing staff.  Denies chest pain or shortness of breath.  Family will be updated regarding today's visit.    CODE STATUS/ADVANCE DIRECTIVES DISCUSSION:  Full Code  CPR/Full code   ALLERGIES:    Allergies   Allergen Reactions     No Known Drug Allergies       PAST MEDICAL HISTORY:   Past Medical History:   Diagnosis Date     CKD (chronic kidney disease) stage 3, GFR 30-59 ml/min (H)      Esophageal reflux     very mild     Essential hypertension, benign      Hypertensive emergency 4/26/2018     Hyperthyroidism      Mixed hyperlipidemia      Paroxysmal atrial fibrillation (H) 05/28/2018     Pernicious anemia 3/19/2008     Type 2 diabetes, HbA1c goal < 7% (H)      Unspecified internal derangement of knee     LEFT      PAST SURGICAL HISTORY:   has a past surgical history that includes no history of surgery; colonoscopy; Phacoemulsification clear cornea with standard intraocular lens implant (9/23/2013); and Phacoemulsification clear cornea with standard intraocular lens implant (10/14/2013).  FAMILY HISTORY: family history includes Cancer in his sister; Cerebrovascular Disease in his brother, brother, brother, and sister; Family History Negative in his mother; Heart Disease in his father.  SOCIAL HISTORY:   reports that he quit smoking about 38 years ago. His smoking use included cigars. He has never used smokeless tobacco. He reports current alcohol use. He reports that he does not use drugs.  Patient's living condition: lives with family, daughter     Post Discharge Medication Reconciliation Status: discharge medications reconciled and changed, per note/orders  Current Outpatient Medications   Medication Sig     acetaminophen (TYLENOL) 500 MG tablet Take 500 mg by mouth every 6 hours as needed for mild pain     Apoaequorin (PREVAGEN PO) Take 1 tablet by mouth daily     aspirin (ASA) 81 MG EC tablet Take 1 tablet (81 mg) by mouth daily     metoprolol succinate ER (TOPROL XL) 50 MG 24 hr tablet Take 1 tablet (50 mg) by mouth 2 times daily     omeprazole (PRILOSEC) 20 MG DR capsule Take 1 capsule (20 mg) by mouth daily     simvastatin (ZOCOR) 20 MG tablet Take 1 tablet (20 mg) by mouth At Bedtime      "tamsulosin (FLOMAX) 0.4 MG capsule Take 1 capsule (0.4 mg) by mouth daily     vitamin B-12 500 MCG PO tablet Take 1 tablet by mouth daily        ROS:  Limited secondary to cognitive impairment but today pt reports no chest pain or shortness of breath.     Vitals:  /78   Pulse 74   Temp 97.5  F (36.4  C)   Resp 17   Ht 1.753 m (5' 9\")   Wt 69.7 kg (153 lb 11.2 oz)   SpO2 97%   BMI 22.70 kg/m       EXAM:  GENERAL APPEARANCE: Well groomed, well-nourished, appropriately dressed   HEENT-EARS: No discharge/drainage, [place]  HEENT-NECK: No lymphadenopathy  HEENT-EYES: PERRLA positive, no drainage/discharge  HEENT-NOSE/MOUTH/THROAT: No nasal drainage or erythema, mucous membranes dry   RESPIRATORY: Clear to auscultation, even and unlabored, symmetrical chest wall expansion  CARDIOVASCULAR: RRR, bilateral peripheral edema, S1/S2 normal, pulses diminished  GASTROINTESTINAL: Soft, nontender, nondistended, bowel sounds positive  GENITOURINARY: Indwelling catheter in place  MUSCULOSKELETAL: In bed, no visible joint deformities, moves all extremities  INTEGUMENTARY: Wound to dorsal aspect of left foot, left middle toe small open area, reddened right foot with healing dry small open area, bruises on right buttock and sacral region   NEUROLOGICAL: Alert to self, memory loss, confusion  PSYCHOLOGICAL: Cooperative, calm     Lab/Diagnostic data:  Last Comprehensive Metabolic Panel:  Sodium   Date Value Ref Range Status   05/27/2022 136 133 - 144 mmol/L Final   10/13/2020 138 133 - 144 mmol/L Final     Potassium   Date Value Ref Range Status   05/27/2022 3.9 3.4 - 5.3 mmol/L Final   10/13/2020 4.3 3.4 - 5.3 mmol/L Final     Chloride   Date Value Ref Range Status   05/27/2022 107 94 - 109 mmol/L Final   10/13/2020 109 94 - 109 mmol/L Final     Carbon Dioxide   Date Value Ref Range Status   10/13/2020 23 20 - 32 mmol/L Final     Carbon Dioxide (CO2)   Date Value Ref Range Status   05/27/2022 22 20 - 32 mmol/L Final     Anion " Gap   Date Value Ref Range Status   05/27/2022 7 3 - 14 mmol/L Final   10/13/2020 6 3 - 14 mmol/L Final     Glucose   Date Value Ref Range Status   05/27/2022 117 (H) 70 - 99 mg/dL Final   10/13/2020 115 (H) 70 - 99 mg/dL Final     Urea Nitrogen   Date Value Ref Range Status   05/27/2022 70 (H) 7 - 30 mg/dL Final   10/13/2020 38 (H) 7 - 30 mg/dL Final     Creatinine   Date Value Ref Range Status   05/27/2022 2.58 (H) 0.66 - 1.25 mg/dL Final   10/13/2020 1.70 (H) 0.66 - 1.25 mg/dL Final     GFR Estimate   Date Value Ref Range Status   05/27/2022 22 (L) >60 mL/min/1.73m2 Final     Comment:     Effective December 21, 2021 eGFRcr in adults is calculated using the 2021 CKD-EPI creatinine equation which includes age and gender (Jesus et al., NEJ, DOI: 10.1056/QAESnx1495069)   10/13/2020 34 (L) >60 mL/min/[1.73_m2] Final     Comment:     Non  GFR Calc  Starting 12/18/2018, serum creatinine based estimated GFR (eGFR) will be   calculated using the Chronic Kidney Disease Epidemiology Collaboration   (CKD-EPI) equation.       Calcium   Date Value Ref Range Status   05/27/2022 8.6 8.5 - 10.1 mg/dL Final   10/13/2020 9.2 8.5 - 10.1 mg/dL Final     Bilirubin Total   Date Value Ref Range Status   05/18/2022 1.2 0.2 - 1.3 mg/dL Final   01/27/2020 0.9 0.2 - 1.3 mg/dL Final     Alkaline Phosphatase   Date Value Ref Range Status   05/18/2022 79 40 - 150 U/L Final   01/27/2020 75 40 - 150 U/L Final     ALT   Date Value Ref Range Status   05/18/2022 29 0 - 70 U/L Final   05/11/2020 36 0 - 70 U/L Final     AST   Date Value Ref Range Status   05/18/2022 25 0 - 45 U/L Final   01/27/2020 16 0 - 45 U/L Final     CBC RESULTS: Recent Labs   Lab Test 05/25/22  0735   WBC 7.8   RBC 4.05*   HGB 13.2*   HCT 40.3      MCH 32.6   MCHC 32.8   RDW 13.4   *     TSH   Date Value Ref Range Status   05/18/2022 4.54 (H) 0.40 - 4.00 mU/L Final   10/05/2020 4.15 (H) 0.40 - 4.00 mU/L Final       ASSESSMENT/PLAN:  BLE  edema  Suspected acute heart failure exacerbation  Left leg cellulitis  Comment: Has had increasing swelling in both feet with open wounds to the tops of both feet x7 days per chart review, received IV Lasix BID until 5/20 before it was held for worsening kidney function, received IV Rocephin x7 days in the hospital  Plan: Continue aspirin, metoprolol, simvastatin, and Tylenol, continue current wound care orders, wound MD referral, elevate legs, monitor weight, heart healthy diet, OT/PT, cardiology referral, monitor labs, monitor for cardiac symptoms    Falls  Generalized weakness  Physical deconditioning  Frail elderly  Comment: Had a fall at home, lives with family, head CT negative  Plan: OT/PT, would benefit from placement if family/patient agree, fall precautions, monitor for safety    Poor appetite  Comment: Per staff report  Plan: Dietary supplements, RD follow-up, monitor intake, monitor weight      A. Fib  Hypertension  Hyperlipidemia  Comment: Chronic, not on AC, lisinopril held due to worsening renal function, vitals stable  Plan: Continue aspirin, metoprolol, and simvastatin, BMP, monitor BP and heart rate and adjust medications as indicated, lipids periodically, monitor for chest pain or shortness of breath    Anemia  Thrombocytopenia  Comment: Plt 121 on 5/18, 106 on 5/25, hgb 13.2 on 5/25, pt not on AC  Plan: CBC, monitor for changes in energy level, monitor for bleeding, monitor vitals    Acute on chronic kidney disease  History of Proteus UTI  Urinary retention  Comment: Admission Cr.  2.0; went up to 4 after Lasix initiated, Lasix stopped, Barry placed for acute retention, UA with small leuk esterase and 7 WBCs, renal ultrasound with mild bilateral hydronephrosis and distended , started on Flomax in the hospital, lisinopril discontinued while inpatient  Plan: Continue Flomax, continue Barry, outpatient urology follow-up, repeat BMP, avoid nephrotoxic agents, monitor for signs of worsening renal  function    Hyperthyroidism   Comment: Chronic, previously on methimazole, TSH 4.54 with normal T4 and T3  Plan: Recheck labs as appropriate, PCP follow-up, monitor for symptoms    GERD  Comment: Chronic  Plan: Continue omeprazole, monitor for heartburn    Dementia  Comment: Chronic, lives with family   Plan: OT/PT, reorient as needed, assist with ADLs as indicated, monitor weight, monitor for dysphagia, monitor for changes in mood, behavior, and functional status    Right fifth metatarsal closed fracture  Comment: In 2021 per chart review  Plan: Tylenol for pain, monitor    Orders:  CBC and CMP  Daily weight  Urology referral   Miralax daily   Wound MD referral         Electronically signed by:  Ezequiel Kimball,FIGUEROA,APRN,AGNP-BC.           The above note was completed in part using Dragon voice recognition software. Although reviewed after completion, some word and grammatical errors may occur. Please contact the author of this note with any questions.

## 2022-05-31 NOTE — LETTER
5/31/2022        RE: Jose Gomez  [address]  [facility] [address]        [facility] GERIATRICS    PRIMARY CARE PROVIDER AND CLINIC:  Gabriel Carroll MD, [address] / [facility] [address]  Chief Complaint   Patient presents with     [facility] Medical Record Number:  [id number]  Place of Service where encounter took place:  [facility] ([facility]) [[id number]]    HPI:  Jose Gomez  is a 89 year old  (1/21/1928), admitted to the above facility from  [facility]. Hospital stay 5/18/22 through 5/27/22.     Presented to the hospital on 5/18 with bilateral lower extremity swelling, falls, and open wounds to the tops of both feet.  Admitted for suspected acute heart failure exacerbation and left leg cellulitis.  Admission labs and diagnostics remarkable for Cr.2.0, protein total 6.6, BNP >9000, CRP 5, and platelet 121.  Chest x-ray showed pulmonary vascular congestion. Echo with EF 55-60% with no regional WMA; chronic moderate tricuspid regurgitation unchanged. Received IV Lasix BID following admission and later discontinued due to worsening kidney function. Received IV Rocephin for cellulitis.  Negative for DVT. [facility] ongoing medical management, rehab, and skilled nursing care.     Seen and examined in bed this morning.  History limited due to underlying cognitive deficits.  Thinks he is still in the hospital.  No acute distress.  Denies pain or discomfort.  Hemodynamically stable.  Has Barry catheter in place draining yellow clear urine without hematuria.  BLE (R>L) edema.  Quarter sized wound to dorsal aspect of left foot, left middle toe small open area, and reddened right foot with healing small open area. Also, has bruises on his right buttock and sacral area.  Poor appetite per nursing staff.  Denies chest pain or shortness of breath.  Family will be updated regarding today's  visit.    CODE STATUS/ADVANCE DIRECTIVES DISCUSSION:  Full Code  CPR/Full code   ALLERGIES:   Allergies   Allergen Reactions     No Known Drug Allergies       PAST MEDICAL HISTORY:   Past Medical History:   Diagnosis Date     CKD (chronic kidney disease) stage 3, GFR 30-59 ml/min (H)      Esophageal reflux     very mild     Essential hypertension, benign      Hypertensive emergency 4/26/2018     Hyperthyroidism      Mixed hyperlipidemia      Paroxysmal atrial fibrillation (H) 05/28/2018     Pernicious anemia 3/19/2008     Type 2 diabetes, HbA1c goal < 7% (H)      Unspecified internal derangement of knee     LEFT      PAST SURGICAL HISTORY:   has a past surgical history that includes no history of surgery; colonoscopy; Phacoemulsification clear cornea with standard intraocular lens implant (9/23/2013); and Phacoemulsification clear cornea with standard intraocular lens implant (10/14/2013).  FAMILY HISTORY: family history includes Cancer in his sister; Cerebrovascular Disease in his brother, brother, brother, and sister; Family History Negative in his mother; Heart Disease in his father.  SOCIAL HISTORY:   reports that he quit smoking about 38 years ago. His smoking use included cigars. He has never used smokeless tobacco. He reports current alcohol use. He reports that he does not use drugs.  Patient's living condition: lives with family, daughter     Post Discharge Medication Reconciliation Status: discharge medications reconciled and changed, per note/orders  Current Outpatient Medications   Medication Sig     acetaminophen (TYLENOL) 500 MG tablet Take 500 mg by mouth every 6 hours as needed for mild pain     Apoaequorin (PREVAGEN PO) Take 1 tablet by mouth daily     aspirin (ASA) 81 MG EC tablet Take 1 tablet (81 mg) by mouth daily     metoprolol succinate ER (TOPROL XL) 50 MG 24 hr tablet Take 1 tablet (50 mg) by mouth 2 times daily     omeprazole (PRILOSEC) 20 MG DR capsule Take 1 capsule (20 mg) by mouth daily  "    simvastatin (ZOCOR) 20 MG tablet Take 1 tablet (20 mg) by mouth At Bedtime     tamsulosin (FLOMAX) 0.4 MG capsule Take 1 capsule (0.4 mg) by mouth daily     vitamin B-12 500 MCG PO tablet Take 1 tablet by mouth daily        ROS:  Limited secondary to cognitive impairment but today pt reports no chest pain or shortness of breath.     Vitals:  /78   Pulse 74   Temp 97.5  F (36.4  C)   Resp 17   Ht 1.753 m (5' 9\")   Wt 69.7 kg (153 lb 11.2 oz)   SpO2 97%   BMI 22.70 kg/m       EXAM:  GENERAL APPEARANCE: Well groomed, well-nourished, appropriately dressed   HEENT-EARS: No discharge/drainage, [place]  HEENT-NECK: No lymphadenopathy  HEENT-EYES: PERRLA positive, no drainage/discharge  HEENT-NOSE/MOUTH/THROAT: No nasal drainage or erythema, mucous membranes dry   RESPIRATORY: Clear to auscultation, even and unlabored, symmetrical chest wall expansion  CARDIOVASCULAR: RRR, bilateral peripheral edema, S1/S2 normal, pulses diminished  GASTROINTESTINAL: Soft, nontender, nondistended, bowel sounds positive  GENITOURINARY: Indwelling catheter in place  MUSCULOSKELETAL: In bed, no visible joint deformities, moves all extremities  INTEGUMENTARY: Wound to dorsal aspect of left foot, left middle toe small open area, reddened right foot with healing dry small open area, bruises on right buttock and sacral region   NEUROLOGICAL: Alert to self, memory loss, confusion  PSYCHOLOGICAL: Cooperative, calm     Lab/Diagnostic data:  Last Comprehensive Metabolic Panel:  Sodium   Date Value Ref Range Status   05/27/2022 136 133 - 144 mmol/L Final   10/13/2020 138 133 - 144 mmol/L Final     Potassium   Date Value Ref Range Status   05/27/2022 3.9 3.4 - 5.3 mmol/L Final   10/13/2020 4.3 3.4 - 5.3 mmol/L Final     Chloride   Date Value Ref Range Status   05/27/2022 107 94 - 109 mmol/L Final   10/13/2020 109 94 - 109 mmol/L Final     Carbon Dioxide   Date Value Ref Range Status   10/13/2020 23 20 - 32 mmol/L Final     Carbon Dioxide " (CO2)   Date Value Ref Range Status   05/27/2022 22 20 - 32 mmol/L Final     Anion Gap   Date Value Ref Range Status   05/27/2022 7 3 - 14 mmol/L Final   10/13/2020 6 3 - 14 mmol/L Final     Glucose   Date Value Ref Range Status   05/27/2022 117 (H) 70 - 99 mg/dL Final   10/13/2020 115 (H) 70 - 99 mg/dL Final     Urea Nitrogen   Date Value Ref Range Status   05/27/2022 70 (H) 7 - 30 mg/dL Final   10/13/2020 38 (H) 7 - 30 mg/dL Final     Creatinine   Date Value Ref Range Status   05/27/2022 2.58 (H) 0.66 - 1.25 mg/dL Final   10/13/2020 1.70 (H) 0.66 - 1.25 mg/dL Final     GFR Estimate   Date Value Ref Range Status   05/27/2022 22 (L) >60 mL/min/1.73m2 Final     Comment:     Effective December 21, 2021 eGFRcr in adults is calculated using the 2021 CKD-EPI creatinine equation which includes age and gender (Jesus et al., NEJ, DOI: 10.1056/PEYXea3781461)   10/13/2020 34 (L) >60 mL/min/[1.73_m2] Final     Comment:     Non  GFR Calc  Starting 12/18/2018, serum creatinine based estimated GFR (eGFR) will be   calculated using the Chronic Kidney Disease Epidemiology Collaboration   (CKD-EPI) equation.       Calcium   Date Value Ref Range Status   05/27/2022 8.6 8.5 - 10.1 mg/dL Final   10/13/2020 9.2 8.5 - 10.1 mg/dL Final     Bilirubin Total   Date Value Ref Range Status   05/18/2022 1.2 0.2 - 1.3 mg/dL Final   01/27/2020 0.9 0.2 - 1.3 mg/dL Final     Alkaline Phosphatase   Date Value Ref Range Status   05/18/2022 79 40 - 150 U/L Final   01/27/2020 75 40 - 150 U/L Final     ALT   Date Value Ref Range Status   05/18/2022 29 0 - 70 U/L Final   05/11/2020 36 0 - 70 U/L Final     AST   Date Value Ref Range Status   05/18/2022 25 0 - 45 U/L Final   01/27/2020 16 0 - 45 U/L Final     CBC RESULTS: Recent Labs   Lab Test 05/25/22  0735   WBC 7.8   RBC 4.05*   HGB 13.2*   HCT 40.3      MCH 32.6   MCHC 32.8   RDW 13.4   *     TSH   Date Value Ref Range Status   05/18/2022 4.54 (H) 0.40 - 4.00 mU/L Final    10/05/2020 4.15 (H) 0.40 - 4.00 mU/L Final       ASSESSMENT/PLAN:  BLE edema  Suspected acute heart failure exacerbation  Left leg cellulitis  Comment: Has had increasing swelling in both feet with open wounds to the tops of both feet x7 days per chart review, received IV Lasix BID until 5/20 before it was held for worsening kidney function, received IV Rocephin x7 days in the hospital  Plan: Continue aspirin, metoprolol, simvastatin, and Tylenol, continue current wound care orders, wound MD referral, elevate legs, monitor weight, heart healthy diet, OT/PT, cardiology referral, monitor labs, monitor for cardiac symptoms    Falls  Generalized weakness  Physical deconditioning  Frail elderly  Comment: Had a fall at home, lives with family, head CT negative  Plan: OT/PT, would benefit from placement if family/patient agree, fall precautions, monitor for safety    Poor appetite  Comment: Per staff report  Plan: Dietary supplements, RD follow-up, monitor intake, monitor weight      A. Fib  Hypertension  Hyperlipidemia  Comment: Chronic, not on AC, lisinopril held due to worsening renal function, vitals stable  Plan: Continue aspirin, metoprolol, and simvastatin, BMP, monitor BP and heart rate and adjust medications as indicated, lipids periodically, monitor for chest pain or shortness of breath    Anemia  Thrombocytopenia  Comment: Plt 121 on 5/18, 106 on 5/25, hgb 13.2 on 5/25, pt not on AC  Plan: CBC, monitor for changes in energy level, monitor for bleeding, monitor vitals    Acute on chronic kidney disease  History of Proteus UTI  Urinary retention  Comment: Admission Cr.  2.0; went up to 4 after Lasix initiated, Lasix stopped, Barry placed for acute retention, UA with small leuk esterase and 7 WBCs, renal ultrasound with mild bilateral hydronephrosis and distended , started on Flomax in the hospital, lisinopril discontinued while inpatient  Plan: Continue Flomax, continue Barry, outpatient urology follow-up,  repeat BMP, avoid nephrotoxic agents, monitor for signs of worsening renal function    Hyperthyroidism   Comment: Chronic, previously on methimazole, TSH 4.54 with normal T4 and T3  Plan: Recheck labs as appropriate, PCP follow-up, monitor for symptoms    GERD  Comment: Chronic  Plan: Continue omeprazole, monitor for heartburn    Dementia  Comment: Chronic, lives with family   Plan: OT/PT, reorient as needed, assist with ADLs as indicated, monitor weight, monitor for dysphagia, monitor for changes in mood, behavior, and functional status    Right fifth metatarsal closed fracture  Comment: In 2021 per chart review  Plan: Tylenol for pain, monitor    Orders:  CBC and CMP  Daily weight  Urology referral   Miralax daily   Wound MD referral         Electronically signed by:  Ezequiel Kimball DNP,APRN,AGNP-BC.           The above note was completed in part using Dragon voice recognition software. Although reviewed after completion, some word and grammatical errors may occur. Please contact the author of this note with any questions.                         Sincerely,        SABIHA Quiroz CNP

## 2022-06-02 NOTE — TELEPHONE ENCOUNTER
[facility] Note     Provider: SABIHA Dill CNP  [facility]  [facility]    Allergies   Allergen Reactions     No Known Drug Allergies        Labs Reviewed by provider: Mary Ellen 2, BMP     Verbal Order/Direction given by Provider: No new orders.      Provider giving Order:  SABIHA Dill CNP    Verbal Order given to: Roque([phone])    Natanael Hooks RN

## 2022-06-02 NOTE — PROGRESS NOTES
[facility] GERIATRICS    PRIMARY CARE PROVIDER AND CLINIC:  Gabriel Carroll MD, [address] / [facility] [address]  Chief Complaint   Patient presents with     Hospital F/U      [place] Medical Record Number:  [id number]  Place of Service where encounter took place[facility]  [facility])    Jose Gomez  is a 89 year old  (1/21/1928), admitted to the above facility from  [facility]. Hospital stay 5/18/22 through 5/27/22..     Hospital course was reviewed by me, is as per the hospital discharge summary and NP note.    PMH Dementia, CHF, CKD, persistent afib, HTN     Pt was hospitalized on 5/18 for the management of  bilateral lower extremity swelling, falls, and open wounds to the tops of both feet.  Admitted for suspected acute heart failure exacerbation and left leg cellulitis.  Admission labs and diagnostics remarkable for Cr.2.0, protein total 6.6, BNP >9000, CRP 5, and platelet 121.  Chest x-ray showed pulmonary vascular congestion. Echo with EF 55-60% with no regional WMA; chronic moderate tricuspid regurgitation unchanged.  Pt was treated with IV lasix initially, later discontinued secondary to initial worsening renal function. Was subsequently found to have urinary retention with 1 L residual. Renal US revealed mild bilateral hydronephrosis. Cr peaked at 4, down to 2.68 at time of discharge. Baseline Cr around 2. Discharged with govea catheter  PTA lisinopril was not restarted  Pt is not on diuretics at baseline.  Pt received a one week course of Rocephin for L foot cellulitis  LE venous doppler neg for DVT      Pt is unable to give a history secondary to cognitive deficits  He denies pain, chest pain, SOB, cough, abd pain  Appetite reported to be good        CODE STATUS/ADVANCE DIRECTIVES DISCUSSION:  Full Code  CPR/Full code   ALLERGIES:   Allergies   Allergen Reactions     No Known Drug Allergies       PAST MEDICAL HISTORY:   Past  Medical History:   Diagnosis Date     CKD (chronic kidney disease) stage 3, GFR 30-59 ml/min (H)      Esophageal reflux     very mild     Essential hypertension, benign       4/26/2018     Hyperthyroidism      Mixed hyperlipidemia      Persistent atrial fibrillation (H) 05/28/2018     Pernicious anemia 3/19/2008     Type 2 diabetes, HbA1c goal < 7% (H)                 PAST SURGICAL HISTORY:   has a past surgical history that includes no history of surgery; colonoscopy; Phacoemulsification clear cornea with standard intraocular lens implant (9/23/2013); and Phacoemulsification clear cornea with standard intraocular lens implant (10/14/2013).  FAMILY HISTORY: family history includes Cancer in his sister; Cerebrovascular Disease in his brother, brother, brother, and sister; Family History Negative in his mother; Heart Disease in his father.  SOCIAL HISTORY:   reports that he quit smoking about 38 years ago. His smoking use included cigars. He has never used smokeless tobacco. He reports current alcohol use. He reports that he does not use drugs.  Patient's living condition: lives with family, daughter         Current Outpatient Medications   Medication Sig     acetaminophen (TYLENOL) 500 MG tablet Take 500 mg by mouth every 6 hours as needed for mild pain     Apoaequorin (PREVAGEN PO) Take 1 tablet by mouth daily     aspirin (ASA) 81 MG EC tablet Take 1 tablet (81 mg) by mouth daily     metoprolol succinate ER (TOPROL XL) 50 MG 24 hr tablet Take 1 tablet (50 mg) by mouth 2 times daily     omeprazole (PRILOSEC) 20 MG DR capsule Take 1 capsule (20 mg) by mouth daily     simvastatin (ZOCOR) 20 MG tablet Take 1 tablet (20 mg) by mouth At Bedtime     tamsulosin (FLOMAX) 0.4 MG capsule Take 1 capsule (0.4 mg) by mouth daily     vitamin B-12 500 MCG PO tablet Take 1 tablet by mouth daily      No current facility-administered medications for this visit.       ROS:  Limited secondary to cognitive impairment     Vitals:  BP  "119/61   Pulse 81   Temp 97.8  F (36.6  C)   Resp 19   Ht 1.753 m (5' 9\")   Wt 72.3 kg (159 lb 6.4 oz)   SpO2 99%   BMI 23.54 kg/m    Exam:  Thin appearing male, sitting in chair, preparing to eat breakfast  Alert, appears comfortable  HEENT oral mucosa moist  Neck supple  Lungs clear  CV irreg, HR 70s  Abd soft, non-tender  Govea cath in place  LE 1 + edema B  LE wraps in place  L foot was not examined  NEURO alert, oriented to self, minimal verbalizations  Face symmetric    Lab/Diagnostic data:    Most Recent 3 CBC's:Recent Labs   Lab Test 06/02/22  0635 05/25/22  0735 05/24/22  0929   WBC 4.6 7.8 6.9   HGB 10.8* 13.2* 13.0*   * 100 99   * 106* 109*     Most Recent 3 BMP's:Recent Labs   Lab Test 06/02/22  0635 05/31/22  0737 05/27/22  0732    137 136   POTASSIUM 5.1* 4.3 3.9   CHLORIDE 104 107 107   CO2 24 20* 22   BUN 57* 55* 70*   CR 2.89* 2.37* 2.58*   ANIONGAP 8 10 7   CAMILA 8.7 8.6 8.6    101 117*     Most Recent 3 BNP's:Recent Labs   Lab Test 05/18/22  1823 01/26/20  1501   NTBNPI 9,802* 5,440*     Most Recent TSH and T4:Recent Labs   Lab Test 05/19/22  1053 05/18/22  1823   TSH  --  4.54*   T4 1.36 1.25     Most Recent Hemoglobin A1c:Recent Labs   Lab Test 12/23/21  0900   A1C 6.7*         ASSESSMENT/PLAN:      BLE edema, likely secondary to CHF (based on CXR)  Left leg cellulitis  Edema improved with wraps, elevation  No evidence of worsening CHF, off diuretics since initial stage   of hospitalization  Not on diuretics normally  S/p 1 week course of Rocephin  Plan wound cares, LE elevation, wound MD referral, ASA. Monitor respiratory status, BMP.  Unclear if diuretics will need to be resumed    Acute on chronic kidney disease  Urinary retention with traumatic Govea cath placement during hospitalization  Cr stable though not back to baseline  Plan monitor renal function. Continue flomax, govea. Urology f/u       Thrombocytopenia  Stable in 130s  Plan " monitor    Falls  Generalized weakness  Physical deconditioning  Frail elderly  Dementia  Secondary to chronic medical concerns, dementia  Plan: OT/PT. Unclear if Pt will require replacement    Hyperthyroidism   Comment: Chronic, previously on methimazole, TSH 4.54 with normal T4 and T3  Plan: monitor TFTs     GERD  Comment: Chronic  Plan: Continue omeprazole, monitor for heartburn    Chronic afib  HTN  HR and BP controlled  Pt remains on metoprolol.  PTA lisinopril not restarted  Not on AC  Plan monitor vitals       Jluis Luna MD

## 2022-06-02 NOTE — LETTER
6/2/2022        RE: Jose Gomez  [address]  [facility] [address]        [facility] GERIATRICS    PRIMARY CARE PROVIDER AND CLINIC:  Gabriel Carroll MD, [address] / [place] [address]  Chief Complaint   Patient presents with     Hospital F/U      [place] Medical Record Number:  [id number]  Place of Service where encounter took place:  [facility] ([facility])    Jose Gomez  is a 89 year old  (1/21/1928), admitted to the above facility from  [facility]. Hospital stay 5/18/22 through 5/27/22..     Hospital course was reviewed by me, is as per the hospital discharge summary and NP note.    PMH Dementia, CHF, CKD, persistent afib, HTN     Pt was hospitalized on 5/18 for the management of  bilateral lower extremity swelling, falls, and open wounds to the tops of both feet.  Admitted for suspected acute heart failure exacerbation and left leg cellulitis.  Admission labs and diagnostics remarkable for Cr.2.0, protein total 6.6, BNP >9000, CRP 5, and platelet 121.  Chest x-ray showed pulmonary vascular congestion. Echo with EF 55-60% with no regional WMA; chronic moderate tricuspid regurgitation unchanged.  Pt was treated with IV lasix initially, later discontinued secondary to initial worsening renal function. Was subsequently found to have urinary retention with 1 L residual. Renal US revealed mild bilateral hydronephrosis. Cr peaked at 4, down to 2.68 at time of discharge. Baseline Cr around 2. Discharged with govea catheter  PTA lisinopril was not restarted  Pt is not on diuretics at baseline.  Pt received a one week course of Rocephin for L foot cellulitis  LE venous doppler neg for DVT      Pt is unable to give a history secondary to cognitive deficits  He denies pain, chest pain, SOB, cough, abd pain  Appetite reported to be good        CODE STATUS/ADVANCE DIRECTIVES DISCUSSION:  Full Code  CPR/Full code   ALLERGIES:   Allergies    Allergen Reactions     No Known Drug Allergies       PAST MEDICAL HISTORY:   Past Medical History:   Diagnosis Date     CKD (chronic kidney disease) stage 3, GFR 30-59 ml/min (H)      Esophageal reflux     very mild     Essential hypertension, benign       4/26/2018     Hyperthyroidism      Mixed hyperlipidemia      Persistent atrial fibrillation (H) 05/28/2018     Pernicious anemia 3/19/2008     Type 2 diabetes, HbA1c goal < 7% (H)                 PAST SURGICAL HISTORY:   has a past surgical history that includes no history of surgery; colonoscopy; Phacoemulsification clear cornea with standard intraocular lens implant (9/23/2013); and Phacoemulsification clear cornea with standard intraocular lens implant (10/14/2013).  FAMILY HISTORY: family history includes Cancer in his sister; Cerebrovascular Disease in his brother, brother, brother, and sister; Family History Negative in his mother; Heart Disease in his father.  SOCIAL HISTORY:   reports that he quit smoking about 38 years ago. His smoking use included cigars. He has never used smokeless tobacco. He reports current alcohol use. He reports that he does not use drugs.  Patient's living condition: lives with family, daughter         Current Outpatient Medications   Medication Sig     acetaminophen (TYLENOL) 500 MG tablet Take 500 mg by mouth every 6 hours as needed for mild pain     Apoaequorin (PREVAGEN PO) Take 1 tablet by mouth daily     aspirin (ASA) 81 MG EC tablet Take 1 tablet (81 mg) by mouth daily     metoprolol succinate ER (TOPROL XL) 50 MG 24 hr tablet Take 1 tablet (50 mg) by mouth 2 times daily     omeprazole (PRILOSEC) 20 MG DR capsule Take 1 capsule (20 mg) by mouth daily     simvastatin (ZOCOR) 20 MG tablet Take 1 tablet (20 mg) by mouth At Bedtime     tamsulosin (FLOMAX) 0.4 MG capsule Take 1 capsule (0.4 mg) by mouth daily     vitamin B-12 500 MCG PO tablet Take 1 tablet by mouth daily      No current facility-administered medications for  "this visit.       ROS:  Limited secondary to cognitive impairment     Vitals:  /61   Pulse 81   Temp 97.8  F (36.6  C)   Resp 19   Ht 1.753 m (5' 9\")   Wt 72.3 kg (159 lb 6.4 oz)   SpO2 99%   BMI 23.54 kg/m    Exam:  Thin appearing male, sitting in chair, preparing to eat breakfast  Alert, appears comfortable  HEENT oral mucosa moist  Neck supple  Lungs clear  CV irreg, HR 70s  Abd soft, non-tender  Govea cath in place  LE 1 + edema B  LE wraps in place  L foot was not examined  NEURO alert, oriented to self, minimal verbalizations  Face symmetric    Lab/Diagnostic data:    Most Recent 3 CBC's:Recent Labs   Lab Test 06/02/22  0635 05/25/22  0735 05/24/22  0929   WBC 4.6 7.8 6.9   HGB 10.8* 13.2* 13.0*   * 100 99   * 106* 109*     Most Recent 3 BMP's:Recent Labs   Lab Test 06/02/22  0635 05/31/22  0737 05/27/22  0732    137 136   POTASSIUM 5.1* 4.3 3.9   CHLORIDE 104 107 107   CO2 24 20* 22   BUN 57* 55* 70*   CR 2.89* 2.37* 2.58*   ANIONGAP 8 10 7   CAMILA 8.7 8.6 8.6    101 117*     Most Recent 3 BNP's:Recent Labs   Lab Test 05/18/22  1823 01/26/20  1501   NTBNPI 9,802* 5,440*     Most Recent TSH and T4:Recent Labs   Lab Test 05/19/22  1053 05/18/22  1823   TSH  --  4.54*   T4 1.36 1.25     Most Recent Hemoglobin A1c:Recent Labs   Lab Test 12/23/21  0900   A1C 6.7*         ASSESSMENT/PLAN:      BLE edema, likely secondary to CHF (based on CXR)  Left leg cellulitis  Edema improved with wraps, elevation  No evidence of worsening CHF, off diuretics since initial stage   of hospitalization  Not on diuretics normally  S/p 1 week course of Rocephin  Plan wound cares, LE elevation, wound MD referral, ASA. Monitor respiratory status, BMP.  Unclear if diuretics will need to be resumed    Acute on chronic kidney disease  Urinary retention with traumatic Govea cath placement during hospitalization  Cr stable though not back to baseline  Plan monitor renal function. Continue flomax, govea. " Urology f/u       Thrombocytopenia  Stable in 130s  Plan monitor    Falls  Generalized weakness  Physical deconditioning  Frail elderly  Dementia  Secondary to chronic medical concerns, dementia  Plan: OT/PT. Unclear if Pt will require replacement    Hyperthyroidism   Comment: Chronic, previously on methimazole, TSH 4.54 with normal T4 and T3  Plan: monitor TFTs     GERD  Comment: Chronic  Plan: Continue omeprazole, monitor for heartburn    Chronic afib  HTN  HR and BP controlled  Pt remains on metoprolol.  PTA lisinopril not restarted  Not on AC  Plan monitor vitals       Jluis Luna MD          Sincerely,        Jluis Luna MD

## 2022-06-06 NOTE — TELEPHONE ENCOUNTER
Reason for call:  Other   Patient called regarding (reason for call): appointment  Additional comments: Dr Jluis Luna would like patient to be seen within one week for urinary retention but there is no appointments at [place] until July. Would like a call back to see if he can be squeezed in.     Phone number to reach patient:  Cell number on file:    Telephone Information:   Mobile [phone]       Best Time:  Anytime    Can we leave a detailed message on this number?  YES    Travel screening: Not Applicable

## 2022-06-06 NOTE — TELEPHONE ENCOUNTER
"Called and spoke to patient's daughter.   Advised caller I was reaching out to schedule an appointment with Dr. Frost for follow up with catheter.   Daughter did not seem to know about referral.   Patient is currently staying at a TCU.  Phone number provided to call when ready to schedule.   Caller reports \"I do not make quick decisions especially when it affects peoples lives\" and then hung up.   Jia Ramos RN    "

## 2022-06-09 NOTE — PROGRESS NOTES
"[facility] GERIATRICS    Chief Complaint   Patient presents with     RECHECK     HPI:  Jose Gomez is a 89 year old  (1/21/1928), who is being seen today for an episodic care visit at: [facility] (U) [[id number]]. Today's concern is: SHERWIN    Presented to the hospital on 5/18 with bilateral lower extremity swelling, falls, and open wounds to the tops of both feet.  Admitted for suspected acute heart failure exacerbation and left leg cellulitis.  Admission labs remarkable for Cr.2.0, protein total 6.6, BNP >9000, CRP 5, and platelet 121.  Chest x-ray showed pulmonary vascular congestion. Echo with EF 55-60% with no regional WMA; chronic moderate tricuspid regurgitation unchanged. Received IV Lasix BID following admission and later discontinued due to worsening kidney function. Received IV Rocephin for cellulitis.  Negative for DVT. [facility] medical [facility], rehab, and skilled nursing care.    Seen and examined for routine TCU follow-up visit.  History limited due to underlying cognitive deficits. No distress. States \"I am fine\". Barry has been removed for TOV and PVR ranged 9-292 ml since. BMP on 6/2 with potassium 5.1, BUN 57, creatinine 2.89, and GFR 20.  Will reinsert Barry and have him follow-up with nephrology/urology if labs continues to look bad.     Doing okay with rehab.  Able to walk up to 150 feet so far.  No falls or injuries since TCU admission.    Wounds of dorsal aspect of left foot and left third toe stable without signs of infection. Appetite improved per report.  Admission weight 153.7 Lbs on 5/28, today 163.9 Lbs. No additional acute concerns.     Allergies, and PMH/PSH reviewed in [employer] today.  REVIEW OF SYSTEMS:  Limited secondary to cognitive impairment but today pt reports fine.     Objective:   /53   Pulse 79   Temp 97.4  F (36.3  C)   Resp 18   Ht 1.753 m (5' 9\")   Wt 46.3 kg (102 lb)   SpO2 97%   BMI 15.06 " kg/m       Exam:  GENERAL APPEARANCE: Well groomed, well-nourished, appropriately dressed   RESPIRATORY: Clear to auscultation, even and unlabored, symmetrical chest wall expansion  CARDIOVASCULAR: RRR, bilateral peripheral edema, S1/S2 normal, pulses diminished  GASTROINTESTINAL: Soft, nontender, nondistended, bowel sounds positive  MUSCULOSKELETAL: No visible joint deformities, moves all extremities  INTEGUMENTARY: Wound to dorsal aspect of left foot covered, left third toe wound dry without sings of infection, bruises on right buttock without open skin   NEUROLOGICAL: Alert to self, memory loss, confusion  PSYCHOLOGICAL: Cooperative, calm      Recent labs in [facility] reviewed by me today.     Assessment/Plan:  BLE edema  Suspected acute heart failure exacerbation  Left leg cellulitis  Left third toe wound   Comment: Stable, followed by wound MD  Plan: Continue aspirin, metoprolol, simvastatin, and Tylenol, follow wound MD orders for wound care, elevate legs, monitor weight, heart healthy diet, OT/PT, cardiology referral, monitor labs as appropriate, monitor for signs of chest pain or shortness of breath     Falls  Generalized weakness  Physical deconditioning  Comment: Fall at home, head CT negative  Plan: OT/PT, would benefit from placement, fall precautions, monitor for safety    Acute on chronic kidney disease  History of Proteus UTI  Urinary retention  Comment:  Labs as above, Barry discontinued for TOV  Plan: Continue Flomax, BMP and CBC, continue PVR, reinsert Barry with worsening renal function,  Outpatient urology/nephrology referral, avoid nephrotoxic agents, monitor for signs of worsening renal function     Poor appetite  Comment: Improved   Plan: Dietary supplements, RD follow-up, monitor intake, monitor weight       Orders:  BMP, CBC    Electronically signed by:    Ezequiel Kimball,DNP,APRN,AGNP-BC.           The above note was completed in part using Dragon voice recognition software. Although reviewed after  completion, some word and grammatical errors may occur. Please contact the author of this note with any questions.

## 2022-06-09 NOTE — LETTER
"    6/9/2022        RE: Jose Gomez  [address] [facility] [address]        [facility] GERIATRICS    Chief Complaint   Patient presents with     RECHECK     HPI:  Jose Gomez is a 89 year old  (1/21/1928), who is being seen today for an episodic care visit at: [facility] (U) [[id number]]. Today's concern is: SHERWIN    Presented to the hospital on 5/18 with bilateral lower extremity swelling, falls, and open wounds to the tops of both feet.  Admitted for suspected acute heart failure exacerbation and left leg cellulitis.  Admission labs remarkable for Cr.2.0, protein total 6.6, BNP >9000, CRP 5, and platelet 121.  Chest x-ray showed pulmonary vascular congestion. Echo with EF 55-60% with no regional WMA; chronic moderate tricuspid regurgitation unchanged. Received IV Lasix BID following admission and later discontinued due to worsening kidney function. Received IV Rocephin for cellulitis.  Negative for DVT. [facility] medical [facility], rehab, and skilled nursing care.    Seen and examined for routine TCU follow-up visit.  History limited due to underlying cognitive deficits. No distress. States \"I am fine\". Barry has been removed for TOV and PVR ranged 9-292 ml since. BMP on 6/2 with potassium 5.1, BUN 57, creatinine 2.89, and GFR 20.  Will reinsert Barry and have him follow-up with nephrology/urology if labs continues to look bad.     Doing okay with rehab.  Able to walk up to 150 feet so far.  No falls or injuries since TCU admission.    Wounds of dorsal aspect of left foot and left third toe stable without signs of infection. Appetite improved per report.  Admission weight 153.7 Lbs on 5/28, today 163.9 Lbs. No additional acute concerns.     Allergies, and PMH/PSH reviewed in [employer] today.  REVIEW OF SYSTEMS:  Limited secondary to cognitive impairment but today pt reports fine.     Objective:   /53   Pulse 79   Temp 97.4  F " "(36.3  C)   Resp 18   Ht 1.753 m (5' 9\")   Wt 46.3 kg (102 lb)   SpO2 97%   BMI 15.06 kg/m       Exam:  GENERAL APPEARANCE: Well groomed, well-nourished, appropriately dressed   RESPIRATORY: Clear to auscultation, even and unlabored, symmetrical chest wall expansion  CARDIOVASCULAR: RRR, bilateral peripheral edema, S1/S2 normal, pulses diminished  GASTROINTESTINAL: Soft, nontender, nondistended, bowel sounds positive  MUSCULOSKELETAL: No visible joint deformities, moves all extremities  INTEGUMENTARY: Wound to dorsal aspect of left foot covered, left third toe wound dry without sings of infection, bruises on right buttock without open skin   NEUROLOGICAL: Alert to self, memory loss, confusion  PSYCHOLOGICAL: Cooperative, calm      Recent labs in [facility] reviewed by me today.     Assessment/Plan:  BLE edema  Suspected acute heart failure exacerbation  Left leg cellulitis  Left third toe wound   Comment: Stable, followed by wound MD  Plan: Continue aspirin, metoprolol, simvastatin, and Tylenol, follow wound MD orders for wound care, elevate legs, monitor weight, heart healthy diet, OT/PT, cardiology referral, monitor labs as appropriate, monitor for signs of chest pain or shortness of breath     Falls  Generalized weakness  Physical deconditioning  Comment: Fall at home, head CT negative  Plan: OT/PT, would benefit from placement, fall precautions, monitor for safety    Acute on chronic kidney disease  History of Proteus UTI  Urinary retention  Comment:  Labs as above, Barry discontinued for TOV  Plan: Continue Flomax, BMP and CBC, continue PVR, reinsert Barry with worsening renal function,  Outpatient urology/nephrology referral, avoid nephrotoxic agents, monitor for signs of worsening renal function     Poor appetite  Comment: Improved   Plan: Dietary supplements, RD follow-up, monitor intake, monitor weight       Orders:  BMP, CBC    Electronically signed by:    Ezequiel Kimball,FIGUEROA,APRN,AGNP-BC.           The above " note was completed in part using Dragon voice recognition software. Although reviewed after completion, some word and grammatical errors may occur. Please contact the author of this note with any questions.               Sincerely,        SABIHA Quiroz CNP

## 2022-06-10 PROBLEM — E87.5 HYPERKALEMIA: Status: ACTIVE | Noted: 2022-01-01

## 2022-06-10 NOTE — TELEPHONE ENCOUNTER
[facility] Note     Provider: SABIHA Dill CNP  Facility: [facility] Facility Type:  TCU    Allergies   Allergen Reactions     No Known Drug Allergies        Labs Reviewed by provider: CBC and BMP. Not currently on any diuretics or potassium.                Verbal Order/Direction given by Provider: Provider addressed with facility    Provider giving Order:  SABIHA Dill CNP, RN

## 2022-06-11 NOTE — H&P
[facility]    History and Physical  Hospitalist       Date of Admission:  6/10/2022  Date of Service (when I saw the patient): 06/10/22    Assessment & Plan   Jose Gomez is a 89 year old male who presents with hyperkalemia    Note: pt with significant dementia, not able to obtain history from him    Hyperkalemia  SHERWIN  CKD  Hx urinary retention  [needs rec- tamsulosin 0.4 mg daily]  *Baseline creatinine recent ~2.0 (had been 1.4-1.8 late 2021). Recently hospitalized with CHF and L leg wounds. With attempts at diuresis had SHERWIN. Also had hyperkalemia which improved with kayexalate/ furosemide. US showed mild bilateral hydro. Also with urinary retention and was discharged with govea. Lisinopril discontinued that admission, not discharged on diuretics. SHERWIN thought 2/2 diuretics and obstruction.  *K notably rising since discharge. Labs at facility today with K of 5.5 and creatinine of 2.71. pt denies c/o in ED but with dementia. In ED vitals stable, sl hypertensive. K 6.5. creatinine 2.84. EKG w/o hyperkalemic changes. Treated with insulin/ D50, bicarb, albuterol in ED. Also given lasix specifically for kaliuresis.   - telemetry   - kayexalate 30 g PO x 1  - low K diet  - IV fluids, gentle  - nephrology consult  - repeat K in ~4 hours and labs in am  - continue tamsulosin    Chronic HFpEF  Persistent afib  HLD  [needs rec- ASA 81 mg daily, metoprolol 50 mg BID, simvastatin 20 mg at HS]  Admit 5/18-5/27 with CHF. BNP at that time was >9000. Attempted diuresis but creatinine paula. Was not discharged on diuretics and his ACE I was stopped. CT on admission with moderate bilateral pleural effusions R>L. Echo last admission with EF 55-60%, nl RV with mildly diminished RV function, mod TR.  Doesn't appear in overt failure on admission  - telemetry   - compression stockings  - resume metoprolol, aspirin, simvastatin    Neck mass  Notable on admission, has R neck mass that appears possibly pulsatile/  venous.  CT neck without acute abnormality (non-contrast)  - R neck vascular US    L leg cellulitis  S/p treatment with rocephin in hospital.     Elevated transaminases  AST//127 on presentation. 5/2022 were normal. Unclear etiology, no new offending medications apparent  - monitor for now    Anemia  Thrombocytopenia  Hgb 11.4 and plts 142, both improved from recent admission.   - monitor counts    Hx hyperthyroidism  Chronic, previously on methimazole, TSH 4.54 with normal T4 and T3 5/2022.  - no acute issues    GERD  - resume PTA omeprazole 20 mg daily    Dementia  - Re-orient as needed  - Maintain normal day/night, sleep wake cycles  - Minimize sedating/altering medications as able    COVID-19 pending    DVT Prophylaxis: Pneumatic Compression Devices  Code Status: Full Code    Disposition: Expected discharge in 2-4 days     Salvador Esquivel MD, MD  [phone] (P)  Text Page     Primary Care Physician   Gabriel Carroll    Chief Complaint   hyperkalemia    History is obtained from the patient and medical records    History of Present Illness   Jose Gomez is a 89 year old male who presents with hyperkalemia.  Patient has a history of chronic kidney disease.  He was recently hospitalized in late May 2022 with left leg cellulitis as well as congestive heart failure.  He also had hyperkalemia during that admission which was treated with Kayexalate and Lasix.  They attempted to diurese him for his heart failure but he had acute kidney injury.  He was discharged without his lisinopril and not on diuretics.  He is currently in a TCU and blood test on the day of presentation showed a potassium of 5.5.  His creatinine was also up from discharge level of 2.3 to 2.8 on admission.  In the emergency department he is comfortable.  He does have fairly advanced dementia and is really not able to provide much history.  He denies any chest pain or shortness of breath but I am not entirely certain his history is  accurate as he is tangential and talks more about when he was young rather than how he is feeling right now.  Regardless he appears comfortable and in no distress.    Past Medical History    I have reviewed this patient's medical history and updated it with pertinent information if needed.   Past Medical History:   Diagnosis Date     CKD (chronic kidney disease) stage 3, GFR 30-59 ml/min (H)      Esophageal reflux     very mild     Essential hypertension, benign      Hypertensive emergency 4/26/2018     Hyperthyroidism      Mixed hyperlipidemia      Paroxysmal atrial fibrillation (H) 05/28/2018     Pernicious anemia 3/19/2008     Type 2 diabetes, HbA1c goal < 7% (H)      Unspecified internal derangement of knee     LEFT       Past Surgical History   I have reviewed this patient's surgical history and updated it with pertinent information if needed.  Past Surgical History:   Procedure Laterality Date     COLONOSCOPY       NO HISTORY OF SURGERY       PHACOEMULSIFICATION CLEAR CORNEA WITH STANDARD INTRAOCULAR LENS IMPLANT  9/23/2013    Procedure: PHACOEMULSIFICATION CLEAR CORNEA WITH STANDARD INTRAOCULAR LENS IMPLANT;  RIGHT PHACOEMULSIFICATION CLEAR CORNEA WITH STANDARD INTRAOCULAR LENS IMPLANT ;  Surgeon: Hieu Jaimes MD;  Location: [facility]     PHACOEMULSIFICATION CLEAR CORNEA WITH STANDARD INTRAOCULAR LENS IMPLANT  10/14/2013    Procedure: PHACOEMULSIFICATION CLEAR CORNEA WITH STANDARD INTRAOCULAR LENS IMPLANT;  LEFT PHACOEMULSIFICATION CLEAR CORNEA WITH STANDARD INTRAOCULAR LENS IMPLANT ;  Surgeon: Hieu Jaimse MD;  Location: [facility]       Prior to Admission Medications   Prior to Admission Medications   Prescriptions Last Dose Informant Patient Reported? Taking?   Apoaequorin (PREVAGEN PO)  Daughter Yes No   Sig: Take 1 tablet by mouth daily   acetaminophen (TYLENOL) 500 MG tablet   Yes No   Sig: Take 500 mg by mouth every 6 hours as needed for mild pain   aspirin (ASA) 81 MG EC tablet   No No    Sig: Take 1 tablet (81 mg) by mouth daily   metoprolol succinate ER (TOPROL XL) 50 MG 24 hr tablet   No No   Sig: Take 1 tablet (50 mg) by mouth 2 times daily   omeprazole (PRILOSEC) 20 MG DR capsule   No No   Sig: Take 1 capsule (20 mg) by mouth daily   simvastatin (ZOCOR) 20 MG tablet   No No   Sig: Take 1 tablet (20 mg) by mouth At Bedtime   tamsulosin (FLOMAX) 0.4 MG capsule   No No   Sig: Take 1 capsule (0.4 mg) by mouth daily   vitamin B-12 500 MCG PO tablet  Daughter Yes No   Sig: Take 1 tablet by mouth daily       Facility-Administered Medications: None     Allergies   Allergies   Allergen Reactions     No Known Drug Allergies        Social History   I have reviewed this patient's social history and updated it with pertinent information if needed. Jose REY Jason  reports that he quit smoking about 38 years ago. His smoking use included cigars. He has never used smokeless tobacco. He reports current alcohol use. He reports that he does not use drugs.    Family History   I have reviewed this patient's family history and updated it with pertinent information if needed.   Family History   Problem Relation Age of Onset     Heart Disease Father         enlarged heart  at age 66     Family History Negative Mother          at age 88     Cancer Sister          at age 69     Cerebrovascular Disease Brother          at age 81     Cerebrovascular Disease Brother          at age 78     Cerebrovascular Disease Brother          at age 88     Cerebrovascular Disease Sister         b, 1930       Review of Systems   The 10 point Review of Systems is limited by pt's dementia    Physical Exam   Temp: 98.1  F (36.7  C) Temp src: Temporal BP: (!) 152/97 Pulse: 100   Resp: 18 SpO2: 99 %      Vital Signs with Ranges  0 lbs 0 oz    Constitutional: alert, not oriented and in no acute distress  Eyes: EOMI, PERRL  HEENT: OP clear  Respiratory: CTA B without w/c  Cardiovascular: RRR no murmur. Wearing  compression stockings.  GI: soft, nontender, nondistended, BS present  Lymph/Hematologic: no cervical LAD  Genitourinary: deferred  Skin: no rashes or lesions grossly (L leg not examined/ compression stockings on)  Musculoskeletal: no deformities or arthritis  Neurologic: CN II-XII, SUAZO  Psychiatric: cognitive impairment    Data   Data reviewed today:  I personally reviewed the EKG tracing showing no changes concerning for hyperkalemia and the CT neck image(s) showing no acute process (non-contrast).  Recent Labs   Lab 06/10/22  2051 06/10/22  2018 06/10/22  1931 06/10/22  1802 06/10/22  0700   WBC  --   --   --  4.9 4.2   HGB  --   --   --  11.4* 10.3*   MCV  --   --   --  106* 105*   PLT  --   --   --  142* 121*   NA  --   --   --  138 139   POTASSIUM  --   --   --  6.5* 5.5*   CHLORIDE  --   --   --  108 107   CO2  --   --   --  24 21*   BUN  --   --   --  73* 67*   CR  --   --   --  2.84* 2.71*   ANIONGAP  --   --   --  6 11   CAMILA  --   --   --  9.1 9.1   * 158* 130* 135* 88   ALBUMIN  --   --   --  3.5  --    PROTTOTAL  --   --   --  6.9  --    BILITOTAL  --   --   --  0.8  --    ALKPHOS  --   --   --  146  --    ALT  --   --   --  127*  --    AST  --   --   --  174*  --        Recent Results (from the past 24 hour(s))   CT Soft Tissue Neck w/o Contrast    Narrative    EXAM: CT SOFT TISSUE NECK W/O CONTRAST  LOCATION: [facility]  DATE/TIME: 6/10/2022 8:03 PM    INDICATION: Right-sided neck mass  COMPARISON: None.  TECHNIQUE: Routine CT Soft Tissue Neck without IV contrast. Multiplanar reformats. Dose reduction techniques were used.    FINDINGS:     MUCOSAL SPACES/SOFT TISSUES: Normal mucosal spaces of the upper aerodigestive tract within the limits of noncontrast imaging. No definite mucosal mass or inflammation identified. Normal vocal cords and infraglottic trachea. Normal parapharyngeal space   and subcutaneous soft tissues. Normal oral cavity,  spaces, and floor of  mouth structures.    LYMPH NODES: No pathologic lymph nodes by size or morphology criteria.     SALIVARY GLANDS: Normal parotid and submandibular glands. A skin marker was placed over the area of interest. This overlies the right submandibular gland. There is no underlying abnormality.    THYROID: Normal.     VISUALIZED INTRACRANIAL/ORBITS/SINUSES: Moderate volume loss of the visualized brain parenchyma. No focal abnormality of the visualized intracranial compartment or orbits. Visualized paranasal sinuses and mastoid air cells are clear.    OTHER: No destructive osseous lesion. Moderate bilateral pleural effusions right greater than left.      Impression    IMPRESSION:   1.  Normal neck soft tissues.  2.  The area of interest was identified with a skin marker. It overlies the right submandibular gland. No underlying abnormality.  3.  Moderate bilateral pleural effusions right greater than left.

## 2022-06-11 NOTE — PROGRESS NOTES
RECEIVING UNIT ED HANDOFF REVIEW    ED Nurse Handoff Report was reviewed by: Amara Jimenes RN on Keesha 10, 2022 at 11:49 PM

## 2022-06-11 NOTE — PROVIDER NOTIFICATION
MD Notification    Notified Person: MD    Notified Person Name:    Notification Date/Time: 6/11 0730    Notification Interaction:  vocera message    Purpose of Notification:govea placed for retention 20 minutes urine output is a merlot color with adequate output but is still consist in the color. do we need to consult urology do not know baseline urine output color.    Orders Received: urology consulted, UA placed    Comments:

## 2022-06-11 NOTE — PROVIDER NOTIFICATION
MD Notification    Notified Person: MD    Notified Person Name: Dr. Esquivel    Notification Date/Time: 6/11/22 at 0104    Notification Interaction: Amcom page    Purpose of Notification: Pt just arrived to the floor. Last K+ check 5.5. Can you call to clarify orders.     Orders Received: Give Dextrose 10%, and kayexalate, will recheck K+ with AM draw instead of 0400.     Comments:

## 2022-06-11 NOTE — ED NOTES
[facility]  ED Nurse Handoff Report    ED Chief complaint: Abnormal Labs      ED Diagnosis:   Final diagnoses:   Hyperkalemia       Code Status: Full Code    Allergies:   Allergies   Allergen Reactions     No Known Drug Allergies        Patient Story: Pt arrives after referral form PCP after K+ OF 5.5. Upon arrival here, recheck was performed and K+ was 6.5. Potassium shift performed, awaiting recheck K+ level  Focused Assessment:    Abnormal Labs Resulted from Time of ED Arrival to Time of ED Departure   COMPREHENSIVE METABOLIC PANEL - Abnormal       Result Value    Sodium 138      Potassium 6.5 (*)     Chloride 108      Carbon Dioxide (CO2) 24      Anion Gap 6      Urea Nitrogen 73 (*)     Creatinine 2.84 (*)     Calcium 9.1      Glucose 135 (*)     Alkaline Phosphatase 146       (*)      (*)     Protein Total 6.9      Albumin 3.5      Bilirubin Total 0.8      GFR Estimate 20 (*)    CBC WITH PLATELETS AND DIFFERENTIAL - Abnormal    WBC Count 4.9      RBC Count 3.45 (*)     Hemoglobin 11.4 (*)     Hematocrit 36.7 (*)      (*)     MCH 33.0      MCHC 31.1 (*)     RDW 14.6      Platelet Count 142 (*)     % Neutrophils 70      % Lymphocytes 16      % Monocytes 10      % Eosinophils 4      % Basophils 0      % Immature Granulocytes 0      NRBCs per 100 WBC 0      Absolute Neutrophils 3.4      Absolute Lymphocytes 0.8      Absolute Monocytes 0.5      Absolute Eosinophils 0.2      Absolute Basophils 0.0      Absolute Immature Granulocytes 0.0      Absolute NRBCs 0.0     GLUCOSE BY METER - Abnormal    GLUCOSE BY METER POCT 130 (*)    GLUCOSE BY METER - Abnormal    GLUCOSE BY METER POCT 158 (*)    GLUCOSE BY METER - Abnormal    GLUCOSE BY METER POCT 123 (*)    GLUCOSE BY METER - Abnormal    GLUCOSE BY METER POCT 121 (*)      CT Soft Tissue Neck w/o Contrast   Final Result   IMPRESSION:    1.  Normal neck soft tissues.   2.  The area of interest was identified with a skin marker. It  overlies the right submandibular gland. No underlying abnormality.   3.  Moderate bilateral pleural effusions right greater than left.      ]    Treatments and/or interventions provided: Albuterol, Calcium gluconate, D50, Lasix, Insulin,  Sodium Bicarb  Patient's response to treatments and/or interventions: N/A    To be done/followed up on inpatient unit:  See inpatient orders    Does this patient have any cognitive concerns?: Short term memory loss    Activity level - Baseline/Home:  Independent  Activity Level - Current:   Stand with Assist    Patient's Preferred language: English   Needed?: No    Isolation: None  Infection: Not Applicable  Patient tested for COVID 19 prior to admission: YES  Bariatric?: No    Vital Signs:   Vitals:    06/10/22 1756 06/10/22 1930 06/10/22 2015   BP: (!) 167/78 118/88 (!) 152/97   Pulse: 92 92 100   Resp: 14 16 18   Temp: 98.1  F (36.7  C)     TempSrc: Temporal     SpO2: 93% 96% 99%       Cardiac Rhythm:     Was the PSS-3 completed:   Yes  What interventions are required if any?               Family Comments: Minnieece updated over phone  OBS brochure/video discussed/provided to patient/family: Yes            For the majority of the shift this patient's behavior was Green.   Behavioral interventions performed were None.    ED NURSE PHONE NUMBER: [phone]

## 2022-06-11 NOTE — PHARMACY-ADMISSION MEDICATION HISTORY
Pharmacy Medication History  Admission medication history interview status for the 6/10/2022  admission is complete. See EPIC admission navigator for prior to admission medications     Location of Interview: Outside patient room but on unit  Medication history sources: MAR ([facility] [employer], [phone])    Significant changes made to the medication list:  1. Added Miralax    In the past week, patient estimated taking medication this percent of the time: greater than 90%    Additional medication history information:   none    Medication reconciliation completed by provider prior to medication history? No    Time spent in this activity: 15 minutes    Prior to Admission medications    Medication Sig Last Dose Taking? Auth Provider   acetaminophen (TYLENOL) 500 MG tablet Take 500 mg by mouth every 6 hours as needed for mild pain  Yes Unknown, Entered By History   Apoaequorin (PREVAGEN PO) Take 1 tablet by mouth daily 6/10/2022 at Unknown time Yes Reported, Patient   aspirin (ASA) 81 MG EC tablet Take 1 tablet (81 mg) by mouth daily 6/10/2022 at Unknown time Yes Gabriel Carroll MD   metoprolol succinate ER (TOPROL XL) 50 MG 24 hr tablet Take 1 tablet (50 mg) by mouth 2 times daily 6/10/2022 at am Yes Nusrat Goss MD   omeprazole (PRILOSEC) 20 MG DR capsule Take 1 capsule (20 mg) by mouth daily 6/10/2022 at Unknown time Yes Gabriel Carroll MD   polyethylene glycol (MIRALAX) 17 g packet Take 1 packet by mouth daily 6/10/2022 at Unknown time Yes Unknown, Entered By History   simvastatin (ZOCOR) 20 MG tablet Take 1 tablet (20 mg) by mouth At Bedtime 6/9/2022 at Unknown time Yes Gabriel Carroll MD   tamsulosin (FLOMAX) 0.4 MG capsule Take 1 capsule (0.4 mg) by mouth daily 6/10/2022 at Unknown time Yes Nusrat Goss MD   vitamin B-12 500 MCG PO tablet Take 1 tablet by mouth daily  6/10/2022 at Unknown time Yes Reported, Patient   lisinopril (ZESTRIL) 10 MG tablet Take 1 tablet (10 mg) by mouth  every evening   Gabriel Carroll MD     The information provided in this note is only as accurate as the sources available at the time of update(s)     See [place] PharmD, PGY-1 Resident

## 2022-06-11 NOTE — PLAN OF CARE
Goal Outcome Evaluation:    Shift Note: Pt here with hyperkalemia, last check was 5.4. Next check will be this morning. VSS on RA. A&Ox2-3, orientation fluctuates. This morning pt is more agitated and confused. Consults with nephrology and PT. Barry placed at shift change and urine return was a dark red/merlot color. Day nurse notified MD and urology was consulted. Utilized bed alarm overnight to maintain pt safety. L PIV infusing NS at 75ml/hr. Will continue plan of care.

## 2022-06-11 NOTE — PLAN OF CARE
A&Ox2-3, fluctuates. VSS on RA. Tele: a fib CVR. Denies pain. Barry inserted at shift change w/ dark red output adequate UOP. PIV SL. Regular 2g K+ fair appetite. A2.

## 2022-06-11 NOTE — PROGRESS NOTES
Urology    Formal consult note to follow    Patient seen and examined    Govea in place, with clear dark red urine, hematuria appears to be improving and the catheter is draining well. Likely traumatic catheterization. The appearance of the hematuria suggests old blood at this point and not active bleeding. For now should leave catheter alone. Irrigate as needed for clot dysfunction. If hematuria worsening and becomes dysfunctional, will need placement of well lubricated 3-way govea catheter and initiation of continuous saline bladder irrigation    Chris Mcgowan MD   [facility]  [phone] clinic phone

## 2022-06-11 NOTE — CONSULTS
[facility]    Nephrology Consultation     Date of Admission:  6/10/2022    Assessment & Plan     Jose Gomez is a 89 year old male who was admitted on 6/10/2022.     Assessment:    1) SHERWIN on CKD:    With recent SHERWIN improved with treating post-renal obstruction, likely SHERWIN with associated hyperkalemia related  To his govea being removed recently. 1.2L urine since placement suggesting was having significant retention.     Hyperkalemia: shifted in ED, will lower with lokelma and should resolve with govea and resolution of sherwin   CKD: had reported baseline 2/20 of 1.2-1.6, earlier this year reported baseline around 2.0 so clearly some progressive CKD. With dementia, advanced age further evaluation not very fruitful. LIkley HTN and/or chronic post-renal etiology. Will not do further evaluations. Clearly would never be a dialysis candidate.     2) Volume status: pt with hypertension, hypervolemia (edema, pleural effusioon). May need to resume diuretics but will first stop maintence fluids and assess gfr response to bladder drainage    Other:  Dementia  Elevated LFT's  Hx HFpEF, a. Fib  HTN      Plan:  1) continue govea drainage, discussed with urology  2) will discontinue fluids, do not feel renal issues related to hypovolemia and pt with edema, pleural effusions  3) expect potassium and gfr to improve with intervention of bladder drainage but willl give short term lokelma to treat short term  4) repeat PM potassium/gfr  5) low potassium diet    Rod Grant DO  [facility] - Nephrology  [phone]  Cell: [phone]  -------------------------------------------------------------------------------------  Reason for Consult     I was asked to see the patient for hyperkalemia, SHERWIN    Primary Care Physician     Gabriel Carroll      History is obtained from the patient and chart review.      History of Present Illness     Jose Gomez is a 89 year old male who presented to  emergency room due to elevated potassium drawn at his care facility. Value reported at 5.5. Pt with admission this past month with SHERWIN, nephrology consultation and has some post-obstructiive etiology. Discharged with govea catheter with plans for urology follow up. Last admission had diuresiis for lower leg edema but lisinopril and diuretics held on admission. Seen 6/2 with documented govea in place and need for urology follow up. Sometime between 6/2 and 6/10 this was removed. Urology note 6/6 with telephone documentation of attemting follow up appt. Govea placed here with bloody urine.  1.2L urine since this time.     Pt with significant dementia, unable to provide any specific history.     Past Medical History   I have reviewed this patient's medical history and updated it with pertinent information if needed.   Past Medical History:   Diagnosis Date     CKD (chronic kidney disease) stage 3, GFR 30-59 ml/min (H)      Esophageal reflux     very mild     Essential hypertension, benign      Hypertensive emergency 4/26/2018     Hyperthyroidism      Mixed hyperlipidemia      Paroxysmal atrial fibrillation (H) 05/28/2018     Pernicious anemia 3/19/2008     Type 2 diabetes, HbA1c goal < 7% (H)      Unspecified internal derangement of knee     LEFT       Past Surgical History   I have reviewed this patient's surgical history and updated it with pertinent information if needed.  Past Surgical History:   Procedure Laterality Date     COLONOSCOPY       NO HISTORY OF SURGERY       PHACOEMULSIFICATION CLEAR CORNEA WITH STANDARD INTRAOCULAR LENS IMPLANT  9/23/2013    Procedure: PHACOEMULSIFICATION CLEAR CORNEA WITH STANDARD INTRAOCULAR LENS IMPLANT;  RIGHT PHACOEMULSIFICATION CLEAR CORNEA WITH STANDARD INTRAOCULAR LENS IMPLANT ;  Surgeon: Hieu Jaimes MD;  Location: [facility]     PHACOEMULSIFICATION CLEAR CORNEA WITH STANDARD INTRAOCULAR LENS IMPLANT  10/14/2013    Procedure: PHACOEMULSIFICATION CLEAR CORNEA WITH  STANDARD INTRAOCULAR LENS IMPLANT;  LEFT PHACOEMULSIFICATION CLEAR CORNEA WITH STANDARD INTRAOCULAR LENS IMPLANT ;  Surgeon: Hieu Jaimes MD;  Location: [facility]       Prior to Admission Medications   Prior to Admission Medications   Prescriptions Last Dose Informant Patient Reported? Taking?   Apoaequorin (PREVAGEN PO) 6/10/2022 at Unknown time Nursing Home Yes Yes   Sig: Take 1 tablet by mouth daily   acetaminophen (TYLENOL) 500 MG tablet  Nursing Home Yes Yes   Sig: Take 500 mg by mouth every 6 hours as needed for mild pain   aspirin (ASA) 81 MG EC tablet 6/10/2022 at Unknown time jail No Yes   Sig: Take 1 tablet (81 mg) by mouth daily   metoprolol succinate ER (TOPROL XL) 50 MG 24 hr tablet 6/10/2022 at am Nursing Home No Yes   Sig: Take 1 tablet (50 mg) by mouth 2 times daily   omeprazole (PRILOSEC) 20 MG DR capsule 6/10/2022 at Unknown time jail No Yes   Sig: Take 1 capsule (20 mg) by mouth daily   polyethylene glycol (MIRALAX) 17 g packet 6/10/2022 at Unknown time Nursing Home Yes Yes   Sig: Take 1 packet by mouth daily   simvastatin (ZOCOR) 20 MG tablet 6/9/2022 at Unknown time jail No Yes   Sig: Take 1 tablet (20 mg) by mouth At Bedtime   tamsulosin (FLOMAX) 0.4 MG capsule 6/10/2022 at Unknown time jail No Yes   Sig: Take 1 capsule (0.4 mg) by mouth daily   vitamin B-12 500 MCG PO tablet 6/10/2022 at Unknown time Nursing Home Yes Yes   Sig: Take 1 tablet by mouth daily       Facility-Administered Medications: None     Allergies   Allergies   Allergen Reactions     No Known Drug Allergies        Social History   I have reviewed this patient's social history and updated it with pertinent information if needed. Jose Gomez  reports that he quit smoking about 38 years ago. His smoking use included cigars. He has never used smokeless tobacco. He reports current alcohol use. He reports that he does not use drugs.    Family History   I have reviewed this patient's family  history and updated it with pertinent information if needed.   Family History   Problem Relation Age of Onset     Heart Disease Father         enlarged heart  at age 66     Family History Negative Mother          at age 88     Cancer Sister          at age 69     Cerebrovascular Disease Brother          at age 81     Cerebrovascular Disease Brother          at age 78     Cerebrovascular Disease Brother          at age 88     Cerebrovascular Disease Sister         b, 193       Review of Systems   The 10 point Review of Systems is negative other than noted in the HPI.     Physical Exam   Temp: 97  F (36.1  C) Temp src: Temporal BP: (!) 141/70 Pulse: 62   Resp: 18 SpO2: 96 % O2 Device: None (Room air)    Vital Signs with Ranges  Temp:  [97  F (36.1  C)-98.1  F (36.7  C)] 97  F (36.1  C)  Pulse:  [] 62  Resp:  [13-18] 18  BP: (111-167)/(70-97) 141/70  SpO2:  [93 %-99 %] 96 %  166 lbs 10.68 oz    GENERAL: healthy, alert, NAD  HEENT:  Normocephalic. No gross abnormalities.  Pupils equal.  MMM.  Dentition is poor, many missing teeth  CV: IRR, no murmurs, no clicks, gallops, or rubs, 2-3+ lower leg edema, in wraps  RESP: decreased at bases  GI: Abdomen soft/nt/nd, BS normal.   MUSCULOSKELETAL: extremities nl - no gross deformities noted  SKIN: no suspicious lesions or rashes, dry to touch  PSYCH: mood good, affect appropriate  Barry cath in place    Data   BMP  Recent Labs   Lab 22  0831 22  0059 22  0013 22  0002 06/10/22  2246 06/10/22  2144 06/10/22  2142 06/10/22  1931 06/10/22  1802 06/10/22  0700     --   --   --   --   --   --   --  138 139   POTASSIUM 5.4*  --  5.5*  --   --   --  5.4*  --  6.5* 5.5*   CHLORIDE 110*  --   --   --   --   --   --   --  108 107   CAMILA 9.2  --   --   --   --   --   --   --  9.1 9.1   CO2 21  --   --   --   --   --   --   --  24 21*   BUN 73*  --   --   --   --   --   --   --  73* 67*   CR 2.71*  --   --   --   --   --   --   --   2.84* 2.71*   * 102*  --  100* 106*   < >  --    < > 135* 88    < > = values in this interval not displayed.     [email or web address](phos:4)  CBC)  Recent Labs   Lab 06/10/22  1802 06/10/22  0700   WBC 4.9 4.2   HGB 11.4* 10.3*   HCT 36.7* 32.9*   * 105*   * 121*     Recent Labs   Lab 06/10/22  1802   *   *   ALKPHOS 146   BILITOTAL 0.8     No lab results found in last 7 days.  No results found for: D2VIT, D3VIT, DTOT  Recent Labs   Lab 06/10/22  1802   HGB 11.4*   HCT 36.7*   *

## 2022-06-11 NOTE — ED PROVIDER NOTES
History   Chief Complaint:  Abnormal Labs     HPI   Jose Gomez is a 89 year old male with history of hypertension, hyperlipidemia, type II diabetes, and paroxysmal atrial fibrillation who presents for evaluation of abnormal labs. Today the patient was notified that his potassium was significantly elevated at 5.5. Due to concern for this EMS was called to bring him into the ED for evaluation. Here in the ED, the patient reports that he is feeling well and he denies any recent fever, shortness of breath, chest pain, or palpitations.     Review of Systems   Constitutional: Negative for fever.   Respiratory: Negative for shortness of breath.    Cardiovascular: Negative for chest pain and palpitations.   10 point review of systems performed and is negative except as above and in HPI.     Allergies:  No known drug allergies     Medications:  Prevagen  Aspirin   Toprol XL   Omeprazole  Simvastatin  Flomax     Past Medical History:     Chronic kidney disease, stage 3   Esophageal reflux   Hypertension   Hyperthyroidism  Hyperlipidemia  Paroxysmal atrial fibrillation  Pernicious anemia   Diabetes mellitus, type II      Past Surgical History:    Colonoscopy  Phacoemulsification clear cornea with standard intraocular lens implant x2      Family History:    Heart disease - Father   Cancer - Sister   CVA - Brother and sister     Social History:  The patient presents to the ED via EMS.     Physical Exam     Patient Vitals for the past 24 hrs:   BP Temp Temp src Pulse Resp SpO2   06/10/22 2015 (!) 152/97 -- -- 100 18 99 %   06/10/22 1930 118/88 -- -- 92 16 96 %   06/10/22 1756 (!) 167/78 98.1  F (36.7  C) Temporal 92 14 93 %       Physical Exam  General: Resting on the gurney, appears  uncomfortable  Head:  The scalp, face, and head appear normal  Mouth/Throat: Mucus membranes are slightly dry.   Neck:  Pulsatile vascular appearing mass on the right neck.     Significantly smaller similar appearing abnormality on the left.    CV:  Regular rate    Normal S1 and S2  No pathological murmur   Resp:  Breath sounds clear and equal bilaterally    Non-labored, no retractions or accessory muscle use    No coarseness    No wheezing   GI:  Abdomen is soft, no rigidity    No tenderness to palpation  MS:  Normal motor assessment of all extremities.    Good capillary refill noted.  Skin:   No rash or lesions noted.  Neuro:  Speech is normal and fluent. No apparent deficit.  Psych: Awake. Alert.  Normal affect.      Appropriate interactions.     Emergency Department Course   ECG  ECG results from 06/10/22   EKG 12 lead     Value    Systolic Blood Pressure     Diastolic Blood Pressure     Ventricular Rate 85    Atrial Rate 98    VT Interval     QRS Duration 74        QTc 452    P Axis     R AXIS 83    T Axis -26    Interpretation ECG      Atrial fibrillation  Low voltage QRS  Cannot rule out Anteroseptal infarct (cited on or before 18-MAY-2022)  Abnormal ECG  When compared with ECG of 18-MAY-2022 18:04,  Questionable change in initial forces of Septal leads  ST no longer elevated in Inferior leads  Inverted T waves have replaced nonspecific T wave abnormality in Inferior leads  Confirmed by GENERATED REPORT, COMPUTER ([id number]),  Kaun Reid ([id number]) on 6/10/2022 7:26:34 PM         Imaging:  CT Soft Tissue Neck w/o Contrast   Final Result   IMPRESSION:    1.  Normal neck soft tissues.   2.  The area of interest was identified with a skin marker. It overlies the right submandibular gland. No underlying abnormality.   3.  Moderate bilateral pleural effusions right greater than left.      Report per radiology    Laboratory:  Labs Ordered and Resulted from Time of ED Arrival to Time of ED Departure   COMPREHENSIVE METABOLIC PANEL - Abnormal       Result Value    Sodium 138      Potassium 6.5 (*)     Chloride 108      Carbon Dioxide (CO2) 24      Anion Gap 6      Urea Nitrogen 73 (*)     Creatinine 2.84 (*)     Calcium 9.1      Glucose 135 (*)      Alkaline Phosphatase 146       (*)      (*)     Protein Total 6.9      Albumin 3.5      Bilirubin Total 0.8      GFR Estimate 20 (*)    CBC WITH PLATELETS AND DIFFERENTIAL - Abnormal    WBC Count 4.9      RBC Count 3.45 (*)     Hemoglobin 11.4 (*)     Hematocrit 36.7 (*)      (*)     MCH 33.0      MCHC 31.1 (*)     RDW 14.6      Platelet Count 142 (*)     % Neutrophils 70      % Lymphocytes 16      % Monocytes 10      % Eosinophils 4      % Basophils 0      % Immature Granulocytes 0      NRBCs per 100 WBC 0      Absolute Neutrophils 3.4      Absolute Lymphocytes 0.8      Absolute Monocytes 0.5      Absolute Eosinophils 0.2      Absolute Basophils 0.0      Absolute Immature Granulocytes 0.0      Absolute NRBCs 0.0     GLUCOSE BY METER - Abnormal    GLUCOSE BY METER POCT 130 (*)    GLUCOSE BY METER - Abnormal    GLUCOSE BY METER POCT 158 (*)    GLUCOSE BY METER - Abnormal    GLUCOSE BY METER POCT 123 (*)    GLUCOSE MONITOR NURSING POCT   GLUCOSE MONITOR NURSING POCT   GLUCOSE MONITOR NURSING POCT   POTASSIUM   POTASSIUM      Emergency Department Course:     Reviewed:  I reviewed nursing notes, vitals and past medical history    Assessments:  1901:  I obtained history and examined the patient as noted above.     Consults:  2115: I spoke with Dr. Esquivel of the hospitalist service regarding patient's presentation, findings, and plan of care.     Interventions:  1931 Calcium gluconate 1 g IV   1935 Albuterol 10 mg Nebulization  1939 D50 25 g IV   1941 Insulin 4.63 units IV   1943 Sodium bicarbonate 50 mEq IV      Disposition:  The patient was admitted to the hospital under the care of Dr. Esquivel.     Impression & Plan     Medical Decision Making:  Jose Gomez is a 89 year old male who presents for evaluation of a lab abnormality found by hid primary care doctor.  In the ED labs were rechecked and his potassium was higher.  The patient was treated with insulin, glucose, bicarb, calcium,  and albuterol as well as IVF and lasix. T waves normal. The patient does require inpatient treatment for this.      They are admitted to the hospitalist service for further management and treatment.      Diagnosis:    ICD-10-CM    1. Hyperkalemia  E87.5          Scribe Disclosure:  I, Torsten Eugene, am serving as a scribe at 7:01 PM on 6/10/2022 to document services personally performed by April Patel MD based on my observations and the provider's statements to me.           April Patel MD  06/10/22 6302

## 2022-06-11 NOTE — PROGRESS NOTES
[facility]    Hospitalist Progress Note    Interval History   - Barry placed for urinary retention, patient returning dark red urine  - Patient appears somewhat confused, alert and oriented x2, weak and requires assist of 1-2, unclear baseline given known history of dementia  - Nephrology and Urology consult for further assistance  - Discussed with daughter Armida, unfortunately Armida was very confused about the patient's recent timeline. She initially said patient has been living at home with Armida and his son since most recent discharge. Does not use any assists to walk at home. Armida also thought that the patient weighed 102lbs.   Armida would also be tangential and would talk about patient's history of gambling problems many years ago despite me asking specifically about his recent baseline mental status. When I asked where he is living, she started discussing at length their home history for the last fifty years.   However, with further clarification, patient was living at home in mid May with her and her brother, and then following hospitalization 5/18 to 5/27, patient was discharged to [facility], and then brought to the ED from the TCU yesterday. I told Armida that the patient weights around 165lbs.    Assessment & Plan   Summary: Jose Gomez is a 89 year old male with PMH diastolic congestive heart failure, persistent atrial fibrillation, falls, HLD, CKD, dementia, GERD, who was admitted on 6/10/2022 with acute kidney injury and hyperkalemia.    Hyperkalemia due to acute kidney injury  Acute kidney injury on CKD  Baseline creatinine recent ~2.0 (had been 1.4-1.8 late 2021). Recently hospitalized 5/18 to 5/27 with CHF and L leg wounds. With attempts at diuresis had SHERWIN. Also had hyperkalemia which improved with kayexalate/ furosemide. US showed mild bilateral hydro. Lisinopril discontinued that admission, not discharged on diuretics. SHERWIN thought 2/2 diuretics and obstruction.   K  "notably rising since discharge. Brought to ED for hyperkalemia, in ED noted K 6.5, creatinine 2.84. EKG w/o hyperkalemic changes. Treated with insulin/ D50, bicarb, albuterol in ED. Also given lasix specifically for kaliuresis. Kayexalate 30 g PO x 1 given  - Low K diet  - NS @ 75ml/hr  - Nephrology consult  - Monitor potassium q8h  - Continue tamsulosin    Hematuria  Urinary retention  History of BPH. Was recently discharged from the hospital on 5/27/2022 with a govea due to urinary retention. It appears that govea was removed at some time while he was at TCU between 5/27 and 6/10. Govea placed 6/11 for urinary retention returning \"Merlot\" colored urine.  - Check UA  - Urology consult    Chronic HFpEF  Persistent afib  HLD  Admit 5/18-5/27 with CHF. BNP at that time was >9000. Attempted diuresis but creatinine paula. Was not discharged on diuretics and his ACE I was stopped. CT on admission with moderate bilateral pleural effusions R>L. Echo 5/19/2022 with EF 55-60%, nl RV with mildly diminished RV function, mod TR.  Doesn't appear in overt failure on admission--appears volume overloaded. Renal function likely related to urinary retention not prerenal.  - Continue metoprolol, aspirin, simvastatin  - Check BNP    Pulsatile right neck internal jugular vein. Notable on admission, has R neck mass that appears possibly pulsatile/ venous.  CT neck without acute abnormality. R neck US shows pulsatile R internal jugular vein.    Elevated transaminases  AST//127 on presentation. 5/2022 were normal. Unclear etiology, no new offending medications apparent  - Monitor for now    Anemia  Thrombocytopenia  Hgb 11.4 and plts 142, both improved from recent admission.   - monitor counts    Hx hyperthyroidism  Chronic, previously on methimazole, TSH 4.54 with normal T4 and T3 5/2022.  - no acute issues    GERD  - PTA omeprazole 20 mg daily    Dementia  - Re-orient as needed  - Maintain normal day/night, sleep wake cycles  - " Minimize sedating/altering medications as able    DVT Prophylaxis: Pneumatic Compression Devices  Code Status: Full Code  PT/OT: ordered  Diet: Combination Diet Regular Diet Adult; 2 gm K Diet      Disposition: Expected discharge 2-3 days    - I spent 35 minutes, with greater than 50% spent in counseling and coordination of care with the patient and nurse and daughter Yang    Colby Ornelas MD, MD  Text Page  (7am to 6pm)  -Data reviewed today: I reviewed all new labs and imaging results over the last 24 hours.    Physical Exam   Temp: 97  F (36.1  C) Temp src: Temporal BP: (!) 141/70 Pulse: 62   Resp: 18 SpO2: 96 % O2 Device: None (Room air)    Vitals:    06/11/22 0033   Weight: 75.6 kg (166 lb 10.7 oz)     Vital Signs with Ranges  Temp:  [97  F (36.1  C)-98.1  F (36.7  C)] 97  F (36.1  C)  Pulse:  [] 62  Resp:  [13-18] 18  BP: (111-167)/(70-97) 141/70  SpO2:  [93 %-99 %] 96 %  No intake/output data recorded.  O2 requirements: None    Constitutional: Frail elderly male appears weak, tired  HEENT: Eyes nonicteric, oral mucosa moist  Cardiovascular: Irregular, normal S1/2, systolic murmur  Respiratory: CTAB, no wheezing or crackles  Vascular: No LE pitting edema  GI: Normoactive bowel sounds, nontender  Skin/Integumen: No rashes  Neuro/Psych: Appropriate affect and mood. A&Ox3, moves all extremities    Medications     sodium chloride 75 mL/hr at 06/11/22 0208       aspirin  81 mg Oral Daily     cyanocobalamin  500 mcg Oral Daily     metoprolol succinate ER  50 mg Oral BID     pantoprazole  40 mg Oral Daily     simvastatin  20 mg Oral At Bedtime     sodium chloride (PF)  3 mL Intracatheter Q8H     tamsulosin  0.4 mg Oral Daily       Data   Recent Labs   Lab 06/11/22  0059 06/11/22  0013 06/11/22  0002 06/10/22  2246 06/10/22  2144 06/10/22  2142 06/10/22  1931 06/10/22  1802 06/10/22  0700   WBC  --   --   --   --   --   --   --  4.9 4.2   HGB  --   --   --   --   --   --   --  11.4* 10.3*   MCV  --   --    --   --   --   --   --  106* 105*   PLT  --   --   --   --   --   --   --  142* 121*   NA  --   --   --   --   --   --   --  138 139   POTASSIUM  --  5.5*  --   --   --  5.4*  --  6.5* 5.5*   CHLORIDE  --   --   --   --   --   --   --  108 107   CO2  --   --   --   --   --   --   --  24 21*   BUN  --   --   --   --   --   --   --  73* 67*   CR  --   --   --   --   --   --   --  2.84* 2.71*   ANIONGAP  --   --   --   --   --   --   --  6 11   CAMILA  --   --   --   --   --   --   --  9.1 9.1   *  --  100* 106*   < >  --    < > 135* 88   ALBUMIN  --   --   --   --   --   --   --  3.5  --    PROTTOTAL  --   --   --   --   --   --   --  6.9  --    BILITOTAL  --   --   --   --   --   --   --  0.8  --    ALKPHOS  --   --   --   --   --   --   --  146  --    ALT  --   --   --   --   --   --   --  127*  --    AST  --   --   --   --   --   --   --  174*  --     < > = values in this interval not displayed.       Imaging:   Recent Results (from the past 24 hour(s))   CT Soft Tissue Neck w/o Contrast    Narrative    EXAM: CT SOFT TISSUE NECK W/O CONTRAST  LOCATION: [facility]  DATE/TIME: 6/10/2022 8:03 PM    INDICATION: Right-sided neck mass  COMPARISON: None.  TECHNIQUE: Routine CT Soft Tissue Neck without IV contrast. Multiplanar reformats. Dose reduction techniques were used.    FINDINGS:     MUCOSAL SPACES/SOFT TISSUES: Normal mucosal spaces of the upper aerodigestive tract within the limits of noncontrast imaging. No definite mucosal mass or inflammation identified. Normal vocal cords and infraglottic trachea. Normal parapharyngeal space   and subcutaneous soft tissues. Normal oral cavity,  spaces, and floor of mouth structures.    LYMPH NODES: No pathologic lymph nodes by size or morphology criteria.     SALIVARY GLANDS: Normal parotid and submandibular glands. A skin marker was placed over the area of interest. This overlies the right submandibular gland. There is no underlying  abnormality.    THYROID: Normal.     VISUALIZED INTRACRANIAL/ORBITS/SINUSES: Moderate volume loss of the visualized brain parenchyma. No focal abnormality of the visualized intracranial compartment or orbits. Visualized paranasal sinuses and mastoid air cells are clear.    OTHER: No destructive osseous lesion. Moderate bilateral pleural effusions right greater than left.      Impression    IMPRESSION:   1.  Normal neck soft tissues.  2.  The area of interest was identified with a skin marker. It overlies the right submandibular gland. No underlying abnormality.  3.  Moderate bilateral pleural effusions right greater than left.   US Upper Extremity Venous Duplex Right    Narrative    EXAM: US UPPER EXTREMITY VENOUS DUPLEX RIGHT  LOCATION: [facility]  DATE/TIME: 6/10/2022 11:35 PM    INDICATION: imaging of R neck vessels (carotid, jugular), apparent pulsatile mass in neck  COMPARISON: None.  TECHNIQUE: Venous Duplex ultrasound of the right upper extremity with (when possible) and without compression, augmentation, and duplex. Color flow and spectral Doppler with waveform analysis performed.    FINDINGS: Ultrasound includes evaluation of the internal jugular vein, innominate vein, subclavian vein, axillary vein, and brachial vein. The superficial cephalic and basilic veins were also evaluated where seen.     RIGHT: The internal jugular vein is enlarged and dilated with pulsatile flow. Subclavian vein appears normal. No venous thrombus. No solid neck mass. No superficial thrombophlebitis.      Impression    IMPRESSION:  1.  Dilated right internal jugular vein with pulsatile flow. AV fistula is a possibility.

## 2022-06-12 NOTE — CONSULTS
[facility] Urology Consultation    Jose Gomez MRN# [id number]   Age: 89 year old YOB: 1928     Date of Admission:  6/10/2022    Reason for consult: Hematuria       Requesting physician: Colby Ornelas MD       Level of consult: One-time consult to assist in determining a diagnosis and to recommend an appropriate treatment plan           Assessment and Recommendation:   Assessment:   89 yo M with dementia, upon whom urology is consulted for gross hematuria in setting of Govea catheter placement for urinary retention. Likely traumatic catheterization but will need outpatient cystoscopy to ensure there is no bladder tumor etc. A renal ultrasound performed last month shows no obvious concerning renal mass or bladder mass. He should get CT abd/pelvis w/o contrast prior to discharge (CKD precludes IV contrast) and return for outpatient cystoscopy to complete hematuria workup.    The appearance of the hematuria suggests old blood at this point and not active bleeding. For now should leave catheter alone. Irrigate as needed for clot dysfunction. If hematuria worsening and becomes dysfunctional, will need placement of well lubricated 3-way govea catheter and initiation of continuous saline bladder irrigation    As for the urinary retention, he is on alpha blocker tamsulosin. He should also start 5ARI for maximum medical therapy given his recurrent issues with retention presumably from BPH      Recommendations:   - continue tamsulosin 0.4 mg daily  - start finasteride 5 mg daily.  - leave govea catheter in place at least 1 more week  - irrigate as needed for catheter dysfunction  - if worsening hematuria and dysfunction may need 3-way Govea catheter placed  - CT abd/pelvis w/o contrast prior to discharge  - follow up for office cystoscopy as outpatient    Chris Mcgowan MD   [facility]  [phone] clinic phone               Chief Complaint:   Hematuria     Unable to obtain a history  from the patient due to dementia    89 yo M with dementia, upon whom urology is consulted for gross hematuria in setting of Barry catheter placement for urinary retention    In 2018 he had been seen as inpatient consult by [facility] for scrotal ecchymosis and retention    He had a recent hospitalization in May 2022 for lower extremity swelling, admitted for acute heart failure exacerbation and left leg cellulitis. He had worsening renal function and was found to have urinary retention with 1L post void residual and had a Barry catheter placed. He was recommended outpatient followup with urology. Was not able to follow up with urology. At some point his Barry catheter was removed when he was at the TCU and his PVR had ranged from 9-292 ml since then (see progress note from 6/9/22)    He is now readmitted after outpatient labs showed hyperkalemia. A Barry catheter was placed for retention and now he has dark red urine output.    He is unable to participate in a discussion with me due to dementia; he is resting comfortably          Past Medical History:     I have reviewed this patient's past medical history  Past Medical History:   Diagnosis Date     CKD (chronic kidney disease) stage 3, GFR 30-59 ml/min (H)      Esophageal reflux     very mild     Essential hypertension, benign      Hypertensive emergency 4/26/2018     Hyperthyroidism      Mixed hyperlipidemia      Paroxysmal atrial fibrillation (H) 05/28/2018     Pernicious anemia 3/19/2008     Type 2 diabetes, HbA1c goal < 7% (H)      Unspecified internal derangement of knee     LEFT             Past Surgical History:     I have reviewed this patient's past surgical history  Past Surgical History:   Procedure Laterality Date     COLONOSCOPY       NO HISTORY OF SURGERY       PHACOEMULSIFICATION CLEAR CORNEA WITH STANDARD INTRAOCULAR LENS IMPLANT  9/23/2013    Procedure: PHACOEMULSIFICATION CLEAR CORNEA WITH STANDARD INTRAOCULAR LENS IMPLANT;  RIGHT  PHACOEMULSIFICATION CLEAR CORNEA WITH STANDARD INTRAOCULAR LENS IMPLANT ;  Surgeon: Hieu Jaimes MD;  Location: [facility]     PHACOEMULSIFICATION CLEAR CORNEA WITH STANDARD INTRAOCULAR LENS IMPLANT  10/14/2013    Procedure: PHACOEMULSIFICATION CLEAR CORNEA WITH STANDARD INTRAOCULAR LENS IMPLANT;  LEFT PHACOEMULSIFICATION CLEAR CORNEA WITH STANDARD INTRAOCULAR LENS IMPLANT ;  Surgeon: Hieu Jaimes MD;  Location: [facility]             Social History:     I have reviewed this patient's social history  Social History     Tobacco Use     Smoking status: Former Smoker     Types: Cigars     Quit date: 5/3/1984     Years since quittin.1     Smokeless tobacco: Never Used   Substance Use Topics     Alcohol use: Yes     Comment: 1-2x per month             Family History:     I have reviewed this patient's family history  Family History   Problem Relation Age of Onset     Heart Disease Father         enlarged heart  at age 66     Family History Negative Mother          at age 88     Cancer Sister          at age 69     Cerebrovascular Disease Brother          at age 81     Cerebrovascular Disease Brother          at age 78     Cerebrovascular Disease Brother          at age 88     Cerebrovascular Disease Sister         b, 1930     Family history not discussed          Immunizations:     Immunization History   Administered Date(s) Administered     COVID-19,PF,Pfizer 12+ Yrs ( and After) 2022, 2022     Influenza (High Dose) 3 valent vaccine 2013, 10/22/2014, 2019     Influenza (IIV3) PF 10/27/2004, 10/27/2005, 10/15/2006, 10/10/2007, 2009, 10/21/2010, 2011     Pneumo Conj 13-V (&after) 2015     Pneumococcal 23 valent 2011     Tdap (Adacel,Boostrix) 2011, 2022             Allergies:     Allergies   Allergen Reactions     No Known Drug Allergies              Medications:     Current Facility-Administered Medications    Medication     acetaminophen (TYLENOL) tablet 650 mg    Or     acetaminophen (TYLENOL) Suppository 650 mg     aspirin EC tablet 81 mg     cyanocobalamin (VITAMIN B-12) tablet 500 mcg     glucose gel 15-30 g    Or     dextrose 50 % injection 25-50 mL    Or     glucagon injection 1 mg     lidocaine (LMX4) cream     lidocaine 1 % 0.1-1 mL     melatonin tablet 1 mg     metoprolol succinate ER (TOPROL XL) 24 hr tablet 50 mg     ondansetron (ZOFRAN ODT) ODT tab 4 mg    Or     ondansetron (ZOFRAN) injection 4 mg     pantoprazole (PROTONIX) EC tablet 40 mg     polyethylene glycol (MIRALAX) Packet 17 g     senna-docusate (SENOKOT-S/PERICOLACE) 8.6-50 MG per tablet 1 tablet    Or     senna-docusate (SENOKOT-S/PERICOLACE) 8.6-50 MG per tablet 2 tablet     simvastatin (ZOCOR) tablet 20 mg     sodium chloride (PF) 0.9% PF flush 3 mL     sodium chloride (PF) 0.9% PF flush 3 mL     sodium zirconium cyclosilicate (LOKELMA) packet 10 g     tamsulosin (FLOMAX) capsule 0.4 mg             Review of Systems:   Review of systems is not obtainable due to patient factors - dementia, confusion     Genitounirinary:   Barry in place with dark red urine without obvious clots             Data:   All laboratory and imaging data in the past 24 hours reviewed  Results for orders placed or performed during the hospital encounter of 06/10/22 (from the past 24 hour(s))   Glucose by meter   Result Value Ref Range    GLUCOSE BY METER POCT 100 (H) 70 - 99 mg/dL   US Upper Extremity Venous Duplex Right    Narrative    EXAM: US UPPER EXTREMITY VENOUS DUPLEX RIGHT  LOCATION: [facility]  DATE/TIME: 6/10/2022 11:35 PM    INDICATION: imaging of R neck vessels (carotid, jugular), apparent pulsatile mass in neck  COMPARISON: None.  TECHNIQUE: Venous Duplex ultrasound of the right upper extremity with (when possible) and without compression, augmentation, and duplex. Color flow and spectral Doppler with waveform analysis  performed.    FINDINGS: Ultrasound includes evaluation of the internal jugular vein, innominate vein, subclavian vein, axillary vein, and brachial vein. The superficial cephalic and basilic veins were also evaluated where seen.     RIGHT: The internal jugular vein is enlarged and dilated with pulsatile flow. Subclavian vein appears normal. No venous thrombus. No solid neck mass. No superficial thrombophlebitis.      Impression    IMPRESSION:  1.  Dilated right internal jugular vein with pulsatile flow. AV fistula is a possibility.   Potassium   Result Value Ref Range    Potassium 5.5 (H) 3.4 - 5.3 mmol/L   CK total   Result Value Ref Range    CK 47 30 - 300 U/L   Glucose by meter   Result Value Ref Range    GLUCOSE BY METER POCT 102 (H) 70 - 99 mg/dL   UA with Microscopic   Result Value Ref Range    Color Urine Red (A) Colorless, Straw, Light Yellow, Yellow    Appearance Urine Bloody (A) Clear    Glucose Urine Negative Negative mg/dL    Bilirubin Urine Negative Negative    Ketones Urine Negative Negative mg/dL    Specific Gravity Urine 1.012 1.003 - 1.035    Blood Urine Large (A) Negative    pH Urine 6.0 5.0 - 7.0    Protein Albumin Urine 100  (A) Negative mg/dL    Urobilinogen Urine Normal Normal, 2.0 mg/dL    Nitrite Urine Negative Negative    Leukocyte Esterase Urine Trace (A) Negative    RBC Urine >182 (H) <=2 /HPF    WBC Urine >182 (H) <=5 /HPF   Basic metabolic panel   Result Value Ref Range    Sodium 139 133 - 144 mmol/L    Potassium 5.4 (H) 3.4 - 5.3 mmol/L    Chloride 110 (H) 94 - 109 mmol/L    Carbon Dioxide (CO2) 21 20 - 32 mmol/L    Anion Gap 8 3 - 14 mmol/L    Urea Nitrogen 73 (H) 7 - 30 mg/dL    Creatinine 2.71 (H) 0.66 - 1.25 mg/dL    Calcium 9.2 8.5 - 10.1 mg/dL    Glucose 128 (H) 70 - 99 mg/dL    GFR Estimate 21 (L) >60 mL/min/1.73m2   Nt probnp inpatient   Result Value Ref Range    N terminal Pro BNP Inpatient 12,759 (H) 0 - 1,800 pg/mL   Potassium   Result Value Ref Range    Potassium 5.1 3.4 -  5.3 mmol/L   Potassium   Result Value Ref Range    Potassium 4.7 3.4 - 5.3 mmol/L     All imaging studies reviewed by me.    Renal ultrasound 5/24/2022    Intravesical protrusion of prostate on renal/bladder ultrasound         Attestation:  I have reviewed today's vital signs, notes, medications, labs and imaging.  Amount of time performed on this consult: 45 minutes.    Chris Mcgowan MD

## 2022-06-12 NOTE — PROVIDER NOTIFICATION
MD Notification    Notified Person: MD    Notified Person Name: Asha Zuniga    Notification Date/Time: 6/11/22 at 2305    Notification Interaction: [employer]    Purpose of Notification:  Pts UOP for the last 6 hours is 50, urine still dark red/merlot color. Bladder scan shows only 115 but pt now agitated and uncomfortable saying he needs to pee. Could you put in orders to irrigate?     Orders Received: bladder irrigation orders    Comments:

## 2022-06-12 NOTE — PROGRESS NOTES
"[facility]    Hospitalist Progress Note    Interval History   - Good UOP since yesterday and hematuria cleared, renal function not improving as much  - Remains somewhat confused, alert and oriented x2, generalized weakness requiring assist    Assessment & Plan   Summary: Jose Gomez is a 89 year old male with PMH diastolic congestive heart failure, persistent atrial fibrillation, falls, HLD, CKD, dementia, GERD, who was admitted on 6/10/2022 with acute kidney injury and hyperkalemia.    Acute kidney injury on CKD due to urinary retention  Hematuria, improved  Hyperkalemia, improved  Baseline creatinine recent ~2.0 (had been 1.4-1.8 late 2021). Recently hospitalized 5/18 to 5/27 with CHF and L leg wounds. With attempts at diuresis had SHERWIN. Also had hyperkalemia which improved with kayexalate/ furosemide. US showed mild bilateral hydro. Lisinopril discontinued that admission, not discharged on diuretics. SHERWIN thought 2/2 diuretics and obstruction.   K notably rising since discharge. Brought to ED for hyperkalemia, in ED noted K 6.5, creatinine 2.84. EKG w/o hyperkalemic changes. Treated with insulin/ D50, bicarb, albuterol in ED. Also given lasix specifically for kaliuresis. Kayexalate 30 g PO x 1 given. Hyperakelmia improved on 6/11.   Renal function remains elevated Cr 2.71 on 6/12.  - Appreciate Nephrology consult  - Low K diet  - Monitor potassium q8h  - Continue tamsulosin    Urinary retention  Hematuria, probably traumatic cath, improved  History of BPH. Was recently discharged from the hospital on 5/27/2022 with a govea due to urinary retention. It appears that govea was removed at some time while he was at TCU between 5/27 and 6/10. Govea placed 6/11 for urinary retention returning \"Merlot\" colored urine. Hematuria improved on 6/12.  - Urology consult appreciated   - CT abd/pelv noncontrast   - Continue govea for at least one week   - Outpatient follow up with them  - Continue " finasteride and tamsulosin    Moderate bilateral pleural effusions  Chronic HFpEF  Persistent afib  HLD  Admit 5/18-5/27 with CHF. BNP at that time was >9000. Attempted diuresis but creatinine paula. Was not discharged on diuretics and his ACE I was stopped. CT on admission with moderate bilateral pleural effusions R>L. Echo 5/19/2022 with EF 55-60%, nl RV with mildly diminished RV function, mod TR.  Doesn't appear in overt failure on admission--appears volume overloaded. Renal function likely related to urinary retention not prerenal.  - Continue metoprolol, aspirin, simvastatin  - Consider diuresis this admission, defer to Nephrology regarding timing given SHERWIN    Elevated transaminases, improving  AST//127 on presentation. 5/2022 were normal. Unclear etiology, no new offending medications apparent. Recheck on 6/12 shows improved LFTs--no further workup needed at this point.  - Recheck again prior to discharge    Anemia  Thrombocytopenia  Hgb 11.4 and plts 142, both improved from recent admission.   - monitor counts    Hx hyperthyroidism  Chronic, previously on methimazole, TSH 4.54 with normal T4 and T3 5/2022.  - no acute issues    GERD  - PTA omeprazole 20 mg daily    Dementia  Appears to be somewhat confused, possibly worse than baseline.  - Re-orient as needed  - Maintain normal day/night, sleep wake cycles  - Minimize sedating/altering medications as able    DVT Prophylaxis: Pneumatic Compression Devices  Code Status: Full Code  PT/OT: ordered  Diet: Combination Diet Regular Diet Adult; 2 gm K Diet      Disposition: Expected discharge 2-3 days back to TCU once renal function is improved  Colby Ornelas MD, MD  Text Page  (7am to 6pm)  -Data reviewed today: I reviewed all new labs and imaging results over the last 24 hours.    Physical Exam   Temp: (!) 95.9  F (35.5  C) Temp src: Axillary BP: 106/53 Pulse: 61   Resp: 18 SpO2: 92 % O2 Device: None (Room air)    Vitals:    06/11/22 0033 06/12/22 0617    Weight: 75.6 kg (166 lb 10.7 oz) 76.7 kg (169 lb 1.5 oz)     Vital Signs with Ranges  Temp:  [95.7  F (35.4  C)-96.4  F (35.8  C)] 95.9  F (35.5  C)  Pulse:  [61-85] 61  Resp:  [16-18] 18  BP: (106-115)/(53-77) 106/53  SpO2:  [92 %-94 %] 92 %  I/O last 3 completed shifts:  In: -   Out: 1335 [Urine:1335]  O2 requirements: None    Constitutional: Frail elderly male appears weak, tired  HEENT: Eyes nonicteric, oral mucosa moist  Cardiovascular: Irregular, normal S1/2, systolic murmur  Respiratory: CTAB, no wheezing or crackles  Vascular: No LE pitting edema  GI: Normoactive bowel sounds, nontender  Skin/Integumen: No rashes  Neuro/Psych: Appropriate affect and mood. A&Ox3, moves all extremities    Medications       aspirin  81 mg Oral Daily     cyanocobalamin  500 mcg Oral Daily     finasteride  5 mg Oral Daily     metoprolol succinate ER  50 mg Oral BID     pantoprazole  40 mg Oral Daily     simvastatin  20 mg Oral At Bedtime     sodium chloride (PF)  3 mL Intracatheter Q8H     tamsulosin  0.4 mg Oral Daily       Data   Recent Labs   Lab 06/12/22  0603 06/11/22  2251 06/11/22  1532 06/11/22  0831 06/11/22  0059 06/10/22  1931 06/10/22  1802 06/10/22  0700   WBC  --   --   --   --   --   --  4.9 4.2   HGB  --   --   --   --   --   --  11.4* 10.3*   MCV  --   --   --   --   --   --  106* 105*   PLT  --   --   --   --   --   --  142* 121*     --   --  139  --   --  138 139   POTASSIUM 4.3  4.3 4.7 5.1 5.4*  --    < > 6.5* 5.5*   CHLORIDE 111*  --   --  110*  --   --  108 107   CO2 22  --   --  21  --   --  24 21*   BUN 72*  --   --  73*  --   --  73* 67*   CR 2.71*  --   --  2.71*  --   --  2.84* 2.71*   ANIONGAP 6  --   --  8  --   --  6 11   CAMILA 9.0  --   --  9.2  --   --  9.1 9.1   *  --   --  128* 102*   < > 135* 88   ALBUMIN 2.8*  --   --   --   --   --  3.5  --    PROTTOTAL 5.5*  --   --   --   --   --  6.9  --    BILITOTAL 0.8  --   --   --   --   --  0.8  --    ALKPHOS 107  --   --   --   --   --   146  --    ALT 74*  --   --   --   --   --  127*  --    AST 57*  --   --   --   --   --  174*  --     < > = values in this interval not displayed.       Imaging:   No results found for this or any previous visit (from the past 24 hour(s)).

## 2022-06-12 NOTE — PROGRESS NOTES
Renal Medicine Progress Note            Assessment/Plan:   Jose Gomez is a 89 year old male who was admitted on 6/10/2022.      Assessment:     1) SHERWIN on CKD:    With recent SHERWIN improved with treating post-renal obstruction, likely SHERWIN with associated hyperkalemia related to his govea being removed recently. 1.2L urine output on govea placement. Seen by urology, plans for discharge with govea and follow up.      Hyperkalemia: shifted in ED, treated with lokelma and normal this morning.      GFR stable this morning, continue to monitor daily.    2) CKD: had reported baseline 2/20 of 1.2-1.6, earlier this year reported baseline around 2.0 so clearly some progressive CKD. With dementia, advanced age further evaluation not very fruitful. LIkley HTN and/or chronic post-renal etiology. Will not do further evaluations. Clearly would never be a dialysis candidate.      Remains hypervolemic with edema and  pleural effusions. Can start some diuretics.     Other:  Dementia  Elevated LFT's  Hx HFpEF, a. Fib  HTN        Plan:  1) govea bladder drainage, follow up  with urology  2) okay to start diuretics, lasix 40mg IV BID and monitor  3) will follow      Rod Grant DO  [facility] - [facility]  [phone]  Cell: [phone]            Interval History:     Pt with resolving bloody urine. Had irrigation with some clots flushed out. Pt conversant but confused.           Medications and Allergies:       aspirin  81 mg Oral Daily     cyanocobalamin  500 mcg Oral Daily     finasteride  5 mg Oral Daily     metoprolol succinate ER  50 mg Oral BID     pantoprazole  40 mg Oral Daily     simvastatin  20 mg Oral At Bedtime     sodium chloride (PF)  3 mL Intracatheter Q8H     tamsulosin  0.4 mg Oral Daily        Allergies   Allergen Reactions     No Known Drug Allergies             Physical Exam:   Vitals were reviewed  /53 (BP Location: Right arm)   Pulse 61   Temp (!) 95.9  F (35.5  C) (Axillary)   Resp 18    "Ht 1.753 m (5' 9\")   Wt 76.7 kg (169 lb 1.5 oz)   SpO2 92%   BMI 24.97 kg/m      Wt Readings from Last 3 Encounters:   06/12/22 76.7 kg (169 lb 1.5 oz)   06/09/22 46.3 kg (102 lb)   06/02/22 72.3 kg (159 lb 6.4 oz)       Intake/Output Summary (Last 24 hours) at 6/12/2022 1018  Last data filed at 6/12/2022 0617  Gross per 24 hour   Intake --   Output 1335 ml   Net -1335 ml       GENERAL: healthy, alert, NAD  HEENT:  Normocephalic. No gross abnormalities.  Pupils equal.  MMM.  Dentition is poor, many missing teeth  CV: IRR, no murmurs, no clicks, gallops, or rubs, 2+ lower leg edema  RESP: decreased at bases  GI: Abdomen soft/nt/nd, BS normal.   MUSCULOSKELETAL: extremities nl - no gross deformities noted  SKIN: no suspicious lesions or rashes, dry to touch  PSYCH: mood good, affect appropriate  Barry cath in place         Data:     BMP  Recent Labs   Lab 06/12/22  0603 06/11/22  2251 06/11/22  1532 06/11/22  0831 06/11/22  0059 06/11/22  0013 06/11/22  0002 06/10/22  1931 06/10/22  1802 06/10/22  0700     --   --  139  --   --   --   --  138 139   POTASSIUM 4.3  4.3 4.7 5.1 5.4*  --    < >  --    < > 6.5* 5.5*   CHLORIDE 111*  --   --  110*  --   --   --   --  108 107   CAMILA 9.0  --   --  9.2  --   --   --   --  9.1 9.1   CO2 22  --   --  21  --   --   --   --  24 21*   BUN 72*  --   --  73*  --   --   --   --  73* 67*   CR 2.71*  --   --  2.71*  --   --   --   --  2.84* 2.71*   *  --   --  128* 102*  --  100*   < > 135* 88    < > = values in this interval not displayed.     CBC  Recent Labs   Lab 06/10/22  1802 06/10/22  0700   WBC 4.9 4.2   HGB 11.4* 10.3*   HCT 36.7* 32.9*   * 105*   * 121*     Lab Results   Component Value Date    AST 57 (H) 06/12/2022    ALT 74 (H) 06/12/2022    ALKPHOS 107 06/12/2022    BILITOTAL 0.8 06/12/2022     Lab Results   Component Value Date    INR 1.07 05/26/2018       Attestation:  I have reviewed today's vital signs, notes, medications, labs and " imaging.    DO [facility] - [facility]  Office: [phone]  Cell: [phone]

## 2022-06-12 NOTE — PLAN OF CARE
A&Ox2-3, fluctuates. VSS on RA. Tele: A fib CVR. Denies pain. PIV SL. Regular 2g K+ fair appetite. Incontinent, has a govea with yellow clear output. L PIV SL. A1-2 gaitbelt and walker.

## 2022-06-12 NOTE — PROGRESS NOTES
"   06/12/22 1141   Quick Adds   Type of Visit Initial PT Evaluation   Living Environment   Living Environment Comments Chart reports pt has been in TCU since hospital stay in May, 2022. PT eval from that time states at baseline, pt lives with adult children. Pt unable to tell me prior level of function/living situation d/t dementia.   Self-Care   Usual Activity Tolerance moderate   Current Activity Tolerance fair   Regular Exercise No   Equipment Currently Used at Home walker, rolling   Fall history within last six months yes   Number of times patient has fallen within last six months 10   Activity/Exercise/Self-Care Comment RN notes report multiple falls. Pt states \"I think so\" when asked if she uses a walker.   General Information   Onset of Illness/Injury or Date of Surgery 06/10/22   Referring Physician Dr Esquivel   Patient/Family Therapy Goals Statement (PT) unknown   Pertinent History of Current Problem (include personal factors and/or comorbidities that impact the POC) Pt admitted from TCU with hyperkalemia. Had hospital stay in May, 2022 with fall and LE edema. Went to TCU. Apparently lives with adult children at baseline? Pt unable to give history d/t dementia. PMH also includes CHF, L LE cellulitis, CKD.   Existing Precautions/Restrictions fall   Cognition   Affect/Mental Status (Cognition) low arousal/lethargic;confused  (sleeping with coffee cup in hand)   Orientation Status (Cognition) person;oriented to   Cognitive Status Comments pt unable to answer orientation questions correctly, thought it was August and stated \"I don't know\" when asked where he was.   Pain Assessment   Patient Currently in Pain No   Integumentary/Edema   Integumentary/Edema Comments mild LE edema noted, wearing compression stockings to knees cam   Posture    Posture Forward head position;Protracted shoulders   Range of Motion (ROM)   Range of Motion ROM is WFL   Strength (Manual Muscle Testing)   Strength (Manual Muscle Testing) " Deficits observed during functional mobility   Bed Mobility   Comment, (Bed Mobility) supine to sit with rail and SBA   Transfers   Comment, (Transfers) sit to stand from elevated bed with min assist 1   Gait/Stairs (Locomotion)   Bryan Level (Gait) minimum assist (75% patient effort)   Assistive Device (Gait) walker, front-wheeled   Distance in Feet (Required for LE Total Joints) 10'   Pattern (Gait) step-to   Comment, (Gait/Stairs) gait with wh walker and min assist 1 at belt and also to help steer the walker at times.   Balance   Balance Comments unsteady standing balance. Sitting balance with supervision   Sensory Examination   Sensory Perception patient reports no sensory changes   Coordination   Coordination Comments decreased coordination noted cam LEs   Muscle Tone   Muscle Tone no deficits were identified   Clinical Impression   Criteria for Skilled Therapeutic Intervention Yes, treatment indicated   PT Diagnosis (PT) impaired functional mobility   Influenced by the following impairments decreased strength, genl deconditioning   Functional limitations due to impairments fall risk, decreased IND with mobility, increased caregiver burden   Clinical Presentation (PT Evaluation Complexity) Stable/Uncomplicated   Clinical Presentation Rationale clinical judgement   Clinical Decision Making (Complexity) low complexity   Planned Therapy Interventions (PT) balance training;bed mobility training;gait training;neuromuscular re-education;patient/family education;strengthening;transfer training   Anticipated Equipment Needs at Discharge (PT)   (defer to TCU)   Risk & Benefits of therapy have been explained evaluation/treatment results reviewed;care plan/treatment goals reviewed   PT Discharge Planning   PT Discharge Recommendation (DC Rec) Transitional Care Facility   PT Rationale for DC Rec Pt is below baseline for mobility,requires assist 1 for all transfers and short distances gait. Rec return to TCU at  discharge.   PT Brief overview of current status Bed mobility with SBA, transfers and ambulation with FWW and Ruben   Plan of Care Review   Plan of Care Reviewed With patient   Total Evaluation Time   Total Evaluation Time (Minutes) 15

## 2022-06-13 NOTE — PLAN OF CARE
Goal Outcome Evaluation:        Shift Note: `[id number]  A&Ox1, confused and impulsive at times, can be easily redirected. VSS on RA. Tele: A fib CVR. Denies pain. PIV SL. Regular 2g K diet. Incontinent, govea with dark claribel output. A1-2 gaitbelt and walker. Not oob during shift. Mepi on LLE,Lymph wraps on. Discharge pending to TCU once renal fx is improved.

## 2022-06-13 NOTE — PROGRESS NOTES
Renal Medicine Progress Note            Assessment/Plan:   Jose Gomez is a 89 year old male who was admitted on 6/10/2022.      Assessment:     1) SHERWIN on CKD:  -Presumed postrenal obstruction with SHERWIN with 1.2 L urine output and Govea placement, but creatinine has not improved as expected, although stable.  Nonoliguric.       Hyperkalemia: Treated medically.  K normal now     GFR stable this morning, continue to monitor daily.    2) CKD: had reported baseline 2/20 of 1.2-1.6, earlier this year reported baseline around 2.0 so clearly some progressive CKD. With dementia, advanced age further evaluation not very fruitful. LIkley HTN and/or chronic post-renal etiology. Will not do further evaluations. Clearly would never be a dialysis candidate.      Remains hypervolemic with edema and  pleural effusions. Can start some diuretics.     Other:  Dementia  Elevated LFT's  Hx HFpEF, a. Fib  HTN        Plan:  1) govea bladder drainage, follow up  with urology  2) okay to start diuretics, give lasix 40 mg IV X 1   3) will follow    Keith Chandra MD  [facility] - Nephrology   [phone]              Interval History:   No acute changes overnight.  Govea in place.  Dark urine but hematuria seems to be improving per report.  Notes indicate patient is being diuresed but I do not see any orders.  Last dose of Lasix seems to be on 6/10  Breathing okay.  Unable to provide review of system.  Creatinine stable            Medications and Allergies:       aspirin  81 mg Oral Daily     cyanocobalamin  500 mcg Oral Daily     finasteride  5 mg Oral Daily     metoprolol succinate ER  50 mg Oral BID     pantoprazole  40 mg Oral Daily     simvastatin  20 mg Oral At Bedtime     sodium chloride (PF)  3 mL Intracatheter Q8H     tamsulosin  0.4 mg Oral Daily        Allergies   Allergen Reactions     No Known Drug Allergies             Physical Exam:   Vitals were reviewed  /68 (BP Location: Right arm)   Pulse 58   Temp (!)  "94.5  F (34.7  C) (Axillary)   Resp 18   Ht 1.753 m (5' 9\")   Wt 77.5 kg (170 lb 13.7 oz)   SpO2 95%   BMI 25.23 kg/m      Wt Readings from Last 3 Encounters:   06/13/22 77.5 kg (170 lb 13.7 oz)   06/09/22 46.3 kg (102 lb)   06/02/22 72.3 kg (159 lb 6.4 oz)           GENERAL: healthy, alert, NAD  HEENT:  Normocephalic. No gross abnormalities.  Pupils equal.  MMM.  Dentition is poor, many missing teeth  CV: IRR, no murmurs, no clicks, gallops, or rubs, 2+ lower leg edema  RESP: decreased at bases  GI: Abdomen soft/nt/nd, BS normal.   SKIN: no suspicious lesions or rashes, dry to touch  PSYCH: mood good, affect appropriate  Barry cath in place         Data:     BMP  Recent Labs   Lab 06/13/22  0735 06/12/22  0603 06/11/22  2251 06/11/22  1532 06/11/22  0831 06/11/22  0059 06/10/22  1931 06/10/22  1802    139  --   --  139  --   --  138   POTASSIUM 4.0 4.3  4.3 4.7 5.1 5.4*  --    < > 6.5*   CHLORIDE 110* 111*  --   --  110*  --   --  108   CAMILA 8.7 9.0  --   --  9.2  --   --  9.1   CO2 21 22  --   --  21  --   --  24   BUN 72* 72*  --   --  73*  --   --  73*   CR 2.72* 2.71*  --   --  2.71*  --   --  2.84*   * 102*  --   --  128* 102*   < > 135*    < > = values in this interval not displayed.     CBC  Recent Labs   Lab 06/13/22  0735 06/10/22  1802 06/10/22  0700   WBC 3.8* 4.9 4.2   HGB 10.4* 11.4* 10.3*   HCT 32.7* 36.7* 32.9*   * 106* 105*   PLT 91* 142* 121*     Lab Results   Component Value Date    AST 57 (H) 06/12/2022    ALT 74 (H) 06/12/2022    ALKPHOS 107 06/12/2022    BILITOTAL 0.8 06/12/2022     Lab Results   Component Value Date    INR 1.07 05/26/2018       Attestation:  I have reviewed today's vital signs, notes, medications, labs and imaging.    Keith Chandra MD      "

## 2022-06-13 NOTE — PROGRESS NOTES
"[facility]    Hospitalist Progress Note    Interval History   Diuresing, appears creatinine is tolerating  A&O to self and place only    Assessment & Plan   Summary: Jose Gomez is a 89 year old male with PMH diastolic congestive heart failure, persistent atrial fibrillation, falls, HLD, CKD, dementia, GERD, who was admitted on 6/10/2022 with acute kidney injury and hyperkalemia.    Acute kidney injury on CKD due to urinary retention  Hematuria, improved  Hyperkalemia, improved  Baseline creatinine recent ~2.0 (had been 1.4-1.8 late 2021). Recently hospitalized 5/18 to 5/27 with CHF and L leg wounds. With attempts at diuresis had SHERWIN. Also had hyperkalemia which improved with kayexalate/ furosemide. US showed mild bilateral hydro. Lisinopril discontinued that admission, not discharged on diuretics. SHERWIN thought 2/2 diuretics and obstruction.   K notably rising since discharge. Brought to ED for hyperkalemia, in ED noted K 6.5, creatinine 2.84. EKG w/o hyperkalemic changes. Treated with insulin/ D50, bicarb, albuterol in ED. Also given lasix specifically for kaliuresis. Kayexalate 30 g PO x 1 given. Hyperakelmia improved on 6/11.   Renal function remains elevated Cr 2.71 on 6/12.  - Appreciate Nephrology consult   - Diuresis  - Low K diet  - Continue tamsulosin    Urinary retention  Hematuria, probably traumatic cath, improved  History of BPH. Was recently discharged from the hospital on 5/27/2022 with a govea due to urinary retention. It appears that govea was removed at some time while he was at TCU between 5/27 and 6/10. Govea placed 6/11 for urinary retention returning \"Merlot\" colored urine. Hematuria improved on 6/12.  - Urology consult appreciated   - CT abd/pelv noncontrast   - Continue govea for at least one week   - Outpatient follow up with them  - Continue finasteride and tamsulosin    Moderate bilateral pleural effusions  Chronic HFpEF  Persistent afib  HLD  Admit 5/18-5/27 " with CHF. BNP at that time was >9000. Attempted diuresis but creatinine paula. Was not discharged on diuretics and his ACE I was stopped. CT on admission with moderate bilateral pleural effusions R>L. Echo 5/19/2022 with EF 55-60%, nl RV with mildly diminished RV function, mod TR.  Doesn't appear in overt failure on admission--appears volume overloaded. Renal function likely related to urinary retention not prerenal.  - Continue metoprolol, aspirin, simvastatin  - Consider diuresis this admission, defer to Nephrology regarding timing given SHERWIN  - Continue PT/OT while inpatient    Elevated transaminases, improving  AST//127 on presentation. 5/2022 were normal. Unclear etiology, no new offending medications apparent. Recheck on 6/12 shows improved LFTs--no further workup needed at this point.  - Recheck again prior to discharge    Anemia  Thrombocytopenia  Hgb 11.4 and plts 142, both improved from recent admission.   - monitor counts    Hx hyperthyroidism  Chronic, previously on methimazole, TSH 4.54 with normal T4 and T3 5/2022.  - no acute issues    GERD  - PTA omeprazole 20 mg daily    Dementia  Appears to be somewhat confused, possibly worse than baseline.  - Re-orient as needed  - Maintain normal day/night, sleep wake cycles  - Minimize sedating/altering medications as able    DVT Prophylaxis: Pneumatic Compression Devices  Code Status: Full Code  PT/OT: ordered  Diet: Combination Diet Regular Diet Adult; 2 gm K Diet      Disposition: Expected discharge 2-3 days back to TCU once renal function is improved  Colby Ornelas MD, MD  Text Page  (7am to 6pm)  -Data reviewed today: I reviewed all new labs and imaging results over the last 24 hours.    Physical Exam   Temp: 97.3  F (36.3  C) Temp src: Axillary BP: 117/68 Pulse: 58   Resp: 18 SpO2: 95 % O2 Device: None (Room air)    Vitals:    06/11/22 0033 06/12/22 0617 06/13/22 0648   Weight: 75.6 kg (166 lb 10.7 oz) 76.7 kg (169 lb 1.5 oz) 77.5 kg (170 lb  13.7 oz)     Vital Signs with Ranges  Temp:  [93.4  F (34.1  C)-97.3  F (36.3  C)] 97.3  F (36.3  C)  Pulse:  [58-76] 58  Resp:  [16-18] 18  BP: (112-131)/(68-80) 117/68  SpO2:  [95 %-98 %] 95 %  I/O last 3 completed shifts:  In: -   Out: 500 [Urine:500]  O2 requirements: None    Constitutional: Frail elderly male appears weak, tired  HEENT: Eyes nonicteric, oral mucosa moist  Cardiovascular: Irregular, normal S1/2, systolic murmur  Respiratory: CTAB, no wheezing or crackles  Vascular: No LE pitting edema  GI: Normoactive bowel sounds, nontender  Skin/Integumen: No rashes  Neuro/Psych: Appropriate affect and mood. A&Ox3, moves all extremities    Medications       aspirin  81 mg Oral Daily     cyanocobalamin  500 mcg Oral Daily     finasteride  5 mg Oral Daily     metoprolol succinate ER  50 mg Oral BID     pantoprazole  40 mg Oral Daily     simvastatin  20 mg Oral At Bedtime     sodium chloride (PF)  3 mL Intracatheter Q8H     tamsulosin  0.4 mg Oral Daily       Data   Recent Labs   Lab 06/13/22  0735 06/12/22  0603 06/11/22  2251 06/11/22  1532 06/11/22  0831 06/10/22  1931 06/10/22  1802 06/10/22  0700   WBC 3.8*  --   --   --   --   --  4.9 4.2   HGB 10.4*  --   --   --   --   --  11.4* 10.3*   *  --   --   --   --   --  106* 105*   PLT 91*  --   --   --   --   --  142* 121*    139  --   --  139  --  138 139   POTASSIUM 4.0 4.3  4.3 4.7   < > 5.4*   < > 6.5* 5.5*   CHLORIDE 110* 111*  --   --  110*  --  108 107   CO2 21 22  --   --  21  --  24 21*   BUN 72* 72*  --   --  73*  --  73* 67*   CR 2.72* 2.71*  --   --  2.71*  --  2.84* 2.71*   ANIONGAP 8 6  --   --  8  --  6 11   CAMILA 8.7 9.0  --   --  9.2  --  9.1 9.1   * 102*  --   --  128*   < > 135* 88   ALBUMIN  --  2.8*  --   --   --   --  3.5  --    PROTTOTAL  --  5.5*  --   --   --   --  6.9  --    BILITOTAL  --  0.8  --   --   --   --  0.8  --    ALKPHOS  --  107  --   --   --   --  146  --    ALT  --  74*  --   --   --   --  127*  --     AST  --  57*  --   --   --   --  174*  --     < > = values in this interval not displayed.       Imaging:   Recent Results (from the past 24 hour(s))   CT Abdomen Pelvis w/o Contrast    Narrative    EXAM: CT ABDOMEN PELVIS W/O CONTRAST  LOCATION: [facility]  DATE/TIME: 6/12/2022 9:02 AM    INDICATION: Hematuria, unknown cause  COMPARISON: Renal ultrasound 05/24/2022, CT 05/26/2018  TECHNIQUE: CT scan of the abdomen and pelvis was performed without IV contrast. Multiplanar reformats were obtained. Dose reduction techniques were used.  CONTRAST: None.    FINDINGS:   LOWER CHEST: There are bilateral pleural effusions, small to moderate on the right and small on the left. Associated compressive atelectasis present with mild basilar fibrosis in dendritic calcifications in the left lower lobe. Multichamber cardiac   enlargement.    HEPATOBILIARY: Few tiny layering gallstones.    PANCREAS: Fatty atrophy.    SPLEEN: Calcified granulomas in the spleen.    ADRENAL GLANDS: Normal.    KIDNEYS/BLADDER: No calculi or hydronephrosis. Mild, symmetrical perinephric stranding. Bladder is decompressed with a Barry.    BOWEL: Trace ascites adjacent to the liver and inferiorly in both paracolic gutters. Bowel is of normal caliber with contrast having reached the left colon. Extensive distal colonic diverticulosis.    LYMPH NODES: Normal.    VASCULATURE: Extensive arterial calcifications bilobed appearance to the infrarenal aorta which is ectatic measuring up to 3 cm. No iliac artery aneurysms.    PELVIC ORGANS: Slightly enlarged.    MUSCULOSKELETAL: Diffuse subcutaneous edema along the left side of the abdomen and posteriorly in the back extending into the upper thighs. Interval healing of the fractures of the left ischium and the left superior pubic rami noted in 2018. Mild lumbar   rotoscoliosis with paucity of degenerative changes in the spine given age.      Impression    IMPRESSION:   1.  No  abnormalities are seen to explain hematuria. Bladder is decompressed with a Barry.  2.  Interval resolution of the mild hydronephrosis noted on prior ultrasound with bladder decompression.  3.  Notable third spacing of fluid with bilateral pleural effusions, moderate on the right, trace ascites and marked subcutaneous edema.  4.  Distal colonic diverticulosis without evidence of diverticulitis.  5.  Cholelithiasis.  6.  Extensive atherosclerotic vascular disease.  7.  Evidence of previous granulomatous infection.

## 2022-06-13 NOTE — PLAN OF CARE
Goal Outcome Evaluation:    7572-6055: AOx2, disoriented to situation and time. VSS on RA ex low temp. Up A1 GB/W, ambulated in hallways with PT, up in chair for dinner. Able to shift ind in bed with reminders, preventative mepilex to coccyx. 2 gm K+ diet, fair appetite. Denies pain and N/V. L PIV SL. One time dose of IV lasix given, adequate UOP in govea. Incontinent of bowel, 2 BMs this shift. L foot mepilex - CDI, WOC consulted. BLE lymph wraps reapplied. Neph following, discharge to TCU pending clinical improvement.

## 2022-06-13 NOTE — PLAN OF CARE
Goal Outcome Evaluation:      [id number]    AVSS on room air. Heart monitor indicating A. Fib w/ CVR. A+Ox2, oriented to self only. Knows that he is in a hospital. Ambulating Ax1 w/ gait belt+walker. Working with PT & OT. New R PIV SL. BLE wraps removed, L foot dressing changed, cleansed with wound cleanser and new mepilex placed. Paged to request WOC input. Tolerating regular diet (2 gram K limit) w/o nausea or vomiting. LBM this afternoon. Barry draining marginal amts of claribel/clear UOP.  One-time dose of IV lasix given. Nephrology following. Plan to discharge to TCU once placement established, SW following. Will continue with POC.

## 2022-06-14 NOTE — PROVIDER NOTIFICATION
MD Notification    Notified Person: MD    Notified Person Name: Dr. Perez    Notification Date/Time: 2311 6/13/22    Notification Interaction: AMCOM    Purpose of Notification: Pt rectal temp 94.8. please advise.     Orders Received:    Comments:

## 2022-06-14 NOTE — PROVIDER NOTIFICATION
MD Notification    Notified Person: MD    Notified Person Name: Dr. Ornelas    Notification Date/Time: 6/14/2022 4:45PM     Notification Interaction: Vocera message    Purpose of Notification: Were you aware of pt's low temps? Pt was normal this morning, but that was after having the nadya-hugger on. Right now he is 93.4 axillary. I will put the nadya hugger back on.. any other interventions you recommend? Also, I noted this LFTs are up today- not sure if that would play any part?    Orders Received: Continue to monitor temps as they don't tend to be new.  MD will look at LFTs and address.     Comments: Abdominal US ordered for AM (as needs to be NPO for 8 hrs). Check hep B, C and INR.

## 2022-06-14 NOTE — PLAN OF CARE
Goal Outcome Evaluation:        6/13/22 0416-2259  Pt alert to self. VSS on RA x low temp. MD paged and ordered nadya cross, results pending. On tele - Afib w/ SVR. L PIV SL. Incont B/B, govea in place w/ adequate UOP. +2 edema in BLE, wearing lymph wraps. Mepilex to L foot wound, WOC consulted. Preventable mepilex to coccyx. Up A1 w/ GB + W. On 2g K diet. Discharge to TCU pending improvement.

## 2022-06-14 NOTE — CONSULTS
"[facility] Nurse Inpatient Assessment     Consulted for: Left foot drainage    Patient History (according to provider note(s):      \"89 year old male with PMH diastolic congestive heart failure, persistent atrial fibrillation, falls, HLD, CKD, dementia, GERD, who was admitted on 6/10/2022 with acute kidney injury and hyperkalemia.\"    Areas Assessed:      Areas visualized during today's visit: Focused: and left foot        Wound location: left dorsal foot     Last photo: 6/14  Wound due to: mixed venous and arterial   Wound history/plan of care: patient unable to report, mild confusion   Wound base: 100 % slough     Palpation of the wound bed: fluctuance      Drainage: small     Description of drainage: serosanguinous     Measurements (length x width x depth, in cm): 1.4  x 1.4  x  0.2 cm      Tunneling: N/A     Undermining: N/A  Periwound skin: Edematous      Color: normal and consistent with surrounding tissue      Temperature: normal   Odor: none  Pain: no grimacing or signs of discomfort, none  Pain interventions prior to dressing change: patient tolerated well  Treatment goal: Heal   STATUS: initial assessment  Supplies ordered: supplies stored on unit        Wound location: left foot 3rd toe     Last photo: 6/14  Wound due to: neuropathic vs trauma vs arterial   Wound history/plan of care: patient unable to report   Wound base: 100 % eschar and fibrin     Palpation of the wound bed: firm      Drainage: none     Description of drainage: none     Measurements (length x width x depth, in cm): 0.4  x 0.4  x  0.1 cm      Tunneling: N/A     Undermining: N/A  Periwound skin: Intact      Color: normal and consistent with surrounding tissue      Temperature: normal   Odor: none  Pain: no grimacing or signs of discomfort, none  Pain interventions prior to dressing change: patient tolerated well  Treatment goal: Heal   STATUS: initial assessment  Supplies ordered: supplies stored on " "unit    Treatment Plan:     Left foot wound(s): Daily     Wound care  Start:  06/15/22 0600,   DAILY,   Routine        Comments: Location: left dorsal foot   Care: provided daily by primary RN   1. Cleanse with commercial wound cleanser and 4x4\" gauze, pat dry   2. Cover with 4x4\" mepilex dressing, ok to use for up to 5 days each          Orders: Written    RECOMMEND PRIMARY TEAM ORDER: None, at this time  Education provided: plan of care  Discussed plan of care with: Patient and Nurse  WO nurse follow-up plan: weekly  Notify WOC if wound(s) deteriorate.  Nursing to notify the Provider(s) and re-consult the WO Nurse if new skin concern.    DATA:     Current support surface: Standard  Atmos Air mattress  Containment of urine/stool: not assessed with consult   BMI: Body mass index is 25.46 kg/m .   Active diet order: Orders Placed This Encounter      Combination Diet Regular Diet Adult; 2 gm K Diet     Output: I/O last 3 completed shifts:  In: 360 [P.O.:360]  Out: 1650 [Urine:1650]     Labs: Recent Labs   Lab 06/14/22  0741 06/13/22  0735   ALBUMIN 2.7*  --    HGB  --  10.4*   WBC  --  3.8*     Pressure injury risk assessment:   Sensory Perception: 3-->slightly limited  Moisture: 3-->occasionally moist  Activity: 3-->walks occasionally  Mobility: 3-->slightly limited  Nutrition: 3-->adequate  Friction and Shear: 2-->potential problem  Ariel Score: 17      Keely ALSTONMAHAMED   Dept. Pager: [phone]  Dept. Office Number: [phone]      "

## 2022-06-14 NOTE — PROGRESS NOTES
"[facility]    Hospitalist Progress Note    Interval History   - Creatinine improved today, will give another dose of lasix  - A&O to self and place only    Assessment & Plan   Summary: Jose Gomez is a 89 year old male with PMH diastolic congestive heart failure, persistent atrial fibrillation, falls, HLD, CKD, dementia, GERD, who was admitted on 6/10/2022 with acute kidney injury and hyperkalemia.    Acute kidney injury on CKD due to urinary retention  Hematuria, improved  Hyperkalemia, improved  Baseline creatinine recent ~2.0 (had been 1.4-1.8 late 2021). Recently hospitalized 5/18 to 5/27 with CHF and L leg wounds. With attempts at diuresis had SHERWIN. Also had hyperkalemia which improved with kayexalate/ furosemide. US showed mild bilateral hydro. Lisinopril discontinued that admission, not discharged on diuretics. SHERWIN thought 2/2 diuretics and obstruction.   K notably rising since discharge. Brought to ED for hyperkalemia, in ED noted K 6.5, creatinine 2.84. EKG w/o hyperkalemic changes. Treated with insulin/ D50, bicarb, albuterol in ED. Also given lasix specifically for kaliuresis. Kayexalate 30 g PO x 1 given. Hyperakelmia improved on 6/11.   Renal function improved to 2.65 on 6/14 with initiating diuresis.  - Appreciate Nephrology consult   - Diuresis  - Low K diet  - Continue tamsulosin    Urinary retention  Hematuria, probably traumatic cath, improved  History of BPH. Was recently discharged from the hospital on 5/27/2022 with a govea due to urinary retention. It appears that govea was removed at some time while he was at TCU between 5/27 and 6/10. Govea placed 6/11 for urinary retention returning \"Merlot\" colored urine. Hematuria improved on 6/12. Ct abd/pelvis shows decompressed bladder, nothing to explain hematuria, and improved hydronephrosis.  - Urology consult appreciated  - Continue govea until Urology follow up  - Continue finasteride and tamsulosin    Elevated " transaminases  AST//127 on presentation. LFTs have been historically normal--first elevation in chart. Unclear etiology, no new offending medications apparent. CT abdomen on 6/12 notable for trace ascites which is felt to be more related to patient's edema than to liver etiology. LFTs improved on 6/12 but now elevated again on 6/14.  - Check abdominal ultrasound  - Check hepatitis B and C antibodies  - Check INR  - Trend LFTs      Moderate bilateral pleural effusions  Chronic HFpEF  Persistent afib  HLD  Admit 5/18-5/27 with CHF. BNP at that time was >9000. Attempted diuresis but creatinine paula. Was not discharged on diuretics and his ACE I was stopped. CT on admission with moderate bilateral pleural effusions R>L. Echo 5/19/2022 with EF 55-60%, nl RV with mildly diminished RV function, mod TR.  Doesn't appear in overt failure on admission--appears volume overloaded. Renal function likely related to urinary retention not prerenal.  - Continue metoprolol, aspirin, simvastatin  - Continue PT/OT while inpatient    Anemia  Thrombocytopenia  Hgb 11.4 and plts 142, both improved from recent admission.   - monitor counts    Hx hyperthyroidism  Chronic, previously on methimazole, TSH 4.54 with normal T4 and T3 5/2022.  - no acute issues    GERD  - PTA omeprazole 20 mg daily    Dementia  Appears to be somewhat confused, possibly worse than baseline.  - Re-orient as needed  - Maintain normal day/night, sleep wake cycles  - Minimize sedating/altering medications as able    DVT Prophylaxis: Pneumatic Compression Devices  Code Status: Full Code  PT/OT: ordered  Diet: Combination Diet Regular Diet Adult; 2 gm K Diet      Disposition: Expected discharge 2-3 days back to TCU once renal function is improved  Colby Ornelas MD, MD  Text Page  (7am to 6pm)  -Data reviewed today: I reviewed all new labs and imaging results over the last 24 hours.    Physical Exam   Temp: 96.8  F (36  C) Temp src: Axillary BP: 115/59  Pulse: 53   Resp: 16 SpO2: 91 %      Vitals:    06/12/22 0617 06/13/22 0648 06/14/22 0546   Weight: 76.7 kg (169 lb 1.5 oz) 77.5 kg (170 lb 13.7 oz) 78.2 kg (172 lb 6.4 oz)     Vital Signs with Ranges  Temp:  [93.5  F (34.2  C)-96.8  F (36  C)] 96.8  F (36  C)  Pulse:  [53-76] 53  Resp:  [16-18] 16  BP: (115-140)/(59-88) 115/59  SpO2:  [91 %-96 %] 91 %  I/O last 3 completed shifts:  In: 360 [P.O.:360]  Out: 1650 [Urine:1650]  O2 requirements: None    Constitutional: Frail elderly male appears weak, tired  HEENT: Eyes nonicteric, oral mucosa moist  Cardiovascular: Irregular, normal S1/2, systolic murmur  Respiratory: CTAB, no wheezing or crackles  Vascular: No LE pitting edema  GI: Normoactive bowel sounds, nontender  Skin/Integumen: No rashes  Neuro/Psych: Appropriate affect and mood. A&Ox3, moves all extremities    Medications       aspirin  81 mg Oral Daily     cyanocobalamin  500 mcg Oral Daily     finasteride  5 mg Oral Daily     metoprolol succinate ER  50 mg Oral BID     pantoprazole  40 mg Oral Daily     simvastatin  20 mg Oral At Bedtime     sodium chloride (PF)  3 mL Intracatheter Q8H     tamsulosin  0.4 mg Oral Daily       Data   Recent Labs   Lab 06/14/22  0741 06/13/22  0735 06/12/22  0603 06/10/22  1931 06/10/22  1802 06/10/22  0700 06/10/22  0700   WBC  --  3.8*  --   --  4.9  --  4.2   HGB  --  10.4*  --   --  11.4*  --  10.3*   MCV  --  102*  --   --  106*  --  105*   PLT  --  91*  --   --  142*  --  121*    139 139   < > 138  --  139   POTASSIUM 3.9 4.0 4.3  4.3   < > 6.5*  --  5.5*   CHLORIDE 110* 110* 111*   < > 108  --  107   CO2 21 21 22   < > 24  --  21*   BUN 69* 72* 72*   < > 73*  --  67*   CR 2.65* 2.72* 2.71*   < > 2.84*  --  2.71*   ANIONGAP 8 8 6   < > 6  --  11   CAMILA 8.5 8.7 9.0   < > 9.1  --  9.1   * 102* 102*   < > 135*  --  88   ALBUMIN 2.7*  --  2.8*  --  3.5   < >  --    PROTTOTAL 5.5*  --  5.5*  --  6.9   < >  --    BILITOTAL 1.2  --  0.8  --  0.8   < >  --    ALKPHOS  125  --  107  --  146   < >  --    *  --  74*  --  127*   < >  --    *  --  57*  --  174*   < >  --     < > = values in this interval not displayed.       Imaging:   No results found for this or any previous visit (from the past 24 hour(s)).

## 2022-06-14 NOTE — PROGRESS NOTES
22 0917   Quick Adds   Type of Visit Initial Occupational Therapy Evaluation   Living Environment   Living Environment Comments Chart reports pt has been in TCU since hospital stay in May, 2022. PT eval from that time states at baseline, pt lives with adult children. Pt unable to tell me prior level of function/living situation d/t dementia.   Self-Care   Current Activity Tolerance fair   Activity/Exercise/Self-Care Comment Unable to attain this info from patient, however, per chart pt has had multiple falls in past several months and he uses a walker at baseline.   General Information   Onset of Illness/Injury or Date of Surgery 06/10/22   Referring Physician Dr. Salvador Esquivel   Patient/Family Therapy Goal Statement (OT) unable to attain from patient   Additional Occupational Profile Info/Pertinent History of Current Problem 89yo male admitted from TCU with hyperkalemia. PMH per chart includes CHF, LLE cellulitis and CKD.   Existing Precautions/Restrictions fall   Limitations/Impairments safety/cognitive   General Observations and Info Pt in warming blanket upon arrival as had low body temp overnight. Temp now 97.3 therefore nursing approved removal of blanket and participation in OT.   Cognitive Status Examination   Orientation Status person   Affect/Mental Status (Cognitive) confused  (cooperative)   Follows Commands follows one-step commands;50-74% accuracy   Cognitive Status Comments Oriented to self and  only, needs things repeated which may be due to hearing issue?? Does follow 1-step commands majority of time when attending.   Visual Perception   Impact of Vision Impairment on Function (Vision) Pt denies need for any type of corrective lenses and he was able to read clock correctly on far wall and menu as well.   Pain Assessment   Patient Currently in Pain No   Posture   Posture Comments Forward head position   Coordination   Coordination Comments Able to manipulate grooming tools and eating  utensils independently although somewhat slowly   Bed Mobility   Comment (Bed Mobility) Min A with supine to sit (gave him a hand for support to pull himself up and forward)   Transfers   Transfers sit-stand transfer;bed-chair transfer   Transfer Skill: Bed to Chair/Chair to Bed   Bed-Chair [place] (Transfers) minimum assist (75% patient effort)   Assistive Device (Bed-Chair Transfers) rolling walker   Sit-Stand Transfer   Sit-Stand [place] (Transfers) contact guard;verbal cues   Assistive Device (Sit-Stand Transfers) walker, front-wheeled   Balance   Balance Comments Amb in room FWW Min A of 1   Activities of Daily Living   BADL Assessment/Intervention feeding;grooming   Grooming Assessment/Training   Position (Grooming) supported sitting   [place] Level (Grooming) set up;verbal cues   Eating/Self Feeding   [place] Level (Feeding) set up;verbal cues   Comment, (Feeding) Assist to order food /use phone   Clinical Impression   Criteria for Skilled Therapeutic Interventions Met (OT) Yes, treatment indicated   OT Diagnosis decreased ADL/IADL performance   OT Problem List-Impairments impacting ADL problems related to;activity tolerance impaired;balance;cognition;coordination;mobility;strength   Assessment of Occupational Performance 3-5 Performance Deficits   Identified Performance Deficits dressing, toileting, grooming, functiona mobility   Planned Therapy Interventions (OT) ADL retraining;cognition;transfer training;strengthening   Clinical Decision Making Complexity (OT) low complexity   OT Discharge Planning   OT Discharge Recommendation (DC Rec) Transitional Care Facility   OT Rationale for DC Rec Pt requiriing A of 1 with all mobilities and self-cares so unsafe to return to IL setting at this time. Recommend return to TCU with continued OT intervention to maximize safety and indepedence with daily tasks and to help determine long term care needs.   Total Evaluation Time (Minutes)   Total  Evaluation Time (Minutes) 15   OT Goals   Therapy Frequency (OT) 5 times/wk   OT Predicted Duration/Target Date for Goal Attainment 06/21/22   OT Goals Hygiene/Grooming;Toilet Transfer/Toileting;Transfers;Lower Body Dressing   OT: Hygiene/Grooming supervision/stand-by assist;while standing   OT: Lower Body Dressing Supervision/stand-by assist  (with set-up from chair level)   OT: Transfer Supervision/stand-by assist;with assistive device   OT: Toilet Transfer/Toileting Supervision/stand-by assist;cleaning and garment management;toilet transfer;using adaptive equipment   Psychosocial Support   Trust Relationship/Rapport care explained;choices provided;questions answered;questions encouraged

## 2022-06-14 NOTE — PLAN OF CARE
Goal Outcome Evaluation:    9361-4825     AVSS on room air. Heart monitor indicating A. Fib w/ CVR. A+Ox1, oriented to self only. Knows that he is in a hospital. Ambulating Ax1 w/ gait belt+walker.  Working with PT & OT. Preventative mepilex CDI on coccyx. L PIV SL. BLE wraps removed for 1 hour and redressed. L foot wound care completed per WOC orders. Tolerating regular diet (2 gram K+ limit) w/o nausea or vomiting. LBM 6/13. Barry draining good amts of yellow/clear UOP. Nephrology following. Plan to discharge to TCU once placement established, SW following. Will continue with POC.

## 2022-06-15 NOTE — PLAN OF CARE
Goal Outcome Evaluation:    7455-4170: Alert to self only. VSS on RA ex low temp at times, nadya hugger in room if needed. Tele Afib CVR. Up A1 GB/W. Tolerating renal diet, poor appetite for dinner. Denies pain and N/V. R PIV SL. Switched to PO torsemide, govea with good UOP. Edemawear on BLE. Mepilex to L foot, CDI. Abd US completed, see results. Neph following. Discharge to TCU pending, SW following.

## 2022-06-15 NOTE — PLAN OF CARE
Goal Outcome Evaluation:    8357-4498     AVSS on room air. Heart monitor indicating A. Fib w/ CVR. A+Ox1, oriented to self only.  Ambulating Ax1 w/ gait belt+walker.  Working with PT & OT. Preventative mepilex CDI on coccyx. L PIV SL. BLE wraps removed for 1 hour and redressed. L foot wound care completed per WOC orders. Tolerating regular diet (2 gram K+ limit) w/o nausea or vomiting. LBM 6/13. Barry draining good amts of yellow/clear UOP. Nephrology following. Plan to discharge to TCU once placement established, SW following. Will continue with POC.

## 2022-06-15 NOTE — PLAN OF CARE
Goal Outcome Evaluation:    3120-0350     AVSS on room air. Heart monitor indicating A. Fib w/ CVR. A+Ox1, oriented to self only.  Ambulating Ax1 w/ gait belt+walker.  Working with PT & OT. Preventative mepilex CDI on coccyx. L PIV SL. BLE wraps removed for 1 hour and redressed. L foot wound care completed per WOC orders. Tolerating regular diet (2 gram K+ limit) w/o nausea or vomiting. LBM 6/13. Barry draining good amts of yellow/clear UOP. Nephrology following. Plan to discharge to TCU once placement established, SW following. Will continue with POC.

## 2022-06-15 NOTE — CONSULTS
Care Management Initial Consult    General Information  Assessment completed with: Children, Daughter  Type of CM/SW Visit: Offer D/C Planning    Primary Care Provider verified and updated as needed:     Readmission within the last 30 days:        Reason for Consult: discharge planning  Advance Care Planning: None on file           Communication Assessment  Patient's communication style: spoken language (English or Bilingual)    Hearing Difficulty or Deaf: no   Wear Glasses or Blind: no    Cognitive  Cognitive/Neuro/Behavioral: .WDL except  Level of Consciousness: confused  Arousal Level: opens eyes spontaneously  Orientation: disoriented to, place, time, situation  Mood/Behavior: calm, cooperative  Best Language: 0 - No aphasia  Speech: clear, slow    Living Environment:   People in home: child(katarzyna), adult     Current living Arrangements: extended care facility  Name of Facility: [facility]   Able to return to prior arrangements: no (Daughter gave up bed at facility)  Living Arrangement Comments: Discharge to TCU facility    Family/Social Support:  Care provided by: other (see comments), child(katarzyna) (Facility Staff)  Provides care for: no one, unable/limited ability to care for self     Children, Facility resident(s)/Staff          Description of Support System: Supportive, Involved    Support Assessment: Adequate family and caregiver support, Adequate social supports    Current Resources:   Patient receiving home care services:  No, patient admitted from [facility]. Daughter does not want patient to return back there and gave up bed hold.      Community Resources:  [facility] - [facility]CU  Equipment currently used at home: walker, rolling  Supplies currently used at home:      Employment/Financial:  Employment Status: retired        Financial Concerns: No concerns identified           Lifestyle & Psychosocial Needs:  Social Determinants of Health     Tobacco Use: Medium Risk     Smoking Tobacco Use:  Former Smoker     Smokeless Tobacco Use: Never Used   Alcohol Use: Not on file   Financial Resource Strain: Not on file   Food Insecurity: Not on file   Transportation Needs: Not on file   Physical Activity: Not on file   Stress: Not on file   Social Connections: Not on file   Intimate Partner Violence: Not on file   Depression: Not at risk     PHQ-2 Score: 0   Housing Stability: Not on file       Functional Status:  Prior to admission patient needed assistance:    Patient was admitted to the hospital from [facility]. SW spoke with patient's daughter Armida and she would like SW to find another facility for patient to discharge to.         Mental Health Status:      None indicated    Chemical Dependency Status:      None indicated          Values/Beliefs:  Spiritual, Cultural Beliefs, [employer]ish Practices, Values that affect care:                 Additional Information:  SW spoke with daughter who requested referrals be sent out to facilities for placement:    Accepted:    Pending:  [facility]: Left Vm to follow up  [facility]:    Declined/Discontinued:  [facility]  [facility]: No beds available  [facility]: Unable to accept any more dementia patients on the unit.   [facility]: No longer has bed available    ANN Dick, LGSW  [phone]  [facility]

## 2022-06-15 NOTE — PLAN OF CARE
VSS on RA except temp low at times, Tmin 94.1. Lei hugger used intermittently to bring temp up. Tele Afib w CVR. Oriented to self only. Knows he is in a hospital, repeated requests to have govea removed so he can 'Use the bathroom like a normal person.' Per report A1+GB/W, but not OOB this shift. Working with PT and OT. Preventive mepilex CDI on coccyx. L PIV SL. Edemawear on legs and feet. Tolerating regular diet with 2g K+ limit. Govea good UOP. Nephrology following. NPO since midnight for imaging today to look for source of elevated LFTs. Plan to discharge to TCU pending placement.

## 2022-06-15 NOTE — PROGRESS NOTES
Renal Medicine Progress Note            Assessment/Plan:   Jose Gomez is a 89 year old male who was admitted on 6/10/2022.      Assessment:     1) SHERWIN on CKD:  -Presumed postrenal obstruction with SHERWIN with 1.2 L urine output and Govea placement, but creatinine has not improved as expected and remained plateau'ed . I wonder if he had ATN type injury with slow recovery  Or incomplete recovery from major SHERWIN in May. May not know for sure until several weeks later     - stable Cr with diuresis        Hyperkalemia: Treated medically.  K normal now      2) CKD: had reported baseline 2/20 of 1.2-1.6, earlier this year reported baseline around 2.0 so clearly some progressive CKD. Also has had multiple AKIs.  With dementia, advanced age further evaluation not very fruitful. LIkley HTN and/or chronic post-renal etiology. Will not do further evaluations. Clearly would never be a dialysis candidate.      Remains hypervolemic with edema and  pleural effusions. Diuresing well on Lasix. No dyspne a    Other:  Dementia  Elevated LFT's - trending down   Hx HFpEF, a. Fib  HTN        Plan:  1) govea bladder drainage, follow up  with urology  2) got one dose of lasix IV yesterday . Switch to Torsemide 20 mg daily   3) Do not need to wait for renal function to come back to baseline ( which it may never do) for discharge. Okay to discharge from renal standpoint with close f/u with PCP if other issues resolved. Awaiting TCU placement     Keith Chandra MD  [facility] - Nephrology   [phone]              Interval History:   No acute changes overnight.  Govea in place.  Urine appears clear  No dyspnea . Sitting up in chair.   1.7 L UOp with lasix yesterday . Cr stable   Unable to provide review of system.              Medications and Allergies:       aspirin  81 mg Oral Daily     cyanocobalamin  500 mcg Oral Daily     finasteride  5 mg Oral Daily     metoprolol succinate ER  50 mg Oral BID     pantoprazole  40 mg Oral  "Daily     simvastatin  20 mg Oral At Bedtime     sodium chloride (PF)  3 mL Intracatheter Q8H     tamsulosin  0.4 mg Oral Daily        Allergies   Allergen Reactions     No Known Drug Allergies             Physical Exam:   Vitals were reviewed  /82 (BP Location: Left arm)   Pulse 97   Temp 98.6  F (37  C) (Oral)   Resp 18   Ht 1.753 m (5' 9\")   Wt 78.5 kg (173 lb 1 oz)   SpO2 98%   BMI 25.56 kg/m      Wt Readings from Last 3 Encounters:   06/15/22 78.5 kg (173 lb 1 oz)   06/09/22 46.3 kg (102 lb)   06/02/22 72.3 kg (159 lb 6.4 oz)           GENERAL: healthy, alert, NAD  HEENT:  Normocephalic. No gross abnormalities.  Pupils equal.  MMM.  Dentition is poor, many missing teeth  CV: IRR, no murmurs, no clicks, gallops, or rubs,1+ lower leg edema  RESP: decreased at bases  GI: Abdomen soft/nt/nd, BS normal.   SKIN: no suspicious lesions or rashes, dry to touch  PSYCH: mood good, affect appropriate  Barry cath in place         Data:     BMP  Recent Labs   Lab 06/15/22  0737 06/14/22  0741 06/13/22  0735 06/12/22  0603    139 139 139   POTASSIUM 3.9 3.9 4.0 4.3  4.3   CHLORIDE 106 110* 110* 111*   CAMILA 8.6 8.5 8.7 9.0   CO2 23 21 21 22   BUN 68* 69* 72* 72*   CR 2.71* 2.65* 2.72* 2.71*   * 116* 102* 102*     CBC  Recent Labs   Lab 06/13/22  0735 06/10/22  1802 06/10/22  0700   WBC 3.8* 4.9 4.2   HGB 10.4* 11.4* 10.3*   HCT 32.7* 36.7* 32.9*   * 106* 105*   PLT 91* 142* 121*     Lab Results   Component Value Date    AST 84 (H) 06/15/2022     (H) 06/15/2022    ALKPHOS 109 06/15/2022    BILITOTAL 1.7 (H) 06/15/2022     Lab Results   Component Value Date    INR 1.26 (H) 06/15/2022       Attestation:  I have reviewed today's vital signs, notes, medications, labs and imaging.    Keith Chandra MD      "

## 2022-06-16 NOTE — PROGRESS NOTES
Care Management Follow Up    Length of Stay (days): 6    Expected Discharge Date: 06/16/2022     Concerns to be Addressed: discharge planning     Patient plan of care discussed at interdisciplinary rounds: Yes    Anticipated Discharge Disposition:       Anticipated Discharge Services:    Anticipated Discharge DME:      Patient/family educated on Medicare website which has current facility and service quality ratings:    Education Provided on the Discharge Plan:    Patient/Family in Agreement with the Plan:      Referrals Placed by CM/SW:    Private pay costs discussed: Not applicable    Additional Information:  Writer left messages for [facility] to see if there's any available tcu beds. Writer called Armida (daughter) and discussed tcu choices for patient. She said that writer could send out tcu memory care referrals to [facility] (1), [facility] (2), and [facility] (3). Writer sent referrals via [facility].    Addendum 1508: [facility] accept patient in DOD. Writer called Armida (daughter) and let her know that [facility]. She said she was fine with this. Writer called [facility] and they said that they could take patient tomorrow at 1200. Writer called Armida to let her know and she said patient will need a ride set up. She's aware of the cost.    Writer set up a stretcher ride through [facility] tomorrow at 1200. Patient needs stretcher ride for his confusion.     JAMES Guadarrama

## 2022-06-16 NOTE — PLAN OF CARE
Goal Outcome Evaluation:    AOx2, disoriented to situation and time. VSS on RA. Up A1 GB/W, ambulated in martinez with PT, up in chair for meals. Tolerating renal diet, fair appetite. Denies pain and N/V. L PIV SL. Incontinent, govea in place, 2 soft BMs this shift. Tele Afib CVR. +1-2 BLE edema, edemawear in place. Mepilex to L foot and L shin changed, CDI. Worked with PT and OT today. Neph signed off. Plan on discharge to [facility] tomorrow, stretcher ride set up for 1200.

## 2022-06-16 NOTE — PROGRESS NOTES
"[facility]    Hospitalist Progress Note    Interval History   Minimal history from patient.  Patient states he is comfortable.  Govea catheter draining, pink urine, no pain complaints.  Afebrile.  Eating.    Assessment & Plan   Summary: Jose Gomez is a 89 year old male with PMH diastolic congestive heart failure, persistent atrial fibrillation, falls, HLD, CKD, dementia, GERD, who was admitted on 6/10/2022 with acute kidney injury and hyperkalemia.    Acute kidney injury on CKD due to urinary retention  Hematuria, improved  Hyperkalemia, improved  Baseline creatinine recent ~2.0 (had been 1.4-1.8 late 2021). Recently hospitalized 5/18 to 5/27 with CHF and L leg wounds. With attempts at diuresis had SHERWIN. Also had hyperkalemia which improved with kayexalate/ furosemide. US showed mild bilateral hydro. Lisinopril discontinued that admission, not discharged on diuretics. SHERWIN thought 2/2 diuretics and obstruction.   K notably rising since discharge. Brought to ED for hyperkalemia, in ED noted K 6.5, creatinine 2.84. EKG w/o hyperkalemic changes. Treated with insulin/ D50, bicarb, albuterol in ED. Also given lasix specifically for kaliuresis. Kayexalate 30 g PO x 1 given. Hyperakelmia improved on 6/11.  - Appreciate Nephrology consult   - Diuresis  -Creatinine stable with diuresis in the 2.7 range, Govea catheter in for known urinary retention.  Urology follow-up.  K now normal.  Continue diuresis with torsemide 20 mg daily, monitor BMP.  Potassium now normal.,  Nonuremic.  -Govea catheter in, drainage, pink urine.  - Low K diet  - Continue tamsulosin    Urinary retention  Hematuria, probably traumatic cath, improved  History of BPH. Was recently discharged from the hospital on 5/27/2022 with a govea due to urinary retention. It appears that govea was removed at some time while he was at TCU between 5/27 and 6/10. Govea placed 6/11 for urinary retention returning \"Merlot\" colored urine. " Hematuria improved on 6/12. Ct abd/pelvis shows decompressed bladder, nothing to explain hematuria, and improved hydronephrosis.  - Urology consult appreciated  - Continue govea until Urology follow up  - Continue finasteride and tamsulosin    Elevated transaminases  AST//127 on presentation. LFTs have been historically normal--first elevation in chart. Unclear etiology, no new offending medications apparent. CT abdomen on 6/12 notable for trace ascites which is felt to be more related to patient's edema than to liver etiology. LFTs improved on 6/12 but now elevated again on 6/14.  - Check abdominal ultrasound stones without cholecystitis.    Check hepatitis B and C antibodies  - Check INR: 1.26  - Trend LFTs      Moderate bilateral pleural effusions  Chronic HFpEF  Persistent afib  HLD  Admit 5/18-5/27 with CHF. BNP at that time was >9000. Attempted diuresis but creatinine paula. Was not discharged on diuretics and his ACE I was stopped. CT on admission with moderate bilateral pleural effusions R>L. Echo 5/19/2022 with EF 55-60%, nl RV with mildly diminished RV function, mod TR.  Doesn't appear in overt failure on admission--appears volume overloaded. Renal function likely related to urinary retention not prerenal.  - Continue metoprolol, aspirin, simvastatin  - Continue PT/OT while inpatient    Anemia  Thrombocytopenia  Hgb 11.4 and plts 142, both improved from recent admission.   - monitor counts    Hx hyperthyroidism  Chronic, previously on methimazole, TSH 4.54 with normal T4 and T3 5/2022.  - no acute issues    GERD  - PTA omeprazole 20 mg daily    Dementia  Appears to be somewhat confused, possibly worse than baseline.  - Re-orient as needed  - Maintain normal day/night, sleep wake cycles  - Minimize sedating/altering medications as able    DVT Prophylaxis: Pneumatic Compression Devices  Code Status: Full Code  PT/OT: ordered  Diet: Room Service  Renal Diet (non-dialysis)      Disposition: Expected  discharge 1-2 daysback to TCU pending clinical stability.  Urology follow-up plan established  Adán Cota MD, MD  Text Page  (7am to 6pm)  -Data reviewed today: I reviewed all new labs and imaging results over the last 24 hours.    I spent 25 minutes on the unit/floor in management of care today reviewing labs, medications, interdisciplinary notes; and completing documentation of encounter and orders with over 50% of my timeCoordinating Care and plan with Nursing and Specialists, Nephrology regarding fluid management and surveillance; Urology regarding Barry management    Physical Exam   Temp: (!) 96  F (35.6  C) Temp src: Axillary BP: 118/81 Pulse: 68   Resp: 18 SpO2: 96 % O2 Device: None (Room air)    Vitals:    06/13/22 0648 06/14/22 0546 06/15/22 0600   Weight: 77.5 kg (170 lb 13.7 oz) 78.2 kg (172 lb 6.4 oz) 78.5 kg (173 lb 1 oz)     Vital Signs with Ranges  Temp:  [94.1  F (34.5  C)-97.2  F (36.2  C)] 96  F (35.6  C)  Pulse:  [56-76] 68  Resp:  [18] 18  BP: (106-139)/(68-98) 118/81  SpO2:  [95 %-96 %] 96 %  I/O last 3 completed shifts:  In: -   Out: 900 [Urine:900]  O2 requirements: None    Constitutional:   NAD, awake, calm, cooperative. [place]  HEENT: Atraumatic  Cardiovascular: Irregular, normal S1/2, systolic murmur  Respiratory: Respirations nonlabored room air  Vascular: No LE pitting edema  GI: Normoactive bowel sounds, nontender.  Barry catheter in, pink clear urine  Neuro.  Gross motor tested, nonfocal, sensory intact  Psych orientation cannot be reliably tested.  Affect calm.    Medications       aspirin  81 mg Oral Daily     cyanocobalamin  500 mcg Oral Daily     finasteride  5 mg Oral Daily     metoprolol succinate ER  50 mg Oral BID     pantoprazole  40 mg Oral Daily     simvastatin  20 mg Oral At Bedtime     sodium chloride (PF)  3 mL Intracatheter Q8H     tamsulosin  0.4 mg Oral Daily     torsemide  20 mg Oral Daily       Data   Recent Labs   Lab 06/16/22  0820 06/15/22  0737  06/14/22  0741 06/13/22  0735 06/10/22  1931 06/10/22  1802 06/10/22  0700   WBC  --   --   --  3.8*  --  4.9 4.2   HGB  --   --   --  10.4*  --  11.4* 10.3*   MCV  --   --   --  102*  --  106* 105*   PLT  --   --   --  91*  --  142* 121*   INR  --  1.26*  --   --   --   --   --     138 139 139   < > 138 139   POTASSIUM 4.0 3.9 3.9 4.0   < > 6.5* 5.5*   CHLORIDE 106 106 110* 110*   < > 108 107   CO2 23 23 21 21   < > 24 21*   BUN 66* 68* 69* 72*   < > 73* 67*   CR 2.70* 2.71* 2.65* 2.72*   < > 2.84* 2.71*   ANIONGAP 9 9 8 8   < > 6 11   CAMILA 9.1 8.6 8.5 8.7   < > 9.1 9.1   GLC 98 106* 116* 102*   < > 135* 88   ALBUMIN  --  2.9* 2.7*  --    < > 3.5  --    PROTTOTAL  --  5.8* 5.5*  --    < > 6.9  --    BILITOTAL  --  1.7* 1.2  --    < > 0.8  --    ALKPHOS  --  109 125  --    < > 146  --    ALT  --  114* 142*  --    < > 127*  --    AST  --  84* 151*  --    < > 174*  --     < > = values in this interval not displayed.       Imaging:

## 2022-06-16 NOTE — PROGRESS NOTES
"[facility]    Hospitalist Progress Note    Interval History   Minimal history from patient.  No complaints, states he is comfortable.  Eating.    Assessment & Plan   Summary: Jose Gomez is a 89 year old male with PMH diastolic congestive heart failure, persistent atrial fibrillation, falls, HLD, CKD, dementia, GERD, who was admitted on 6/10/2022 with acute kidney injury and hyperkalemia.    Acute kidney injury on CKD due to urinary retention  Hematuria, improved  Hyperkalemia, improved  Baseline creatinine recent ~2.0 (had been 1.4-1.8 late 2021). Recently hospitalized 5/18 to 5/27 with CHF and L leg wounds. With attempts at diuresis had SHERWIN. Also had hyperkalemia which improved with kayexalate/ furosemide. US showed mild bilateral hydro. Lisinopril discontinued that admission, not discharged on diuretics. SHERWIN thought 2/2 diuretics and obstruction.   K notably rising since discharge. Brought to ED for hyperkalemia, in ED noted K 6.5, creatinine 2.84. EKG w/o hyperkalemic changes. Treated with insulin/ D50, bicarb, albuterol in ED. Also given lasix specifically for kaliuresis. Kayexalate 30 g PO x 1 given. Hyperakelmia improved on 6/11.  - Appreciate Nephrology consult   - Diuresis  -Govea catheter in, drainage, pink urine.  - Low K diet  - Continue tamsulosin    Urinary retention  Hematuria, probably traumatic cath, improved  History of BPH. Was recently discharged from the hospital on 5/27/2022 with a govea due to urinary retention. It appears that govea was removed at some time while he was at TCU between 5/27 and 6/10. Govea placed 6/11 for urinary retention returning \"Merlot\" colored urine. Hematuria improved on 6/12. Ct abd/pelvis shows decompressed bladder, nothing to explain hematuria, and improved hydronephrosis.  - Urology consult appreciated  - Continue govea until Urology follow up  - Continue finasteride and tamsulosin    Elevated transaminases  AST//127 on " presentation. LFTs have been historically normal--first elevation in chart. Unclear etiology, no new offending medications apparent. CT abdomen on 6/12 notable for trace ascites which is felt to be more related to patient's edema than to liver etiology. LFTs improved on 6/12 but now elevated again on 6/14.  - Check abdominal ultrasound stones without cholecystitis.    Check hepatitis B and C antibodies  - Check INR: 1.26  - Trend LFTs      Moderate bilateral pleural effusions  Chronic HFpEF  Persistent afib  HLD  Admit 5/18-5/27 with CHF. BNP at that time was >9000. Attempted diuresis but creatinine paula. Was not discharged on diuretics and his ACE I was stopped. CT on admission with moderate bilateral pleural effusions R>L. Echo 5/19/2022 with EF 55-60%, nl RV with mildly diminished RV function, mod TR.  Doesn't appear in overt failure on admission--appears volume overloaded. Renal function likely related to urinary retention not prerenal.  - Continue metoprolol, aspirin, simvastatin  - Continue PT/OT while inpatient    Anemia  Thrombocytopenia  Hgb 11.4 and plts 142, both improved from recent admission.   - monitor counts    Hx hyperthyroidism  Chronic, previously on methimazole, TSH 4.54 with normal T4 and T3 5/2022.  - no acute issues    GERD  - PTA omeprazole 20 mg daily    Dementia  Appears to be somewhat confused, possibly worse than baseline.  - Re-orient as needed  - Maintain normal day/night, sleep wake cycles  - Minimize sedating/altering medications as able    DVT Prophylaxis: Pneumatic Compression Devices  Code Status: Full Code  PT/OT: ordered  Diet: Room Service  Renal Diet (non-dialysis)      Disposition: Expected discharge 2-3 days back to TCU pending clinical stability.  Adán Cota MD, MD  Text Page  (7am to 6pm)  -Data reviewed today: I reviewed all new labs and imaging results over the last 24 hours.    Total unit/floor time 25 minutes:  time consisted of the following assuming Patient  care, examination of patient, review of records including labs, imaging results, abdominal US;  medications, interdisciplinary notes and completing documentation; > 50%Coordination of Care time with Nursing  and Specialists, Nephrology regarding SHERWIN care plan, management and surveillance.     Physical Exam   Temp: (!) 96  F (35.6  C) Temp src: Axillary BP: 118/81 Pulse: 68   Resp: 18 SpO2: 96 % O2 Device: None (Room air)    Vitals:    06/13/22 0648 06/14/22 0546 06/15/22 0600   Weight: 77.5 kg (170 lb 13.7 oz) 78.2 kg (172 lb 6.4 oz) 78.5 kg (173 lb 1 oz)     Vital Signs with Ranges  Temp:  [94.1  F (34.5  C)-97.2  F (36.2  C)] 96  F (35.6  C)  Pulse:  [56-76] 68  Resp:  [18] 18  BP: (106-139)/(68-98) 118/81  SpO2:  [95 %-96 %] 96 %  I/O last 3 completed shifts:  In: -   Out: 900 [Urine:900]  O2 requirements: None    Constitutional:   NAD, alert, calm, cooperative    HEENT: Atraumatic  Cardiovascular: Irregular, normal S1/2, systolic murmur  Respiratory: Rations nonlabored room air  Vascular: No LE pitting edema  GI: Normoactive bowel sounds, nontender  Neuro.  Gross motor tested, nonfocal, sensory intact  Psych orientation cannot be reliably tested.  Affect calm     Medications       aspirin  81 mg Oral Daily     cyanocobalamin  500 mcg Oral Daily     finasteride  5 mg Oral Daily     metoprolol succinate ER  50 mg Oral BID     pantoprazole  40 mg Oral Daily     simvastatin  20 mg Oral At Bedtime     sodium chloride (PF)  3 mL Intracatheter Q8H     tamsulosin  0.4 mg Oral Daily     torsemide  20 mg Oral Daily       Data   Recent Labs   Lab 06/16/22  0820 06/15/22  0737 06/14/22  0741 06/13/22  0735 06/10/22  1931 06/10/22  1802 06/10/22  0700   WBC  --   --   --  3.8*  --  4.9 4.2   HGB  --   --   --  10.4*  --  11.4* 10.3*   MCV  --   --   --  102*  --  106* 105*   PLT  --   --   --  91*  --  142* 121*   INR  --  1.26*  --   --   --   --   --     138 139 139   < > 138 139   POTASSIUM 4.0 3.9 3.9 4.0   < >  6.5* 5.5*   CHLORIDE 106 106 110* 110*   < > 108 107   CO2 23 23 21 21   < > 24 21*   BUN 66* 68* 69* 72*   < > 73* 67*   CR 2.70* 2.71* 2.65* 2.72*   < > 2.84* 2.71*   ANIONGAP 9 9 8 8   < > 6 11   CAMILA 9.1 8.6 8.5 8.7   < > 9.1 9.1   GLC 98 106* 116* 102*   < > 135* 88   ALBUMIN  --  2.9* 2.7*  --    < > 3.5  --    PROTTOTAL  --  5.8* 5.5*  --    < > 6.9  --    BILITOTAL  --  1.7* 1.2  --    < > 0.8  --    ALKPHOS  --  109 125  --    < > 146  --    ALT  --  114* 142*  --    < > 127*  --    AST  --  84* 151*  --    < > 174*  --     < > = values in this interval not displayed.       Imaging:

## 2022-06-16 NOTE — PROGRESS NOTES
Renal Medicine Progress Note            Assessment/Plan:   Jose Gomez is a 89 year old male who was admitted on 6/10/2022.      Assessment:     1) SHERWIN on CKD:  -Presumed postrenal obstruction with SHERWIN with 1.2 L urine output and Govea placement, but creatinine has not improved as expected and remained plateau'ed . I wonder if he had ATN type injury with slow recovery  Or incomplete recovery from major SHERWIN in May. May not know for sure until several weeks later     - stable Cr with diuresis        Hyperkalemia: Treated medically.  K normal now      2) CKD: had reported baseline 2/20 of 1.2-1.6, earlier this year reported baseline around 2.0 so clearly some progressive CKD. Also has had multiple AKIs.  With dementia, advanced age further evaluation not very fruitful. LIkley HTN and/or chronic post-renal etiology. Will not do further evaluations. Clearly would never be a dialysis candidate.      Remains hypervolemic with edema and  pleural effusions. On Torsemide . No dyspne a    Other:  Dementia  Elevated LFT's - trending down   Hx HFpEF, a. Fib  HTN        Plan:  1) govea bladder drainage, follow up  with urology  2) On  Torsemide 20 mg daily   3) Do not need to wait for renal function to come back to baseline ( which it may never do) for discharge. Okay to discharge from renal standpoint with close f/u with PCP if other issues resolved. Awaiting TCU placement     Will sign off. Plz call back with questions    Keith Chandra MD  [facility] - Nephrology   [phone]              Interval History:   No acute changes overnight.  Govea in place.  Urine appears clear  No dyspnea . Laying flat    0.9 L UOp with lasix yesterday . Cr stable   Unable to provide review of system.              Medications and Allergies:       aspirin  81 mg Oral Daily     cyanocobalamin  500 mcg Oral Daily     finasteride  5 mg Oral Daily     metoprolol succinate ER  50 mg Oral BID     pantoprazole  40 mg Oral Daily      "simvastatin  20 mg Oral At Bedtime     sodium chloride (PF)  3 mL Intracatheter Q8H     tamsulosin  0.4 mg Oral Daily     torsemide  20 mg Oral Daily        Allergies   Allergen Reactions     No Known Drug Allergies             Physical Exam:   Vitals were reviewed  /81 (BP Location: Right arm)   Pulse 68   Temp (!) 96  F (35.6  C) (Axillary)   Resp 18   Ht 1.753 m (5' 9\")   Wt 78.5 kg (173 lb 1 oz)   SpO2 96%   BMI 25.56 kg/m      Wt Readings from Last 3 Encounters:   06/15/22 78.5 kg (173 lb 1 oz)   06/09/22 46.3 kg (102 lb)   06/02/22 72.3 kg (159 lb 6.4 oz)           GENERAL: healthy, alert, NAD  HEENT:  Normocephalic. No gross abnormalities.  Pupils equal.  MMM.  Dentition is poor, many missing teeth  CV: IRR, no murmurs, no clicks, gallops, or rubs,1+ lower leg edema ( better)   RESP: decreased at bases  GI: Abdomen soft/nt/nd, BS normal.   SKIN: no suspicious lesions or rashes, dry to touch  PSYCH: mood good, affect appropriate  Barry cath in place         Data:     BMP  Recent Labs   Lab 06/16/22  0820 06/15/22  0737 06/14/22  0741 06/13/22  0735    138 139 139   POTASSIUM 4.0 3.9 3.9 4.0   CHLORIDE 106 106 110* 110*   CAMILA 9.1 8.6 8.5 8.7   CO2 23 23 21 21   BUN 66* 68* 69* 72*   CR 2.70* 2.71* 2.65* 2.72*   GLC 98 106* 116* 102*     CBC  Recent Labs   Lab 06/13/22  0735 06/10/22  1802 06/10/22  0700   WBC 3.8* 4.9 4.2   HGB 10.4* 11.4* 10.3*   HCT 32.7* 36.7* 32.9*   * 106* 105*   PLT 91* 142* 121*     Lab Results   Component Value Date    AST 84 (H) 06/15/2022     (H) 06/15/2022    ALKPHOS 109 06/15/2022    BILITOTAL 1.7 (H) 06/15/2022     Lab Results   Component Value Date    INR 1.26 (H) 06/15/2022       Attestation:  I have reviewed today's vital signs, notes, medications, labs and imaging.    Keith Chandra MD      "

## 2022-06-16 NOTE — PLAN OF CARE
Goal Outcome Evaluation:      Shift Note:     [id number]: Alert to self only. Pt confused, easy to redirect. VSS on RA, patient had episodes of low temp during previous shifts, nadya hugger in the room if needed.Pt temp during shift was WNL. Tele Afib CVR.. Tolerating renal diet, poor appetite for dinner. Denies pain and nausea. R PIV SL. Switched from lasix to PO torsemide during the day shift. Pt govea with good yellow UOP. Lymph wraps on BLE. Mepilex to L foot, CDI. Abd US completed yesterday during day shift, see results. Neph following. Discharge to TCU pending.

## 2022-06-17 NOTE — PROGRESS NOTES
Care Management Discharge Note    Discharge Date: 06/17/2022       Discharge Disposition: [facility]     Discharge Services:  TCU    Discharge DME:      Discharge Transportation:   Stretcher Transport at 12:00 today. Patient in need of stretcher transport secondary to dementia diagnosis and needing supervision for safety.  PCS form completed, faxed to  and provided to [facility].     Private pay costs discussed: Not applicable    PAS Confirmation Code:  N/A - patient returning back to facility.  Patient/family educated on Medicare website which has current facility and service quality ratings:      Education Provided on the Discharge Plan:    Persons Notified of Discharge Plans: Daughter Armida  Patient/Family in Agreement with the Plan:  Yes    Handoff Referral Completed: No    Additional Information:   received discharge orders for patient to discharge to [facility] today at 12:00 via  [facility]. Faxed discharge orders to facility.     ANN Dick, LGSW  [phone]  [facility]

## 2022-06-17 NOTE — PROVIDER NOTIFICATION
MD Notification    Notified Person: MD    Notified Person Name:  Álvaroliuike    Notification Date/Time: 6/17/22 9:19 a.m.    Notification Interaction: FYI page regarding Urology per AVS patient is scheduled with [facility] next week and then in August for cysto. Updated discharge instructions to TCU with this information and that family may need assistance with ride and transportation will also ask bedside RN to identify the caregiver for patient to review discharge orders and follow up(s).    Purpose of Notification: page regarding follow up for Urology    Orders Received: NO call back needed unless further clarification.    Comments:

## 2022-06-17 NOTE — DISCHARGE SUMMARY
[facility]    Discharge Summary  Hospitalist    Date of Admission:  6/10/2022  Date of Discharge:  6/17/2022  Discharging Provider: Adán Cota MD  Date of Service (when I saw the patient): 06/17/22      History of Present Illness   Jose Gomez is a 89 year old male with PMH diastolic congestive heart failure, persistent atrial fibrillation, falls, HLD, CKD, dementia, GERD, who was admitted on 6/10/2022 with acute kidney injury and hyperkalemia.    Hospital Course   Jose Gomez was admitted on 6/10/2022.  The following problems were addressed during his hospitalization:    Acute kidney injury on CKD due to urinary retention  Hematuria, improved  Hyperkalemia, improved  Baseline creatinine recent ~2.0 (had been 1.4-1.8 late 2021). Recently hospitalized 5/18 to 5/27 with CHF and L leg wounds. With attempts at diuresis had SHERWIN. Also had hyperkalemia which improved with kayexalate/ furosemide. US showed mild bilateral hydro. Lisinopril discontinued that admission, not discharged on diuretics. SHERWIN thought 2/2 diuretics and obstruction.                K notably rising since discharge. Brought to ED for hyperkalemia, in ED noted K 6.5, creatinine 2.84. EKG w/o hyperkalemic changes. Treated with insulin/ D50, bicarb, albuterol in ED. Also given lasix specifically for kaliuresis. Kayexalate 30 g PO x 1 given. Hyperakelmia improved on 6/11.  - Appreciate Nephrology consult                - Diuresis  -Creatinine stable with diuresis in the 2.7 range, Barry catheter in for known urinary retention.  Urology follow-up.  K now normal.  Per Nephrology recommendations: Continue diuresis with torsemide 20 mg daily, Nonuremic.  -Barry catheter in, drainage, pink nonpurulent urine.  - Low K diet  - Continue tamsulosin and proscar per Urology  -Patient discharged to [facility] today, discharge orders and follow-up as below including follow-up BMP, LFTs and CBC in TCU, and urology  "follow-up as arranged.  In the meantime Govea care per TCU.     Urinary retention  Hematuria, probably traumatic cath, improved  History of BPH. Was recently discharged from the hospital on 5/27/2022 with a govea due to urinary retention. It appears that govea was removed at some time while he was at TCU between 5/27 and 6/10. Govea placed 6/11 for urinary retention returning \"Merlot\" colored urine. Hematuria improved on 6/12. Ct abd/pelvis shows decompressed bladder, nothing to explain hematuria, No AC except for low dose ASA.  improved hydronephrosis.  - Urology consult   - Continue govea until Urology follow up as arranged; follow-up govea and hematuria  - Continue finasteride and tamsulosin     Elevated transaminases  AST//127 on presentation. LFTs have been historically normal--first elevation in chart. Unclear etiology, no new offending medications apparent. CT abdomen on 6/12 notable for trace ascites which is felt to be more related to patient's edema than to liver etiology. LFTs improved on 6/12 but now elevated again on 6/14.  - abdominal ultrasound stones without cholecystitis.    Check hepatitis antibodies:  NEG  - Check INR: 1.26  - Trend LFTs        Moderate bilateral pleural effusions  Chronic HFpEF  Persistent afib  HLD  Admit 5/18-5/27 with CHF. BNP at that time was >9000. Attempted diuresis but creatinine paula. Was not discharged on diuretics and his ACE I was stopped. CT on admission with moderate bilateral pleural effusions R>L. Echo 5/19/2022 with EF 55-60%, nl RV with mildly diminished RV function, mod TR.  Doesn't appear in overt failure on admission--discharge on torsemide. Follow-up BMP in  TCU.  Renal function likely related to urinary retention not prerenal.  - Continue metoprolol, aspirin, simvastatin  - Continue PT/OT while inpatient; recommend return to TCU on discharge      Anemia  Thrombocytopenia  Hgb 11.4 and plts 142, both improved from recent admission.   - monitor " counts     Hx hyperthyroidism  Chronic, previously on methimazole, TSH 4.54 with normal T4 and T3 5/2022.  - no acute issues     GERD  - PTA omeprazole 20 mg daily     Dementia  Appears to be somewhat confused, improving  - Re-orient as needed  - Maintain normal day/night, sleep wake cycles  - Minimize sedating/altering medications as able      Code Status   Full Code       Primary Care Physician   Gabriel Carroll    Physical Exam   Temp: (!) 94  F (34.4  C) Temp src: Axillary BP: 137/73 Pulse: 55   Resp: 16 SpO2: 94 % O2 Device: None (Room air)    Vitals:    06/14/22 0546 06/15/22 0600 06/17/22 0535   Weight: 78.2 kg (172 lb 6.4 oz) 78.5 kg (173 lb 1 oz) 73.9 kg (162 lb 14.7 oz)     Vital Signs with Ranges  Temp:  [94  F (34.4  C)-96.2  F (35.7  C)] 94  F (34.4  C)  Pulse:  [55-78] 55  Resp:  [16-18] 16  BP: (131-143)/(73-95) 137/73  SpO2:  [92 %-94 %] 94 %  I/O last 3 completed shifts:  In: 470 [P.O.:470]  Out: 2400 [Urine:2400]    Constitutional:  NAD, awake, calm, cooperative; [place]     HEENT: Atraumatic  Cardiovascular: Irregular,  systolic murmur  Respiratory: Respirations nonlabored room air  Vascular: No LE pitting edema  GI: Normoactive bowel sounds, nontender.  Barry catheter in, pink clear urine  Neuro.  Gross motor tested, nonfocal, sensory intact  Psych orientation cannot be reliably tested.  Affect calm.    Discharge Disposition   Discharged to short-term care facility  Condition at discharge: Fair    Consultations This Hospital Stay   NEPHROLOGY IP CONSULT  PHYSICAL THERAPY ADULT IP CONSULT  UROLOGY IP CONSULT  OCCUPATIONAL THERAPY ADULT IP CONSULT  WOUND OSTOMY CONTINENCE NURSE  IP CONSULT  CARE MANAGEMENT / SOCIAL WORK IP CONSULT  PHYSICAL THERAPY ADULT IP CONSULT  OCCUPATIONAL THERAPY ADULT IP CONSULT    Time Spent on this Encounter   Adán GORDON MD, personally saw the patient today and spent greater than 30 minutes discharging this patient discussing discharge plans with Patient,  Nursing, and Care Coordinator, coordinating with Specialties, Nephrology and Urology regarding discharge recommendations and follow-up; coordinating discharge and transition to [facility]; completing discharge orders, medications and follow-up.    Discharge Orders      General info for SNF    Length of Stay Estimate: Short Term Care: Estimated # of Days <30  Condition at Discharge: Stable  Level of care:skilled   Rehabilitation Potential: Fair  Admission H&P remains valid and up-to-date: Yes  Recent Chemotherapy: N/A  Use Nursing Home Standing Orders: Yes     Mantoux instructions    Give two-step Mantoux (PPD) Per Facility Policy Yes     Follow Up and recommended labs and tests    Follow up with group home physician.  The following labs/tests are recommended:   BMP, LFTs and CBC  .  Follow up with primary care provider in one week after TCU discharge;  BMP at that time      Follow up with specialist, Urologist as arranged for ongoing Barry catheter management     Reason for your hospital stay    Presented with [SHERWIN} acute kidney injury and hyperkalemia.     Barry catheter    To straight gravity drainage. Change catheter every 2 weeks and PRN for leaking or decreased uring output with signs of bladder distention. DO NOT change catheter without a specific MD order IF diagnosis of benign prostatic hypertrophy (BPH), neurogenic bladder, or other urological conditions     Activity - Up with nursing assistance    Activities per OT/PT recommendations     Full Code     Physical Therapy Adult Consult    Evaluate and treat as clinically indicated.    Reason:  Generalized weakness     Occupational Therapy Adult Consult    Evaluate and treat as clinically indicated.    Reason:  Generalized weakness     Fall precautions     Diet    Follow this diet upon discharge:     Renal Diet (non-dialysis)     Discharge Medications   Current Discharge Medication List      START taking these medications    Details   finasteride  (PROSCAR) 5 MG tablet Take 1 tablet (5 mg) by mouth daily  Qty: 90 tablet, Refills: 3    Associated Diagnoses: Urinary retention      torsemide (DEMADEX) 20 MG tablet Take 1 tablet (20 mg) by mouth daily    Associated Diagnoses: Acute congestive heart failure, unspecified heart failure type (H)         CONTINUE these medications which have CHANGED    Details   tamsulosin (FLOMAX) 0.4 MG capsule Take 1 capsule (0.4 mg) by mouth daily  Qty: 90 capsule, Refills: 3    Associated Diagnoses: Urinary retention         CONTINUE these medications which have NOT CHANGED    Details   acetaminophen (TYLENOL) 500 MG tablet Take 500 mg by mouth every 6 hours as needed for mild pain      Apoaequorin (PREVAGEN PO) Take 1 tablet by mouth daily      aspirin (ASA) 81 MG EC tablet Take 1 tablet (81 mg) by mouth daily    Associated Diagnoses: PAD (peripheral artery disease) (H)      metoprolol succinate ER (TOPROL XL) 50 MG 24 hr tablet Take 1 tablet (50 mg) by mouth 2 times daily    Associated Diagnoses: Paroxysmal atrial fibrillation (H)      omeprazole (PRILOSEC) 20 MG DR capsule Take 1 capsule (20 mg) by mouth daily  Qty: 90 capsule, Refills: 3    Associated Diagnoses: PAD (peripheral artery disease) (H)      polyethylene glycol (MIRALAX) 17 g packet Take 1 packet by mouth daily      simvastatin (ZOCOR) 20 MG tablet Take 1 tablet (20 mg) by mouth At Bedtime  Qty: 90 tablet, Refills: 3    Associated Diagnoses: Hyperlipidemia LDL goal <100      vitamin B-12 500 MCG PO tablet Take 1 tablet by mouth daily            Allergies   Allergies   Allergen Reactions     No Known Drug Allergies      Data   Most Recent 3 CBC's:  Recent Labs   Lab Test 06/13/22  0735 06/10/22  1802 06/10/22  0700   WBC 3.8* 4.9 4.2   HGB 10.4* 11.4* 10.3*   * 106* 105*   PLT 91* 142* 121*      Most Recent 3 BMP's:  Recent Labs   Lab Test 06/17/22  0723 06/16/22  0820 06/15/22  0737    138 138   POTASSIUM 3.6 4.0 3.9   CHLORIDE 103 106 106   CO2 25 23  23   BUN 63* 66* 68*   CR 2.61* 2.70* 2.71*   ANIONGAP 8 9 9   CAMILA 8.9 9.1 8.6   * 98 106*     Most Recent 2 LFT's:  Recent Labs   Lab Test 06/15/22  0737 06/14/22  0741   AST 84* 151*   * 142*   ALKPHOS 109 125   BILITOTAL 1.7* 1.2

## 2022-06-17 NOTE — PLAN OF CARE
Goal Outcome Evaluation:        Shift Note: [id number]    AOx2, disoriented to situation and time. VSS on RA Ex low temp. Up A1 GB/W. Tolerating renal diet, fair appetite. Denies pain and N/V. L PIV SL. Incontinent, govea in place with good UOP. No BM this shift. Tele Afib CVR. +1-2 BLE edema,lymph wraps in place. Mepilex to L foot  CDI. Neph signed off. Plan on discharge to [facility] today, stretcher ride set up for 1200.

## 2022-06-17 NOTE — PLAN OF CARE
Physical Therapy Discharge Summary    Reason for therapy discharge:    Discharged to transitional care facility.    Progress towards therapy goal(s). See goals on Care Plan in [facility] electronic health record for goal details.  Goals not met.  Barriers to achieving goals:   discharge from facility.    Therapy recommendation(s):    Continued therapy is recommended.  Rationale/Recommendations:  To progress independence and safety with functional mobility.

## 2022-06-17 NOTE — DISCHARGE INSTRUCTIONS
"Wound Care instructions:  Left foot wound(s): Daily      Wound care  Start:  06/15/22 0600,   DAILY,   Routine        Comments: Location: left dorsal foot   Care: provided daily by primary RN   1. Cleanse with commercial wound cleanser and 4x4\" gauze, pat dry   2. Cover with 4x4\" mepilex dressing, ok to use for up to 5 days each           Patient has scheduled follow up next week with [facility].  Please call [facility] at [phone] If you have further questions.  The patient may require transportation to this appointment if family is unavailable.  Patient was seen by Urology Dr. Chris Mcgowan.   "

## 2022-06-17 NOTE — PLAN OF CARE
Goal Outcome Evaluation:    Discharge Note    Patient discharged to Nursing Home via EMS/BLS accompanied by no family/friend .  IV: Discontinued  Prescriptions faxed to pharmacy.   Belongings reviewed and sent with patient.   Home medications returned to patient: NA  Equipment sent with: N/A.   Patient confused and unable to  verbalizes understanding of discharge instructions. AVS given to  transport personnel .

## 2022-06-17 NOTE — PLAN OF CARE
Occupational Therapy Discharge Summary    Reason for therapy discharge:    Discharged to transitional care facility.    Progress towards therapy goal(s). See goals on Care Plan in [facility] electronic health record for goal details.  Goals not met.  Barriers to achieving goals:   discharge from facility.    Therapy recommendation(s):    Continued therapy is recommended.  Rationale/Recommendations:  to advance safety and indep with ADLs prior to return home.

## 2022-06-20 NOTE — LETTER
[facility]   To:   [facility]          Please give to facility    From:   Luzmaria Kline RN  Care Coordinator   [facility]   P: [phone]  [email or web address] [email or web address]   Patient Name:  Jose Gomez YOB: 1928    Admit date: 6/17/2022      *Information Needed:  Please contact me when the patient will discharge (or if they will move to long term care)- include the discharge date, disposition, and main diagnosis   - If the patient is discharged with home care services, please provide the name of the agency    Also- Please inform me if a care conference is being held.   Phone, Fax or Email with information        Luzmaria Kline RN, BSN, PHN  Primary Care / Care Coordinator   [facility]  E-mail [email or web address]   [phone]                Thank you

## 2022-06-20 NOTE — PROGRESS NOTES
Clinic Care Coordination Contact  Care Coordination Transition Communication    Referral Source: IP Report    Clinical Data: Patient was hospitalized at [facility] from 6/10/2022 to 6/17/2022 with diagnosis of  CKD due to urinary retention, hematuria.     Transition to Facility:              Facility Name: [facility] phone number/fax: [phone]    Plan: RN/SW Care Coordinator will await notification from facility staff informing RN/SW Care Coordinator of patient's discharge plans/needs. RN/SW Care Coordinator will review chart and outreach to facility staff every 4 weeks and as needed.      Luzmaria Kline RN, BSN, PHN  Primary Care / Care Coordinator   [facility]  E-mail [email or web address]   [phone]
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
## 2022-06-23 NOTE — PROGRESS NOTES
"[facility] GERIATRICS    Chief Complaint   Patient presents with     RECHECK     HPI:  Jose Gomez is a 89 year old  (1/21/1928), who is being seen today for an episodic care visit at: [facility] ([facility]) [[id number]]. Today's concern is: Poor appetite      Pt admitted to the hospital on 5/18 for suspected acute heart failure exacerbation and left leg cellulitis. Chest x-ray showed pulmonary vascular congestion. Echo with EF 55-60% with no regional WMA; chronic moderate tricuspid regurgitation unchanged. Received IV Lasix BID following admission and later discontinued due to worsening kidney function. Received IV Rocephin for cellulitis.  Negative for DVT. [facility] Barry catheter for ongoing medical management, rehab, and skilled nursing care.    In TCU, Barry discontinued for TOV.  Was able to urinate initially though he retained later.    Readmitted to the hospital on 6/10 with acute kidney injury and hyperkalemia likely secondary to urinary retention.  Treated for SHERWIN and hyperkalemia and discharged back to TCU on 6/17 with Barry in place.    He is being seen today for routine TCU checks.  History limited due to underlying cognitive deficits.  No acute distress.  Looks pale.  States he is fine.  Staff reports poor appetite. Will consider IV fluids. Seen by urology on 6/21 and had Barry removed.  Verbal order given to the primary nurse for PVR every shift.  BP soft at times.  No reports of chest pain or shortness of breath.  High risk for readmission.    Allergies, and PMH/PSH reviewed in EPIC today.  REVIEW OF SYSTEMS:  Limited secondary to cognitive impairment but today pt reports fine.    Objective:   BP 99/40   Pulse 65   Temp 96.8  F (36  C)   Resp 15   Ht 1.753 m (5' 9\")   Wt 67.7 kg (149 lb 3.2 oz)   SpO2 97%   BMI 22.03 kg/m       Exam:  GENERAL APPEARANCE: Well groomed, pale, appropriately dressed   RESPIRATORY: Clear to auscultation, even " and unlabored, symmetrical chest wall expansion  CARDIOVASCULAR: RRR, bilateral trace peripheral edema, S1/S2 normal, peripheral pulses diminished  GASTROINTESTINAL: Soft, nontender, nondistended, bowel sounds positive  MUSCULOSKELETAL: No visible joint deformities, moves all extremities  INTEGUMENTARY: Wound to dorsal aspect of left foot covered and intact, left third toe wound dry without sings of infection   NEUROLOGICAL: Alert to self, memory loss, confusion  PSYCHOLOGICAL: Cooperative, calm     Recent labs in [facility] reviewed by me today.     Assessment/Plan:  (R63.0) Poor appetite  (primary encounter diagnosis)  Comment: Noo reports of nausea or emesis   Plan: Encourage p.o. intake, RD follow-up, consider IV fluids, check BMP, monitor    (N17.9,  N18.9) Acute kidney injury superimposed on chronic kidney disease (H)  (E87.5) Hyperkalemia  Comment: BMP with K 3.6, Cr.  2.61, BUN 63, and GFR 22 on discharge  Plan: Recheck BMP, monitor for signs of worsening kidney function    (R33.9) Urinary retention  Comment: Retention with hematuria now s/p Barry catheter, Barry discontinued by urology  Plan: PVR every shift, monitor for retention    (R74.8) Elevated liver enzymes  Comment: ,  on presentation to the hospital, CT abdomen on 6/12 with notable for trace ascites which was felt to be more related to patient's edema than to liver etiology, Abd ultrasound with stones without cholecystitis  Plan: Recheck liver enzymes, outpatient GI follow-up, monitor    (L03.116) Left leg cellulitis  Comment: Stable, followed by wound MD  Plan: Continue current wound care, monitor    (I50.9) Acute congestive heart failure, unspecified heart failure type (H)  Comment: Stable, no reports of chest pain or shortness of breath  Plan: Monitor for symptoms    (M62.81) Generalized muscle weakness  (R53.81) Physical deconditioning  (R54) Frail elderly  Comment: Secondary to acute illness  Plan: OT/PT, fall  precautions    Orders:  BMP    Electronically signed by:  Ezequiel Kimball DNP,SABIHA,ALEJA.           The above note was completed in part using Dragon voice recognition software. Although reviewed after completion, some word and grammatical errors may occur. Please contact the author of this note with any questions.

## 2022-06-23 NOTE — TELEPHONE ENCOUNTER
[facility] Triage Nurse Telephone Encounter    Provider: SABIHA Dill CNP  Facility: [facility] Facility Type:  TCU    Caller: Sandy  Call Back Number: [phone]    Allergies:    Allergies   Allergen Reactions     No Known Drug Allergies         Reason for call: Nurse called to report that patient's BP has been running low.  BP this morning was 99/40 and pulse 58.  Nurse held Metoprolol.  Patient is on scheduled Metoprolol Succinate 50 mg po BID and Torsemide 20 mg po daily.  6/21-/62 pulse 85.  6/18-/49 pulse 79.  Nurse is wondering if parameters should be initiated for BP medications.     Verbal Order/Direction given by Provider: Hold Metoprolol for SBP <110 or pulse <60, check BMP tomorrow d/t SHERWIN, and check vital signs every shift.      Provider giving Order:  SABIHA Dill CNP    Verbal Order given to: Sandy Perdue RN

## 2022-06-23 NOTE — LETTER
"    6/23/2022        RE: Jose Gomez  [id number] [facility] [address]        [facility] GERIATRICS    Chief Complaint   Patient presents with     RECHECK     HPI:  Jose Gomez is a 89 year old  (1/21/1928), who is being seen today for an episodic care visit at: [facility] (U) [[id number]]. Today's concern is: Poor appetite      Pt admitted to the hospital on 5/18 for suspected acute heart failure exacerbation and left leg cellulitis. Chest x-ray showed pulmonary vascular congestion. Echo with EF 55-60% with no regional WMA; chronic moderate tricuspid regurgitation unchanged. Received IV Lasix BID following admission and later discontinued due to worsening kidney function. Received IV Rocephin for cellulitis.  Negative for DVT. [facility] Barry catheter for ongoing medical management, rehab, and skilled nursing care.    In TCU, Barry discontinued for TOV.  Was able to urinate initially though he retained later.    Readmitted to the hospital on 6/10 with acute kidney injury and hyperkalemia likely secondary to urinary retention.  Treated for SHERWIN and hyperkalemia and discharged back to TCU on 6/17 with Barry in place.    He is being seen today for routine TCU checks.  History limited due to underlying cognitive deficits.  No acute distress.  Looks pale.  States he is fine.  Staff reports poor appetite. Will consider IV fluids. Seen by urology on 6/21 and had Barry removed.  Verbal order given to the primary nurse for PVR every shift.  BP soft at times.  No reports of chest pain or shortness of breath.  High risk for readmission.    Allergies, and PMH/PSH reviewed in [facility] today.  REVIEW OF SYSTEMS:  Limited secondary to cognitive impairment but today pt reports fine.    Objective:   BP 99/40   Pulse 65   Temp 96.8  F (36  C)   Resp 15   Ht 1.753 m (5' 9\")   Wt 67.7 kg (149 lb 3.2 oz)   SpO2 97%   BMI 22.03 kg/m       Exam:  GENERAL " APPEARANCE: Well groomed, pale, appropriately dressed   RESPIRATORY: Clear to auscultation, even and unlabored, symmetrical chest wall expansion  CARDIOVASCULAR: RRR, bilateral trace peripheral edema, S1/S2 normal, peripheral pulses diminished  GASTROINTESTINAL: Soft, nontender, nondistended, bowel sounds positive  MUSCULOSKELETAL: No visible joint deformities, moves all extremities  INTEGUMENTARY: Wound to dorsal aspect of left foot covered and intact, left third toe wound dry without sings of infection   NEUROLOGICAL: Alert to self, memory loss, confusion  PSYCHOLOGICAL: Cooperative, calm     Recent labs in [facility] reviewed by me today.     Assessment/Plan:  (R63.0) Poor appetite  (primary encounter diagnosis)  Comment: Noo reports of nausea or emesis   Plan: Encourage p.o. intake, RD follow-up, consider IV fluids, check BMP, monitor    (N17.9,  N18.9) Acute kidney injury superimposed on chronic kidney disease (H)  (E87.5) Hyperkalemia  Comment: BMP with K 3.6, Cr.  2.61, BUN 63, and GFR 22 on discharge  Plan: Recheck BMP, monitor for signs of worsening kidney function    (R33.9) Urinary retention  Comment: Retention with hematuria now s/p Barry catheter, Barry discontinued by urology  Plan: PVR every shift, monitor for retention    (R74.8) Elevated liver enzymes  Comment: ,  on presentation to the hospital, CT abdomen on 6/12 with notable for trace ascites which was felt to be more related to patient's edema than to liver etiology, Abd ultrasound with stones without cholecystitis  Plan: Recheck liver enzymes, outpatient GI follow-up, monitor    (L03.116) Left leg cellulitis  Comment: Stable, followed by wound MD  Plan: Continue current wound care, monitor    (I50.9) Acute congestive heart failure, unspecified heart failure type (H)  Comment: Stable, no reports of chest pain or shortness of breath  Plan: Monitor for symptoms    (M62.81) Generalized muscle weakness  (R53.81) Physical  deconditioning  (R54) Frail elderly  Comment: Secondary to acute illness  Plan: OT/PT, fall precautions    Orders:  BMP    Electronically signed by:  Ezequiel Kimball DNP,SABIAH,BEN-BC.           The above note was completed in part using Dragon voice recognition software. Although reviewed after completion, some word and grammatical errors may occur. Please contact the author of this note with any questions.               Sincerely,        SABIHA Quiroz CNP

## 2022-07-19 NOTE — PROGRESS NOTES
Clinic Care Coordination Contact    Situation: Patient chart reviewed by care coordinator.    Background:   Patient admitted to [facility] from 6/10/2022 to 6/17/2022 with a diagnoses of CKD due to urinary retention, hematuria and discharged to [facility] TCU.    Assessment:   Patient was admitted to [facility] from [facility] TCU from 6/24/2022 to 7/12/2022 with a diagnoses of SHERWIN, chronic diastolic heart failure, permanent A. Fib, dementia and discharged to a hospice facility with [facility] services     Plan/Recommendations:   No further care coordination outreaches at this time.     Luzmaria Kline RN, BSN, PHN  Primary Care / Care Coordinator   [facility]  E-mail [email or web address]   [phone]

## 2023-01-30 NOTE — TELEPHONE ENCOUNTER
"Requested Prescriptions   Pending Prescriptions Disp Refills     amLODIPine (NORVASC) 5 MG tablet 90 tablet 0     Sig: Take 1 tablet (5 mg) by mouth daily    Calcium Channel Blockers Protocol  Failed    8/15/2018 11:33 AM       Failed - Normal serum creatinine on file in past 12 months    Recent Labs   Lab Test 07/09/18   CR  1.22*            Passed - Blood pressure under 140/90 in past 12 months    BP Readings from Last 3 Encounters:   07/23/18 136/54   07/11/18 148/76   07/05/18 142/79                Passed - Recent (12 mo) or future (30 days) visit within the authorizing provider's specialty    Patient had office visit in the last 12 months or has a visit in the next 30 days with authorizing provider or within the authorizing provider's specialty.  See \"Patient Info\" tab in inbasket, or \"Choose Columns\" in Meds & Orders section of the refill encounter.           Passed - Patient is age 18 or older        simvastatin (ZOCOR) 20 MG tablet 90 tablet 0     Sig: Take 1 tablet (20 mg) by mouth At Bedtime    Statins Protocol Passed    8/15/2018 11:33 AM       Passed - LDL on file in past 12 months    Recent Labs   Lab Test  04/27/18   0516   LDL  136*            Passed - No abnormal creatine kinase in past 12 months    No lab results found.            Passed - Recent (12 mo) or future (30 days) visit within the authorizing provider's specialty    Patient had office visit in the last 12 months or has a visit in the next 30 days with authorizing provider or within the authorizing provider's specialty.  See \"Patient Info\" tab in inbasket, or \"Choose Columns\" in Meds & Orders section of the refill encounter.           Passed - Patient is age 18 or older          " Report received from Delia ACEVEDO. Pt assessment complete. Reviewed POC for this evening including PRN pain medication available to her. Pt states no pain at this time. Pt states she has been ambulating in the room and voiding without difficulty. Offered assistance with breastfeeding, but pt declined. Pt is bottle feeding baby with Enfamil. Call light is within reach. Advised pt to call with needs at any time.

## 2023-04-10 NOTE — PLAN OF CARE
A/O x2, disoriented to place and situation. Denies pain. Frustrated most of the shift wants to go home . RLE edema. Reg diet. SBA.Sitter  in place. Incontinent of bowel and bladder.TCU placement pending.   Increase losartan to 100 mg daily

## 2023-06-09 NOTE — PLAN OF CARE
Goal Outcome Evaluation:    A&Ox2-3, fluctuates, VSS on RA, no complaints of pain. Tele running a fib CVR. MD paged last evening d/t low UOP. Only had 50cc out in 6 hours. Order was placed for bladder irrigation, did get some clots out and has been flowing much better since. PIV SL. Regular 2g K+ fair appetite. Up with assist 2. Discharge pending clinical improvement. Will continue plan of care.    Stable

## 2024-01-02 NOTE — PLAN OF CARE
NST Performed   Patient A&0x1 -2. Up with 1 GB and walker. Incont. Of bladder at times. Mild edema on R foot. DP +1. Cap refill less than 3.  Denies numbness and tingling. Lungs clear. On RA. Denies pain.  Full code.Tolerating regular diet.  7 day repeat asymptomatic covid test done this shift and sent to lab. Awaiting placement. Long arm sit. No IV

## 2024-05-27 NOTE — PROGRESS NOTES
Problem: PAIN - ADULT  Goal: Verbalizes/displays adequate comfort level or baseline comfort level  Description: Interventions:  - Encourage patient to monitor pain and request assistance  - Assess pain using appropriate pain scale  - Administer analgesics based on type and severity of pain and evaluate response  - Implement non-pharmacological measures as appropriate and evaluate response  - Consider cultural and social influences on pain and pain management  - Notify physician/advanced practitioner if interventions unsuccessful or patient reports new pain  Outcome: Progressing     Problem: SAFETY ADULT  Goal: Patient will remain free of falls  Description: INTERVENTIONS:  - Educate patient/family on patient safety including physical limitations  - Instruct patient to call for assistance with activity   - Consult OT/PT to assist with strengthening/mobility   - Keep Call bell within reach  - Keep bed low and locked with side rails adjusted as appropriate  - Keep care items and personal belongings within reach  - Initiate and maintain comfort rounds  - Make Fall Risk Sign visible to staff  - Offer Toileting every 2 Hours, in advance of need  - Initiate/Maintain bed/chair alarm  - Obtain necessary fall risk management equipment: call bell, gripper socks, walker, bedside commode, bed/chair alarm  - Apply yellow socks and bracelet for high fall risk patients  - Consider moving patient to room near nurses station  Outcome: Progressing     Problem: CONFUSION/THOUGHT DISTURBANCE  Goal: Thought disturbances (confusion, delirium, depression, dementia or psychosis) are managed to maintain or return to baseline mental status and functional level  Description: INTERVENTIONS:  - Assess for possible contributors to  thought disturbance, including but not limited to medications, infection, impaired vision or hearing, underlying metabolic abnormalities, dehydration, respiratory compromise,  psychiatric diagnoses and notify  A&OX2 to self and place only. AX1 g/b walker. O2=RA. Diet=regular. Denies pain. Frequently incont. No PIV. Barrier cream to buttocks for blanchable redness to sacrum/coccyx. Pt does have good bed mobility. Discharge to TCU pending placement.    attending PHYSICAN/AP  - Monitor and intervene to maintain adequate nutrition, hydration, elimination, sleep and activity  - Decrease environmental stimuli, including noise as appropriate.  - Provide frequent contacts to provide refocusing, direction and reassurance as needed. Approach patient calmly with eye contact and at their level.  - [place] high risk fall precautions, aspiration precautions and other safety measures, as indicated  - If delirium suspected, notify physician/AP of change in condition and request immediate in-person evaluation  - Pursue consults as appropriate including Geriatric (campus dependent), OT for cognitive evaluation/activity planning, psychiatric, pastoral care, etc.  Outcome: Progressing     Problem: DISCHARGE PLANNING  Goal: Discharge to home or other facility with appropriate resources  Description: INTERVENTIONS:  - Identify barriers to discharge w/patient and caregiver  - Arrange for needed discharge resources and transportation as appropriate  - Identify discharge learning needs (meds, wound care, etc.)  - Refer to Case Management Department for coordinating discharge planning if the patient needs post-hospital services based on physician/advanced practitioner order or complex needs related to functional status, cognitive ability, or social support system  Outcome: Progressing
